# Patient Record
Sex: MALE | Race: WHITE | NOT HISPANIC OR LATINO | Employment: OTHER | URBAN - METROPOLITAN AREA
[De-identification: names, ages, dates, MRNs, and addresses within clinical notes are randomized per-mention and may not be internally consistent; named-entity substitution may affect disease eponyms.]

---

## 2017-01-31 RX ORDER — AMLODIPINE BESYLATE 10 MG/1
10 TABLET ORAL DAILY
COMMUNITY
End: 2017-02-07 | Stop reason: SDUPTHER

## 2017-01-31 RX ORDER — TACROLIMUS 1 MG/1
1 CAPSULE ORAL 2 TIMES DAILY
COMMUNITY
End: 2017-02-07 | Stop reason: SDUPTHER

## 2017-01-31 RX ORDER — PRAVASTATIN SODIUM 40 MG
40 TABLET ORAL DAILY
COMMUNITY
End: 2017-02-07 | Stop reason: SDUPTHER

## 2017-01-31 RX ORDER — INSULIN GLARGINE 100 [IU]/ML
100 INJECTION, SOLUTION SUBCUTANEOUS DAILY
COMMUNITY
End: 2017-02-07 | Stop reason: SDUPTHER

## 2017-01-31 RX ORDER — ISOSORBIDE DINITRATE 30 MG/1
30 TABLET ORAL 4 TIMES DAILY
COMMUNITY
End: 2017-02-07 | Stop reason: SDUPTHER

## 2017-01-31 RX ORDER — FENOFIBRATE 145 MG/1
134 TABLET, COATED ORAL DAILY
COMMUNITY
End: 2017-02-07 | Stop reason: SDUPTHER

## 2017-01-31 RX ORDER — CLOTRIMAZOLE 1 G/ML
SOLUTION TOPICAL 2 TIMES DAILY
COMMUNITY
End: 2017-02-07 | Stop reason: SDUPTHER

## 2017-01-31 RX ORDER — BLOOD SUGAR DIAGNOSTIC
STRIP MISCELLANEOUS
COMMUNITY
End: 2017-02-07 | Stop reason: SDUPTHER

## 2017-01-31 RX ORDER — NEBIVOLOL 20 MG/1
20 TABLET ORAL DAILY
COMMUNITY
End: 2017-02-07 | Stop reason: SDUPTHER

## 2017-01-31 RX ORDER — ASPIRIN 81 MG/1
81 TABLET ORAL DAILY
COMMUNITY
End: 2017-02-07 | Stop reason: SDUPTHER

## 2017-01-31 RX ORDER — VENLAFAXINE HYDROCHLORIDE 150 MG/1
150 CAPSULE, EXTENDED RELEASE ORAL DAILY
COMMUNITY
End: 2017-02-07 | Stop reason: SDUPTHER

## 2017-01-31 RX ORDER — NITROGLYCERIN 0.4 MG/1
0.4 TABLET SUBLINGUAL
COMMUNITY
End: 2017-02-07 | Stop reason: SDUPTHER

## 2017-01-31 RX ORDER — RANOLAZINE 1000 MG/1
1000 TABLET, EXTENDED RELEASE ORAL 2 TIMES DAILY
COMMUNITY
End: 2017-02-07 | Stop reason: SDUPTHER

## 2017-01-31 RX ORDER — MYCOPHENOLIC ACID 360 MG/1
360 TABLET, DELAYED RELEASE ORAL EVERY 12 HOURS SCHEDULED
COMMUNITY
End: 2017-02-07 | Stop reason: SDUPTHER

## 2017-01-31 RX ORDER — LORAZEPAM 0.5 MG/1
0.5 TABLET ORAL EVERY 8 HOURS PRN
COMMUNITY
End: 2017-02-07 | Stop reason: SDUPTHER

## 2017-01-31 RX ORDER — OMEPRAZOLE 40 MG/1
40 CAPSULE, DELAYED RELEASE ORAL DAILY
COMMUNITY
End: 2017-02-07 | Stop reason: SDUPTHER

## 2017-01-31 RX ORDER — PENTOXIFYLLINE 400 MG/1
400 TABLET, EXTENDED RELEASE ORAL
COMMUNITY
End: 2017-02-07 | Stop reason: SDUPTHER

## 2017-01-31 RX ORDER — MAGNESIUM 30 MG
30 TABLET ORAL 2 TIMES DAILY
COMMUNITY
End: 2017-02-07 | Stop reason: SDUPTHER

## 2017-01-31 RX ORDER — CYCLOBENZAPRINE HCL 10 MG
10 TABLET ORAL 3 TIMES DAILY PRN
COMMUNITY
End: 2017-02-07 | Stop reason: SDUPTHER

## 2017-02-01 ENCOUNTER — OFFICE VISIT (OUTPATIENT)
Dept: NEUROSURGERY | Facility: CLINIC | Age: 62
End: 2017-02-01

## 2017-02-01 VITALS
BODY MASS INDEX: 40.5 KG/M2 | TEMPERATURE: 97.2 F | WEIGHT: 299 LBS | DIASTOLIC BLOOD PRESSURE: 78 MMHG | HEIGHT: 72 IN | SYSTOLIC BLOOD PRESSURE: 142 MMHG | HEART RATE: 73 BPM

## 2017-02-01 DIAGNOSIS — M47.812 CERVICAL SPONDYLOSIS WITHOUT MYELOPATHY: ICD-10-CM

## 2017-02-01 DIAGNOSIS — M47.817 LUMBOSACRAL SPONDYLOSIS WITHOUT MYELOPATHY: ICD-10-CM

## 2017-02-01 DIAGNOSIS — G56.01 CARPAL TUNNEL SYNDROME OF RIGHT WRIST: Primary | ICD-10-CM

## 2017-02-01 DIAGNOSIS — M54.50 CHRONIC BILATERAL LOW BACK PAIN WITHOUT SCIATICA: ICD-10-CM

## 2017-02-01 DIAGNOSIS — G89.29 CHRONIC BILATERAL LOW BACK PAIN WITHOUT SCIATICA: ICD-10-CM

## 2017-02-01 DIAGNOSIS — M48.061 SPINAL STENOSIS, LUMBAR REGION, WITHOUT NEUROGENIC CLAUDICATION: ICD-10-CM

## 2017-02-01 DIAGNOSIS — M54.2 NECK PAIN: ICD-10-CM

## 2017-02-01 PROBLEM — M79.602 PAIN IN BOTH UPPER EXTREMITIES: Status: ACTIVE | Noted: 2017-02-01

## 2017-02-01 PROBLEM — M79.601 PAIN IN BOTH UPPER EXTREMITIES: Status: ACTIVE | Noted: 2017-02-01

## 2017-02-01 PROCEDURE — 99204 OFFICE O/P NEW MOD 45 MIN: CPT | Performed by: NEUROLOGICAL SURGERY

## 2017-02-01 NOTE — PROGRESS NOTES
Patient: Mahendra Yates  :  1955  Chart #:  [id number]    Date of Service: 17    Chief Complaint:   Chief Complaint   Patient presents with   • Neck Pain     lower, legs, neck pain       Neck Pain    Chronicity: Patient is new with me today.  He is a pleasant 62 year old male who presents today with low back pain and cervical pain. The current episode started more than 1 year ago (This has been bothersome for the last couple of years now.). The problem occurs intermittently. Progression since onset: Patient states that he has upper extremity pain. The pain is associated with nothing (He has been dropping objects because of the numbness in his upper extremities.). The pain is at a severity of 6/10 (Patient has numbness in his first 3 digits on his hands bilaterally.). The pain is mild (His pain is mild to moderate.). Exacerbated by: He states that when he is ambulatory is when his lumbar region starts hurting. The pain is same all the time (He did have some problem with his gait, however he found to have had a inner ear infection.  He does have trouble when he is ambulatory, but that is due to his small blood vessel disease.). Stiffness is present in the morning. Associated symptoms include chest pain and weakness. He has tried ice, bed rest and acetaminophen (Patient has been to physcial therapy with little relief.) for the symptoms. The treatment provided no relief.     He has numbness in both hands R>L;  The thumb, IF and MF are most affected;  He has intermittent neck and low back pain;  Walking increases axial pain;  His walking is limited by heart disease and angina.      Radiographic Images:   MRI of the cervical spine shows normal alignment of the cervical spine.  He has dessication of all the cervical and upper thoracic disc. He has mild narrowing of the cervical spinal canal.  There is no compression or signal change.  He has a disc herniation and osteophyte at C5-C6 on the right.      Lumbar  "MRI dated 06-15-16 shows he has degenerative disc disease in the upper lumbar region particularly at the L1-L2, L2-L3, L3 and L4.  He has a congential narrow spinal canal.  There is no disc herniation or modic change.    He had an EMG/NCV study of the arms on 1/04/17 and was told there was evidence of CTS.    Past Medical History   Diagnosis Date   • Atherosclerosis    • B12 deficiency    • Chronic headaches    • Coronary artery disease    • Diabetes mellitus    • Gastroesophageal reflux disease    • Hyperlipidemia    • Hypertension    • Sleep apnea      Current Outpatient Prescriptions   Medication Sig Dispense Refill   • amLODIPine (NORVASC) 10 MG tablet Take 10 mg by mouth Daily.     • aspirin 81 MG EC tablet Take 81 mg by mouth Daily.     • clotrimazole (LOTRIMIN) 1 % external solution Apply  topically 2 (Two) Times a Day.     • cyclobenzaprine (FLEXERIL) 10 MG tablet Take 10 mg by mouth 3 (Three) Times a Day As Needed for muscle spasms.     • fenofibrate (TRICOR) 145 MG tablet Take 134 mg by mouth Daily.     • insulin glargine (LANTUS) 100 UNIT/ML injection Inject 100 Units under the skin Daily.     • Insulin Lispro (HUMALOG KWIKPEN) 100 UNIT/ML solution pen-injector Inject  under the skin.     • Insulin Syringe-Needle U-100 (B-D INSULIN SYRINGE) 31G X 5/16\" 0.3 ML misc      • isosorbide dinitrate (ISORDIL) 30 MG tablet Take 30 mg by mouth 4 (Four) Times a Day.     • loratadine-pseudoephedrine (CLARITIN-D 12 HOUR) 5-120 MG per 12 hr tablet Take 1 tablet by mouth 2 (Two) Times a Day.     • LORazepam (ATIVAN) 0.5 MG tablet Take 0.5 mg by mouth Every 8 (Eight) Hours As Needed for anxiety.     • magnesium 30 MG tablet Take 30 mg by mouth 2 (Two) Times a Day.     • Multiple Vitamin (MULTI-VITAMINS PO) Take  by mouth.     • mycophenolate (MYFORTIC) 360 MG tablet delayed-release EC tablet Take 360 mg by mouth Every 12 (Twelve) Hours.     • nebivolol (BYSTOLIC) 20 MG tablet Take 20 mg by mouth Daily.     • " nitroglycerin (NITROSTAT) 0.4 MG SL tablet Place 0.4 mg under the tongue Every 5 (Five) Minutes As Needed for chest pain. Take no more than 3 doses in 15 minutes.     • omeprazole (priLOSEC) 40 MG capsule Take 40 mg by mouth Daily.     • pentoxifylline (TRENtal) 400 MG CR tablet Take 400 mg by mouth 3 (Three) Times a Day With Meals.     • pravastatin (PRAVACHOL) 40 MG tablet Take 40 mg by mouth Daily.     • ranolazine (RANEXA) 1000 MG 12 hr tablet Take 1,000 mg by mouth 2 (Two) Times a Day.     • tacrolimus (PROGRAF) 1 MG capsule Take 1 mg by mouth 2 (Two) Times a Day.     • venlafaxine XR (EFFEXOR-XR) 150 MG 24 hr capsule Take 150 mg by mouth Daily.       No current facility-administered medications for this visit.      Social History     Social History   • Marital status:      Spouse name: N/A   • Number of children: N/A   • Years of education: N/A     Social History Main Topics   • Smoking status: Former Smoker   • Smokeless tobacco: Not on file   • Alcohol use Not on file   • Drug use: Not on file   • Sexual activity: Not on file     Other Topics Concern   • Not on file     Social History Narrative     No family history on file.  Past Surgical History   Procedure Laterality Date   • Liver transplantation     • Coronary artery bypass graft       Review of Systems   Constitutional: Positive for activity change and fatigue.   HENT: Positive for ear discharge, ear pain and tinnitus.    Respiratory: Positive for shortness of breath.    Cardiovascular: Positive for chest pain and leg swelling.   Endocrine: Positive for polydipsia.   Genitourinary: Positive for difficulty urinating.   Musculoskeletal: Positive for arthralgias, back pain, neck pain and neck stiffness.   Allergic/Immunologic: Positive for environmental allergies and immunocompromised state.   Neurological: Positive for weakness and light-headedness.   Psychiatric/Behavioral: Positive for confusion, dysphoric mood and sleep disturbance.   All other  "systems reviewed and are negative.    Vitals:    02/01/17 0918   BP: 142/78   BP Location: Right arm   Patient Position: Sitting   Cuff Size: Adult   Pulse: 73   Temp: 97.2 °F (36.2 °C)   TempSrc: Temporal Artery    Weight: 299 lb (136 kg)   Height: 72\" (182.9 cm)     Physical Exam  Neurologic Exam  Physical Exam   Constitutional: He is oriented to person, place, and time. Vital signs are normal. He appears well-developed and well-nourished. No distress.   Neat obese healthy male    HENT:   Head: Normocephalic.   Right Ear: Hearing normal.   Left Ear: Hearing normal.   Mouth/Throat: Uvula is midline, oropharynx is clear and moist and mucous membranes are normal. He has missing teeth.   Eyes: Conjunctivae, EOM and lids are normal. Pupils are equal, round, and reactive to light.   Fundoscopic exam:  The right eye shows no papilledema.   The left eye shows no papilledema.   Pupils 1 mm; Fundi normal   Neck: Trachea normal and normal range of motion. No thyroid mass present. No Spurling's or L'Hermittes signs.  Cardiovascular: Regular rhythm and normal heart sounds.   No murmur heard.  Pulses:  Carotid pulses are 2+ on the right side, and 2+ on the left side.  Radial pulses are 2+ on the right side, and 2+ on the left side.   Dorsalis pedis pulses are 2+ on the right side, and 2+ on the left side.   Pulmonary/Chest: Effort normal and breath sounds normal.   Musculoskeletal:   Lumbar back: He exhibits pain. He exhibits normal range of motion, no deformity and no spasm.   Mild stiffness; SLR increased low back; Hip ROM normal        Neurologic Exam      Mental Status   Oriented to person, place, and time.   Attention: normal. Concentration: normal.   Speech: speech is normal   Level of consciousness: alert  Knowledge: good and consistent with education.   Normal comprehension.      Cranial Nerves   Cranial nerves II through XII intact.      CN III, IV, VI   Pupils are equal, round, and reactive to light.  Extraocular " motions are normal.      Motor Exam   Muscle bulk: normal  Overall muscle tone: normal  No Pronator Drift    Strength   Strength 5/5 throughout.      Sensory Exam   Light touch normal.   Proprioception normal.      Gait, Coordination, and Reflexes      Gait  Gait: normal     Tremor   Resting tremor: absent  Intention tremor: absent  Action tremor: absent     Reflexes   Right biceps: 1+  Left biceps: +  Right triceps: 2+  Left triceps: 2+  Right patellar: 2+  Left patellar: 2+  Right achilles: 0  Left achilles: 0  No Babinski signs  Right Poe: absent  Left Poe: absent  Right ankle clonus: absent  Left ankle clonus: absent  ELY normal; R handed   Bilateral Tinel's with percussion of the wrists, R > L  R Phalen's sign present     Mahendra was seen today for neck pain.    Diagnoses and all orders for this visit:    Carpal tunnel syndrome of right wrist    Neck pain    Cervical spondylosis without myelopathy    Chronic bilateral low back pain without sciatica    Lumbosacral spondylosis without myelopathy    Spinal stenosis, lumbar region, without neurogenic claudication      Plan: I recommend R carpal tunnel release;  I don't recommend any spine surgery at this time.  I have discussed this with the patient.  I described the operative procedure in detail as well as the indications, alternatives, and expected postoperative course and results. I described the potential risks and complications of surgery in detail. All questions were answered. The patient has indicated understanding of the planned procedure, accepted the risks, and wishes to proceed with surgery. No guarantee of results was given to the patient. The operative permit will be completed prior to surgery.      Luis Hayden MD

## 2017-02-03 ENCOUNTER — PREP FOR SURGERY (OUTPATIENT)
Dept: NEUROSURGERY | Facility: CLINIC | Age: 62
End: 2017-02-03

## 2017-02-03 DIAGNOSIS — G56.01 CARPAL TUNNEL SYNDROME OF RIGHT WRIST: Primary | ICD-10-CM

## 2017-02-07 ENCOUNTER — ANESTHESIA EVENT (OUTPATIENT)
Dept: PERIOP | Facility: HOSPITAL | Age: 62
End: 2017-02-07

## 2017-02-07 ENCOUNTER — APPOINTMENT (OUTPATIENT)
Dept: PREADMISSION TESTING | Facility: HOSPITAL | Age: 62
End: 2017-02-07

## 2017-02-07 VITALS — HEIGHT: 72 IN | BODY MASS INDEX: 40.52 KG/M2 | WEIGHT: 299.16 LBS

## 2017-02-07 LAB
DEPRECATED RDW RBC AUTO: 40.8 FL (ref 37–54)
ERYTHROCYTE [DISTWIDTH] IN BLOOD BY AUTOMATED COUNT: 13.1 % (ref 11.3–14.5)
HCT VFR BLD AUTO: 41.8 % (ref 38.9–50.9)
HGB BLD-MCNC: 14.3 G/DL (ref 13.1–17.5)
MCH RBC QN AUTO: 29.3 PG (ref 27–31)
MCHC RBC AUTO-ENTMCNC: 34.2 G/DL (ref 32–36)
MCV RBC AUTO: 85.7 FL (ref 80–99)
MRSA DNA SPEC QL NAA+PROBE: NEGATIVE
PLATELET # BLD AUTO: 348 10*3/MM3 (ref 150–450)
PMV BLD AUTO: 10.4 FL (ref 6–12)
POTASSIUM BLD-SCNC: 4.2 MMOL/L (ref 3.5–5.5)
RBC # BLD AUTO: 4.88 10*6/MM3 (ref 4.2–5.76)
WBC NRBC COR # BLD: 5.92 10*3/MM3 (ref 3.5–10.8)

## 2017-02-07 PROCEDURE — 85027 COMPLETE CBC AUTOMATED: CPT | Performed by: NEUROLOGICAL SURGERY

## 2017-02-07 PROCEDURE — 93005 ELECTROCARDIOGRAM TRACING: CPT

## 2017-02-07 PROCEDURE — 93010 ELECTROCARDIOGRAM REPORT: CPT | Performed by: INTERNAL MEDICINE

## 2017-02-07 PROCEDURE — 36415 COLL VENOUS BLD VENIPUNCTURE: CPT | Performed by: NEUROLOGICAL SURGERY

## 2017-02-07 PROCEDURE — 84132 ASSAY OF SERUM POTASSIUM: CPT | Performed by: ANESTHESIOLOGY

## 2017-02-07 PROCEDURE — 87641 MR-STAPH DNA AMP PROBE: CPT | Performed by: NEUROLOGICAL SURGERY

## 2017-02-07 RX ORDER — ASPIRIN 81 MG/1
81 TABLET ORAL DAILY
COMMUNITY

## 2017-02-07 RX ORDER — INSULIN GLARGINE 100 [IU]/ML
55 INJECTION, SOLUTION SUBCUTANEOUS 2 TIMES DAILY
COMMUNITY
End: 2021-09-07

## 2017-02-07 RX ORDER — NITROGLYCERIN 0.4 MG/1
0.4 TABLET SUBLINGUAL
COMMUNITY
End: 2022-01-03 | Stop reason: SDUPTHER

## 2017-02-07 RX ORDER — PENTOXIFYLLINE 400 MG/1
400 TABLET, EXTENDED RELEASE ORAL
COMMUNITY
End: 2022-01-07 | Stop reason: SDUPTHER

## 2017-02-07 RX ORDER — MULTIVIT WITH MINERALS/LUTEIN
1000 TABLET ORAL DAILY
COMMUNITY
End: 2020-02-20

## 2017-02-07 RX ORDER — ISOSORBIDE MONONITRATE 30 MG/1
30 TABLET, EXTENDED RELEASE ORAL DAILY
COMMUNITY
End: 2020-02-20

## 2017-02-07 RX ORDER — OMEPRAZOLE 40 MG/1
40 CAPSULE, DELAYED RELEASE ORAL 2 TIMES DAILY
COMMUNITY
End: 2022-01-03 | Stop reason: SDUPTHER

## 2017-02-07 RX ORDER — PRAVASTATIN SODIUM 40 MG
40 TABLET ORAL DAILY
COMMUNITY
End: 2020-02-20

## 2017-02-07 RX ORDER — LORAZEPAM 0.5 MG/1
0.5 TABLET ORAL DAILY
COMMUNITY
End: 2018-12-13

## 2017-02-07 RX ORDER — TACROLIMUS 1 MG/1
1 CAPSULE ORAL 2 TIMES DAILY
COMMUNITY
End: 2022-06-20

## 2017-02-07 RX ORDER — FENOFIBRATE 134 MG/1
134 CAPSULE ORAL EVERY EVENING
COMMUNITY
End: 2022-11-21

## 2017-02-07 RX ORDER — FEXOFENADINE HCL 180 MG/1
180 TABLET ORAL DAILY
COMMUNITY
End: 2018-12-13

## 2017-02-07 RX ORDER — NEBIVOLOL 20 MG/1
20 TABLET ORAL DAILY
COMMUNITY
End: 2022-01-03 | Stop reason: SDUPTHER

## 2017-02-07 RX ORDER — RANOLAZINE 1000 MG/1
1000 TABLET, EXTENDED RELEASE ORAL 2 TIMES DAILY
COMMUNITY
End: 2021-11-17 | Stop reason: ALTCHOICE

## 2017-02-07 RX ORDER — AMLODIPINE BESYLATE 10 MG/1
10 TABLET ORAL DAILY
COMMUNITY
End: 2022-01-07 | Stop reason: SDUPTHER

## 2017-02-07 NOTE — PAT
"Jose Raul hose measurements:  Length: 24  Width: 20  Spoke with Dr. Mendieta re: patient's history/EKG. Patient is \"good\" for surgery  "

## 2017-02-08 ENCOUNTER — RESULTS ENCOUNTER (OUTPATIENT)
Dept: NEUROSURGERY | Facility: CLINIC | Age: 62
End: 2017-02-08

## 2017-02-08 ENCOUNTER — HOSPITAL ENCOUNTER (OUTPATIENT)
Facility: HOSPITAL | Age: 62
Discharge: HOME | End: 2017-02-08
Attending: NEUROLOGICAL SURGERY | Admitting: NEUROLOGICAL SURGERY

## 2017-02-08 ENCOUNTER — ANESTHESIA (OUTPATIENT)
Dept: PERIOP | Facility: HOSPITAL | Age: 62
End: 2017-02-08

## 2017-02-08 VITALS
TEMPERATURE: 97.3 F | HEART RATE: 82 BPM | DIASTOLIC BLOOD PRESSURE: 79 MMHG | WEIGHT: 299 LBS | RESPIRATION RATE: 18 BRPM | BODY MASS INDEX: 40.5 KG/M2 | HEIGHT: 72 IN | OXYGEN SATURATION: 97 % | SYSTOLIC BLOOD PRESSURE: 153 MMHG

## 2017-02-08 DIAGNOSIS — G56.01 CARPAL TUNNEL SYNDROME OF RIGHT WRIST: ICD-10-CM

## 2017-02-08 PROBLEM — G56.00 CARPAL TUNNEL SYNDROME: Status: ACTIVE | Noted: 2017-02-08

## 2017-02-08 LAB — GLUCOSE BLDC GLUCOMTR-MCNC: 226 MG/DL (ref 70–130)

## 2017-02-08 PROCEDURE — 25010000002 DEXAMETHASONE PER 1 MG: Performed by: NURSE ANESTHETIST, CERTIFIED REGISTERED

## 2017-02-08 PROCEDURE — 25010000002 ONDANSETRON PER 1 MG: Performed by: NURSE ANESTHETIST, CERTIFIED REGISTERED

## 2017-02-08 PROCEDURE — 25010000002 PROPOFOL 10 MG/ML EMULSION: Performed by: NURSE ANESTHETIST, CERTIFIED REGISTERED

## 2017-02-08 PROCEDURE — 64721 CARPAL TUNNEL SURGERY: CPT | Performed by: NEUROLOGICAL SURGERY

## 2017-02-08 PROCEDURE — 25010000002 FENTANYL CITRATE (PF) 100 MCG/2ML SOLUTION: Performed by: NURSE ANESTHETIST, CERTIFIED REGISTERED

## 2017-02-08 PROCEDURE — 25010000003 CEFAZOLIN IN DEXTROSE 2-4 GM/100ML-% SOLUTION: Performed by: NEUROLOGICAL SURGERY

## 2017-02-08 PROCEDURE — 82962 GLUCOSE BLOOD TEST: CPT

## 2017-02-08 RX ORDER — DEXAMETHASONE SODIUM PHOSPHATE 10 MG/ML
INJECTION INTRAMUSCULAR; INTRAVENOUS AS NEEDED
Status: DISCONTINUED | OUTPATIENT
Start: 2017-02-08 | End: 2017-02-08 | Stop reason: SURG

## 2017-02-08 RX ORDER — SODIUM CHLORIDE 0.9 % (FLUSH) 0.9 %
1-10 SYRINGE (ML) INJECTION AS NEEDED
Status: DISCONTINUED | OUTPATIENT
Start: 2017-02-08 | End: 2017-02-08 | Stop reason: HOSPADM

## 2017-02-08 RX ORDER — ONDANSETRON 2 MG/ML
INJECTION INTRAMUSCULAR; INTRAVENOUS AS NEEDED
Status: DISCONTINUED | OUTPATIENT
Start: 2017-02-08 | End: 2017-02-08 | Stop reason: SURG

## 2017-02-08 RX ORDER — TRAMADOL HYDROCHLORIDE 50 MG/1
50 TABLET ORAL EVERY 6 HOURS PRN
Qty: 60 TABLET | Refills: 2 | Status: SHIPPED | OUTPATIENT
Start: 2017-02-08 | End: 2018-12-13

## 2017-02-08 RX ORDER — PROPOFOL 10 MG/ML
VIAL (ML) INTRAVENOUS CONTINUOUS PRN
Status: DISCONTINUED | OUTPATIENT
Start: 2017-02-08 | End: 2017-02-08 | Stop reason: SURG

## 2017-02-08 RX ORDER — PROPOFOL 10 MG/ML
VIAL (ML) INTRAVENOUS AS NEEDED
Status: DISCONTINUED | OUTPATIENT
Start: 2017-02-08 | End: 2017-02-08 | Stop reason: SURG

## 2017-02-08 RX ORDER — SODIUM CHLORIDE, SODIUM LACTATE, POTASSIUM CHLORIDE, CALCIUM CHLORIDE 600; 310; 30; 20 MG/100ML; MG/100ML; MG/100ML; MG/100ML
9 INJECTION, SOLUTION INTRAVENOUS CONTINUOUS
Status: DISCONTINUED | OUTPATIENT
Start: 2017-02-08 | End: 2017-02-08 | Stop reason: HOSPADM

## 2017-02-08 RX ORDER — LIDOCAINE HYDROCHLORIDE 10 MG/ML
1 INJECTION, SOLUTION EPIDURAL; INFILTRATION; INTRACAUDAL; PERINEURAL ONCE
Status: DISCONTINUED | OUTPATIENT
Start: 2017-02-08 | End: 2017-02-08 | Stop reason: HOSPADM

## 2017-02-08 RX ORDER — MAGNESIUM HYDROXIDE 1200 MG/15ML
LIQUID ORAL AS NEEDED
Status: DISCONTINUED | OUTPATIENT
Start: 2017-02-08 | End: 2017-02-08 | Stop reason: HOSPADM

## 2017-02-08 RX ORDER — CEFAZOLIN SODIUM 2 G/100ML
2 INJECTION, SOLUTION INTRAVENOUS ONCE
Status: COMPLETED | OUTPATIENT
Start: 2017-02-08 | End: 2017-02-08

## 2017-02-08 RX ORDER — FENTANYL CITRATE 50 UG/ML
INJECTION, SOLUTION INTRAMUSCULAR; INTRAVENOUS AS NEEDED
Status: DISCONTINUED | OUTPATIENT
Start: 2017-02-08 | End: 2017-02-08 | Stop reason: SURG

## 2017-02-08 RX ORDER — HYDROMORPHONE HYDROCHLORIDE 1 MG/ML
0.5 INJECTION, SOLUTION INTRAMUSCULAR; INTRAVENOUS; SUBCUTANEOUS
Status: DISCONTINUED | OUTPATIENT
Start: 2017-02-08 | End: 2017-02-08 | Stop reason: HOSPADM

## 2017-02-08 RX ORDER — FAMOTIDINE 10 MG/ML
20 INJECTION, SOLUTION INTRAVENOUS ONCE
Status: DISCONTINUED | OUTPATIENT
Start: 2017-02-08 | End: 2017-02-08

## 2017-02-08 RX ORDER — FAMOTIDINE 20 MG/1
20 TABLET, FILM COATED ORAL ONCE
Status: DISCONTINUED | OUTPATIENT
Start: 2017-02-08 | End: 2017-02-08 | Stop reason: HOSPADM

## 2017-02-08 RX ORDER — FAMOTIDINE 10 MG/ML
20 INJECTION, SOLUTION INTRAVENOUS ONCE
Status: DISCONTINUED | OUTPATIENT
Start: 2017-02-08 | End: 2017-02-08 | Stop reason: HOSPADM

## 2017-02-08 RX ORDER — LIDOCAINE HYDROCHLORIDE 10 MG/ML
INJECTION, SOLUTION INFILTRATION; PERINEURAL AS NEEDED
Status: DISCONTINUED | OUTPATIENT
Start: 2017-02-08 | End: 2017-02-08 | Stop reason: HOSPADM

## 2017-02-08 RX ORDER — FENTANYL CITRATE 50 UG/ML
50 INJECTION, SOLUTION INTRAMUSCULAR; INTRAVENOUS
Status: DISCONTINUED | OUTPATIENT
Start: 2017-02-08 | End: 2017-02-08 | Stop reason: HOSPADM

## 2017-02-08 RX ADMIN — PROPOFOL 75 MG: 10 INJECTION, EMULSION INTRAVENOUS at 15:47

## 2017-02-08 RX ADMIN — CEFAZOLIN SODIUM 2 G: 2 INJECTION, SOLUTION INTRAVENOUS at 15:42

## 2017-02-08 RX ADMIN — FENTANYL CITRATE 50 MCG: 50 INJECTION, SOLUTION INTRAMUSCULAR; INTRAVENOUS at 16:03

## 2017-02-08 RX ADMIN — DEXAMETHASONE SODIUM PHOSPHATE 4 MG: 10 INJECTION INTRAMUSCULAR; INTRAVENOUS at 16:18

## 2017-02-08 RX ADMIN — PROPOFOL 100 MCG/KG/MIN: 10 INJECTION, EMULSION INTRAVENOUS at 15:47

## 2017-02-08 RX ADMIN — ONDANSETRON 4 MG: 2 INJECTION INTRAMUSCULAR; INTRAVENOUS at 16:18

## 2017-02-08 RX ADMIN — FENTANYL CITRATE 50 MCG: 50 INJECTION, SOLUTION INTRAMUSCULAR; INTRAVENOUS at 16:15

## 2017-02-08 NOTE — PLAN OF CARE
Problem: Perioperative Period (Adult)  Goal: Signs and Symptoms of Listed Potential Problems Will be Absent or Manageable (Perioperative Period)  Outcome: Ongoing (interventions implemented as appropriate)    02/08/17 1356   Perioperative Period   Problems Assessed (Perioperative Period) all   Problems Present (Perioperative Period) none         02/08/17 1356   Perioperative Period   Problems Assessed (Perioperative Period) all   Problems Present (Perioperative Period) none

## 2017-02-08 NOTE — BRIEF OP NOTE
CARPAL TUNNEL RELEASE  Procedure Note    Mahendra Yates  2/8/2017    Pre-op Diagnosis:   R carpal tunnel syndrome    Post-op Diagnosis:     HECTOR    Procedure/CPT® Codes:      Procedure(s):  CARPAL TUNNEL RELEASE RIGHT     Surgeon(s):  Luis Hayden MD    Anesthesia: Regional    Staff:   Circulator: Pattie CLAUDIO RN; Irais Cisneros RN  Scrub Person: Analy Grewal    Estimated Blood Loss: * No values recorded between 2/8/2017  3:41 PM and 2/8/2017  4:35 PM *  0 ml    Specimens:                * No specimens in log *      Drains:           Findings: thick transverse carpal ligament    Complications: none      Luis Hayden MD     Date: 2/8/2017  Time: 4:46 PM

## 2017-02-08 NOTE — PLAN OF CARE
Problem: Patient Care Overview (Adult)  Goal: Plan of Care Review  Outcome: Ongoing (interventions implemented as appropriate)    02/08/17 1305   Coping/Psychosocial Response Interventions   Plan Of Care Reviewed With patient   Patient Care Overview   Progress improving

## 2017-02-08 NOTE — H&P
CHIEF COMPLAINT: Numbness in the hands.     HISTORY OF PRESENT ILLNESS: This 62-year-old gentleman has a history of numbness in both hands. The right is more affected than the left. The numbness is in the thumb, index finger, and middle finger mostly. He has had some intermittent neck and back pain.     The patient has had an EMG/NCV study with evidence of carpal tunnel syndrome.     PAST MEDICAL HISTORY:  1.  This patient has a history of neck and low back pain also.   2.  He has a history of atherosclerosis.   3.  B12 deficiency.   4.  Chronic headaches.   5.  Coronary artery disease.   6.  Diabetes mellitus.   7.  Gastroesophageal reflux disease.   8.  Hyperlipidemia.   9.  Hypertension.    10.  Sleep apnea.     CURRENT MEDICATIONS:  1.  Norvasc 10 mg daily.   2.  Aspirin 81 mg daily.   3.  Lotrimin solution.    4.  Flexeril 10 mg 3 times daily as needed.   5.  TriCor 145 mg daily.   6.  Lantus insulin.   7.  Humalog insulin.   8.  Isosorbide dinitrate 30 mg 4 times daily.   9.  Claritin-D b.i.d.   10.  Ativan 0.5 mg q.8 h. p.r.n.   11.  Magnesium 30 mg b.i.d.   12.  Multivitamin daily.   13.  Myfortic 360 mg q.12 h.   14.  Bystolic 20 mg daily.   15.  Nitroglycerin p.r.n.   16.  Prilosec 40 mg daily.   17.  Trental 400 mg t.i.d.   18.  Pravastatin 40 mg daily.   19.  Ranexa 1000 mg b.i.d.   20.  Prograf 1 mg twice daily.    21.  Effexor- mg daily.     ALLERGIES:  1.  IV CONTRAST DYE.    2.  PENICILLIN.    3.  SULFASALAZINE.     PAST SURGICAL HISTORY:  The patient has had liver transplantation and coronary artery bypass graft surgery.     SOCIAL HISTORY: The patient is . He is a former cigarette smoker.     FAMILY HISTORY: There is no history of hereditary or congenital disease.     REVIEW OF SYSTEMS: The patient denies a history of recent chest pain or heart attack, stroke, seizures, cancer, chronic pulmonary disease, kidney disease, psychiatric disease or a history of bleeding disorder.      PHYSICAL EXAMINATION:  VITAL SIGNS: The patient is 6 feet tall, weighs 299 pounds. Temperature is 97.2°F, blood pressure is 142/78, pulse is 73.   GENERAL: The patient is neat, obese, healthy male. He is in no acute distress.   HEENT: Unremarkable, except for multiple missing teeth.   NECK: Normal range of motion. There were no palpable neck masses. There were no Spurlings or _____ signs.  CHEST: Symmetric with normal expansion on inspiration.   LUNGS: Clear to auscultation in all fields.   HEART: Regular rhythm with normal S1 and S2 sounds. No murmur was detected.   ABDOMEN:  Obese and nontender. Bowel sounds are present.   BACK: Normal range of motion with pain at the extremes. There was no deformity or spasm. He had mild stiffness.   NEUROLOGIC: Mental status, speech, and language were normal. Cranial nerves 2-12 are intact. Motor testing showed normal strength and tone in upper and lower extremities. Sensation to light touch and joint position were normal. The patient's gait was normal. Balance and coordination were intact. Deep tendon reflexes were diminished at the biceps, normal at the triceps, normal at the knees and absent at the ankles. There were no Babinski signs. There were no Ge sign or ankle clonus. Rapid alternating movements were done well with both hands. The patient is right-handed. He had bilateral Tinels sign with percussion of the wrists right more than left. He had a right Phalen sign. Straight leg raising increased low back pain. Hip range of motion was normal.     IMPRESSION: Bilateral carpal tunnel syndrome, right greater than left.     PLAN: The patient is admitted for right carpal tunnel release in an attempt to relieve symptoms of carpal tunnel syndrome in the right hand.         Luis Hayden MD  BAS/tls  DD: 02/08/2017 07:50:05  DT: 02/08/2017 08:55:14  Voice Rec. ID #[id number]  Voice Original ID #[id number]  Doc ID #[id number]  Rev. #1 (mrn)  cc:

## 2017-02-08 NOTE — PLAN OF CARE
Problem: Patient Care Overview (Adult)  Goal: Adult Individualization and Mutuality  Outcome: Ongoing (interventions implemented as appropriate)    02/08/17 1305   Individualization   Patient Specific Preferences none   Patient Specific Goals none   Patient Specific Interventions none   Mutuality/Individual Preferences   What Anxieties, Fears or Concerns Do You Have About Your Health or Care? none   What Questions Do You Have About Your Health or Care? none   What Information Would Help Us Give You More Personalized Care? none

## 2017-02-09 NOTE — OP NOTE
DATE OF PROCEDURE:  02/08/2017    PREOPERATIVE DIAGNOSIS: Right carpal tunnel syndrome.    POSTOPERATIVE DIAGNOSIS: Right carpal tunnel syndrome.     PROCEDURE PERFORMED: Right carpal tunnel release.     SURGEON: Luis Hayden MD     INDICATIONS: This 62-year-old gentleman has symptoms of carpal tunnel syndrome in the right hand. He presents for operative decompression of the median nerve at the wrist in an attempt to relieve symptoms of carpal tunnel syndrome.     DESCRIPTION OF PROCEDURE: The patient was brought into the operating suite and in the supine position a [place] block regional anesthetic was applied to the right arm. Additional sedation was also used. Then, the right arm was prepared with a chlorhexidine skin preparation. The right arm was draped in a sterile fashion. The wrist was gently extended over a rolled towel. Then, a 2.5 cm incision was made on the palm of the hand starting at the wrist crease and extending between the thenar and hypothenar muscles. A self-retaining Weitlaner retractor was placed. Potential bleeding spots were cauterized with the bipolar. Dissection was continued with the #15 blade down to the transverse carpal ligament. The ligament was incised in its midportion and the median nerve identified. Then, a plane was dissected between ligament and nerve both proximally and distally. The Listen Edition dental instrument was used. Then, the skin of the palm was elevated with a Angélica rake and the ligament was split to its distal extent using curved Aburto scissors. The cut ends of the ligament were visualized and the nerve was surrounded by palmar fat. Then, the skin of the wrist was elevated with the Angélica rake. The ligament was split to its proximal extent using the Aburto scissors. A [place] 4 dissector was passed over the nerve under the skin of the wrist and no further constricting bands were identified. The wound was inspected once again and potential bleeding spots cauterized with the  bipolar. The retractor was removed. The incision was closed with a combination of vertical mattress stitches and simple stitches of 3-0 nylon. A Telfa, dressing sponge, Kerlix, and Iliana wrap was applied to the hand. The tourniquet was deflated. The patient was allowed to awaken from sedation. He was transferred in stable condition to the recovery room. He tolerated surgery well. There was negligible blood loss. No blood products were transfused intraoperatively.      MD RONNIE Munoz/marilynn  DD: 02/08/2017 16:56:51  DT: 02/08/2017 20:40:28  Voice Rec. ID #[id number]  Voice Original ID #[id number]  Doc ID #[id number]  Rev. #0  cc:

## 2017-02-22 ENCOUNTER — OFFICE VISIT (OUTPATIENT)
Dept: NEUROSURGERY | Facility: CLINIC | Age: 62
End: 2017-02-22

## 2017-02-22 VITALS
BODY MASS INDEX: 40.77 KG/M2 | DIASTOLIC BLOOD PRESSURE: 64 MMHG | WEIGHT: 301 LBS | HEIGHT: 72 IN | SYSTOLIC BLOOD PRESSURE: 142 MMHG | TEMPERATURE: 97.2 F

## 2017-02-22 DIAGNOSIS — G56.03 BILATERAL CARPAL TUNNEL SYNDROME: Primary | ICD-10-CM

## 2017-02-22 PROCEDURE — 99024 POSTOP FOLLOW-UP VISIT: CPT | Performed by: PHYSICIAN ASSISTANT

## 2017-02-22 NOTE — PROGRESS NOTES
Patient: Mahendra Yates  :  1955  Chart #:  [id number]    Date of Service: 17    Chief Complaint:   Chief Complaint   Patient presents with   • Post-op       HPI    Radiographic Images:   ***    Past Medical History   Diagnosis Date   • Arthritis    • Atherosclerosis    • B12 deficiency    • Cancer    • Cancer of liver    • Chronic headaches    • Coronary artery disease    • Diabetes mellitus    • Gastroesophageal reflux disease    • History of transfusion    • Hyperlipidemia    • Hypertension    • Sleep apnea      Current Outpatient Prescriptions   Medication Sig Dispense Refill   • amLODIPine (NORVASC) 10 MG tablet Take 10 mg by mouth Daily.     • aspirin 81 MG EC tablet Take 81 mg by mouth Daily. Continues per doctors orders     • CYCLOBENZAPRINE HCL PO Take 10 mg by mouth 3 (Three) Times a Day.     • fenofibrate micronized (LOFIBRA) 134 MG capsule Take 134 mg by mouth Every Morning Before Breakfast.     • fexofenadine (ALLEGRA) 180 MG tablet Take 180 mg by mouth Daily.     • insulin aspart (novoLOG) 100 UNIT/ML injection Inject  under the skin 3 (Three) Times a Day Before Meals. SSI     • insulin glargine (LANTUS) 100 UNIT/ML injection Inject 55 Units under the skin 2 (Two) Times a Day.     • isosorbide mononitrate (IMDUR) 30 MG 24 hr tablet Take 30 mg by mouth Daily.     • LORazepam (ATIVAN) 0.5 MG tablet Take 0.5 mg by mouth Daily.     • Magnesium 80 MG tablet Take 80 mg by mouth 2 (Two) Times a Day.     • Multiple Vitamins-Minerals (MULTIVITAMIN ADULT PO) Take 1 tablet by mouth Daily.     • Mycophenolate Sodium (MYFORTIC PO) Take 720 mg by mouth 2 (Two) Times a Day.     • nebivolol (BYSTOLIC) 20 MG tablet Take 20 mg by mouth Daily.     • nitroglycerin (NITROSTAT) 0.4 MG SL tablet Place 0.4 mg under the tongue Every 5 (Five) Minutes As Needed for chest pain. Take no more than 3 doses in 15 minutes.     • omeprazole (priLOSEC) 40 MG capsule Take 40 mg by mouth 2 (Two) Times a Day.     •  pentoxifylline (TRENtal) 400 MG CR tablet Take 400 mg by mouth 3 (Three) Times a Day With Meals.     • pravastatin (PRAVACHOL) 40 MG tablet Take 40 mg by mouth Daily.     • ranolazine (RANEXA) 1000 MG 12 hr tablet Take 1,000 mg by mouth 2 (Two) Times a Day.     • Tacrolimus (PROGRAF PO) Take 2 mg by mouth Every Evening.     • tacrolimus (PROGRAF) 1 MG capsule Take 1 mg by mouth Every Morning.     • traMADol (ULTRAM) 50 MG tablet Take 1 tablet by mouth Every 6 (Six) Hours As Needed for moderate pain (4-6). 60 tablet 2   • Venlafaxine HCl (EFFEXOR XR PO) Take 225 mg by mouth Daily.     • vitamin E 1000 UNIT capsule Take 1,000 Units by mouth Daily.       No current facility-administered medications for this visit.       Allergies   Allergen Reactions   • Contrast Dye Other (See Comments)     Cardiac Arrest   • Penicillins Rash   • Sulfasalazine Rash     Social History     Social History   • Marital status:      Spouse name: N/A   • Number of children: N/A   • Years of education: N/A     Social History Main Topics   • Smoking status: Former Smoker     Packs/day: 4.00     Years: 30.00     Types: Cigarettes     Quit date: 2/7/1991   • Smokeless tobacco: Never Used   • Alcohol use No   • Drug use: No   • Sexual activity: Not on file     Other Topics Concern   • Not on file     Social History Narrative     No family history on file.  Past Surgical History   Procedure Laterality Date   • Liver transplantation     • Coronary artery bypass graft     • Angioplasty     • Coronary stent placement       x2   • Cholecystectomy     • Cardiac surgery     • Tumor removal Right      axilla   • Carpal tunnel release Right 2/8/2017     Procedure: CARPAL TUNNEL RELEASE RIGHT ;  Surgeon: Luis Hayden MD;  Location: [facility];  Service:      Review of Systems   Musculoskeletal: Positive for arthralgias, back pain, myalgias, neck pain and neck stiffness.   Allergic/Immunologic: Positive for environmental allergies.   Neurological:  "Positive for dizziness and weakness.   Psychiatric/Behavioral: Positive for dysphoric mood and sleep disturbance.   All other systems reviewed and are negative.    Vitals:    02/22/17 1316   BP: 142/64   BP Location: Right arm   Patient Position: Sitting   Cuff Size: Adult   Temp: 97.2 °F (36.2 °C)   TempSrc: Temporal Artery    Weight: (!) 301 lb (137 kg)   Height: 72\" (182.9 cm)     Physical Exam  Neurologic Exam    There are no diagnoses linked to this encounter.    Plan: ***    Melissa Dunbar MA  "

## 2017-02-22 NOTE — PROGRESS NOTES
PT NAME: Mahendra Yates   : 1955   Date of Service: 2017       CC: Suture removal       HPI:   Mr. Yates  is a 59-year-old gentleman who is seen today in follow-up status post right carpal tunnel release for right carpal tunnel syndrome on 2017.  He has bilateral carpal tunnel disease.  His right hand is worse than his left.  Prior to surgery, he complained of numbness in both hands.  Numbness was in the thumb, index finger, and middle finger, mostly.  He also has had some intermittent neck and back pain.  EMG/NCV study showed evidence of carpal tunnel syndrome.    Today, Mr. Yates states that he has not noticed much of a difference in his preoperative symptoms.  He has not had any difficulty with his surgical incision.  He denies any new neurologic complaints.      Review of Systems   Constitutional: Positive for fatigue.   HENT: Positive for ear discharge and ear pain.    Musculoskeletal: Positive for arthralgias, back pain, myalgias and neck pain.   Allergic/Immunologic: Positive for environmental allergies.   Neurological: Positive for dizziness and weakness.   Psychiatric/Behavioral: Positive for sleep disturbance.       PHYSICAL EXAM:  Vitals:    17 1316   BP: 142/64   Temp: 97.2 °F (36.2 °C)     Examination of his surgical incision reveals it to be intact and healing.  There is no evidence of erythema, edema, or induration.  His incision was prepped with alcohol and sutures were removed without difficulty.  There was no dehiscence or drainage noted from the incision following suture removal.  He demonstrates fairly good wrist, hand and digit range of motion.  Motor strength is grossly intact.      PLAN: Activity restrictions as well as exercise and range of motion were discussed with Mr. Soto today.  Wound care and signs of infection were also discussed.  He will contact our office when he is ready to undergo left carpal tunnel release.        Harriet Mace,  MARIA INES

## 2017-04-11 ENCOUNTER — TELEPHONE (OUTPATIENT)
Dept: NEUROSURGERY | Facility: CLINIC | Age: 62
End: 2017-04-11

## 2017-04-11 NOTE — TELEPHONE ENCOUNTER
Patient's spouse aware, she stated she would call Dr. Hayden's  to try and get in with Corinna Mace when Dr. Hayden is in office regarding the issues.

## 2017-04-11 NOTE — TELEPHONE ENCOUNTER
Provider:  Catracho  Caller: Patient's wife'  Time of call:   2:04  Phone #:  [phone]  Surgery:  R-CTR  Surgery Date:  02/08/17  Last visit:   02/22/17  Next visit: None    JOSY:         Reason for call:     Extreme pain in the right hand.      When did it start: After surgery.    Where is it located: Right wrist.    How long has this been going on/is this new or the same as before surgery: Yes, it is the same as before surgery.    Characteristics of symptom/severity: His hand is still swelling.    Timing- Is it constant or intermittent: Constant    What makes it worse: Using the hand.    What makes it better: Ultram and Naproxen    What therapies/medications have you tried: Naproxen ( He has had a liver transplant.)

## 2017-04-11 NOTE — TELEPHONE ENCOUNTER
Called number in last message and in database. No answer. Left message.  Will have him come in to be seen if he is having issues.  Has not been examined since the end of Feb 2017

## 2017-05-01 ENCOUNTER — OFFICE VISIT (OUTPATIENT)
Dept: NEUROSURGERY | Facility: CLINIC | Age: 62
End: 2017-05-01

## 2017-05-01 DIAGNOSIS — Z98.890 S/P CARPAL TUNNEL RELEASE: Primary | ICD-10-CM

## 2017-05-01 PROCEDURE — 99024 POSTOP FOLLOW-UP VISIT: CPT | Performed by: NEUROLOGICAL SURGERY

## 2017-05-01 RX ORDER — GABAPENTIN 300 MG/1
300 CAPSULE ORAL 3 TIMES DAILY
Qty: 90 CAPSULE | Refills: 3 | Status: SHIPPED | OUTPATIENT
Start: 2017-05-01 | End: 2018-12-13

## 2017-05-01 RX ORDER — NAPROXEN 500 MG/1
500 TABLET ORAL 2 TIMES DAILY WITH MEALS
Qty: 60 TABLET | Refills: 3 | Status: SHIPPED | OUTPATIENT
Start: 2017-05-01 | End: 2020-02-20

## 2017-05-30 ENCOUNTER — OFFICE VISIT (OUTPATIENT)
Dept: NEUROSURGERY | Facility: CLINIC | Age: 62
End: 2017-05-30

## 2017-05-30 VITALS
BODY MASS INDEX: 40.77 KG/M2 | SYSTOLIC BLOOD PRESSURE: 138 MMHG | HEIGHT: 72 IN | TEMPERATURE: 97.2 F | DIASTOLIC BLOOD PRESSURE: 64 MMHG | WEIGHT: 301 LBS

## 2017-05-30 DIAGNOSIS — G56.01 CARPAL TUNNEL SYNDROME OF RIGHT WRIST: ICD-10-CM

## 2017-05-30 DIAGNOSIS — G56.03 BILATERAL CARPAL TUNNEL SYNDROME: ICD-10-CM

## 2017-05-30 DIAGNOSIS — Z98.890 S/P CARPAL TUNNEL RELEASE: Primary | ICD-10-CM

## 2017-05-30 DIAGNOSIS — M54.50 CHRONIC BILATERAL LOW BACK PAIN WITHOUT SCIATICA: ICD-10-CM

## 2017-05-30 DIAGNOSIS — G89.29 CHRONIC BILATERAL LOW BACK PAIN WITHOUT SCIATICA: ICD-10-CM

## 2017-05-30 DIAGNOSIS — M47.817 LUMBOSACRAL SPONDYLOSIS WITHOUT MYELOPATHY: ICD-10-CM

## 2017-05-30 PROCEDURE — 99213 OFFICE O/P EST LOW 20 MIN: CPT | Performed by: NEUROLOGICAL SURGERY

## 2017-08-30 ENCOUNTER — OFFICE VISIT (OUTPATIENT)
Dept: NEUROSURGERY | Facility: CLINIC | Age: 62
End: 2017-08-30

## 2017-08-30 VITALS
WEIGHT: 299 LBS | DIASTOLIC BLOOD PRESSURE: 64 MMHG | BODY MASS INDEX: 40.5 KG/M2 | OXYGEN SATURATION: 95 % | HEART RATE: 67 BPM | TEMPERATURE: 97.7 F | SYSTOLIC BLOOD PRESSURE: 128 MMHG | HEIGHT: 72 IN

## 2017-08-30 DIAGNOSIS — M54.50 CHRONIC BILATERAL LOW BACK PAIN WITHOUT SCIATICA: ICD-10-CM

## 2017-08-30 DIAGNOSIS — Z98.890 S/P CARPAL TUNNEL RELEASE: Primary | ICD-10-CM

## 2017-08-30 DIAGNOSIS — G89.29 CHRONIC BILATERAL LOW BACK PAIN WITHOUT SCIATICA: ICD-10-CM

## 2017-08-30 DIAGNOSIS — M47.812 CERVICAL SPONDYLOSIS WITHOUT MYELOPATHY: ICD-10-CM

## 2017-08-30 DIAGNOSIS — M48.061 SPINAL STENOSIS, LUMBAR REGION, WITHOUT NEUROGENIC CLAUDICATION: ICD-10-CM

## 2017-08-30 DIAGNOSIS — G56.03 BILATERAL CARPAL TUNNEL SYNDROME: ICD-10-CM

## 2017-08-30 DIAGNOSIS — M47.817 LUMBOSACRAL SPONDYLOSIS WITHOUT MYELOPATHY: ICD-10-CM

## 2017-08-30 DIAGNOSIS — M54.2 NECK PAIN: ICD-10-CM

## 2017-08-30 DIAGNOSIS — G56.01 CARPAL TUNNEL SYNDROME OF RIGHT WRIST: ICD-10-CM

## 2017-08-30 PROCEDURE — 99212 OFFICE O/P EST SF 10 MIN: CPT | Performed by: NEUROLOGICAL SURGERY

## 2017-08-30 NOTE — PROGRESS NOTES
Patient: Mahendra Yates  :  1955  Chart #:  [id number]    Date of Service: 17    Chief Complaint:   Chief Complaint   Patient presents with   • Carpal Tunnel     release        Carpal Tunnel   Chronicity: Patient returns today for a follow-up on his right carpal tunnel release. The current episode started more than 1 month ago. The problem occurs intermittently. The problem has been gradually improving. Pertinent negatives include no abdominal pain, arthralgias, chest pain, chills, congestion, coughing, diaphoresis, fatigue, fever, headaches, joint swelling, myalgias, nausea, neck pain, numbness, rash, sore throat, vomiting or weakness. Nothing (Patient states that the pain is no longer waking him up now.) aggravates the symptoms. He has tried NSAIDs, ice and rest for the symptoms. The treatment provided moderate relief.     He had R CTR 17.  He has no numbness in the hand.  He has some pain proximal to the metacarpal head of the R MF and there is still some tenderness to the carpal tunnel incision.    Radiographic Images:   none    Past Medical History:   Diagnosis Date   • Arthritis    • Atherosclerosis    • B12 deficiency    • Cancer    • Cancer of liver    • Chronic headaches    • Coronary artery disease    • Diabetes mellitus    • Gastroesophageal reflux disease    • History of transfusion    • Hyperlipidemia    • Hypertension    • Sleep apnea      Current Outpatient Prescriptions   Medication Sig Dispense Refill   • amLODIPine (NORVASC) 10 MG tablet Take 10 mg by mouth Daily.     • aspirin 81 MG EC tablet Take 81 mg by mouth Daily. Continues per doctors orders     • CYCLOBENZAPRINE HCL PO Take 10 mg by mouth 3 (Three) Times a Day.     • fenofibrate micronized (LOFIBRA) 134 MG capsule Take 134 mg by mouth Every Morning Before Breakfast.     • fexofenadine (ALLEGRA) 180 MG tablet Take 180 mg by mouth Daily.     • gabapentin (NEURONTIN) 300 MG capsule Take 1 capsule by mouth 3 (Three) Times a  Day. 90 capsule 3   • insulin aspart (novoLOG) 100 UNIT/ML injection Inject  under the skin 3 (Three) Times a Day Before Meals. SSI     • insulin glargine (LANTUS) 100 UNIT/ML injection Inject 55 Units under the skin 2 (Two) Times a Day.     • isosorbide mononitrate (IMDUR) 30 MG 24 hr tablet Take 30 mg by mouth Daily.     • LORazepam (ATIVAN) 0.5 MG tablet Take 0.5 mg by mouth Daily.     • Magnesium 80 MG tablet Take 80 mg by mouth 2 (Two) Times a Day.     • Multiple Vitamins-Minerals (MULTIVITAMIN ADULT PO) Take 1 tablet by mouth Daily.     • Mycophenolate Sodium (MYFORTIC PO) Take 720 mg by mouth 2 (Two) Times a Day.     • naproxen (NAPROSYN) 500 MG tablet Take 1 tablet by mouth 2 (Two) Times a Day With Meals. With food 60 tablet 3   • nebivolol (BYSTOLIC) 20 MG tablet Take 20 mg by mouth Daily.     • nitroglycerin (NITROSTAT) 0.4 MG SL tablet Place 0.4 mg under the tongue Every 5 (Five) Minutes As Needed for chest pain. Take no more than 3 doses in 15 minutes.     • omeprazole (priLOSEC) 40 MG capsule Take 40 mg by mouth 2 (Two) Times a Day.     • pentoxifylline (TRENtal) 400 MG CR tablet Take 400 mg by mouth 3 (Three) Times a Day With Meals.     • pravastatin (PRAVACHOL) 40 MG tablet Take 40 mg by mouth Daily.     • ranolazine (RANEXA) 1000 MG 12 hr tablet Take 1,000 mg by mouth 2 (Two) Times a Day.     • Tacrolimus (PROGRAF PO) Take 2 mg by mouth Every Evening.     • tacrolimus (PROGRAF) 1 MG capsule Take 1 mg by mouth Every Morning.     • traMADol (ULTRAM) 50 MG tablet Take 1 tablet by mouth Every 6 (Six) Hours As Needed for moderate pain (4-6). 60 tablet 2   • Venlafaxine HCl (EFFEXOR XR PO) Take 225 mg by mouth Daily.     • vitamin E 1000 UNIT capsule Take 1,000 Units by mouth Daily.       No current facility-administered medications for this visit.       Allergies   Allergen Reactions   • Contrast Dye Other (See Comments)     Cardiac Arrest   • Penicillins Rash   • Sulfasalazine Rash     Social History      Social History   • Marital status:      Spouse name: N/A   • Number of children: N/A   • Years of education: N/A     Social History Main Topics   • Smoking status: Former Smoker     Packs/day: 4.00     Years: 30.00     Types: Cigarettes     Quit date: 2/7/1991   • Smokeless tobacco: Never Used   • Alcohol use No   • Drug use: No   • Sexual activity: Defer     Other Topics Concern   • None     Social History Narrative     History reviewed. No pertinent family history.  Past Surgical History:   Procedure Laterality Date   • ANGIOPLASTY     • CARDIAC SURGERY     • CARPAL TUNNEL RELEASE Right 2/8/2017    Procedure: CARPAL TUNNEL RELEASE RIGHT ;  Surgeon: Luis Hayden MD;  Location: [facility];  Service:    • CHOLECYSTECTOMY     • CORONARY ARTERY BYPASS GRAFT     • CORONARY STENT PLACEMENT      x2   • LIVER TRANSPLANTATION     • TUMOR REMOVAL Right     axilla     Review of Systems   Constitutional: Negative for activity change, appetite change, chills, diaphoresis, fatigue, fever and unexpected weight change.   HENT: Negative.  Negative for congestion, dental problem, drooling, ear discharge, ear pain, facial swelling, hearing loss, mouth sores, nosebleeds, postnasal drip, rhinorrhea, sinus pressure, sneezing, sore throat, tinnitus, trouble swallowing and voice change.    Eyes: Negative.  Negative for photophobia, pain, discharge, redness, itching and visual disturbance.   Respiratory: Negative.  Negative for apnea, cough, choking, chest tightness, shortness of breath, wheezing and stridor.    Cardiovascular: Negative for chest pain, palpitations and leg swelling.   Gastrointestinal: Negative.  Negative for abdominal distention, abdominal pain, anal bleeding, blood in stool, constipation, diarrhea, nausea, rectal pain and vomiting.   Endocrine: Negative.  Negative for cold intolerance, heat intolerance, polydipsia, polyphagia and polyuria.   Genitourinary: Negative for decreased urine volume, difficulty  "urinating, discharge, dysuria, enuresis, flank pain, frequency, genital sores, hematuria, penile pain, penile swelling, scrotal swelling, testicular pain and urgency.   Musculoskeletal: Negative for arthralgias, back pain, gait problem, joint swelling, myalgias, neck pain and neck stiffness.   Skin: Negative for color change, pallor, rash and wound.   Allergic/Immunologic: Negative.  Negative for environmental allergies, food allergies and immunocompromised state.   Neurological: Negative.  Negative for dizziness, tremors, seizures, syncope, facial asymmetry, speech difficulty, weakness, light-headedness, numbness and headaches.   Hematological: Negative.  Negative for adenopathy. Does not bruise/bleed easily.   Psychiatric/Behavioral: Negative.  Negative for agitation, behavioral problems, confusion, decreased concentration, dysphoric mood, hallucinations, self-injury, sleep disturbance and suicidal ideas. The patient is not nervous/anxious and is not hyperactive.      Vitals:    08/30/17 1012   BP: 128/64   BP Location: Right arm   Patient Position: Sitting   Pulse: 67   Temp: 97.7 °F (36.5 °C)   TempSrc: Temporal Artery    SpO2: 95%   Weight: 299 lb (136 kg)   Height: 72\" (182.9 cm)     Physical Exam  Neurologic Exam    The R palmar incision is healed;  sensation to light touch is normal in the R hand;  There is a nodule on the flexor tendon of the R MF which is tender.  There is no atrophy in the hand.    Mahendra was seen today for carpal tunnel.    Diagnoses and all orders for this visit:    S/P carpal tunnel release  -     Ambulatory Referral to Orthopedic Surgery    Lumbosacral spondylosis without myelopathy  -     Ambulatory Referral to Orthopedic Surgery    Bilateral carpal tunnel syndrome  -     Ambulatory Referral to Orthopedic Surgery    Carpal tunnel syndrome of right wrist  -     Ambulatory Referral to Orthopedic Surgery    Cervical spondylosis without myelopathy  -     Ambulatory Referral to Orthopedic " Surgery    Chronic bilateral low back pain without sciatica  -     Ambulatory Referral to Orthopedic Surgery    Spinal stenosis, lumbar region, without neurogenic claudication  -     Ambulatory Referral to Orthopedic Surgery    Neck pain  -     Ambulatory Referral to Orthopedic Surgery      Plan: I recommend seeing a hand orthopedic specialist.  He is to use the R hand as tolerated;  I will see him after ortho evaluation.    I, Dr. Luis Hayden, personally performed the services described in the documentation as scribed in my presence, and the documentation is both accurate and complete.    Luis Hayden MD   scribe

## 2018-10-11 ENCOUNTER — TRANSCRIBE ORDERS (OUTPATIENT)
Dept: PAIN MEDICINE | Facility: CLINIC | Age: 63
End: 2018-10-11

## 2018-10-11 DIAGNOSIS — M51.36 DDD (DEGENERATIVE DISC DISEASE), LUMBAR: Primary | ICD-10-CM

## 2018-10-11 DIAGNOSIS — M50.90 CERVICAL DISC DISEASE: ICD-10-CM

## 2018-12-07 NOTE — PROGRESS NOTES
"Chief Complaint: \"Pain in my neck.\"      History of Present Illness:   Patient: Mr. Mahendra aYtes, 63 y.o. male   Referring physician: Dr. Calvillo  Reason for referral: Consultation for intractable chronic neck pain.   Pain history: Patient reports a 2-year history of neck pain, which began without incident. Pain has progressed in intensity over the past 2 months.   Pain description: constant neck pain with intermittent exacerbation, described as sharp and stabbing sensation.   Radiation of pain: The pain does not radiate.   Pain intensity today: 7/10  Average pain intensity last week: 8/10  Pain intensity ranges from: 4/10 to 10/10  Aggravating factors: Pain increases with activities  Alleviating factors: Pain decreases with rest    Associated symptoms:   Patient reports intermittent numbness and weakness in the upper extremities (L>R) in his hands.  Patient denies any new bladder or bowel problems.   Patient reports difficulties with his balance and recent falls.      Review of previous therapies and additional medical records:  Mahendra Yates has already failed the following measures, including:   Conservative measures: oral analgesics, opioids, topical analgesics, physical therapy, ice and heat   Interventional measures: None  Surgical measures: No history of cervical spine surgery  Mahendra Yates has not undergone surgical evaluation for his neck pain. Patient underwent with Dr. Luis Hayden in 2017 right carpal tunnel release  Mahendra Yates presents with significant comorbidities including anxiety and depression, hypertension, hyperlipidemia, coronary artery disease (s/p bypass and stent), insulin dependant diabetes, liver CA (s/p transplant in 2010) engaged in treatment.  In terms of current analgesics, Mahendra Yates takes: naproxen, Flexeril, diclofenac. Patient also takes Effexor  I have reviewed [facility] Report #[id number] consistent to medication reconciliation.     Global Pain Scale 12-13  2018        "   Pain 20          Feelings 14          Clinical outcomes 20          Activities 25          GPS Total: 79             Review of Diagnostic Studies:    MRI CERVICAL SPINE WITHOUT CONTRAST-08/23/2016: Multilevel degenerative disc and spondylotic disease. Marrow signal is normal. No cervical fracture or subluxation. No abnormal signal is seen within the cervical cord. Syringomyelia or entrapment myelopathy is not identified.  C1-C2: normal alignment and the foramen magnum is clear.   C4-C5: soft disc protrusion with mild smooth compression of the dural sac and mild effacement of the cord. Mild narrowing of the uncovertebral lateral recesses, slightly worse on the left than right.  C5-C6: dominant soft disc protrusion with arthropathic bony bar producing a dominant central and rightward sloping compression impingement on the cervical cord and dural sac and extending markedly into the right lateral recess to create critical right lateral recess impingement   C6-C7: mild soft disc protrusion producing a mild rightward sloping defect on the dural sac but not effacing the cord or significantly embarrassing the lateral recesses.    Review of Systems   Constitutional: Positive for fatigue.   HENT: Positive for hearing loss, nosebleeds, postnasal drip and tinnitus.    Respiratory: Positive for chest tightness and shortness of breath.    Cardiovascular: Positive for chest pain and leg swelling.   Endocrine: Positive for polydipsia.   Musculoskeletal: Positive for arthralgias, back pain, gait problem, joint swelling, myalgias, neck pain and neck stiffness.   Allergic/Immunologic: Positive for environmental allergies.   Psychiatric/Behavioral: Positive for decreased concentration and sleep disturbance. The patient is nervous/anxious (depression).    All other systems reviewed and are negative.        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)   • Hx of CABG   • History of liver transplant (CMS/HCC)   • Physical deconditioning   • Lumbar stenosis with neurogenic claudication       Past Medical History:   Diagnosis Date   • Arthritis    • Atherosclerosis    • B12 deficiency    • Cancer (CMS/HCC)    • Cancer of liver (CMS/HCC)    • Chronic headaches    • Coronary artery disease    • Diabetes mellitus (CMS/HCC)    • Gastroesophageal reflux disease    • History of transfusion    • Hyperlipidemia    • Hypertension    • Sleep apnea          Past Surgical History:   Procedure Laterality Date   • ANGIOPLASTY     • CARDIAC SURGERY     • CHOLECYSTECTOMY     • CORONARY ARTERY BYPASS GRAFT     • CORONARY STENT PLACEMENT      x2   • LIVER TRANSPLANTATION     • TUMOR REMOVAL Right     axilla         History reviewed. No pertinent family history.      Social History     Socioeconomic History   • Marital status:      Spouse name: Not on file   • Number of children: Not on file   • Years of education: Not on file   • Highest education level: Not on file   Tobacco Use   • Smoking status: Former Smoker     Packs/day: 4.00     Years: 30.00     Pack years: 120.00     Types: Cigarettes     Last attempt to quit: 1991     Years since quittin.8   • Smokeless tobacco: Never Used   Substance and Sexual Activity   • Alcohol use: No   • Drug use: No   • Sexual activity: Defer           Current Outpatient Medications:   •  amLODIPine (NORVASC) 10 MG tablet, Take 10 mg by mouth Daily., Disp: , Rfl:   •  aspirin 81 MG EC tablet, Take 81 mg by mouth Daily. Continues per doctors orders, Disp: , Rfl:   •  coenzyme Q10 100 MG capsule, Take 100 mg by mouth Daily., Disp:  , Rfl:   •  CYCLOBENZAPRINE HCL PO, Take 10 mg by mouth 3 (Three) Times a Day., Disp: , Rfl:   •  DICLOFENAC PO, Take  by mouth., Disp: , Rfl:   •  fenofibrate micronized (LOFIBRA) 134 MG capsule, Take 134 mg by mouth Every Morning Before Breakfast., Disp: , Rfl:   •  glucosamine-chondroitin 500-400 MG capsule capsule, Take  by mouth 3 (Three) Times a Day With Meals., Disp: , Rfl:   •  insulin aspart (novoLOG) 100 UNIT/ML injection, Inject  under the skin 3 (Three) Times a Day Before Meals. SSI, Disp: , Rfl:   •  insulin glargine (LANTUS) 100 UNIT/ML injection, Inject 55 Units under the skin 2 (Two) Times a Day., Disp: , Rfl:   •  isosorbide mononitrate (IMDUR) 30 MG 24 hr tablet, Take 30 mg by mouth Daily., Disp: , Rfl:   •  LORATADINE ALLERGY RELIEF PO, Take  by mouth., Disp: , Rfl:   •  Magnesium 80 MG tablet, Take 80 mg by mouth 2 (Two) Times a Day., Disp: , Rfl:   •  Multiple Vitamins-Minerals (MULTIVITAMIN ADULT PO), Take 1 tablet by mouth Daily., Disp: , Rfl:   •  Mycophenolate Sodium (MYFORTIC PO), Take 720 mg by mouth 2 (Two) Times a Day., Disp: , Rfl:   •  naproxen (NAPROSYN) 500 MG tablet, Take 1 tablet by mouth 2 (Two) Times a Day With Meals. With food, Disp: 60 tablet, Rfl: 3  •  nebivolol (BYSTOLIC) 20 MG tablet, Take 20 mg by mouth Daily., Disp: , Rfl:   •  nitroglycerin (NITROSTAT) 0.4 MG SL tablet, Place 0.4 mg under the tongue Every 5 (Five) Minutes As Needed for chest pain. Take no more than 3 doses in 15 minutes., Disp: , Rfl:   •  omeprazole (priLOSEC) 40 MG capsule, Take 40 mg by mouth 2 (Two) Times a Day., Disp: , Rfl:   •  pentoxifylline (TRENtal) 400 MG CR tablet, Take 400 mg by mouth 3 (Three) Times a Day With Meals., Disp: , Rfl:   •  pravastatin (PRAVACHOL) 40 MG tablet, Take 40 mg by mouth Daily., Disp: , Rfl:   •  ranolazine (RANEXA) 1000 MG 12 hr tablet, Take 1,000 mg by mouth 2 (Two) Times a Day., Disp: , Rfl:   •  Tacrolimus (PROGRAF PO), Take 2 mg by mouth Every Evening., Disp: ,  "Rfl:   •  tacrolimus (PROGRAF) 1 MG capsule, Take 1 mg by mouth Every Morning., Disp: , Rfl:   •  Venlafaxine HCl (EFFEXOR XR PO), Take 225 mg by mouth Daily., Disp: , Rfl:   •  vitamin E 1000 UNIT capsule, Take 1,000 Units by mouth Daily., Disp: , Rfl:       Allergies   Allergen Reactions   • Contrast Dye Other (See Comments)     Cardiac Arrest   • Penicillins Rash   • Sulfasalazine Rash         /72   Pulse 55   Temp 97.8 °F (36.6 °C) (Temporal)   Resp 18   Ht 182.9 cm (72\")   Wt 131 kg (287 lb 12.8 oz)   SpO2 98%   BMI 39.03 kg/m²       Physical Exam:  Constitutional: Patient is oriented to person, place, and time. Patient appears well-developed and well-nourished.   HEENT: Head: Normocephalic and atraumatic. Eyes: Conjunctivae and lids are normal. Pupils: Equal, round, reactive to light.   Neck: Trachea normal. Neck supple. No JVD present.   Lymphatic: No cervical adenopathy  Pulmonary Respiratory effort: No increased work of breathing or signs of respiratory distress. Auscultation of lungs: Clear to auscultation.   Cardiovascular Auscultation of heart: Normal rate and rhythm, normal S1 and S2, no murmurs.   Musculoskeletal   Gait and station: Gait evaluation demonstrated a normal gait. Patient uses a cane  Cervical spine: Passive and active range of motion are limited secondary to pain. Extension, lateral flexion, rotation of the cervical spine increased and reproduced pain. Cervical facet joint loading maneuvers are positive.  Muscles: Presence of active trigger points at the levator scapulae   Shoulders: The range of motion of the glenohumeral joints is full and without pain. Rotator cuff strength is 5/5.   Thoracic spine: Thoracic facet joint loading maneuvers are negative   Neurological: Patient is alert and oriented to person, place, and time. Speech: speech is normal. Cortical function: Normal mental status.   Cranial nerves: Cranial nerves 2-12 intact.   Reflex Scores:  Right brachioradialis: " 2+  Left brachioradialis: 2+  Right biceps: 2+  Left biceps: 2+  Right triceps: 2+  Left triceps: 2+  Right patellar: 2+  Left patellar: 2+  Right Achilles: 2+  Left Achilles: 2+  Motor strength: 5/5  Motor Tone: normal tone.   Involuntary movements: none.   Superficial/Primitive Reflexes: primitive reflexes were absent.   Right Poe: absent  Left Poe: absent  Right ankle clonus: absent  Left ankle clonus: absent   Spurling sign is negative. Neck tornado test is negative. Lhermitte sign is negative. Negative long tract signs. Positive Tinel sign at both wrists  Sensation: No sensory loss. Sensory exam: intact to light touch, intact pain and temperature sensation, intact vibration sensation and normal proprioception.   Coordination: Normal finger to nose and heel to shin. Normal balance and negative. Romberg's sign negative.   Skin and subcutaneous tissue: Skin is warm and intact. No rash noted. No cyanosis.   Psychiatric: Judgment and insight: Normal. Orientation to person, place and time: Normal. Recent and remote memory: Intact. Mood and affect: Normal.     ASSESSMENT:   1. Cervical spondylosis without myelopathy    2. DDD (degenerative disc disease), cervical    3. Cervical stenosis of spine    4. Bilateral carpal tunnel syndrome    5. Lumbar stenosis with neurogenic claudication    6. 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 Morbid obesity due to excess calories (CMS/HCC)    8. Hx of CABG    9. Presence of stent in coronary artery in patient with coronary artery disease    10. History of liver transplant (CMS/HCC)    11. Anxiety and depression    12. Physical deconditioning        PLAN/MEDICAL DECISION MAKING: I had a lengthy conversation with Mr. Mahendra Yates regarding his chronic pain condition and potential therapeutic options including risks, benefits, alternative therapies, to name a few. Patient has failed to obtain pain relief with conservative measures,  as referenced above.  Patient presents with numerous comorbidities. Patient also has a long-standing history of lower back pain and left lower extremity pain and weakness associated with balance disturbance and multiple falls. I have reviewed all available patient's medical records as well as previous therapies as referenced above.  Therefore, I have proposed the following plan:  1. Diagnostic studies:  A. MRI of the lumbar spine without contrast  B. EMG/NCV of the bilateral upper and lower extremities   2. Pharmacological measures: Reviewed. Discussed.   A. Patient takes naproxen, Flexeril, diclofenac. Patient also takes Effexor  B. Trial with prilocaine 2%, lidocaine 10%, imipramine 3%, capsaicin 0.001% and mannitol 20%  cream, apply 1 to 2 grams of cream to the affected areas every 4 to 6 hours as needed  3. Interventional pain management measures:  A. Treatment of chronic neck pain: Patient will be scheduled for diagnostic bilateral cervical medial branch blocks at C5, C6, C7; for bilateral cervical facet joints at C5-C6, C6-C7 to clarify the origin of chronic refractory mechanical/axial cervicalgia. If patient experiences more than 80% pain relief along with significant improvement in the range of motion of the cervical spine, then, patient will be scheduled for a second set of diagnostic bilateral cervical medial branch blocks, to then, proceed with bilateral cervical medial branch rhizotomies. Otherwise, we may proceed with cervical epidural steroid injection by interlaminar approach.   B. Treatment of chronic lower back pain: I have discussed with the patient the possibility of diagnostic and therapeutic transforaminal epidural steroid injections depending on MRI findings vs NSA consultation  4. Long-term rehabilitation efforts:  A. The patient has a history of falls. I did complete a risk assessment for falls. Fall precautions: Patient has been instructed regarding universal fall precautions, such as;   · Using gait aids a cane  or a rolling walker at the appropriate height at all times for ambulation   · Removing all area rugs and coffee tables to create a safe environment at home  · Ensure clean, dry floors  · Wearing supportive footwear and properly fitting clothing  · Ensure bed/chair is appropriate height and patient's feet can touch the floor  · Using a shower transfer bench  · Using walk-in shower and having shower safety bars installed  · Ensure proper lighting, minimize glare  · Have nightlights operational and in use  · Participation in an exercise program for gait training, balance training and strength  · Ensure proper compliance and organization of medications to avoid errors   · Avoid use of over the counter sedatives and alcohol consumption  · Ensure easy access to call bell, glasses, TV control, telephone  · Ensure glasses/hearing aids are in use or close by (on top of night table)  B.   Patient will start a comprehensive physical therapy program for water therapy, therapeutic exercise, core strengthening, gait and balance training, neurodynamics, myofascial release, cupping and dry needling once pain is under control  C.  Start an exercise program such as water therapy and swimming  D.  Contrast therapy: Apply ice-packs for 15-20 minutes, followed by heating pads for 15-20 minutes to affected area   E.  Referral to [facility]  F.   Referral to Dr. Julien for full psychological evaluation and intensive CBT  5.  The patient and his family have been instructed to contact my office with any questions or difficulties. The patient understands the plan and agrees to proceed accordingly.       Patient Care Team:  Bobo Calvillo MD as PCP - General (Family Medicine)  Natanael Estrella MD as Consulting Physician (Neurology)  Oren Pena MD as Consulting Physician (Cardiology)  Everardo Tate MD as Consulting Physician (Pain Medicine)  Scout Grant MD as Consulting Physician  (Otolaryngology)     No orders of the defined types were placed in this encounter.        No future appointments.      Everardo Tate MD     EMR Dragon/Transcription disclaimer:  Much of this encounter note is an electronic transcription of spoken language to printed text. Electronic transcription of spoken language may permit erroneous, or at times, nonsensical words or phrases to be inadvertently transcribed. Although I have reviewed the note for such errors, some may still exist.

## 2018-12-12 PROBLEM — M48.02 CERVICAL STENOSIS OF SPINE: Status: ACTIVE | Noted: 2018-12-12

## 2018-12-12 PROBLEM — M50.30 DDD (DEGENERATIVE DISC DISEASE), CERVICAL: Status: ACTIVE | Noted: 2018-12-12

## 2018-12-12 PROBLEM — M47.812 CERVICAL SPONDYLOSIS WITHOUT MYELOPATHY: Status: ACTIVE | Noted: 2018-12-12

## 2018-12-13 ENCOUNTER — OFFICE VISIT (OUTPATIENT)
Dept: PAIN MEDICINE | Facility: CLINIC | Age: 63
End: 2018-12-13

## 2018-12-13 VITALS
TEMPERATURE: 97.8 F | HEIGHT: 72 IN | BODY MASS INDEX: 38.98 KG/M2 | SYSTOLIC BLOOD PRESSURE: 148 MMHG | WEIGHT: 287.8 LBS | DIASTOLIC BLOOD PRESSURE: 72 MMHG | OXYGEN SATURATION: 98 % | RESPIRATION RATE: 18 BRPM | HEART RATE: 55 BPM

## 2018-12-13 DIAGNOSIS — G56.03 BILATERAL CARPAL TUNNEL SYNDROME: ICD-10-CM

## 2018-12-13 DIAGNOSIS — M47.812 CERVICAL SPONDYLOSIS WITHOUT MYELOPATHY: ICD-10-CM

## 2018-12-13 DIAGNOSIS — I25.10 PRESENCE OF STENT IN CORONARY ARTERY IN PATIENT WITH CORONARY ARTERY DISEASE: ICD-10-CM

## 2018-12-13 DIAGNOSIS — E66.01 MORBID OBESITY DUE TO EXCESS CALORIES (HCC): ICD-10-CM

## 2018-12-13 DIAGNOSIS — Z95.5 PRESENCE OF STENT IN CORONARY ARTERY IN PATIENT WITH CORONARY ARTERY DISEASE: ICD-10-CM

## 2018-12-13 DIAGNOSIS — M48.02 CERVICAL STENOSIS OF SPINE: ICD-10-CM

## 2018-12-13 DIAGNOSIS — M50.30 DDD (DEGENERATIVE DISC DISEASE), CERVICAL: ICD-10-CM

## 2018-12-13 DIAGNOSIS — E10.8 DIABETES MELLITUS TYPE 1 WITH COMPLICATIONS (HCC): ICD-10-CM

## 2018-12-13 DIAGNOSIS — Z95.1 HX OF CABG: ICD-10-CM

## 2018-12-13 DIAGNOSIS — F41.9 ANXIETY AND DEPRESSION: ICD-10-CM

## 2018-12-13 DIAGNOSIS — R53.81 PHYSICAL DECONDITIONING: ICD-10-CM

## 2018-12-13 DIAGNOSIS — M48.062 LUMBAR STENOSIS WITH NEUROGENIC CLAUDICATION: ICD-10-CM

## 2018-12-13 DIAGNOSIS — F32.A ANXIETY AND DEPRESSION: ICD-10-CM

## 2018-12-13 DIAGNOSIS — M47.812 CERVICAL SPONDYLOSIS WITHOUT MYELOPATHY: Primary | ICD-10-CM

## 2018-12-13 DIAGNOSIS — Z94.4 HISTORY OF LIVER TRANSPLANT (HCC): ICD-10-CM

## 2018-12-13 PROCEDURE — 99204 OFFICE O/P NEW MOD 45 MIN: CPT | Performed by: ANESTHESIOLOGY

## 2018-12-13 RX ORDER — UBIDECARENONE 100 MG
100 CAPSULE ORAL DAILY
COMMUNITY
End: 2020-02-20

## 2018-12-13 RX ORDER — SODIUM PHOSPHATE,MONO-DIBASIC 19G-7G/118
ENEMA (ML) RECTAL
COMMUNITY
End: 2020-02-20

## 2018-12-17 ENCOUNTER — TRANSCRIBE ORDERS (OUTPATIENT)
Dept: DIABETES SERVICES | Facility: HOSPITAL | Age: 63
End: 2018-12-17

## 2018-12-17 DIAGNOSIS — E66.01 MORBID OBESITY (HCC): ICD-10-CM

## 2018-12-17 DIAGNOSIS — E11.8 TYPE 2 DIABETES MELLITUS WITH COMPLICATION, UNSPECIFIED WHETHER LONG TERM INSULIN USE: Primary | ICD-10-CM

## 2018-12-26 ENCOUNTER — TRANSCRIBE ORDERS (OUTPATIENT)
Dept: ADMINISTRATIVE | Facility: HOSPITAL | Age: 63
End: 2018-12-26

## 2018-12-26 DIAGNOSIS — G45.8 OTHER TRANSIENT CEREBRAL ISCHEMIC ATTACKS AND RELATED SYNDROMES: Primary | ICD-10-CM

## 2018-12-26 DIAGNOSIS — G45.9 TIA (TRANSIENT ISCHEMIC ATTACK): ICD-10-CM

## 2018-12-28 ENCOUNTER — HOSPITAL ENCOUNTER (OUTPATIENT)
Dept: MRI IMAGING | Facility: HOSPITAL | Age: 63
Discharge: HOME | End: 2018-12-28
Attending: ANESTHESIOLOGY | Admitting: ANESTHESIOLOGY

## 2018-12-28 PROCEDURE — 72148 MRI LUMBAR SPINE W/O DYE: CPT

## 2019-01-14 NOTE — PROGRESS NOTES
"Chief Complaint: \"Neck pain.\"    History of Present Illness:   Patient: Mr. Mahendra Yates, 63 y.o. male, with a 2-year history of neck pain, which began without incident.  Patient was last seen on 12/13/2018 by Dr. Tate, and a procedure was scheduled but unfortunately cancelled due to insurance issues.  Patient presents today to update history and re-reschedule procedure.    Pain description: constant neck pain with intermittent exacerbation, described as sharp and stabbing sensation.   Radiation of pain: The pain does not radiate.   Pain intensity today: 6/10  Average pain intensity last week: 6/10  Pain intensity ranges from: 3/10 to 7/10  Aggravating factors: Pain increases with activities  Alleviating factors: Pain decreases with rest    Associated symptoms:   Patient reports intermittent numbness and weakness in the upper extremities (L>R) in his hands.  Patient denies any new bladder or bowel problems.   Patient reports difficulties with his balance and recent falls.      Review of previous therapies and additional medical records:  Mahendra Yates has already failed the following measures, including:   Conservative measures: oral analgesics, opioids, topical analgesics, physical therapy, ice and heat   Interventional measures: None  Surgical measures: No history of cervical spine surgery  Mahendra Yates has not undergone surgical evaluation for his neck pain. Patient underwent with Dr. Luis Hayden in 2017 right carpal tunnel release  Mahendra Yates presents with significant comorbidities including anxiety and depression, hypertension, hyperlipidemia, coronary artery disease (s/p bypass and stent), insulin dependant diabetes, liver CA (s/p transplant in 2010) engaged in treatment.  In terms of current analgesics, Mahendra Yates takes: naproxen, Flexeril, diclofenac.   I have reviewed Mendez Report #[id number] consistent to medication reconciliation.     Global Pain Scale 12-13  2018 01-15-19       "   Pain 20 14         Feelings 14 18         Clinical outcomes 20 16         Activities 25 21         GPS Total: 79 69            Review of Diagnostic Studies:    MRI CERVICAL SPINE WITHOUT CONTRAST-08/23/2016:    C4-C5: soft disc protrusion with mild smooth compression of the dural sac and mild effacement of the cord. Mild narrowing of the uncovertebral lateral recesses, slightly worse on the left than right.  C5-C6: dominant soft disc protrusion with arthropathic bony bar producing a dominant central and rightward sloping compression impingement on the cervical cord and dural sac and extending markedly into the right lateral recess to create critical right lateral recess impingement   C6-C7: mild soft disc protrusion producing a mild rightward sloping defect on the dural sac but not effacing the cord or significantly embarrassing the lateral recesses.    MRI LUMBAR SPINE WO CONTRAST-12/28/2018: L1-2: Shallow disc bulge and facet arthropathy. Mild spinal canal stenosis beyond baseline. No significant foraminal stenosis.  L2-3: Disc bulge and bilateral facet arthropathy. Mild bilateral foraminal stenosis is unchanged. Severe spinal canal stenosis is seen beyond baseline.  L3-4: Disc bulge and bilateral facet arthropathy. Severe left and moderate right foraminal stenosis. Moderate spinal canal stenosis beyond baseline.  L4-5: Disc bulge and facet arthropathy. Moderate bilateral foraminal stenosis. Mild spinal canal stenosis beyond baseline.  L5-S1: Shallow disc bulge. No significant foraminal stenosis or spinal canal stenosis beyond baseline.    Review of Systems   Constitutional: Positive for fatigue.   HENT: Positive for ear discharge, ear pain and tinnitus.    Endocrine: Positive for polydipsia.   Musculoskeletal: Positive for arthralgias, back pain, gait problem, myalgias, neck pain and neck stiffness.   Allergic/Immunologic: Positive for immunocompromised state.   Neurological: Positive for dizziness, weakness,  light-headedness and headaches.   Psychiatric/Behavioral: The patient is nervous/anxious (depression).    All other systems reviewed and are negative.        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)   • Hx of CABG   • History of liver transplant (CMS/HCC)   • Physical deconditioning   • Lumbar stenosis with neurogenic claudication       Past Medical History:   Diagnosis Date   • Arthritis    • Atherosclerosis    • B12 deficiency    • Cancer (CMS/HCC)    • Cancer of liver (CMS/HCC)    • Chronic headaches    • Coronary artery disease    • Diabetes mellitus (CMS/HCC)    • Gastroesophageal reflux disease    • History of transfusion    • Hyperlipidemia    • Hypertension    • Sleep apnea          Past Surgical History:   Procedure Laterality Date   • ANGIOPLASTY     • CARDIAC SURGERY     • CARPAL TUNNEL RELEASE Right 2/8/2017    Procedure: CARPAL TUNNEL RELEASE RIGHT ;  Surgeon: Luis Hayden MD;  Location: [facility];  Service:    • CHOLECYSTECTOMY     • CORONARY ARTERY BYPASS GRAFT     • CORONARY STENT PLACEMENT      x2   • LIVER TRANSPLANTATION     • TUMOR REMOVAL Right     axilla         History reviewed. No pertinent family history.      Social History     Socioeconomic History   • Marital status:      Spouse name: Not on file   • Number of children: Not on file   • Years of education: Not on file   • Highest education level: Not on file   Tobacco Use   • Smoking status: Former Smoker     Packs/day: 4.00     Years: 30.00     Pack years: 120.00     Types: Cigarettes     Last attempt to quit: 2/7/1991     Years since  quittin.9   • Smokeless tobacco: Never Used   Substance and Sexual Activity   • Alcohol use: No   • Drug use: No   • Sexual activity: Defer           Current Outpatient Medications:   •  amLODIPine (NORVASC) 10 MG tablet, Take 10 mg by mouth Daily., Disp: , Rfl:   •  aspirin 81 MG EC tablet, Take 81 mg by mouth Daily. Continues per doctors orders, Disp: , Rfl:   •  coenzyme Q10 100 MG capsule, Take 100 mg by mouth Daily., Disp: , Rfl:   •  CYCLOBENZAPRINE HCL PO, Take 10 mg by mouth 3 (Three) Times a Day., Disp: , Rfl:   •  DICLOFENAC PO, Take  by mouth., Disp: , Rfl:   •  fenofibrate micronized (LOFIBRA) 134 MG capsule, Take 134 mg by mouth Every Morning Before Breakfast., Disp: , Rfl:   •  Gel Base gel, CAPSAICIN 0.001% IMIPRAMINE 3% LIDOCAINE 10% PRILOCAINE 2% MANNITOL 20%. APPLY 1-2 G TO AFFECTED AREA 3-4 TIMES DAILY, Disp: 240 g, Rfl: PRN  •  glucosamine-chondroitin 500-400 MG capsule capsule, Take  by mouth 3 (Three) Times a Day With Meals., Disp: , Rfl:   •  insulin aspart (novoLOG) 100 UNIT/ML injection, Inject  under the skin 3 (Three) Times a Day Before Meals. SSI, Disp: , Rfl:   •  insulin glargine (LANTUS) 100 UNIT/ML injection, Inject 55 Units under the skin 2 (Two) Times a Day., Disp: , Rfl:   •  isosorbide mononitrate (IMDUR) 30 MG 24 hr tablet, Take 30 mg by mouth Daily., Disp: , Rfl:   •  LORATADINE ALLERGY RELIEF PO, Take  by mouth., Disp: , Rfl:   •  Magnesium 80 MG tablet, Take 80 mg by mouth 2 (Two) Times a Day., Disp: , Rfl:   •  Multiple Vitamins-Minerals (MULTIVITAMIN ADULT PO), Take 1 tablet by mouth Daily., Disp: , Rfl:   •  Mycophenolate Sodium (MYFORTIC PO), Take 720 mg by mouth 2 (Two) Times a Day., Disp: , Rfl:   •  naproxen (NAPROSYN) 500 MG tablet, Take 1 tablet by mouth 2 (Two) Times a Day With Meals. With food, Disp: 60 tablet, Rfl: 3  •  nebivolol (BYSTOLIC) 20 MG tablet, Take 20 mg by mouth Daily., Disp: , Rfl:   •  nitroglycerin (NITROSTAT) 0.4 MG SL tablet, Place 0.4 mg  "under the tongue Every 5 (Five) Minutes As Needed for chest pain. Take no more than 3 doses in 15 minutes., Disp: , Rfl:   •  omeprazole (priLOSEC) 40 MG capsule, Take 40 mg by mouth 2 (Two) Times a Day., Disp: , Rfl:   •  pentoxifylline (TRENtal) 400 MG CR tablet, Take 400 mg by mouth 3 (Three) Times a Day With Meals., Disp: , Rfl:   •  pravastatin (PRAVACHOL) 40 MG tablet, Take 40 mg by mouth Daily., Disp: , Rfl:   •  ranolazine (RANEXA) 1000 MG 12 hr tablet, Take 1,000 mg by mouth 2 (Two) Times a Day., Disp: , Rfl:   •  Tacrolimus (PROGRAF PO), Take 2 mg by mouth Every Evening., Disp: , Rfl:   •  tacrolimus (PROGRAF) 1 MG capsule, Take 1 mg by mouth Every Morning., Disp: , Rfl:   •  Venlafaxine HCl (EFFEXOR XR PO), Take 225 mg by mouth Daily., Disp: , Rfl:   •  vitamin E 1000 UNIT capsule, Take 1,000 Units by mouth Daily., Disp: , Rfl:       Allergies   Allergen Reactions   • Contrast Dye Other (See Comments)     Cardiac Arrest   • Penicillins Rash   • Sulfasalazine Rash         /90   Pulse 63   Temp 96.7 °F (35.9 °C) (Temporal)   Resp 18   Ht 182.9 cm (72\")   Wt 128 kg (283 lb 3.2 oz)   SpO2 99%   BMI 38.41 kg/m²       Physical Exam:  Constitutional: Patient is oriented to person, place, and time.  Appears well-developed and well-nourished.   HEENT: Head: Normocephalic and atraumatic. Eyes: Conjunctivae and lids are normal. Pupils: Equal, round, and reactive to light.   Neck: Trachea normal. Neck supple. No JVD present.   Lymphatic: No cervical adenopathy  Pulmonary: No increased work of breathing or signs of respiratory distress.  Clear to auscultation bilaterally.   Cardiovascular: Normal rate and rhythm, normal S1 and S2, no murmurs.   Musculoskeletal   Gait and station: Normal. Patient uses a cane  Cervical spine: Passive and active range of motion are limited secondary to pain. Extension, lateral flexion, rotation of the cervical spine increased and reproduced pain. Cervical facet joint loading " maneuvers are positive.  Muscles: Presence of active trigger points at the levator scapulae   Shoulders: The range of motion of the glenohumeral joints is full and without pain. Rotator cuff strength is 5/5.   Thoracic spine: Thoracic facet joint loading maneuvers are negative   Neurological: Patient is alert and oriented to person, place, and time. Speech: speech is normal. Cortical function: Normal mental status.   Cranial nerves: Cranial nerves 2-12 intact.   Reflex Scores:  brachioradialis: 2+ bilaterally  biceps: 2+ bilaterally  triceps: 2+ bilaterally  patellar: 2+ bilaterally  Achilles: 2+ bilaterally  Motor strength: 5/5  Motor Tone: normal tone.   Involuntary movements: none.   Right ankle clonus: absent  Left ankle clonus: absent   Spurling sign is negative. Neck tornado test is negative. Lhermitte sign is negative. Negative long tract signs.   Sensation: No sensory loss. Sensory exam: intact to light touch, intact pain and temperature sensation, intact vibration sensation and normal proprioception.   Coordination: Normal finger to nose and heel to shin. Normal balance and negative. Romberg's sign negative.   Skin and subcutaneous tissue: Skin is warm and intact. No rash noted. No cyanosis.   Psychiatric: Judgment and insight: Normal. Orientation to person, place and time: Normal. Recent and remote memory: Intact. Mood and affect: Normal.     ASSESSMENT:   1. Cervical spondylosis without myelopathy    2. DDD (degenerative disc disease), cervical    3. Cervical stenosis of spine    4. Bilateral carpal tunnel syndrome    5. Lumbar stenosis with neurogenic claudication    6. 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 Morbid obesity due to excess calories (CMS/[facility])    8. Hx of CABG    9. Presence of stent in coronary artery in patient with coronary artery disease    10. History of liver transplant (CMS/[facility])    11. Anxiety and depression    12. Physical deconditioning        PLAN/MEDICAL  DECISION MAKING: I have reviewed all available medical records as well as previous therapies as referenced above, and discussed the patient with Dr. Tate.  1. Interventional pain management measures:  A. Treatment of chronic neck pain: Patient will be scheduled for diagnostic bilateral cervical medial branch blocks at C5, C6, C7; for bilateral cervical facet joints at C5-C6, C6-C7 to clarify the origin of chronic refractory mechanical/axial cervicalgia.  If patient experiences more than 80% pain relief along with significant improvement in the range of motion of the cervical spine, then he will be scheduled for a second set of diagnostic bilateral cervical medial branch blocks, in order to proceed with bilateral cervical medial branch rhizotomies.  Otherwise, we may proceed with cervical epidural steroid injection by interlaminar approach.   B. Treatment of chronic lower back pain: Dr. Tate previously discussed with the patient the possibility of diagnostic and therapeutic transforaminal epidural steroid injections depending on MRI findings vs NSA consultation  2. Diagnostic studies:  A. EMG/NCV of the bilateral upper and lower extremities (1/22/19)   2. Pharmacological measures: Reviewed and discussed.   A. Trial with prilocaine 2%, lidocaine 10%, imipramine 3%, capsaicin 0.001% and mannitol 20%  cream, apply 1 to 2 grams of cream to the affected areas every 4 to 6 hours as needed  3. Long-term rehabilitation efforts:  A.   Patient will start a comprehensive physical therapy program for water therapy, therapeutic exercise, core strengthening, gait and balance training, neurodynamics, myofascial release, cupping and dry needling once pain is under control  B.  Start an exercise program such as water therapy and swimming  C.  Contrast therapy: Apply ice-packs for 15-20 minutes, followed by heating pads for 15-20 minutes to affected area   4.  The patient and his family have been instructed to contact my  office with any questions or difficulties. The patient understands the plan and agrees to proceed accordingly.       Patient Care Team:  Bobo Calvillo MD as PCP - General (Family Medicine)  Natanael Estrella MD as Consulting Physician (Neurology)  Oren Pena MD as Consulting Physician (Cardiology)  Everardo Ttae MD as Consulting Physician (Pain Medicine)  Scout Grant MD as Consulting Physician (Otolaryngology)     No orders of the defined types were placed in this encounter.        Future Appointments   Date Time Provider Department Center   1/16/2019  7:30 AM Everardo Tate MD [facility] None   1/22/2019 11:00 AM  [facility]  [facility]         Pravin Castillo PA-C     EMR Dragon/Transcription disclaimer:  Much of this encounter note is an electronic transcription of spoken language to printed text. Electronic transcription of spoken language may permit erroneous, or at times, nonsensical words or phrases to be inadvertently transcribed. Although I have reviewed the note for such errors, some may still exist.

## 2019-01-15 ENCOUNTER — OFFICE VISIT (OUTPATIENT)
Dept: PAIN MEDICINE | Facility: CLINIC | Age: 64
End: 2019-01-15

## 2019-01-15 ENCOUNTER — TRANSCRIBE ORDERS (OUTPATIENT)
Dept: ADMINISTRATIVE | Facility: HOSPITAL | Age: 64
End: 2019-01-15

## 2019-01-15 VITALS
BODY MASS INDEX: 38.36 KG/M2 | HEART RATE: 63 BPM | RESPIRATION RATE: 18 BRPM | DIASTOLIC BLOOD PRESSURE: 90 MMHG | WEIGHT: 283.2 LBS | OXYGEN SATURATION: 99 % | TEMPERATURE: 96.7 F | SYSTOLIC BLOOD PRESSURE: 150 MMHG | HEIGHT: 72 IN

## 2019-01-15 DIAGNOSIS — R06.02 SHORTNESS OF BREATH: Primary | ICD-10-CM

## 2019-01-15 DIAGNOSIS — G56.03 BILATERAL CARPAL TUNNEL SYNDROME: ICD-10-CM

## 2019-01-15 DIAGNOSIS — Z95.5 PRESENCE OF STENT IN CORONARY ARTERY IN PATIENT WITH CORONARY ARTERY DISEASE: ICD-10-CM

## 2019-01-15 DIAGNOSIS — F41.9 ANXIETY AND DEPRESSION: ICD-10-CM

## 2019-01-15 DIAGNOSIS — Z95.1 HX OF CABG: ICD-10-CM

## 2019-01-15 DIAGNOSIS — G45.8 OTHER TRANSIENT CEREBRAL ISCHEMIC ATTACKS AND RELATED SYNDROMES: ICD-10-CM

## 2019-01-15 DIAGNOSIS — M48.02 CERVICAL STENOSIS OF SPINE: ICD-10-CM

## 2019-01-15 DIAGNOSIS — R53.81 PHYSICAL DECONDITIONING: ICD-10-CM

## 2019-01-15 DIAGNOSIS — M47.812 CERVICAL SPONDYLOSIS WITHOUT MYELOPATHY: Primary | ICD-10-CM

## 2019-01-15 DIAGNOSIS — F32.A ANXIETY AND DEPRESSION: ICD-10-CM

## 2019-01-15 DIAGNOSIS — E66.01 MORBID OBESITY DUE TO EXCESS CALORIES (HCC): ICD-10-CM

## 2019-01-15 DIAGNOSIS — E10.8 DIABETES MELLITUS TYPE 1 WITH COMPLICATIONS (HCC): ICD-10-CM

## 2019-01-15 DIAGNOSIS — M50.30 DDD (DEGENERATIVE DISC DISEASE), CERVICAL: ICD-10-CM

## 2019-01-15 DIAGNOSIS — Z94.4 HISTORY OF LIVER TRANSPLANT (HCC): ICD-10-CM

## 2019-01-15 DIAGNOSIS — I25.10 PRESENCE OF STENT IN CORONARY ARTERY IN PATIENT WITH CORONARY ARTERY DISEASE: ICD-10-CM

## 2019-01-15 DIAGNOSIS — M48.062 LUMBAR STENOSIS WITH NEUROGENIC CLAUDICATION: ICD-10-CM

## 2019-01-15 PROCEDURE — 99213 OFFICE O/P EST LOW 20 MIN: CPT | Performed by: PHYSICIAN ASSISTANT

## 2019-01-16 ENCOUNTER — OUTSIDE FACILITY SERVICE (OUTPATIENT)
Dept: PAIN MEDICINE | Facility: CLINIC | Age: 64
End: 2019-01-16

## 2019-01-16 PROCEDURE — 64491 INJ PARAVERT F JNT C/T 2 LEV: CPT | Performed by: ANESTHESIOLOGY

## 2019-01-16 PROCEDURE — 99152 MOD SED SAME PHYS/QHP 5/>YRS: CPT | Performed by: ANESTHESIOLOGY

## 2019-01-16 PROCEDURE — 64490 INJ PARAVERT F JNT C/T 1 LEV: CPT | Performed by: ANESTHESIOLOGY

## 2019-01-17 ENCOUNTER — TELEPHONE (OUTPATIENT)
Dept: PAIN MEDICINE | Facility: CLINIC | Age: 64
End: 2019-01-17

## 2019-01-17 NOTE — TELEPHONE ENCOUNTER
Patient had 1st set dx bilateral cervical medial branch blocks on 01/16/2019. Patient reports that he did well. He slept most of the day. He is scheduled for 2nd set on 02/06/19

## 2019-01-22 ENCOUNTER — HOSPITAL ENCOUNTER (OUTPATIENT)
Dept: NEUROLOGY | Facility: HOSPITAL | Age: 64
Discharge: HOME | End: 2019-01-22
Attending: ANESTHESIOLOGY | Admitting: ANESTHESIOLOGY

## 2019-01-22 PROCEDURE — 95886 MUSC TEST DONE W/N TEST COMP: CPT

## 2019-01-22 PROCEDURE — 95913 NRV CNDJ TEST 13/> STUDIES: CPT

## 2019-02-06 ENCOUNTER — OUTSIDE FACILITY SERVICE (OUTPATIENT)
Dept: PAIN MEDICINE | Facility: CLINIC | Age: 64
End: 2019-02-06

## 2019-02-06 PROCEDURE — 64491 INJ PARAVERT F JNT C/T 2 LEV: CPT | Performed by: ANESTHESIOLOGY

## 2019-02-06 PROCEDURE — 99152 MOD SED SAME PHYS/QHP 5/>YRS: CPT | Performed by: ANESTHESIOLOGY

## 2019-02-06 PROCEDURE — 64490 INJ PARAVERT F JNT C/T 1 LEV: CPT | Performed by: ANESTHESIOLOGY

## 2019-02-06 NOTE — PROGRESS NOTES
"Chief Complaint: \"Neck pain.\"    History of Present Illness:   Patient: Mr. Mahendra Yates, 64 y.o. male, with a 2-year history of neck pain, which began without incident.  He presents today for post-procedure follow-up.  Patient was last seen on 02/06/2019 for a 2nd set of diagnostic cervical medial branch blocks and experienced at least 80% relief immediately after the procedure.  The pain then progressively recurred.   Pain description: constant neck pain with intermittent exacerbation, described as sharp and stabbing sensation.   Radiation of pain: The pain does not radiate.   Pain intensity today: 7/10  Average pain intensity last week: 7-8/10  Pain intensity ranges from: 5/10 to 8/10  Aggravating factors: Pain increases with activities  Alleviating factors: Pain decreases with rest    Associated symptoms:   Patient reports intermittent numbness and weakness in the upper extremities (L>R) in his hands.  Patient denies any new bladder or bowel problems.   Patient reports difficulties with his balance and recent falls.      Review of previous therapies and additional medical records:  Mahendra Yates has already failed the following measures, including:   Conservative measures: oral analgesics, opioids, topical analgesics, physical therapy, ice and heat   Interventional measures: As referenced above.  Cervical MBB's on 01/16/2019.  Surgical measures: No history of cervical spine surgery  Mahendra Yates has not undergone surgical evaluation for his neck pain.  Patient underwent right carpal tunnel release with Dr. Luis Hayden in 2017.  Mahendra Yates presents with significant comorbidities including anxiety and depression, hypertension, hyperlipidemia, coronary artery disease (s/p bypass and stent), insulin dependant diabetes, liver CA (s/p transplant in 2010) engaged in treatment.  In terms of current analgesics, Mahendra Yates takes: naproxen, Flexeril, diclofenac.   I have reviewed [facility] Report #[id number] " consistent to medication reconciliation.     Global Pain Scale 12-13  2018 01-15-19 02-08-  2019        Pain 20 14 17        Feelings 14 18 15        Clinical outcomes 20 16 18        Activities 25 21 15        GPS Total: 79 69 65           Review of Diagnostic Studies:    EMG/NCS- 01/22/2019: Generalized sensori-motor neuropathy, moderate.  EMG of the right leg is normal.  EMG of the left leg suggests chronic L5 radiculopathy.  Median neuropathy at the wrist bilaterally, moderate (carpal tunnel).  EMG of the right arm is normal.  EMG of the left arm suggests chronic C5 radiculopathy.   MRI CERVICAL SPINE WITHOUT CONTRAST-08/23/2016:    C4-C5: soft disc protrusion with mild smooth compression of the dural sac and mild effacement of the cord. Mild narrowing of the uncovertebral lateral recesses, slightly worse on the left than right.  C5-C6: dominant soft disc protrusion with arthropathic bony bar producing a dominant central and rightward sloping compression impingement on the cervical cord and dural sac and extending markedly into the right lateral recess to create critical right lateral recess impingement   C6-C7: mild soft disc protrusion producing a mild rightward sloping defect on the dural sac but not effacing the cord or significantly embarrassing the lateral recesses.  MRI LUMBAR SPINE WO CONTRAST-12/28/2018: L1-2: Shallow disc bulge and facet arthropathy. Mild spinal canal stenosis beyond baseline. No significant foraminal stenosis.  L2-3: Disc bulge and bilateral facet arthropathy. Mild bilateral foraminal stenosis is unchanged. Severe spinal canal stenosis is seen beyond baseline.  L3-4: Disc bulge and bilateral facet arthropathy. Severe left and moderate right foraminal stenosis. Moderate spinal canal stenosis beyond baseline.  L4-5: Disc bulge and facet arthropathy. Moderate bilateral foraminal stenosis. Mild spinal canal stenosis beyond baseline.  L5-S1: Shallow disc bulge. No significant foraminal  stenosis or spinal canal stenosis beyond baseline.    Review of Systems   Constitutional: Positive for fatigue.   HENT: Positive for ear discharge, ear pain and tinnitus.    Endocrine: Positive for polydipsia.   Musculoskeletal: Positive for arthralgias, back pain, gait problem, myalgias, neck pain and neck stiffness.   Allergic/Immunologic: Positive for immunocompromised state.   Neurological: Positive for dizziness, weakness, light-headedness and headaches.   Psychiatric/Behavioral: The patient is nervous/anxious (depression).    All other systems reviewed and are negative.        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)   • Hx of CABG   • History of liver transplant (CMS/HCC)   • Physical deconditioning   • Lumbar stenosis with neurogenic claudication       Past Medical History:   Diagnosis Date   • Arthritis    • Atherosclerosis    • B12 deficiency    • Cancer (CMS/HCC)    • Cancer of liver (CMS/HCC)    • Chronic headaches    • Coronary artery disease    • Diabetes mellitus (CMS/HCC)    • Gastroesophageal reflux disease    • History of transfusion    • Hyperlipidemia    • Hypertension    • Sleep apnea          Past Surgical History:   Procedure Laterality Date   • ANGIOPLASTY     • CARDIAC SURGERY     • CARPAL TUNNEL RELEASE Right 2/8/2017    Procedure: CARPAL TUNNEL RELEASE RIGHT ;  Surgeon: Luis Hayden MD;  Location: [facility];  Service:    • CHOLECYSTECTOMY     • CORONARY ARTERY BYPASS GRAFT     • CORONARY STENT PLACEMENT      x2   • LIVER TRANSPLANTATION     • TUMOR REMOVAL Right     axilla         History  reviewed. No pertinent family history.      Social History     Socioeconomic History   • Marital status:      Spouse name: Not on file   • Number of children: Not on file   • Years of education: Not on file   • Highest education level: Not on file   Tobacco Use   • Smoking status: Former Smoker     Packs/day: 4.00     Years: 30.00     Pack years: 120.00     Types: Cigarettes     Last attempt to quit: 1991     Years since quittin.0   • Smokeless tobacco: Never Used   Substance and Sexual Activity   • Alcohol use: No   • Drug use: No   • Sexual activity: Defer           Current Outpatient Medications:   •  amLODIPine (NORVASC) 10 MG tablet, Take 10 mg by mouth Daily., Disp: , Rfl:   •  aspirin 81 MG EC tablet, Take 81 mg by mouth Daily. Continues per doctors orders, Disp: , Rfl:   •  coenzyme Q10 100 MG capsule, Take 100 mg by mouth Daily., Disp: , Rfl:   •  CYCLOBENZAPRINE HCL PO, Take 10 mg by mouth 3 (Three) Times a Day., Disp: , Rfl:   •  DICLOFENAC PO, Take  by mouth., Disp: , Rfl:   •  fenofibrate micronized (LOFIBRA) 134 MG capsule, Take 134 mg by mouth Every Morning Before Breakfast., Disp: , Rfl:   •  Gel Base gel, CAPSAICIN 0.001% IMIPRAMINE 3% LIDOCAINE 10% PRILOCAINE 2% MANNITOL 20%. APPLY 1-2 G TO AFFECTED AREA 3-4 TIMES DAILY, Disp: 240 g, Rfl: PRN  •  glucosamine-chondroitin 500-400 MG capsule capsule, Take  by mouth 3 (Three) Times a Day With Meals., Disp: , Rfl:   •  insulin aspart (novoLOG) 100 UNIT/ML injection, Inject  under the skin 3 (Three) Times a Day Before Meals. SSI, Disp: , Rfl:   •  insulin glargine (LANTUS) 100 UNIT/ML injection, Inject 55 Units under the skin 2 (Two) Times a Day., Disp: , Rfl:   •  isosorbide mononitrate (IMDUR) 30 MG 24 hr tablet, Take 30 mg by mouth Daily., Disp: , Rfl:   •  LORATADINE ALLERGY RELIEF PO, Take  by mouth., Disp: , Rfl:   •  Magnesium 80 MG tablet, Take 80 mg by mouth 2 (Two) Times a Day., Disp: , Rfl:   •  Multiple Vitamins-Minerals  "(MULTIVITAMIN ADULT PO), Take 1 tablet by mouth Daily., Disp: , Rfl:   •  Mycophenolate Sodium (MYFORTIC PO), Take 720 mg by mouth 2 (Two) Times a Day., Disp: , Rfl:   •  naproxen (NAPROSYN) 500 MG tablet, Take 1 tablet by mouth 2 (Two) Times a Day With Meals. With food, Disp: 60 tablet, Rfl: 3  •  nebivolol (BYSTOLIC) 20 MG tablet, Take 20 mg by mouth Daily., Disp: , Rfl:   •  nitroglycerin (NITROSTAT) 0.4 MG SL tablet, Place 0.4 mg under the tongue Every 5 (Five) Minutes As Needed for chest pain. Take no more than 3 doses in 15 minutes., Disp: , Rfl:   •  omeprazole (priLOSEC) 40 MG capsule, Take 40 mg by mouth 2 (Two) Times a Day., Disp: , Rfl:   •  pentoxifylline (TRENtal) 400 MG CR tablet, Take 400 mg by mouth 3 (Three) Times a Day With Meals., Disp: , Rfl:   •  pravastatin (PRAVACHOL) 40 MG tablet, Take 40 mg by mouth Daily., Disp: , Rfl:   •  ranolazine (RANEXA) 1000 MG 12 hr tablet, Take 1,000 mg by mouth 2 (Two) Times a Day., Disp: , Rfl:   •  Tacrolimus (PROGRAF PO), Take 2 mg by mouth Every Evening., Disp: , Rfl:   •  tacrolimus (PROGRAF) 1 MG capsule, Take 1 mg by mouth Every Morning., Disp: , Rfl:   •  Venlafaxine HCl (EFFEXOR XR PO), Take 225 mg by mouth Daily., Disp: , Rfl:   •  vitamin E 1000 UNIT capsule, Take 1,000 Units by mouth Daily., Disp: , Rfl:       Allergies   Allergen Reactions   • Contrast Dye Other (See Comments)     Cardiac Arrest   • Penicillins Rash   • Sulfasalazine Rash         /82   Pulse 55   Temp 96.3 °F (35.7 °C) (Temporal)   Resp 18   Ht 182.9 cm (72\")   Wt 128 kg (281 lb 3.2 oz)   SpO2 99%   BMI 38.14 kg/m²       Physical Exam:  Constitutional: Patient is oriented to person, place, and time.  Appears well-developed and well-nourished.  Obese.   HEENT: Head: Normocephalic and atraumatic. Eyes: Conjunctivae and lids are normal. Pupils: Equal, round, and reactive to light.   Neck: Trachea normal. Neck supple. No JVD present.   Lymphatic: No cervical " adenopathy  Pulmonary: No increased work of breathing or signs of respiratory distress.  Clear to auscultation bilaterally.   Cardiovascular: Normal rate and rhythm, normal S1 and S2, no murmurs.   Musculoskeletal   Gait and station: Normal. Patient uses a cane  Cervical spine: Passive and active range of motion are limited secondary to pain. Extension, lateral flexion, rotation of the cervical spine increased and reproduced pain. Cervical facet joint loading maneuvers are positive.  Muscles: Presence of active trigger points at the levator scapulae   Shoulders: The range of motion of the glenohumeral joints is full and without pain. Rotator cuff strength is 5/5.   Thoracic spine: Thoracic facet joint loading maneuvers are negative   Neurological: Patient is alert and oriented to person, place, and time. Speech: speech is normal. Cortical function: Normal mental status.   Cranial nerves: Cranial nerves 2-12 intact.   Reflex Scores:  brachioradialis: 2+ bilaterally  biceps: 2+ bilaterally  triceps: 2+ bilaterally  patellar: 2+ bilaterally  Achilles: 2+ bilaterally  Motor strength: 5/5  Motor Tone: normal tone.   Involuntary movements: none.   Right ankle clonus: absent  Left ankle clonus: absent   Spurling sign is negative.  Negative long tract signs.   Sensation: No sensory loss. Sensory exam: intact to light touch, intact pain and temperature sensation, intact vibration sensation and normal proprioception.   Coordination: Normal finger to nose and heel to shin. Normal balance and negative. Romberg's sign negative.   Skin and subcutaneous tissue: Skin is warm and intact. No rash noted. No cyanosis.   Psychiatric: Judgment and insight: Normal. Orientation to person, place and time: Normal. Recent and remote memory: Intact. Mood and affect: Normal.     ASSESSMENT:   1. Cervical spondylosis without myelopathy    2. DDD (degenerative disc disease), cervical    3. Cervical stenosis of spine    4. Bilateral carpal tunnel  syndrome    5. Lumbar stenosis with neurogenic claudication    6. 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 Morbid obesity due to excess calories (CMS/HCC)    8. Hx of CABG    9. Presence of stent in coronary artery in patient with coronary artery disease    10. History of liver transplant (CMS/HCC)    11. Anxiety and depression    12. Physical deconditioning        PLAN/MEDICAL DECISION MAKING: I have reviewed all available medical records as well as previous therapies as referenced above, and discussed the patient with Dr. Tate.  1. Interventional pain management measures: Patient will be scheduled for bilateral cervical medial branch rhizotomies at C5, C6, C7; for bilateral cervical facet joints at C5-C6 and C6-C7.   2. Pharmacological measures: Reviewed and discussed.   A. Continue prilocaine 2%, lidocaine 10%, imipramine 3%, capsaicin 0.001% and mannitol 20%  cream, apply 1 to 2 grams of cream to the affected areas every 4 to 6 hours as needed  3. Long-term rehabilitation efforts:  A.   Patient will start a comprehensive physical therapy program for water therapy, therapeutic exercise, core strengthening, gait and balance training, neurodynamics, myofascial release, cupping and dry needling once pain is under control  B.  Start an exercise program such as water therapy and swimming  C.  Contrast therapy: Apply ice-packs for 15-20 minutes, followed by heating pads for 15-20 minutes to affected area   4.  The patient and his family have been instructed to contact my office with any questions or difficulties. The patient understands the plan and agrees to proceed accordingly.       Patient Care Team:  Bobo Calvillo MD as PCP - General (Family Medicine)  Natanael Estrella MD as Consulting Physician (Neurology)  Oren Pena MD as Consulting Physician (Cardiology)  Everardo Tate MD as Consulting Physician (Pain Medicine)  Scout Grant MD as Consulting Physician  (Otolaryngology)     No orders of the defined types were placed in this encounter.        Future Appointments   Date Time Provider Department Center   2/10/2019  8:30 AM [facility] ECHO/VASC CART RM1  [facility]   2/10/2019  9:30 AM [facility] ECHO/VASC CART RM1  [facility]   2/27/2019  7:00 AM Everardo Tate MD [facility] None         Pravin Castillo PA-C     EMR Dragon/Transcription disclaimer:  Much of this encounter note is an electronic transcription of spoken language to printed text. Electronic transcription of spoken language may permit erroneous, or at times, nonsensical words or phrases to be inadvertently transcribed. Although I have reviewed the note for such errors, some may still exist.

## 2019-02-07 ENCOUNTER — TELEPHONE (OUTPATIENT)
Dept: PAIN MEDICINE | Facility: CLINIC | Age: 64
End: 2019-02-07

## 2019-02-07 NOTE — TELEPHONE ENCOUNTER
Patient had 2nd set dx bilateral cervical medial branch blocks on 02/06/19. Spoke with patient's wife. She reports that he did very well

## 2019-02-08 ENCOUNTER — OFFICE VISIT (OUTPATIENT)
Dept: PAIN MEDICINE | Facility: CLINIC | Age: 64
End: 2019-02-08

## 2019-02-08 VITALS
BODY MASS INDEX: 38.09 KG/M2 | OXYGEN SATURATION: 99 % | WEIGHT: 281.2 LBS | DIASTOLIC BLOOD PRESSURE: 82 MMHG | SYSTOLIC BLOOD PRESSURE: 142 MMHG | RESPIRATION RATE: 18 BRPM | HEART RATE: 55 BPM | TEMPERATURE: 96.3 F | HEIGHT: 72 IN

## 2019-02-08 DIAGNOSIS — M48.062 LUMBAR STENOSIS WITH NEUROGENIC CLAUDICATION: ICD-10-CM

## 2019-02-08 DIAGNOSIS — I25.10 PRESENCE OF STENT IN CORONARY ARTERY IN PATIENT WITH CORONARY ARTERY DISEASE: ICD-10-CM

## 2019-02-08 DIAGNOSIS — Z95.5 PRESENCE OF STENT IN CORONARY ARTERY IN PATIENT WITH CORONARY ARTERY DISEASE: ICD-10-CM

## 2019-02-08 DIAGNOSIS — M48.02 CERVICAL STENOSIS OF SPINE: ICD-10-CM

## 2019-02-08 DIAGNOSIS — M50.30 DDD (DEGENERATIVE DISC DISEASE), CERVICAL: ICD-10-CM

## 2019-02-08 DIAGNOSIS — F41.9 ANXIETY AND DEPRESSION: ICD-10-CM

## 2019-02-08 DIAGNOSIS — Z94.4 HISTORY OF LIVER TRANSPLANT (HCC): ICD-10-CM

## 2019-02-08 DIAGNOSIS — Z95.1 HX OF CABG: ICD-10-CM

## 2019-02-08 DIAGNOSIS — G56.03 BILATERAL CARPAL TUNNEL SYNDROME: ICD-10-CM

## 2019-02-08 DIAGNOSIS — M47.812 CERVICAL SPONDYLOSIS WITHOUT MYELOPATHY: Primary | ICD-10-CM

## 2019-02-08 DIAGNOSIS — E66.01 MORBID OBESITY DUE TO EXCESS CALORIES (HCC): ICD-10-CM

## 2019-02-08 DIAGNOSIS — R53.81 PHYSICAL DECONDITIONING: ICD-10-CM

## 2019-02-08 DIAGNOSIS — E10.8 DIABETES MELLITUS TYPE 1 WITH COMPLICATIONS (HCC): ICD-10-CM

## 2019-02-08 DIAGNOSIS — F32.A ANXIETY AND DEPRESSION: ICD-10-CM

## 2019-02-08 PROCEDURE — 99213 OFFICE O/P EST LOW 20 MIN: CPT | Performed by: PHYSICIAN ASSISTANT

## 2019-02-10 ENCOUNTER — HOSPITAL ENCOUNTER (OUTPATIENT)
Dept: CARDIOLOGY | Facility: HOSPITAL | Age: 64
Discharge: HOME | End: 2019-02-10
Attending: FAMILY MEDICINE | Admitting: FAMILY MEDICINE

## 2019-02-10 ENCOUNTER — HOSPITAL ENCOUNTER (OUTPATIENT)
Dept: CARDIOLOGY | Facility: HOSPITAL | Age: 64
Discharge: HOME | End: 2019-02-10
Attending: FAMILY MEDICINE

## 2019-02-10 VITALS
WEIGHT: 281 LBS | WEIGHT: 281 LBS | HEIGHT: 72 IN | BODY MASS INDEX: 38.06 KG/M2 | HEIGHT: 72 IN | BODY MASS INDEX: 38.06 KG/M2

## 2019-02-10 DIAGNOSIS — R06.02 SHORTNESS OF BREATH: ICD-10-CM

## 2019-02-10 DIAGNOSIS — G45.8 OTHER TRANSIENT CEREBRAL ISCHEMIC ATTACKS AND RELATED SYNDROMES: ICD-10-CM

## 2019-02-10 LAB
BH CV ECHO MEAS - AO MAX PG (FULL): 9.9 MMHG
BH CV ECHO MEAS - AO MAX PG: 16.7 MMHG
BH CV ECHO MEAS - AO MEAN PG (FULL): 4.5 MMHG
BH CV ECHO MEAS - AO MEAN PG: 7.7 MMHG
BH CV ECHO MEAS - AO ROOT AREA (BSA CORRECTED): 1.4
BH CV ECHO MEAS - AO ROOT AREA: 8.7 CM^2
BH CV ECHO MEAS - AO ROOT DIAM: 3.3 CM
BH CV ECHO MEAS - AO V2 MAX: 204.6 CM/SEC
BH CV ECHO MEAS - AO V2 MEAN: 128.3 CM/SEC
BH CV ECHO MEAS - AO V2 VTI: 43.7 CM
BH CV ECHO MEAS - AVA(I,A): 2.5 CM^2
BH CV ECHO MEAS - AVA(I,D): 2.5 CM^2
BH CV ECHO MEAS - AVA(V,A): 2.2 CM^2
BH CV ECHO MEAS - AVA(V,D): 2.2 CM^2
BH CV ECHO MEAS - BSA(HAYCOCK): 2.6 M^2
BH CV ECHO MEAS - BSA(HAYCOCK): 2.6 M^2
BH CV ECHO MEAS - BSA: 2.5 M^2
BH CV ECHO MEAS - BSA: 2.5 M^2
BH CV ECHO MEAS - BZI_BMI: 38.1 KILOGRAMS/M^2
BH CV ECHO MEAS - BZI_BMI: 38.1 KILOGRAMS/M^2
BH CV ECHO MEAS - BZI_METRIC_HEIGHT: 182.9 CM
BH CV ECHO MEAS - BZI_METRIC_HEIGHT: 182.9 CM
BH CV ECHO MEAS - BZI_METRIC_WEIGHT: 127.5 KG
BH CV ECHO MEAS - BZI_METRIC_WEIGHT: 127.5 KG
BH CV ECHO MEAS - EDV(CUBED): 224 ML
BH CV ECHO MEAS - EDV(MOD-SP2): 94 ML
BH CV ECHO MEAS - EDV(MOD-SP4): 101 ML
BH CV ECHO MEAS - EDV(TEICH): 185 ML
BH CV ECHO MEAS - EF(CUBED): 76.2 %
BH CV ECHO MEAS - EF(MOD-BP): 70 %
BH CV ECHO MEAS - EF(MOD-SP2): 70.2 %
BH CV ECHO MEAS - EF(MOD-SP4): 70.3 %
BH CV ECHO MEAS - EF(TEICH): 67.3 %
BH CV ECHO MEAS - ESV(CUBED): 53.2 ML
BH CV ECHO MEAS - ESV(MOD-SP2): 28 ML
BH CV ECHO MEAS - ESV(MOD-SP4): 30 ML
BH CV ECHO MEAS - ESV(TEICH): 60.5 ML
BH CV ECHO MEAS - FS: 38.1 %
BH CV ECHO MEAS - IVS/LVPW: 0.87
BH CV ECHO MEAS - IVSD: 1.1 CM
BH CV ECHO MEAS - LAD MAJOR: 5.6 CM
BH CV ECHO MEAS - LAT PEAK E' VEL: 8.7 CM/SEC
BH CV ECHO MEAS - LATERAL E/E' RATIO: 9.5
BH CV ECHO MEAS - LV DIASTOLIC VOL/BSA (35-75): 41 ML/M^2
BH CV ECHO MEAS - LV MASS(C)D: 298.6 GRAMS
BH CV ECHO MEAS - LV MASS(C)DI: 121.3 GRAMS/M^2
BH CV ECHO MEAS - LV MAX PG: 6.8 MMHG
BH CV ECHO MEAS - LV MEAN PG: 3.1 MMHG
BH CV ECHO MEAS - LV SYSTOLIC VOL/BSA (12-30): 12.2 ML/M^2
BH CV ECHO MEAS - LV V1 MAX: 130.8 CM/SEC
BH CV ECHO MEAS - LV V1 MEAN: 79 CM/SEC
BH CV ECHO MEAS - LV V1 VTI: 30.6 CM
BH CV ECHO MEAS - LVIDD: 6.1 CM
BH CV ECHO MEAS - LVIDS: 3.8 CM
BH CV ECHO MEAS - LVLD AP2: 9.1 CM
BH CV ECHO MEAS - LVLD AP4: 9.1 CM
BH CV ECHO MEAS - LVLS AP2: 7.7 CM
BH CV ECHO MEAS - LVLS AP4: 7.6 CM
BH CV ECHO MEAS - LVOT AREA (M): 3.5 CM^2
BH CV ECHO MEAS - LVOT AREA: 3.5 CM^2
BH CV ECHO MEAS - LVOT DIAM: 2.1 CM
BH CV ECHO MEAS - LVPWD: 1.2 CM
BH CV ECHO MEAS - MED PEAK E' VEL: 8.7 CM/SEC
BH CV ECHO MEAS - MEDIAL E/E' RATIO: 9.5
BH CV ECHO MEAS - MV A MAX VEL: 78 CM/SEC
BH CV ECHO MEAS - MV DEC TIME: 0.23 SEC
BH CV ECHO MEAS - MV E MAX VEL: 83.9 CM/SEC
BH CV ECHO MEAS - MV E/A: 1.1
BH CV ECHO MEAS - PA ACC SLOPE: 452.7 CM/SEC^2
BH CV ECHO MEAS - PA ACC TIME: 0.17 SEC
BH CV ECHO MEAS - PA PR(ACCEL): 2.9 MMHG
BH CV ECHO MEAS - PI END-D VEL: 114 CM/SEC
BH CV ECHO MEAS - RAP SYSTOLE: 3 MMHG
BH CV ECHO MEAS - RVSP: 25 MMHG
BH CV ECHO MEAS - SI(AO): 154.7 ML/M^2
BH CV ECHO MEAS - SI(CUBED): 69.4 ML/M^2
BH CV ECHO MEAS - SI(LVOT): 43.6 ML/M^2
BH CV ECHO MEAS - SI(MOD-SP2): 26.8 ML/M^2
BH CV ECHO MEAS - SI(MOD-SP4): 28.8 ML/M^2
BH CV ECHO MEAS - SI(TEICH): 50.6 ML/M^2
BH CV ECHO MEAS - SV(AO): 380.8 ML
BH CV ECHO MEAS - SV(CUBED): 170.7 ML
BH CV ECHO MEAS - SV(LVOT): 107.2 ML
BH CV ECHO MEAS - SV(MOD-SP2): 66 ML
BH CV ECHO MEAS - SV(MOD-SP4): 71 ML
BH CV ECHO MEAS - SV(TEICH): 124.6 ML
BH CV ECHO MEAS - TAPSE (>1.6): 1.8 CM2
BH CV ECHO MEAS - TR MAX PG: 22 MMHG
BH CV ECHO MEAS - TR MAX VEL: 230.8 CM/SEC
BH CV ECHO MEASUREMENTS AVERAGE E/E' RATIO: 9.64
BH CV VAS BP LEFT ARM: NORMAL MMHG
BH CV VAS BP RIGHT ARM: NORMAL MMHG
BH CV XLRA - RV BASE: 4.4 CM
BH CV XLRA - RV LENGTH: 8.5 CM
BH CV XLRA - RV MID: 4 CM
BH CV XLRA - TDI S': 10.7 CM/SEC
BH CV XLRA MEAS LEFT CCA RATIO VEL: 125 CM/SEC
BH CV XLRA MEAS LEFT DIST CCA EDV: 17.3 CM/SEC
BH CV XLRA MEAS LEFT DIST CCA PSV: 108.4 CM/SEC
BH CV XLRA MEAS LEFT DIST ICA EDV: 21.6 CM/SEC
BH CV XLRA MEAS LEFT DIST ICA PSV: 98.5 CM/SEC
BH CV XLRA MEAS LEFT ICA RATIO VEL: 88 CM/SEC
BH CV XLRA MEAS LEFT ICA/CCA RATIO: 0.7
BH CV XLRA MEAS LEFT MID CCA EDV: 14.7 CM/SEC
BH CV XLRA MEAS LEFT MID CCA PSV: 125.7 CM/SEC
BH CV XLRA MEAS LEFT MID ICA EDV: 17.5 CM/SEC
BH CV XLRA MEAS LEFT MID ICA PSV: 66.3 CM/SEC
BH CV XLRA MEAS LEFT PROX CCA EDV: 15.4 CM/SEC
BH CV XLRA MEAS LEFT PROX CCA PSV: 92.2 CM/SEC
BH CV XLRA MEAS LEFT PROX ECA PSV: 175.8 CM/SEC
BH CV XLRA MEAS LEFT PROX ICA EDV: 20.4 CM/SEC
BH CV XLRA MEAS LEFT PROX ICA PSV: 88.8 CM/SEC
BH CV XLRA MEAS LEFT PROX SCLA PSV: 113.8 CM/SEC
BH CV XLRA MEAS LEFT VERTEBRAL A PSV: 44.7 CM/SEC
BH CV XLRA MEAS RIGHT CCA RATIO VEL: 92.5 CM/SEC
BH CV XLRA MEAS RIGHT DIST CCA EDV: 11.9 CM/SEC
BH CV XLRA MEAS RIGHT DIST CCA PSV: 73.5 CM/SEC
BH CV XLRA MEAS RIGHT DIST ICA EDV: 15.7 CM/SEC
BH CV XLRA MEAS RIGHT DIST ICA PSV: 70.4 CM/SEC
BH CV XLRA MEAS RIGHT ICA RATIO VEL: 83.6 CM/SEC
BH CV XLRA MEAS RIGHT ICA/CCA RATIO: 0.9
BH CV XLRA MEAS RIGHT MID CCA EDV: 13.1 CM/SEC
BH CV XLRA MEAS RIGHT MID CCA PSV: 93.4 CM/SEC
BH CV XLRA MEAS RIGHT MID ICA EDV: 18.2 CM/SEC
BH CV XLRA MEAS RIGHT MID ICA PSV: 81.1 CM/SEC
BH CV XLRA MEAS RIGHT PROX CCA EDV: 18.9 CM/SEC
BH CV XLRA MEAS RIGHT PROX CCA PSV: 133.3 CM/SEC
BH CV XLRA MEAS RIGHT PROX ECA PSV: 133.6 CM/SEC
BH CV XLRA MEAS RIGHT PROX ICA EDV: 20.7 CM/SEC
BH CV XLRA MEAS RIGHT PROX ICA PSV: 84.2 CM/SEC
BH CV XLRA MEAS RIGHT PROX SCLA PSV: 230.1 CM/SEC
BH CV XLRA MEAS RIGHT VERTEBRAL A PSV: 43.2 CM/SEC
LEFT ARM BP: NORMAL MMHG
LEFT ATRIUM VOLUME INDEX: 23.6 ML/M^2
LEFT ATRIUM VOLUME: 58 ML
LV EF 2D ECHO EST: 70 %
RIGHT ARM BP: NORMAL MMHG

## 2019-02-10 PROCEDURE — 93306 TTE W/DOPPLER COMPLETE: CPT

## 2019-02-10 PROCEDURE — 93880 EXTRACRANIAL BILAT STUDY: CPT

## 2019-02-10 PROCEDURE — 93306 TTE W/DOPPLER COMPLETE: CPT | Performed by: INTERNAL MEDICINE

## 2019-02-10 PROCEDURE — 93880 EXTRACRANIAL BILAT STUDY: CPT | Performed by: INTERNAL MEDICINE

## 2019-02-27 ENCOUNTER — OUTSIDE FACILITY SERVICE (OUTPATIENT)
Dept: PAIN MEDICINE | Facility: CLINIC | Age: 64
End: 2019-02-27

## 2019-02-27 DIAGNOSIS — E10.8 DIABETES MELLITUS TYPE 1 WITH COMPLICATIONS (HCC): Primary | ICD-10-CM

## 2019-02-27 DIAGNOSIS — E66.01 MORBID OBESITY DUE TO EXCESS CALORIES (HCC): ICD-10-CM

## 2019-02-27 PROCEDURE — 64633 DESTROY CERV/THOR FACET JNT: CPT | Performed by: ANESTHESIOLOGY

## 2019-02-27 PROCEDURE — 99152 MOD SED SAME PHYS/QHP 5/>YRS: CPT | Performed by: ANESTHESIOLOGY

## 2019-02-27 PROCEDURE — 64634 DESTROY C/TH FACET JNT ADDL: CPT | Performed by: ANESTHESIOLOGY

## 2019-02-28 ENCOUNTER — TELEPHONE (OUTPATIENT)
Dept: PAIN MEDICINE | Facility: CLINIC | Age: 64
End: 2019-02-28

## 2019-02-28 NOTE — TELEPHONE ENCOUNTER
Patient had bilateral cervical RFTC on 02/27/19. Called patient to f/u post procedure. Reached voicemail. Left message for return call

## 2019-06-19 ENCOUNTER — TRANSCRIBE ORDERS (OUTPATIENT)
Dept: ADMINISTRATIVE | Facility: HOSPITAL | Age: 64
End: 2019-06-19

## 2019-06-19 ENCOUNTER — HOSPITAL ENCOUNTER (OUTPATIENT)
Dept: GENERAL RADIOLOGY | Facility: HOSPITAL | Age: 64
Discharge: HOME | End: 2019-06-19
Admitting: FAMILY MEDICINE

## 2019-06-19 DIAGNOSIS — M23.92 DERANGEMENT, KNEE INTERNAL, LEFT: Primary | ICD-10-CM

## 2019-06-19 PROCEDURE — 73562 X-RAY EXAM OF KNEE 3: CPT

## 2020-01-30 ENCOUNTER — OFFICE VISIT (OUTPATIENT)
Dept: NEUROSURGERY | Facility: CLINIC | Age: 65
End: 2020-01-30

## 2020-01-30 VITALS
WEIGHT: 285 LBS | BODY MASS INDEX: 38.6 KG/M2 | TEMPERATURE: 97.6 F | SYSTOLIC BLOOD PRESSURE: 126 MMHG | DIASTOLIC BLOOD PRESSURE: 80 MMHG | HEIGHT: 72 IN

## 2020-01-30 DIAGNOSIS — M54.10 RADICULAR SYNDROME OF RIGHT LEG: ICD-10-CM

## 2020-01-30 DIAGNOSIS — M51.26 HERNIATED LUMBAR INTERVERTEBRAL DISC: Primary | ICD-10-CM

## 2020-01-30 DIAGNOSIS — M48.061 DEGENERATIVE LUMBAR SPINAL STENOSIS: ICD-10-CM

## 2020-01-30 PROCEDURE — 99203 OFFICE O/P NEW LOW 30 MIN: CPT | Performed by: NEUROLOGICAL SURGERY

## 2020-01-30 RX ORDER — HYDROCODONE BITARTRATE AND ACETAMINOPHEN 7.5; 325 MG/1; MG/1
1 TABLET ORAL EVERY 6 HOURS PRN
Qty: 45 TABLET | Refills: 0 | Status: SHIPPED | OUTPATIENT
Start: 2020-01-30 | End: 2020-02-10 | Stop reason: SDUPTHER

## 2020-01-30 NOTE — PROGRESS NOTES
"Mahendra Yates  1955  [id number]      Chief Complaint   Patient presents with   • Back Pain   • Leg Pain     Bilateral   • Fall       HISTORY OF PRESENT ILLNESS: This is a 64-year-old male who presents with a several month history of severe pain in his back rating to his right lower extremity.  He has had several falls on occasion because of his leg \"giving out.  He has had lumbar MRI and reports for neurosurgical consultation.    His past medical history, and a word, is \"awful\".  He had a liver transplant approximately 11 years ago, is diabetic, has unreconstructable coronary vascular disease with recent evaluation.  He was told that he was not a surgical candidate and there was little that can be done for him from a cardio vascular perspective.  He is hypertensive.  Has sleep apnea.  Has had TIAs in the past.    Past Medical History:   Diagnosis Date   • Arthritis    • Atherosclerosis    • B12 deficiency    • Cancer (CMS/HCC)    • Cancer of liver (CMS/HCC)    • Chronic headaches    • Coronary artery disease    • Diabetes mellitus (CMS/HCC)    • Gastroesophageal reflux disease    • History of transfusion    • Hyperlipidemia    • Hypertension    • Myocardial infarction (CMS/HCC)    • Sleep apnea    • Stroke (CMS/HCC)        Past Surgical History:   Procedure Laterality Date   • ANGIOPLASTY     • CARDIAC SURGERY     • CARPAL TUNNEL RELEASE Right 2/8/2017    Procedure: CARPAL TUNNEL RELEASE RIGHT ;  Surgeon: Luis Hayden MD;  Location: [facility];  Service:    • CHOLECYSTECTOMY     • CORONARY ARTERY BYPASS GRAFT     • CORONARY STENT PLACEMENT      x2   • LIVER TRANSPLANTATION     • TUMOR REMOVAL Right     axilla       Family History   Problem Relation Age of Onset   • Heart disease Mother    • Diabetes Father    • Cancer Brother        Social History     Socioeconomic History   • Marital status:      Spouse name: Not on file   • Number of children: Not on file   • Years of education: Not on file   • " Highest education level: Not on file   Tobacco Use   • Smoking status: Former Smoker     Packs/day: 4.00     Years: 30.00     Pack years: 120.00     Types: Cigarettes     Last attempt to quit: 1991     Years since quittin.9   • Smokeless tobacco: Never Used   Substance and Sexual Activity   • Alcohol use: No   • Drug use: No   • Sexual activity: Defer       Allergies   Allergen Reactions   • Contrast Dye Other (See Comments)     Cardiac Arrest   • Penicillins Rash   • Sulfasalazine Rash         Current Outpatient Medications:   •  amLODIPine (NORVASC) 10 MG tablet, Take 10 mg by mouth Daily., Disp: , Rfl:   •  aspirin 81 MG EC tablet, Take 81 mg by mouth Daily. Continues per doctors orders, Disp: , Rfl:   •  coenzyme Q10 100 MG capsule, Take 100 mg by mouth Daily., Disp: , Rfl:   •  CYCLOBENZAPRINE HCL PO, Take 10 mg by mouth 3 (Three) Times a Day., Disp: , Rfl:   •  DICLOFENAC PO, Take  by mouth., Disp: , Rfl:   •  fenofibrate micronized (LOFIBRA) 134 MG capsule, Take 134 mg by mouth Every Morning Before Breakfast., Disp: , Rfl:   •  Gel Base gel, CAPSAICIN 0.001% IMIPRAMINE 3% LIDOCAINE 10% PRILOCAINE 2% MANNITOL 20%. APPLY 1-2 G TO AFFECTED AREA 3-4 TIMES DAILY, Disp: 240 g, Rfl: PRN  •  glucosamine-chondroitin 500-400 MG capsule capsule, Take  by mouth 3 (Three) Times a Day With Meals., Disp: , Rfl:   •  insulin aspart (novoLOG) 100 UNIT/ML injection, Inject  under the skin 3 (Three) Times a Day Before Meals. SSI, Disp: , Rfl:   •  insulin glargine (LANTUS) 100 UNIT/ML injection, Inject 55 Units under the skin 2 (Two) Times a Day., Disp: , Rfl:   •  isosorbide mononitrate (IMDUR) 30 MG 24 hr tablet, Take 30 mg by mouth Daily., Disp: , Rfl:   •  LORATADINE ALLERGY RELIEF PO, Take  by mouth., Disp: , Rfl:   •  Magnesium 80 MG tablet, Take 80 mg by mouth 2 (Two) Times a Day., Disp: , Rfl:   •  Multiple Vitamins-Minerals (MULTIVITAMIN ADULT PO), Take 1 tablet by mouth Daily., Disp: , Rfl:   •  Mycophenolate  Sodium (MYFORTIC PO), Take 720 mg by mouth 2 (Two) Times a Day., Disp: , Rfl:   •  naproxen (NAPROSYN) 500 MG tablet, Take 1 tablet by mouth 2 (Two) Times a Day With Meals. With food, Disp: 60 tablet, Rfl: 3  •  nebivolol (BYSTOLIC) 20 MG tablet, Take 20 mg by mouth Daily., Disp: , Rfl:   •  nitroglycerin (NITROSTAT) 0.4 MG SL tablet, Place 0.4 mg under the tongue Every 5 (Five) Minutes As Needed for chest pain. Take no more than 3 doses in 15 minutes., Disp: , Rfl:   •  omeprazole (priLOSEC) 40 MG capsule, Take 40 mg by mouth 2 (Two) Times a Day., Disp: , Rfl:   •  pentoxifylline (TRENtal) 400 MG CR tablet, Take 400 mg by mouth 3 (Three) Times a Day With Meals., Disp: , Rfl:   •  pravastatin (PRAVACHOL) 40 MG tablet, Take 40 mg by mouth Daily., Disp: , Rfl:   •  ranolazine (RANEXA) 1000 MG 12 hr tablet, Take 1,000 mg by mouth 2 (Two) Times a Day., Disp: , Rfl:   •  Tacrolimus (PROGRAF PO), Take 2 mg by mouth Every Evening., Disp: , Rfl:   •  tacrolimus (PROGRAF) 1 MG capsule, Take 1 mg by mouth Every Morning., Disp: , Rfl:   •  Venlafaxine HCl (EFFEXOR XR PO), Take 225 mg by mouth Daily., Disp: , Rfl:   •  vitamin E 1000 UNIT capsule, Take 1,000 Units by mouth Daily., Disp: , Rfl:     Review of Systems   Constitutional: Negative for activity change, appetite change, chills, diaphoresis, fatigue, fever and unexpected weight change.   HENT: Positive for tinnitus. Negative for congestion, dental problem, drooling, ear discharge, ear pain, facial swelling, hearing loss, mouth sores, nosebleeds, postnasal drip, rhinorrhea, sinus pressure, sinus pain, sneezing, sore throat, trouble swallowing and voice change.    Eyes: Negative for photophobia, pain, discharge, redness, itching and visual disturbance.   Respiratory: Negative for apnea, cough, choking, chest tightness, shortness of breath, wheezing and stridor.    Cardiovascular: Positive for chest pain. Negative for palpitations and leg swelling.   Gastrointestinal:  "Negative for abdominal distention, abdominal pain, anal bleeding, blood in stool, constipation, diarrhea, nausea, rectal pain and vomiting.   Endocrine: Negative for cold intolerance, heat intolerance, polydipsia, polyphagia and polyuria.   Genitourinary: Negative for decreased urine volume, difficulty urinating, discharge, dysuria, enuresis, flank pain, frequency, genital sores, hematuria, penile pain, penile swelling, scrotal swelling, testicular pain and urgency.   Musculoskeletal: Positive for arthralgias, back pain and myalgias. Negative for gait problem, joint swelling, neck pain and neck stiffness.   Skin: Negative for color change, pallor, rash and wound.   Allergic/Immunologic: Negative for environmental allergies, food allergies and immunocompromised state.   Neurological: Negative for dizziness, tremors, seizures, syncope, facial asymmetry, speech difficulty, weakness, light-headedness, numbness and headaches.   Hematological: Negative for adenopathy. Does not bruise/bleed easily.   Psychiatric/Behavioral: Negative for agitation, behavioral problems, confusion, decreased concentration, dysphoric mood, hallucinations, self-injury, sleep disturbance and suicidal ideas. The patient is not nervous/anxious and is not hyperactive.        Vitals:    01/30/20 1102   BP: 126/80   BP Location: Left arm   Patient Position: Sitting   Cuff Size: Large Adult   Temp: 97.6 °F (36.4 °C)   Weight: 129 kg (285 lb)   Height: 182.9 cm (72\")       Neurological Examination:    Mental status/speech: The patient is alert and oriented.  Speech is clear without aphysia or dysarthria.  No overt cognitive deficits.    Cranial nerve examination:    Olfaction: Smell is intact.  Vision: Vision is intact without visual field abnormalities.  Funduscopic examination is normal.  No pupillary irregularity.  Ocular motor examination: The extraocular muscles are intact.  There is no diplopia.  The pupil is round and reactive to both light and " accommodation.  There is no nystagmus.  Facial movement/sensation: There is no facial weakness.  Sensation is intact in the first, second, and third divisions of the trigeminal nerve.  The corneal reflex is intact.  Auditory: Hearing is intact to finger rub bilaterally.  Cranial nerves IX, X, XI, XII: Phonation is normal.  No dysphagia.  Tongue is protruded in the midline without atrophy.  The gag reflex is intact.  Shoulder shrug is normal.    Musculoligamentous ligamentous examination: MI 38.7.  Marked limitation of range of motion lumbar spine.  He has slight weakness in the quadricep muscle on the right as compared to the left.  The deep tendon reflexes are hypoactive.  Straight leg raising, Lasègue and flip test negative.  No Babinski, Ge or clonus.    Medical Decision Making:     Diagnostic Data Set: The lumbar MRI shows the presence of disc herniation L3-L4 with superior migration of what appears to be a fragment with compression of the right side of the dural cylinder as well as the L for nerve root.  He has a mild spinal stenosis L2-L3 and L3-L4      Assessment: Symptomatic disc herniation L3-L4, right          Recommendations: Surgical intervention is a therapeutic option however the risks are exceedingly high, in my opinion.  I have asked him to see Dr. rob for a epidural steroid injection.  I have given him prescription of Lortab 7.5/325 1 4 times daily for pain and he will call me subsequently.  In interim I will talk to his cardiologist to determine if indeed he is a surgical candidate.  Unfortunately he has a structural issue which calls for surgery and extraction of the disc fragment.  I will keep you informed and thank you for allowing me to see him    I greatly appreciate the opportunity to see and evaluate this individual.  If you have questions or concerns regarding issues that I may have overlooked please call me at any time: [phone].  South Machuca M.D.  [facility]  [address]  [facility]  [address]    Scribed for Serafin Machuca MD by Jona Rodriguez CMA. 1/30/2020 11:37 AM     I have read and concur with the information provided by the scribe.  Serafin Machuca MD

## 2020-01-30 NOTE — PATIENT INSTRUCTIONS
Call Dr. Machuca on a Monday or Tuesday with an update.      Ask for Christie () and leave a message for  Dr. Machuca.     He will call you back at the end of the day as soon as he can.     [phone]

## 2020-02-10 DIAGNOSIS — M48.061 DEGENERATIVE LUMBAR SPINAL STENOSIS: ICD-10-CM

## 2020-02-10 DIAGNOSIS — M54.10 RADICULAR SYNDROME OF RIGHT LEG: ICD-10-CM

## 2020-02-10 DIAGNOSIS — M51.26 HERNIATED LUMBAR INTERVERTEBRAL DISC: ICD-10-CM

## 2020-02-10 RX ORDER — HYDROCODONE BITARTRATE AND ACETAMINOPHEN 7.5; 325 MG/1; MG/1
1 TABLET ORAL EVERY 6 HOURS PRN
Qty: 45 TABLET | Refills: 0 | Status: SHIPPED | OUTPATIENT
Start: 2020-02-10 | End: 2020-02-20

## 2020-02-10 NOTE — TELEPHONE ENCOUNTER
Spoke with Kisha and let her know that the script is signed and ready for . She will come get it tomorrow. Script up front for .

## 2020-02-10 NOTE — TELEPHONE ENCOUNTER
Provider:  Sven  Caller: Kisha  Time of call:  11:23A   Phone #: [phone]   Surgery:  n/a  Surgery Date: n/a   Last visit: 01/30/2020    Next visit: TBD      Reason for call:     Kisha LM regarding a refill on patients [facility].  She said she knows the patient has some left but she will be going out of town soon and wants to ensure that the patient will get his refill.    JOSY:    01/09/2020 Hydrocodone/Acetaminophen 325MG/5MG  1955 56 7 SHARMAINE PICKETT [facility] 10- [phone]  [facility] 40 1    01/16/2020 Hydrocodone/Acetaminophen  325MG/5MG  1955 [address] 15 SHARMAINE PICKETT [facility] 10- [phone]  [facility] 40 1    02/04/2020 Hydrocodone/Acetaminophen 325MG/7.5MG  1955 45 12 JACKY MATHUR [facility] 10- [phone]  [facility] 28 1

## 2020-02-11 ENCOUNTER — TELEPHONE (OUTPATIENT)
Dept: NEUROSURGERY | Facility: CLINIC | Age: 65
End: 2020-02-11

## 2020-02-11 NOTE — TELEPHONE ENCOUNTER
Patient picked up Rx from our office.  Pharmacy will not fill early patients Norco Rx unless someone from our office calls the pharmacy to give a verbal okay.    Patient's wife is going out of town and she will be away when the rx is due for refill, that is the reason for the early request.    Pharmacy:  [facility]  [phone]    Rx: NORCO 7.5 - 325 MG         1 tab every 6 hrs as needed

## 2020-02-12 ENCOUNTER — TELEPHONE (OUTPATIENT)
Dept: URGENT CARE | Facility: CLINIC | Age: 65
End: 2020-02-12

## 2020-02-13 ENCOUNTER — TELEPHONE (OUTPATIENT)
Dept: URGENT CARE | Facility: CLINIC | Age: 65
End: 2020-02-13

## 2020-02-13 NOTE — TELEPHONE ENCOUNTER
LMTCB  [facility] LM for him to schedule Ortho appt..  Radiologist read his wrist xray as a nondisplaced fx. May need more stable splint or f/u with ortho ASAP

## 2020-02-14 ENCOUNTER — OFFICE VISIT (OUTPATIENT)
Dept: ORTHOPEDIC SURGERY | Facility: CLINIC | Age: 65
End: 2020-02-14

## 2020-02-14 ENCOUNTER — TELEPHONE (OUTPATIENT)
Dept: URGENT CARE | Facility: CLINIC | Age: 65
End: 2020-02-14

## 2020-02-14 VITALS — BODY MASS INDEX: 38.52 KG/M2 | HEART RATE: 71 BPM | WEIGHT: 284.39 LBS | HEIGHT: 72 IN | OXYGEN SATURATION: 98 %

## 2020-02-14 DIAGNOSIS — M25.532 LEFT WRIST PAIN: Primary | ICD-10-CM

## 2020-02-14 DIAGNOSIS — S52.572A OTHER CLOSED INTRA-ARTICULAR FRACTURE OF DISTAL END OF LEFT RADIUS, INITIAL ENCOUNTER: ICD-10-CM

## 2020-02-14 PROCEDURE — 25600 CLTX DST RDL FX/EPHYS SEP WO: CPT | Performed by: PHYSICIAN ASSISTANT

## 2020-02-14 PROCEDURE — 99214 OFFICE O/P EST MOD 30 MIN: CPT | Performed by: PHYSICIAN ASSISTANT

## 2020-02-14 NOTE — PROGRESS NOTES
[facility]    Subjective     Chief Complaint   Patient presents with   • Left Wrist - Pain     DOI: 02/12/20        HPI  Mahendra Yates is a 65 y.o. male.  Right-hand-dominant.  Patient was referred for evaluation of his left wrist.  He reports that he has had multiple falls due to his legs giving out.  That is currently being evaluated by Nicholas Machuca and Lea.    Patient reports that he fell on 2/12/2020 and then again last night onto his left wrist.  Patient was initially seen on 12 February at the urgent care.  It was at that time he was diagnosed with a distal radius fracture.    Patient currently endorses a pain scale of 8/10.  Severity the pain moderate.  Quality pain throbbing, stabbing, shooting.  Associated symptoms swelling.  Patient currently using hydrocodone 7.5 for pain control.  He is currently in a wheelchair but at home does use a walker to assist with ambulation.    No currently reported fever, chills, night sweats or other constitutional symptoms.    Past Medical History:   Diagnosis Date   • Arthritis    • Atherosclerosis    • B12 deficiency    • Cancer (CMS/HCC)    • Cancer of liver (CMS/HCC)    • Chronic headaches    • Coronary artery disease    • Diabetes mellitus (CMS/HCC)    • Gastroesophageal reflux disease    • History of transfusion    • Hyperlipidemia    • Hypertension    • Myocardial infarction (CMS/HCC)    • Sleep apnea    • Stroke (CMS/HCC)       Past Surgical History:   Procedure Laterality Date   • ANGIOPLASTY     • CARDIAC SURGERY     • CARPAL TUNNEL RELEASE Right 2/8/2017    Procedure: CARPAL TUNNEL RELEASE RIGHT ;  Surgeon: Luis Hayden MD;  Location: [facility];  Service:    • CHOLECYSTECTOMY     • CORONARY ARTERY BYPASS GRAFT     • CORONARY STENT PLACEMENT      x2   • LIVER TRANSPLANTATION     • TUMOR REMOVAL Right     axilla      Family History   Problem Relation Age of Onset   • Heart disease Mother    • Diabetes Father    • Cancer Brother       Social History     Socioeconomic History   • Marital status:      Spouse name: Not on file   • Number of children: Not on file   • Years of education: Not on file   • Highest education level: Not on file   Tobacco Use   • Smoking status: Former Smoker     Packs/day: 4.00     Years: 30.00     Pack years: 120.00     Types: Cigarettes     Last attempt to quit: 1991     Years since quittin.0   • Smokeless tobacco: Never Used   Substance and Sexual Activity   • Alcohol use: No   • Drug use: No   • Sexual activity: Defer      Current Outpatient Medications on File Prior to Visit   Medication Sig Dispense Refill   • amLODIPine (NORVASC) 10 MG tablet Take 10 mg by mouth Daily.     • aspirin 81 MG EC tablet Take 81 mg by mouth Daily. Continues per doctors orders     • coenzyme Q10 100 MG capsule Take 100 mg by mouth Daily.     • CYCLOBENZAPRINE HCL PO Take 10 mg by mouth 3 (Three) Times a Day.     • DICLOFENAC PO Take  by mouth.     • fenofibrate micronized (LOFIBRA) 134 MG capsule Take 134 mg by mouth Every Morning Before Breakfast.     • Gel Base gel CAPSAICIN 0.001% IMIPRAMINE 3% LIDOCAINE 10% PRILOCAINE 2% MANNITOL 20%. APPLY 1-2 G TO AFFECTED AREA 3-4 TIMES DAILY 240 g PRN   • glucosamine-chondroitin 500-400 MG capsule capsule Take  by mouth 3 (Three) Times a Day With Meals.     • HYDROcodone-acetaminophen (NORCO) 7.5-325 MG per tablet Take 1 tablet by mouth Every 6 (Six) Hours As Needed for Moderate Pain . 45 tablet 0   • insulin aspart (novoLOG) 100 UNIT/ML injection Inject  under the skin 3 (Three) Times a Day Before Meals. SSI     • insulin glargine (LANTUS) 100 UNIT/ML injection Inject 55 Units under the skin 2 (Two) Times a Day.     • isosorbide mononitrate (IMDUR) 30 MG 24 hr tablet Take 30 mg by mouth Daily.     • LORATADINE ALLERGY RELIEF PO Take  by mouth.     • Magnesium 80 MG tablet Take 80 mg by mouth 2 (Two) Times a Day.     • Multiple Vitamins-Minerals (MULTIVITAMIN ADULT PO) Take 1  tablet by mouth Daily.     • mupirocin (BACTROBAN) 2 % nasal ointment into the nostril(s) as directed by provider 2 (Two) Times a Day for 10 days. Apply to affected areas of skin twice a day 1 g 1   • Mycophenolate Sodium (MYFORTIC PO) Take 720 mg by mouth 2 (Two) Times a Day.     • naproxen (NAPROSYN) 500 MG tablet Take 1 tablet by mouth 2 (Two) Times a Day With Meals. With food 60 tablet 3   • nebivolol (BYSTOLIC) 20 MG tablet Take 20 mg by mouth Daily.     • nitroglycerin (NITROSTAT) 0.4 MG SL tablet Place 0.4 mg under the tongue Every 5 (Five) Minutes As Needed for chest pain. Take no more than 3 doses in 15 minutes.     • omeprazole (priLOSEC) 40 MG capsule Take 40 mg by mouth 2 (Two) Times a Day.     • pentoxifylline (TRENtal) 400 MG CR tablet Take 400 mg by mouth 3 (Three) Times a Day With Meals.     • pravastatin (PRAVACHOL) 40 MG tablet Take 40 mg by mouth Daily.     • ranolazine (RANEXA) 1000 MG 12 hr tablet Take 1,000 mg by mouth 2 (Two) Times a Day.     • Tacrolimus (PROGRAF PO) Take 2 mg by mouth Every Evening.     • tacrolimus (PROGRAF) 1 MG capsule Take 1 mg by mouth Every Morning.     • Venlafaxine HCl (EFFEXOR XR PO) Take 225 mg by mouth Daily.     • vitamin E 1000 UNIT capsule Take 1,000 Units by mouth Daily.       No current facility-administered medications on file prior to visit.       Allergies   Allergen Reactions   • Contrast Dye Other (See Comments)     Cardiac Arrest   • Penicillins Rash   • Sulfasalazine Rash        The following portions of the patient's history were reviewed and updated as appropriate: allergies, current medications, past family history, past medical history, past social history, past surgical history and problem list.    Review of Systems   Constitutional: Negative.    HENT: Negative.    Eyes: Negative.    Respiratory: Negative.    Cardiovascular: Negative.    Gastrointestinal: Negative.    Endocrine: Negative.    Genitourinary: Negative.    Musculoskeletal: Positive  "for arthralgias and joint swelling.   Skin: Negative.    Allergic/Immunologic: Negative.    Neurological: Negative.    Hematological: Negative.    Psychiatric/Behavioral: Negative.         Objective      Physical Exam  Pulse 71   Ht 182.9 cm (72.01\")   Wt 129 kg (284 lb 6.3 oz)   SpO2 98%   BMI 38.56 kg/m²     Body mass index is 38.56 kg/m².    GENERAL APPEARANCE: awake, alert & oriented x 3, in no acute distress and well developed, well nourished  PSYCH: normal mood and affect  LUNGS:  breathing nonlabored, no wheezing  EYES: sclera anicteric, pupils equal  CARDIOVASCULAR: palpable radial pulses bilaterally. Capillary refill less than 2 seconds  INTEGUMENTARY: skin intact, no clubbing, cyanosis  NEUROLOGIC:  Normal Sensation         Ortho Exam  Peripheral Vascular   Left Upper Extremity    No cyanotic nail beds    Pink nail beds and rapid capillary refill   Palpation    Radial Pulse - Bilaterally normal    Musculoskeletal   Left Upper Extremity   Radius:    Inspection and Palpation:    Tenderness - Moderately tender about the wrist    Swelling -very mild    Effusion - none    Muscle tone - no atrophy    Pulses - +2   Deformities/Malalignments/Discrepancies    Normal bony contour    There is a documented closed fracture : location - left - distal end    Motor: Grossly intact radial, ulnar, median, AIN, PIN nerve distributions.  Sensory: Grossly intact to light touch to the radial, ulnar, median nerve distributions.      Imaging/Studies  Ordered left wrist plain films.  Imaging read by Dr. Aleman.    Imaging Results (Last 7 Days)     Procedure Component Value Units Date/Time    XR Wrist 3+ View Left [[id number]] Resulted:  02/17/20 0807     Updated:  02/17/20 0808    Narrative:       Left Wrist X-Ray  Indication: Pain  AP, Lateral, and Oblique views    Findings:  Healing distal radius intra-articular nondisplaced fracture  No bony lesion  Normal soft tissues  Normal joint spaces     prior studies were available for " comparison.            Assessment/Plan        ICD-10-CM ICD-9-CM   1. Left wrist pain M25.532 719.43   2. Other closed intra-articular fracture of distal end of left radius, initial encounter S52.572A 813.42       Orders Placed This Encounter   Procedures   • XR Wrist 3+ View Left        -Left wrist pain due to closed intra-articular distal radius fracture that appears to be nondisplaced.  -Patient was placed into a short arm fiberglass cast today.  -To remain nonweightbearing to the left wrist.  We talked about use of a platform for his walker.  His wife believes he has something that he can use in that capacity.  -Patient already taking hydrocodone.  He may continue use at this time to control his wrist pain.  Any refills would have to be through his PCP.  -Follow-up next week to make sure the cast is still fitting appropriately and he is having no episodes of skin breakdown.  We will also reassess at that time if we need to repeat imaging or not.  -Questions and concerns answered.    History, exam and imaging discussed with Dr. Aleman who agrees with the above assessment and plan.    Medical Decision Making  Management Options : prescription/IM medicine and close treatment of fracture or dislocation  Data/Risk: radiology tests       Christin Handley PA-C  02/17/20  2:50 PM         EMR Dragon/Transcription disclaimer:  Much of this encounter note is an electronic transcription of spoken language to printed text. Electronic transcription of spoken language may permit erroneous, or at times, nonsensical words or phrases to be inadvertently transcribed. Although I have reviewed the note for such errors, some may still exist.

## 2020-02-19 NOTE — PROGRESS NOTES
"Chief Complaint: \"Pain in my lower back and legs.\"      History of Present Illness:   Patient: Mr. Mahendra Yates, 65 y.o. male originally referred by Dr. Calvillo in consultation for intractable chronic neck pain, and most recently referred by Dr. Serafin Machuca in consultation for intractable chronic low back pain and right lower extremity pain.  He was last seen on February 27, 2019, when he underwent bilateral cervical medial branch rhizotomies at C5, C6, and C7, from which he reports experiencing significant pain relief, and reports he has not any troubles dropping objects or with his  since his procedure.  Although, he does report a sensation of \"soreness\" in his neck today.  He was scheduled for a follow-up visit with Pravin Castillo PA-C, to further be evaluated for his lower back and lower extremity pain, unfortunately he canceled his appointment and has not been seen by this practice since.  He returns today at the request of Dr. Serafin Machuca to be evaluated for a new complaint of lower back pain.  He has suffered quite a few falls in the most recent months because of his leg \"giving out,\" in fact, he most recently suffered a closed intra-articular distal radius fracture, and is being seen by orthopedics.  He has not participated in any recent physical therapy.    Problem #1 Lower back and lower extremity pain  Pain History: Patient reports a 4 year history of pain, which began without incident. Pain has progressed in intensity over the past 1 year.   Pain Description: constant pain with intermittent exacerbation, described as aching, sharp, shooting and throbbing sensation.   Radiation of Pain: The pain radiates into the posterior aspect of the gluteal region, medial aspect of the thighs, to the anterior aspect of the shins extending to the plantar aspect of the toes. RT>LT  Pain intensity today: 4/10 (sitting) 8/10 (standing)  Average pain intensity last week: 8/10  Pain intensity ranges from: 6/10 to " 10/10  Aggravating factors: Pain increases immediately upon standing, walking for any amount of time and lying flat.  Alleviating factors: Pain decreases with sitting, heat and analgesics.   Associated Symptoms:   Patient reports pain and weakness in the lower extremities. He denies numbness.   Patient denies  any new bladder or bowel problems.   Patient reports difficulties with his balance, and recent falls. He ambulates with a rolling walker.    Problem #2 Chronic Neck Pain  Pain history: Patient reports a now 3-year history of neck pain, which began without incident.    Pain description: constant neck pain with intermittent exacerbation, described as sharp and stabbing sensation.   Radiation of pain: The pain does not radiate.   Pain intensity today: 5/10  Average pain intensity last week: 5/10  Pain intensity ranges from: 3/10 to 7/10  Aggravating factors: Pain increases with activities  Alleviating factors: Pain decreases with rest  Associated symptoms:   Patient reports intermittent numbness and weakness in the upper extremities (L>R) in his hands.       Review of previous therapies and additional medical records:  Mahendra Yates has already failed the following measures, including:   Conservative measures: oral analgesics, opioids, topical analgesics, physical therapy, ice and heat   Interventional measures: 02/27/2019: Cervical RFTC  Surgical measures: No history of cervical spine surgery. No history of lumbar spine surgery.  Mahendra Yates has not undergone surgical evaluation for his neck pain. Patient underwent with Dr. Luis Hayden in 2017 right carpal tunnel release  Mahendra Yates underwent neurosurgical consultation with Dr. Serafin Machuca on 01/30/2020, and was found to be a potential surgical candidate, pending cardiac clearance.  Mahendra Yates presents with significant comorbidities including anxiety and depression, hypertension, hyperlipidemia, coronary artery disease (s/p bypass and stent on  Plavix), insulin dependant diabetes, liver CA (s/p transplant in 2010) engaged in treatment.  In terms of current analgesics, Mahendra Yates takes: naproxen, Flexeril, diclofenac, and Norco. Patient also takes Effexor  I have reviewed Mendez Report #[id number] consistent to medication reconciliation.     Global Pain Scale 12-13  2018 02-20 2020         Pain 20 20         Feelings 14 25         Clinical outcomes 20 25         Activities 25 25         GPS Total: 79 95            Review of New Diagnostic Studies:  MRI of the Lumbar Spine wo contrast 01/18/2020: Diffuse spondylosis is present with anterior spurs at all levels above L4-L5 and lateral disc bulges at all levels above L5-S1.  Disc desiccation and disc bulges are present at all lumbar levels.  Disc space narrowing is present at the upper 3 level lumbar levels.  There are Schmorl's nodes at multiple levels and these are associated with contiguous marrow edema reaction at L2-L3 and L3-L4.  T11-T12 and T12-L1: Negative.  L1-L2: A mild disc bulge and mild facet hypertrophy result in mild narrowing of the neural foramina and thecal sac.  L2-L3: A slight retrolisthesis, vacuum phenomenon in the disc, a disc bulge, and facet hypertrophy are present.  There is a small central disc extrusion extending superior to the disc space.  A disc extrusion on the right at L3-L4 extends superiorly into the right subarticular zone.  This causes severe narrowing of the right subarticular zone and compression of the right L3 nerve root.  There is mild to moderate narrowing of the neural foramina.  L3-L4: A disc bulge, vacuum phenomenon in the disc, and facet hypertrophy are present.  A posterior rightward disc extrusion extends superiorly to 0.6 cm above the disc space.  This causes severe narrowing of the right L2-L3 subarticular zone.  There is moderate left neural foraminal narrowing with effacement of the left L3 nerve root.  There is mild to moderate right neural foraminal  narrowing.  There is moderate to severe central spinal stenosis.  L4-L5: A disc bulge and facet hypertrophy result in mild to moderate bilateral neural foraminal narrowing.  This is worse on the right where it causes mild effacement of the right L4 nerve root.  There is mild narrowing of the thecal sac and superior right subarticular zone.  L5-S1: A mild disc bulge and slight facet hypertrophy result in mild bilateral neural foraminal narrowing without central spinal stenosis.  EMG/NCV of the bilateral upper and lower extremities 01/22/2019: right leg is normal, left leg suggests chronic L5 radiculopathy. Median neuropathy at the wrist bilaterally, moderate (carpal tunnel). EMG of the right arm is normal,  left arm suggests chronic C5 radiculopathy.      Review of Diagnostic Studies:    MRI CERVICAL SPINE WITHOUT CONTRAST-08/23/2016: Multilevel degenerative disc and spondylotic disease. Marrow signal is normal. No cervical fracture or subluxation. No abnormal signal is seen within the cervical cord. Syringomyelia or entrapment myelopathy is not identified.  C1-C2: normal alignment and the foramen magnum is clear.   C4-C5: soft disc protrusion with mild smooth compression of the dural sac and mild effacement of the cord. Mild narrowing of the uncovertebral lateral recesses, slightly worse on the left than right.  C5-C6: dominant soft disc protrusion with arthropathic bony bar producing a dominant central and rightward sloping compression impingement on the cervical cord and dural sac and extending markedly into the right lateral recess to create critical right lateral recess impingement   C6-C7: mild soft disc protrusion producing a mild rightward sloping defect on the dural sac but not effacing the cord or significantly embarrassing the lateral recesses.    Review of Systems   Constitutional: Positive for fatigue.   HENT: Positive for hearing loss and tinnitus.    Respiratory: Positive for cough, choking and  shortness of breath.    Cardiovascular: Positive for chest pain.   Endocrine: Positive for polydipsia.   Musculoskeletal: Positive for arthralgias, back pain, myalgias, neck pain and neck stiffness.   Allergic/Immunologic: Positive for immunocompromised state.   Neurological: Positive for weakness.   Hematological: Bruises/bleeds easily.   Psychiatric/Behavioral: The patient is nervous/anxious (depression).    All other systems reviewed and are negative.        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)   • Hx of CABG   • History of liver transplant (CMS/HCC)   • Physical deconditioning   • Lumbar stenosis with neurogenic claudication   • Herniated lumbar intervertebral disc   • Radicular syndrome of right leg   • Degenerative lumbar spinal stenosis       Past Medical History:   Diagnosis Date   • Arthritis    • Atherosclerosis    • B12 deficiency    • Cancer (CMS/HCC)    • Cancer of liver (CMS/HCC)    • Chronic headaches    • Coronary artery disease    • Diabetes mellitus (CMS/HCC)    • Gastroesophageal reflux disease    • History of transfusion    • Hyperlipidemia    • Hypertension    • Myocardial infarction (CMS/HCC)    • Sleep apnea    • Stroke (CMS/HCC)          Past Surgical History:   Procedure Laterality Date   • ANGIOPLASTY     • CARDIAC SURGERY     • CARPAL TUNNEL RELEASE Right 2/8/2017    Procedure: CARPAL TUNNEL RELEASE RIGHT ;  Surgeon: Luis Hayden MD;  Location: [facility];  Service:    • CHOLECYSTECTOMY     • CORONARY ARTERY BYPASS GRAFT     • CORONARY STENT PLACEMENT      x2   • LIVER TRANSPLANTATION     •  TUMOR REMOVAL Right     axilla         Family History   Problem Relation Age of Onset   • Heart disease Mother    • Diabetes Father    • Cancer Brother          Social History     Socioeconomic History   • Marital status:      Spouse name: Not on file   • Number of children: Not on file   • Years of education: Not on file   • Highest education level: Not on file   Tobacco Use   • Smoking status: Former Smoker     Packs/day: 4.00     Years: 30.00     Pack years: 120.00     Types: Cigarettes     Last attempt to quit: 1991     Years since quittin.0   • Smokeless tobacco: Never Used   Substance and Sexual Activity   • Alcohol use: No   • Drug use: No   • Sexual activity: Defer           Current Outpatient Medications:   •  amLODIPine (NORVASC) 10 MG tablet, Take 10 mg by mouth Daily., Disp: , Rfl:   •  aspirin 81 MG EC tablet, Take 81 mg by mouth Daily. Continues per doctors orders, Disp: , Rfl:   •  DHEA 50 MG tablet, Take  by mouth., Disp: , Rfl:   •  fenofibrate micronized (LOFIBRA) 134 MG capsule, Take 134 mg by mouth Every Morning Before Breakfast., Disp: , Rfl:   •  Gel Base gel, CAPSAICIN 0.001% IMIPRAMINE 3% LIDOCAINE 10% PRILOCAINE 2% MANNITOL 20%. APPLY 1-2 G TO AFFECTED AREA 3-4 TIMES DAILY, Disp: 240 g, Rfl: PRN  •  insulin aspart (novoLOG) 100 UNIT/ML injection, Inject  under the skin 3 (Three) Times a Day Before Meals. SSI, Disp: , Rfl:   •  insulin glargine (LANTUS) 100 UNIT/ML injection, Inject 55 Units under the skin 2 (Two) Times a Day., Disp: , Rfl:   •  nebivolol (BYSTOLIC) 20 MG tablet, Take 20 mg by mouth Daily., Disp: , Rfl:   •  nitroglycerin (NITROSTAT) 0.4 MG SL tablet, Place 0.4 mg under the tongue Every 5 (Five) Minutes As Needed for chest pain. Take no more than 3 doses in 15 minutes., Disp: , Rfl:   •  Omega-3 1000 MG capsule, Take  by mouth., Disp: , Rfl:   •  omeprazole (priLOSEC) 40 MG capsule, Take 40 mg by mouth 2 (Two) Times a Day., Disp: , Rfl:   •  pentoxifylline  "(TRENtal) 400 MG CR tablet, Take 400 mg by mouth 3 (Three) Times a Day With Meals., Disp: , Rfl:   •  ranolazine (RANEXA) 1000 MG 12 hr tablet, Take 1,000 mg by mouth 2 (Two) Times a Day., Disp: , Rfl:   •  tacrolimus (PROGRAF) 1 MG capsule, Take 1 mg by mouth Every Morning., Disp: , Rfl:       Allergies   Allergen Reactions   • Contrast Dye Other (See Comments)     Cardiac Arrest   • Penicillins Rash   • Sulfasalazine Rash         Ht 182.9 cm (72.01\")   Wt 129 kg (284 lb)   BMI 38.51 kg/m²       Physical Exam:  Constitutional: Patient is oriented to person, place, and time. Patient appears well-developed and well-nourished.   HEENT: Head: Normocephalic and atraumatic. Eyes: Conjunctivae and lids are normal. Pupils: Equal, round, reactive to light.   Neck: Trachea normal. Neck supple. No JVD present.   Lymphatic: No cervical adenopathy  Pulmonary Respiratory effort: No increased work of breathing or signs of respiratory distress. Auscultation of lungs: Clear to auscultation.   Cardiovascular Auscultation of heart: Normal rate and rhythm, normal S1 and S2, no murmurs. Femoral: right 2+, left 2+. Posterior tibialis: right 2+ and left 2+. Dorsalis pedis: right 2+ and left 2+. No edema.  Musculoskeletal   Gait and station: Gait evaluation demonstrated an antalgic gait with limping on the right. Patient uses a cane  Cervical spine: Passive and active range of motion are almost full and without pain. Extension, lateral flexion, rotation of the cervical spine increased and reproduced minimal pain. Cervical facet joint loading maneuvers are equivocal.  Muscles: Presence of active trigger points at the levator scapulae   Shoulders: The range of motion of the glenohumeral joints is full and without pain. Rotator cuff strength is 5/5.   Thoracic spine: Thoracic facet joint loading maneuvers are negative   Lumbar spine: Passive and active range of motion are limited secondary to pain. Extension, flexion, lateral flexion, " rotation of the lumbar spine increased and reproduced pain. Lumbar facet joint loading maneuvers are positive.  Terry test and Gaenslen's test are negative.   Piriformis maneuvers are negative  Palpation of the bilateral ischial tuberosities, unrevealing   Palpation of the bilateral greater trochanter, unrevealing.    Examination of the Iliotibial band:  reveals tenderness on the bilaterally.    Hip joints: The range of motion of the hip joints is limited but without pain.   Neurological: Patient is alert and oriented to person, place, and time. Speech: speech is normal. Cortical function: Normal mental status.   Cranial nerves: Cranial nerves 2-12 intact.   Reflex Scores:  Right brachioradialis: 2+  Left brachioradialis: 2+  Right biceps: 2+  Left biceps: 2+  Right triceps: 2+  Left triceps: 2+  Right patellar: 1+  Left patellar: 1+  Right Achilles: 1+  Left Achilles: 1+  Motor strength: 4/5 in the lower extremities.  Atrophy of the right gastrocnemius.  Motor Tone: normal tone.   Involuntary movements: none.   Superficial/Primitive Reflexes: primitive reflexes were absent.   Right Poe: absent  Left Poe: absent  Right ankle clonus: absent  Left ankle clonus: absent   Spurling sign is negative. Neck tornado test is negative. Lhermitte sign is negative. Negative long tract signs. Positive Tinel sign at both wrists.  Straight leg raising test on the right elicits his lumbar and gluteal pain.  Sensation: No sensory loss. Sensory exam: intact to light touch, intact pain and temperature sensation, intact vibration sensation and normal proprioception.   Coordination: Normal finger to nose and heel to shin. Normal balance and negative. Romberg's sign negative.   Skin and subcutaneous tissue: Skin is warm and intact. No rash noted. No cyanosis.   Psychiatric: Judgment and insight: Normal. Orientation to person, place and time: Normal. Recent and remote memory: Intact. Mood and affect: Normal.     ASSESSMENT:   1.  Lumbar stenosis with neurogenic claudication    2. Herniated lumbar intervertebral disc    3. DDD (degenerative disc disease), lumbar    4. DDD (degenerative disc disease), cervical    5. Cervical spondylosis without myelopathy    6. Presence of stent in coronary artery in patient with coronary artery disease    7. Morbid obesity due to excess calories (CMS/[facility])    8. Diabetes mellitus type 1 with complications (CMS/[facility])    9. Physical deconditioning        PLAN/MEDICAL DECISION MAKING: I had a lengthy conversation with Mr. Mahendra Yates regarding his chronic pain condition and potential therapeutic options including risks, benefits, alternative therapies, to name a few. Patient has failed to obtain pain relief with conservative measures,  as referenced above. Patient presents with numerous comorbidities. Patient also has a long-standing history of lower back pain and left lower extremity pain and weakness associated with balance disturbance and multiple falls. MRI of the lumbar spine revealed diffuse spondylosis is present with anterior spurs at all levels above L4-L5 and lateral disc bulges at all levels above L5-S1. L3-L4: A disc bulge, vacuum phenomenon in the disc, and facet hypertrophy are present.  A posterior rightward disc extrusion extends superiorly to 0.6 cm above the disc space.  This causes severe narrowing of the right L2-L3 subarticular zone.  There is moderate left neural foraminal narrowing with effacement of the left L3 nerve root.  There is mild to moderate right neural foraminal narrowing.  There is moderate to severe central spinal stenosis. L4-L5: A disc bulge and facet hypertrophy result in mild to moderate bilateral neural foraminal narrowing.  This is worse on the right where it causes mild effacement of the right L4 nerve root.  There is mild narrowing of the thecal sac and superior right subarticular zone. Disc desiccation and disc bulges are present at all lumbar levels.  I have  reviewed all available patient's medical records as well as previous therapies as referenced above, and discussed the patient with Dr. Tate.  Therefore, I have proposed the following plan:  1. Pharmacological measures: Reviewed. Discussed.   A. Patient takes naproxen, Flexeril, diclofenac, and Norco. Patient also takes Effexor  B. Continue with prilocaine 2%, lidocaine 10%, imipramine 3%, capsaicin 0.001% and mannitol 20%  cream, apply 1 to 2 grams of cream to the affected areas every 4 to 6 hours as needed  2. Interventional pain management measures:  A. Treatment of chronic neck pain: None indicated at this time.  If pain recurs patient will be scheduled for one set of diagnostic bilateral cervical medial branch blocks at C5, C6, C7; for bilateral cervical facet joints at C5-C6, C6-C7 to clarify the origin of chronic refractory mechanical/axial cervicalgia. If patient experiences more than 80% pain relief along with significant improvement in the range of motion of the cervical spine, to then, proceed with bilateral cervical medial branch rhizotomies. Otherwise, we may proceed with cervical epidural steroid injection by interlaminar approach.   B. Treatment of chronic lower back pain: Patient will need to stop Plavix 7 days between last dose and procedure (we will request clearance from Dr. Oren Pena) order to proceed with diagnostic and therapeutic bilateral L4-L5 transforaminal epidural steroid injections.  We may repeat epidural depending on patient's outcome.  Patient will follow-up with Dr. Machuca thereafter.    3. Long-term rehabilitation efforts:  A. The patient has a history of falls. I did complete a risk assessment for falls. Fall precautions: Patient has been instructed regarding universal fall precautions, such as;   · Using gait aids a cane or a rolling walker at the appropriate height at all times for ambulation   · Removing all area rugs and coffee tables to create a safe environment at  home  · Ensure clean, dry floors  · Wearing supportive footwear and properly fitting clothing  · Ensure bed/chair is appropriate height and patient's feet can touch the floor  · Using a shower transfer bench  · Using walk-in shower and having shower safety bars installed  · Ensure proper lighting, minimize glare  · Have nightlights operational and in use  · Participation in an exercise program for gait training, balance training and strength  · Ensure proper compliance and organization of medications to avoid errors   · Avoid use of over the counter sedatives and alcohol consumption  · Ensure easy access to call bell, glasses, TV control, telephone  · Ensure glasses/hearing aids are in use or close by (on top of night table)  B.   Patient will start a comprehensive physical therapy program for water therapy, therapeutic exercise, core strengthening, gait and balance training, neurodynamics, myofascial release, cupping and dry needling once pain is under control  C.  Start an exercise program such as water therapy and swimming  D.  Contrast therapy: Apply ice-packs for 15-20 minutes, followed by heating pads for 15-20 minutes to affected area   4.  The patient and his family have been instructed to contact my office with any questions or difficulties. The patient understands the plan and agrees to proceed accordingly.       Patient Care Team:  Bobo Calvillo MD as PCP - General (Family Medicine)  Natanael Estrella MD as Consulting Physician (Neurology)  Oren Pena MD as Consulting Physician (Cardiology)  Everardo Tate MD as Consulting Physician (Pain Medicine)  Scout Grant MD as Consulting Physician (Otolaryngology)  Christin Handley PA-C as Physician Assistant (Physician Assistant)  Radha Briggs APRN as Nurse Practitioner (Nurse Practitioner)     No orders of the defined types were placed in this encounter.        Future Appointments   Date Time Provider Department Center   2/21/2020  10:00 AM Christin Handley PA-C [facility] None   3/4/2020 10:15 AM Everardo Tate MD [facility] None         SERJIO Kline/Transcription disclaimer:  Much of this encounter note is an electronic transcription of spoken language to printed text. Electronic transcription of spoken language may permit erroneous, or at times, nonsensical words or phrases to be inadvertently transcribed. Although I have reviewed the note for such errors, some may still exist.

## 2020-02-20 ENCOUNTER — OFFICE VISIT (OUTPATIENT)
Dept: PAIN MEDICINE | Facility: CLINIC | Age: 65
End: 2020-02-20

## 2020-02-20 ENCOUNTER — DOCUMENTATION (OUTPATIENT)
Dept: PAIN MEDICINE | Facility: CLINIC | Age: 65
End: 2020-02-20

## 2020-02-20 VITALS — WEIGHT: 284 LBS | BODY MASS INDEX: 38.47 KG/M2 | HEIGHT: 72 IN

## 2020-02-20 DIAGNOSIS — Z95.5 PRESENCE OF STENT IN CORONARY ARTERY IN PATIENT WITH CORONARY ARTERY DISEASE: ICD-10-CM

## 2020-02-20 DIAGNOSIS — M48.062 LUMBAR STENOSIS WITH NEUROGENIC CLAUDICATION: Primary | ICD-10-CM

## 2020-02-20 DIAGNOSIS — M48.062 LUMBAR STENOSIS WITH NEUROGENIC CLAUDICATION: ICD-10-CM

## 2020-02-20 DIAGNOSIS — M47.812 CERVICAL SPONDYLOSIS WITHOUT MYELOPATHY: ICD-10-CM

## 2020-02-20 DIAGNOSIS — E66.01 MORBID OBESITY DUE TO EXCESS CALORIES (HCC): ICD-10-CM

## 2020-02-20 DIAGNOSIS — M51.36 DDD (DEGENERATIVE DISC DISEASE), LUMBAR: ICD-10-CM

## 2020-02-20 DIAGNOSIS — I25.10 PRESENCE OF STENT IN CORONARY ARTERY IN PATIENT WITH CORONARY ARTERY DISEASE: ICD-10-CM

## 2020-02-20 DIAGNOSIS — R53.81 PHYSICAL DECONDITIONING: ICD-10-CM

## 2020-02-20 DIAGNOSIS — M51.26 HERNIATED LUMBAR INTERVERTEBRAL DISC: ICD-10-CM

## 2020-02-20 DIAGNOSIS — E10.8 DIABETES MELLITUS TYPE 1 WITH COMPLICATIONS (HCC): ICD-10-CM

## 2020-02-20 DIAGNOSIS — M50.30 DDD (DEGENERATIVE DISC DISEASE), CERVICAL: ICD-10-CM

## 2020-02-20 PROCEDURE — 99024 POSTOP FOLLOW-UP VISIT: CPT | Performed by: NURSE PRACTITIONER

## 2020-02-20 PROCEDURE — 99214 OFFICE O/P EST MOD 30 MIN: CPT | Performed by: NURSE PRACTITIONER

## 2020-02-20 RX ORDER — PRASTERONE (DHEA) 50 MG
1 TABLET ORAL DAILY
COMMUNITY
End: 2022-11-21

## 2020-02-20 RX ORDER — CHLORAL HYDRATE 500 MG
1 CAPSULE ORAL DAILY
COMMUNITY
End: 2022-01-04

## 2020-02-20 NOTE — PROGRESS NOTES
Appointment card, map and procedure information sent to patient.   Blood thinner clearance sent to Dr. Pena, success received.

## 2020-02-21 ENCOUNTER — OFFICE VISIT (OUTPATIENT)
Dept: ORTHOPEDIC SURGERY | Facility: CLINIC | Age: 65
End: 2020-02-21

## 2020-02-21 VITALS — HEIGHT: 72 IN | HEART RATE: 83 BPM | BODY MASS INDEX: 38.52 KG/M2 | OXYGEN SATURATION: 99 % | WEIGHT: 284.39 LBS

## 2020-02-21 DIAGNOSIS — S52.572D OTHER CLOSED INTRA-ARTICULAR FRACTURE OF DISTAL END OF LEFT RADIUS WITH ROUTINE HEALING, SUBSEQUENT ENCOUNTER: ICD-10-CM

## 2020-02-21 DIAGNOSIS — Z09 FRACTURE FOLLOW-UP: Primary | ICD-10-CM

## 2020-02-21 PROCEDURE — 99024 POSTOP FOLLOW-UP VISIT: CPT | Performed by: PHYSICIAN ASSISTANT

## 2020-02-21 NOTE — PROGRESS NOTES
"    [facility] [place] [facility] Note        Subjective     CC: Follow-up of the Left Wrist (1 week)      NEERU Yates is a 65 y.o. male.  Patient returns today for one-week follow-up.  He fall again and caused increased pain to the wrist as well swelling to his fingers.  Currently he endorses a pain scale of 3/10.  No numbness or tingling.    ROS:    Constiutional:Pt denies fever, chills, nausea, or vomiting.  MSK:as above        Objective      Past Medical History  Past Medical History:   Diagnosis Date   • Arthritis    • Atherosclerosis    • B12 deficiency    • Cancer (CMS/HCC)    • Cancer of liver (CMS/HCC)    • Chronic headaches    • Coronary artery disease    • Diabetes mellitus (CMS/HCC)    • Gastroesophageal reflux disease    • History of transfusion    • Hyperlipidemia    • Hypertension    • Myocardial infarction (CMS/HCC)    • Sleep apnea    • Stroke (CMS/HCC)          Physical Exam  Pulse 83   Ht 182.9 cm (72.01\")   Wt 129 kg (284 lb 6.3 oz)   SpO2 99%   BMI 38.56 kg/m²     Body mass index is 38.56 kg/m².    Patient is well nourished and well developed.        Ortho Exam  Left wrist  Cast intact.  No evidence of skin compromise or irritation secondary to the cast.  Patient is able to wiggle all digits.  Sensory is grossly intact to each digit.  2+ capillary refill to each digit.      Imaging/Labs/EMG Reviewed:  Ordered left wrist plain films.  Imaging read by Dr. Aleman.    Left Wrist X-Ray  Indication: Pain  AP, Lateral, and Oblique views     Findings: Reduced and healing left distal radius fracture in cast     No bony lesion  Normal soft tissues  Normal joint spaces     prior studies were available for comparison.      Assessment:  1. Fracture follow-up    2. Other closed intra-articular fracture of distal end of left radius with routine healing, subsequent encounter        Plan:  1. Follow-up left distal radius fracture, stable.  2. To remain nonweightbearing to the left " wrist.  3. Follow-up 3 weeks for repeat evaluation to include pre-clinic imaging of the left wrist out of the cast.  4. Questions and concerns answered.      Christin Handley PA-C  02/27/20  1:52 PM

## 2020-02-24 ENCOUNTER — TELEPHONE (OUTPATIENT)
Dept: PAIN MEDICINE | Facility: CLINIC | Age: 65
End: 2020-02-24

## 2020-02-27 DIAGNOSIS — R53.81 PHYSICAL DECONDITIONING: ICD-10-CM

## 2020-02-27 DIAGNOSIS — M50.30 DDD (DEGENERATIVE DISC DISEASE), CERVICAL: ICD-10-CM

## 2020-02-27 DIAGNOSIS — M47.812 CERVICAL SPONDYLOSIS WITHOUT MYELOPATHY: ICD-10-CM

## 2020-02-27 DIAGNOSIS — M51.26 HERNIATED LUMBAR INTERVERTEBRAL DISC: ICD-10-CM

## 2020-02-27 DIAGNOSIS — M51.36 DDD (DEGENERATIVE DISC DISEASE), LUMBAR: ICD-10-CM

## 2020-02-27 DIAGNOSIS — M48.062 LUMBAR STENOSIS WITH NEUROGENIC CLAUDICATION: Primary | ICD-10-CM

## 2020-02-27 DIAGNOSIS — E66.01 MORBID OBESITY DUE TO EXCESS CALORIES (HCC): ICD-10-CM

## 2020-03-02 ENCOUNTER — OFFICE VISIT (OUTPATIENT)
Dept: ORTHOPEDIC SURGERY | Facility: CLINIC | Age: 65
End: 2020-03-02

## 2020-03-02 VITALS — OXYGEN SATURATION: 97 % | HEART RATE: 80 BPM | BODY MASS INDEX: 38.52 KG/M2 | HEIGHT: 72 IN | WEIGHT: 284.39 LBS

## 2020-03-02 DIAGNOSIS — S80.211A ABRASION OF RIGHT KNEE, INITIAL ENCOUNTER: ICD-10-CM

## 2020-03-02 DIAGNOSIS — M25.561 ACUTE PAIN OF RIGHT KNEE: Primary | ICD-10-CM

## 2020-03-02 PROCEDURE — 99213 OFFICE O/P EST LOW 20 MIN: CPT | Performed by: PHYSICIAN ASSISTANT

## 2020-03-02 NOTE — PROGRESS NOTES
"    [facility] [place] [facility] Note        Subjective     CC: Pain of the Right Knee (Fall 03/01/2020 getting in car)      NEERU Yates is a 65 y.o. male.  Patient presents today status post multiple falls since last visit.  He fell again yesterday resulting in increased pain to the right knee as well as abrasions.  He currently endorses a pain scale of 9/10 to the right knee.  Severity the pain moderate to severe.  Quality the pain aching, burning, throbbing, stabbing.  Associated symptoms swelling.  Falls are related to his legs giving away which is currently being worked up and evaluated by Dr. Tate.    He is currently 2 weeks into treatment for left distal radius fracture.  Cast is in place.    ROS:    Constiutional:Pt denies fever, chills, nausea, or vomiting.  MSK:as above        Objective      Past Medical History  Past Medical History:   Diagnosis Date   • Arthritis    • Atherosclerosis    • B12 deficiency    • Cancer (CMS/HCC)    • Cancer of liver (CMS/HCC)    • Chronic headaches    • Coronary artery disease    • Diabetes mellitus (CMS/HCC)    • Gastroesophageal reflux disease    • History of transfusion    • Hyperlipidemia    • Hypertension    • Myocardial infarction (CMS/HCC)    • Sleep apnea    • Stroke (CMS/HCC)          Physical Exam  Pulse 80   Ht 182.9 cm (72.01\")   Wt 129 kg (284 lb 6.3 oz)   SpO2 97%   BMI 38.56 kg/m²     Body mass index is 38.56 kg/m².    Patient is well nourished and well developed.        Ortho Exam  Left knee  Skin: Abrasions noted lateral aspect proximal tibia.  No erythema or warmth.  No evidence of infection.  Skin surrounding the knee joint itself with mild soft tissue swelling.  Tenderness: Patient with diffuse tenderness throughout knee but increased pain noted along medial soft tissue structures.  No focal joint line tenderness.  Straight leg raise intact with palpable extensor mechanism as well as patellar tendon.  Motion: 0-90.  Instability: " Lachman negative.  Varus and valgus stress negative.  Ankle dorsiflexion/plantarflexion grossly intact.    Left wrist  Cast in place.  No evidence of skin irritation or breakdown.  Cast does fit well without pistoning.  Patient able to demonstrate movement of all digits.      Imaging/Labs/EMG Reviewed:  Ordered left knee plain films.  Imaging read by Dr. Aleman.    Knee X-Ray  Indication: Pain     Upright AP . Lateral,  and Sunrise views of right knee     Findings:  No fracture  No bony lesion  Normal soft tissues  Normal joint spaces     No prior studies were available for comparison.      Assessment:  1. Acute pain of right knee    2. Abrasion of right knee, initial encounter        Plan:  1. Right knee pain following falls with noted abrasions to the lateral proximal tibia.  No evidence of fracture or infection.  2. Patient was placed in a hinged knee brace for stability and support.  3. Continue use of walker to assist with ambulation.  4. He has a follow-up appointment March 13 for his left wrist, will reevaluate knee at that time as well.  He will require imaging of his wrist out of his cast as well as repeat imaging of the left knee if there is no improvement of symptoms.  5. Questions and concerns answered.    History, exam and imaging discussed with Dr. Aleman who agrees the above assessment and plan.      Christin Handley PA-C  03/03/20  8:24 AM

## 2020-03-04 ENCOUNTER — OUTSIDE FACILITY SERVICE (OUTPATIENT)
Dept: PAIN MEDICINE | Facility: CLINIC | Age: 65
End: 2020-03-04

## 2020-03-04 PROCEDURE — 64483 NJX AA&/STRD TFRM EPI L/S 1: CPT | Performed by: ANESTHESIOLOGY

## 2020-03-04 PROCEDURE — 99152 MOD SED SAME PHYS/QHP 5/>YRS: CPT | Performed by: ANESTHESIOLOGY

## 2020-03-05 ENCOUNTER — TELEPHONE (OUTPATIENT)
Dept: PAIN MEDICINE | Facility: CLINIC | Age: 65
End: 2020-03-05

## 2020-03-05 NOTE — TELEPHONE ENCOUNTER
DX/THERA MARYLOU L4-L5 TRANS AIDE 03/04/2020.    Attempted to contact patient. No answer.   Voicemail greeting was patient stating his name. I requested a call back and also left information regarding his f/u appointment in office.

## 2020-03-13 ENCOUNTER — OFFICE VISIT (OUTPATIENT)
Dept: ORTHOPEDIC SURGERY | Facility: CLINIC | Age: 65
End: 2020-03-13

## 2020-03-13 VITALS — HEIGHT: 72 IN | HEART RATE: 78 BPM | BODY MASS INDEX: 38.52 KG/M2 | WEIGHT: 284.39 LBS | OXYGEN SATURATION: 99 %

## 2020-03-13 DIAGNOSIS — S80.211D ABRASION OF RIGHT KNEE, SUBSEQUENT ENCOUNTER: ICD-10-CM

## 2020-03-13 DIAGNOSIS — S52.572D OTHER CLOSED INTRA-ARTICULAR FRACTURE OF DISTAL END OF LEFT RADIUS WITH ROUTINE HEALING, SUBSEQUENT ENCOUNTER: ICD-10-CM

## 2020-03-13 DIAGNOSIS — Z09 FRACTURE FOLLOW-UP: ICD-10-CM

## 2020-03-13 DIAGNOSIS — M25.561 ACUTE PAIN OF RIGHT KNEE: Primary | ICD-10-CM

## 2020-03-13 PROCEDURE — 99213 OFFICE O/P EST LOW 20 MIN: CPT | Performed by: PHYSICIAN ASSISTANT

## 2020-03-13 PROCEDURE — 99024 POSTOP FOLLOW-UP VISIT: CPT | Performed by: PHYSICIAN ASSISTANT

## 2020-03-13 PROCEDURE — 20610 DRAIN/INJ JOINT/BURSA W/O US: CPT | Performed by: PHYSICIAN ASSISTANT

## 2020-03-13 RX ORDER — LIDOCAINE HYDROCHLORIDE 10 MG/ML
4 INJECTION, SOLUTION EPIDURAL; INFILTRATION; INTRACAUDAL; PERINEURAL
Status: COMPLETED | OUTPATIENT
Start: 2020-03-13 | End: 2020-03-13

## 2020-03-13 RX ORDER — TRIAMCINOLONE ACETONIDE 40 MG/ML
40 INJECTION, SUSPENSION INTRA-ARTICULAR; INTRAMUSCULAR
Status: COMPLETED | OUTPATIENT
Start: 2020-03-13 | End: 2020-03-13

## 2020-03-13 RX ADMIN — TRIAMCINOLONE ACETONIDE 40 MG: 40 INJECTION, SUSPENSION INTRA-ARTICULAR; INTRAMUSCULAR at 10:39

## 2020-03-13 RX ADMIN — LIDOCAINE HYDROCHLORIDE 4 ML: 10 INJECTION, SOLUTION EPIDURAL; INFILTRATION; INTRACAUDAL; PERINEURAL at 10:39

## 2020-03-13 NOTE — PROGRESS NOTES
Procedure   Large Joint Arthrocentesis: R knee  Date/Time: 3/13/2020 10:39 AM  Consent given by: patient  Site marked: site marked  Timeout: Immediately prior to procedure a time out was called to verify the correct patient, procedure, equipment, support staff and site/side marked as required   Supporting Documentation  Indications: pain   Procedure Details  Location: knee - R knee  Preparation: Patient was prepped and draped in the usual sterile fashion  Needle size: 22 G  Approach: anterolateral  Medications administered: 4 mL lidocaine PF 1% 1 %; 40 mg triamcinolone acetonide 40 MG/ML  Patient tolerance: patient tolerated the procedure well with no immediate complications

## 2020-03-13 NOTE — PROGRESS NOTES
"    [facility] [place] [facility] [place]        Subjective     Follow-up (3 week follow up - Other closed intra-articular fracture of distal end of left radius with routine healing and Acute pain of right knee)       HPI    Mahendra Yates is a 65 y.o. male.  Patient returns today for follow-up evaluation of right knee and left wrist.  He is currently being treated for left wrist distal radius fracture.  He is been in a cast for the last 4 weeks.  States his wrist is feeling much better.  His right knee is continued to hurt.  He was provided a hinged knee brace at his last appointment.    At this time he endorses a pain scale of 8/10 to the right knee.  Severity the pain moderate to severe.  Pain is worse with walking, stair climbing it does affect his sleep.  He also notes giving away.  He does use a walker to assist with ambulation.    Only notes mild discomfort noted to the left wrist.        Objective      Physical Exam  Pulse 78   Ht 182.9 cm (72.01\")   Wt 129 kg (284 lb 6.3 oz)   SpO2 99%   BMI 38.56 kg/m²     Body mass index is 38.56 kg/m².        Ortho Exam  Left knee  Skin: Abrasions noted lateral aspect proximal tibia healing well without redness, warmth or evidence of infection.  Tenderness: Pain now predominantly localized medial knee/joint line.  No lateral pain except over abrasion to proximal lateral tibia.  Motion: 0-100.  Instability: Lachman negative.  Varus and valgus stress negative.  Ankle dorsiflexion/plantarflexion grossly intact.       Left wrist  Cast removed.  Skin: Grossly intact without any redness or warmth.  No rashes or lesions or skin breakdown secondary to the casting.    Tenderness: Minimal palpable discomfort noted distal radius.  Motion: Limited range of motion of the wrist secondary to stiffness from being immobilized.  Motor/sensory: Grossly intact C5-T1.    Imaging/Studies  Ordered left wrist plain films.  Imaging read by Dr. Aleman.  Imaging Results (Last 24 Hours)     " Procedure Component Value Units Date/Time    XR Wrist 3+ View Left [[id number]] Resulted:  03/13/20 1032     Updated:  03/13/20 1034    Narrative:       Left Wrist X-Ray  Indication: Pain  AP, Lateral, and Oblique views    Findings:  Healing left distal radius fracture  No bony lesion  Normal soft tissues  Normal joint spaces    prior studies were available for comparison.            Assessment:  1. Acute pain of right knee    2. Fracture follow-up    3. Other closed intra-articular fracture of distal end of left radius with routine healing, subsequent encounter    4. Abrasion of right knee, subsequent encounter        Plan:  1. Continued right knee pain despite hinged knee brace.  Offered and accepted corticosteroid injection to the right knee.  Injection was given today.  2. Right knee abrasions healing without evidence of infection.  3. Left wrist distal radius fracture, remains  evidence of healing noted.  4. Patient was placed in an Exos brace today.  5. Follow-up 4 weeks for repeat evaluation, sooner if issues arise or symptoms worsen/change.  6. Questions and concerns answered    After discussing the risks, benefits, indications of injection, the patient gave consent to proceed.  His right knee was confirmed as the correct joint to be injected with a timeout.  It was then prepped using Hibiclens and injected with a mixture of 4 cc of 1% plain lidocaine and 1 cc of Kenalog (40 mg per mL), without any resistance through the anterior lateral approach, patient in seated position.  Area was cleaned, hemostasis was achieved and a Band-Aid was applied over the injection site.  The patient tolerated procedure well.  I instructed the patient on signs and symptoms of infection.  They should report to the emergency department or return to clinic if any of these develop, for further evaluation and treatment.  Recommended modifying activity for the next 48 hours to include rest, ice, elevation and oral pain  medication as needed.        Christin Handley PA-C  03/13/20  11:48

## 2020-04-01 ENCOUNTER — TELEMEDICINE (OUTPATIENT)
Dept: PAIN MEDICINE | Facility: CLINIC | Age: 65
End: 2020-04-01

## 2020-04-01 DIAGNOSIS — F41.9 ANXIETY AND DEPRESSION: ICD-10-CM

## 2020-04-01 DIAGNOSIS — M47.812 CERVICAL SPONDYLOSIS WITHOUT MYELOPATHY: ICD-10-CM

## 2020-04-01 DIAGNOSIS — M48.062 LUMBAR STENOSIS WITH NEUROGENIC CLAUDICATION: ICD-10-CM

## 2020-04-01 DIAGNOSIS — E10.8 DIABETES MELLITUS TYPE 1 WITH COMPLICATIONS (HCC): ICD-10-CM

## 2020-04-01 DIAGNOSIS — E66.01 MORBID OBESITY DUE TO EXCESS CALORIES (HCC): ICD-10-CM

## 2020-04-01 DIAGNOSIS — I25.10 PRESENCE OF STENT IN CORONARY ARTERY IN PATIENT WITH CORONARY ARTERY DISEASE: ICD-10-CM

## 2020-04-01 DIAGNOSIS — M51.26 HERNIATED LUMBAR INTERVERTEBRAL DISC: ICD-10-CM

## 2020-04-01 DIAGNOSIS — F32.A ANXIETY AND DEPRESSION: ICD-10-CM

## 2020-04-01 DIAGNOSIS — Z91.81 AT HIGH RISK FOR FALLS: ICD-10-CM

## 2020-04-01 DIAGNOSIS — M48.02 CERVICAL STENOSIS OF SPINE: ICD-10-CM

## 2020-04-01 DIAGNOSIS — Z95.5 PRESENCE OF STENT IN CORONARY ARTERY IN PATIENT WITH CORONARY ARTERY DISEASE: ICD-10-CM

## 2020-04-01 DIAGNOSIS — M50.30 DDD (DEGENERATIVE DISC DISEASE), CERVICAL: ICD-10-CM

## 2020-04-01 PROCEDURE — 99213 OFFICE O/P EST LOW 20 MIN: CPT | Performed by: NURSE PRACTITIONER

## 2020-04-01 RX ORDER — GABAPENTIN 100 MG/1
100 CAPSULE ORAL 3 TIMES DAILY
Qty: 90 CAPSULE | Refills: 1 | OUTPATIENT
Start: 2020-04-01 | End: 2020-05-04

## 2020-05-04 ENCOUNTER — TELEMEDICINE (OUTPATIENT)
Dept: PAIN MEDICINE | Facility: CLINIC | Age: 65
End: 2020-05-04

## 2020-05-04 DIAGNOSIS — M48.062 LUMBAR STENOSIS WITH NEUROGENIC CLAUDICATION: ICD-10-CM

## 2020-05-04 DIAGNOSIS — R29.6 MULTIPLE FALLS: ICD-10-CM

## 2020-05-04 DIAGNOSIS — M51.26 HERNIATED LUMBAR INTERVERTEBRAL DISC: ICD-10-CM

## 2020-05-04 DIAGNOSIS — M50.30 DDD (DEGENERATIVE DISC DISEASE), CERVICAL: ICD-10-CM

## 2020-05-04 DIAGNOSIS — M47.812 CERVICAL SPONDYLOSIS WITHOUT MYELOPATHY: ICD-10-CM

## 2020-05-04 DIAGNOSIS — E66.01 MORBID OBESITY DUE TO EXCESS CALORIES (HCC): ICD-10-CM

## 2020-05-04 DIAGNOSIS — M48.062 LUMBAR STENOSIS WITH NEUROGENIC CLAUDICATION: Primary | ICD-10-CM

## 2020-05-04 DIAGNOSIS — F41.9 ANXIETY AND DEPRESSION: ICD-10-CM

## 2020-05-04 DIAGNOSIS — F32.A ANXIETY AND DEPRESSION: ICD-10-CM

## 2020-05-04 DIAGNOSIS — E10.8 DIABETES MELLITUS TYPE 1 WITH COMPLICATIONS (HCC): ICD-10-CM

## 2020-05-04 PROCEDURE — 99212 OFFICE O/P EST SF 10 MIN: CPT | Performed by: NURSE PRACTITIONER

## 2020-05-04 RX ORDER — GABAPENTIN 300 MG/1
300 CAPSULE ORAL 3 TIMES DAILY
Qty: 90 CAPSULE | Refills: 1 | Status: SHIPPED | OUTPATIENT
Start: 2020-05-04 | End: 2021-11-17

## 2021-01-15 ENCOUNTER — IMMUNIZATION (OUTPATIENT)
Dept: VACCINE CLINIC | Facility: HOSPITAL | Age: 66
End: 2021-01-15

## 2021-01-15 PROCEDURE — 91300 HC SARSCOV02 VAC 30MCG/0.3ML IM: CPT | Performed by: INTERNAL MEDICINE

## 2021-01-15 PROCEDURE — 0002A: CPT | Performed by: INTERNAL MEDICINE

## 2021-01-15 PROCEDURE — 0001A: CPT | Performed by: INTERNAL MEDICINE

## 2021-02-05 ENCOUNTER — IMMUNIZATION (OUTPATIENT)
Dept: VACCINE CLINIC | Facility: HOSPITAL | Age: 66
End: 2021-02-05

## 2021-02-05 PROCEDURE — 0002A: CPT | Performed by: INTERNAL MEDICINE

## 2021-02-05 PROCEDURE — 91300 HC SARSCOV02 VAC 30MCG/0.3ML IM: CPT | Performed by: INTERNAL MEDICINE

## 2021-05-26 ENCOUNTER — TRANSCRIBE ORDERS (OUTPATIENT)
Dept: ADMINISTRATIVE | Facility: HOSPITAL | Age: 66
End: 2021-05-26

## 2021-05-26 DIAGNOSIS — R15.9 FREQUENT FECAL INCONTINENCE: Primary | ICD-10-CM

## 2021-05-26 DIAGNOSIS — M54.50 LOW BACK PAIN, UNSPECIFIED BACK PAIN LATERALITY, UNSPECIFIED CHRONICITY, UNSPECIFIED WHETHER SCIATICA PRESENT: ICD-10-CM

## 2021-06-08 ENCOUNTER — TELEPHONE (OUTPATIENT)
Dept: NEUROLOGY | Facility: OTHER | Age: 66
End: 2021-06-08

## 2021-06-08 NOTE — TELEPHONE ENCOUNTER
[facility] [id number]    RECEIVED FAX VIA [employer] - ONLY RECEIVED IMAGING. NOTHING ELSE.

## 2021-09-07 ENCOUNTER — APPOINTMENT (OUTPATIENT)
Dept: CT IMAGING | Facility: HOSPITAL | Age: 66
End: 2021-09-07

## 2021-09-07 ENCOUNTER — APPOINTMENT (OUTPATIENT)
Dept: GENERAL RADIOLOGY | Facility: HOSPITAL | Age: 66
End: 2021-09-07

## 2021-09-07 ENCOUNTER — HOSPITAL ENCOUNTER (OUTPATIENT)
Facility: HOSPITAL | Age: 66
Setting detail: OBSERVATION
Discharge: LEFT BEFORE DISCHARGE | End: 2021-09-08
Attending: EMERGENCY MEDICINE | Admitting: INTERNAL MEDICINE

## 2021-09-07 ENCOUNTER — APPOINTMENT (OUTPATIENT)
Dept: MRI IMAGING | Facility: HOSPITAL | Age: 66
End: 2021-09-07

## 2021-09-07 ENCOUNTER — APPOINTMENT (OUTPATIENT)
Dept: CARDIOLOGY | Facility: HOSPITAL | Age: 66
End: 2021-09-07
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
## 2021-09-07 DIAGNOSIS — R32 URINARY AND BOWEL INCONTINENCE: ICD-10-CM

## 2021-09-07 DIAGNOSIS — I10 ELEVATED BLOOD PRESSURE READING WITH DIAGNOSIS OF HYPERTENSION: ICD-10-CM

## 2021-09-07 DIAGNOSIS — R15.9 URINARY AND BOWEL INCONTINENCE: ICD-10-CM

## 2021-09-07 DIAGNOSIS — Z94.4 HISTORY OF LIVER TRANSPLANT (HCC): ICD-10-CM

## 2021-09-07 DIAGNOSIS — M48.062 LUMBAR STENOSIS WITH NEUROGENIC CLAUDICATION: ICD-10-CM
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
## 2021-09-07 DIAGNOSIS — S22.42XA CLOSED FRACTURE OF MULTIPLE RIBS OF LEFT SIDE, INITIAL ENCOUNTER: Primary | ICD-10-CM

## 2021-09-07 DIAGNOSIS — I20.8 CHRONIC STABLE ANGINA (HCC): ICD-10-CM

## 2021-09-07 LAB
ADV 40+41 DNA STL QL NAA+NON-PROBE: NOT DETECTED
ALBUMIN SERPL-MCNC: 4.2 G/DL (ref 3.5–5.2)
ALBUMIN/GLOB SERPL: 1.6 G/DL
ALP SERPL-CCNC: 47 U/L (ref 39–117)
ALT SERPL W P-5'-P-CCNC: 14 U/L (ref 1–41)
ANION GAP SERPL CALCULATED.3IONS-SCNC: 12 MMOL/L (ref 5–15)
APTT PPP: 30.6 SECONDS (ref 22–39)
AST SERPL-CCNC: 20 U/L (ref 1–40)
ASTRO TYP 1-8 RNA STL QL NAA+NON-PROBE: NOT DETECTED
BACTERIA UR QL AUTO: NORMAL /HPF
BASOPHILS # BLD AUTO: 0.05 10*3/MM3 (ref 0–0.2)
BASOPHILS NFR BLD AUTO: 0.8 % (ref 0–1.5)
BH CV LOWER VASCULAR LEFT COMMON FEMORAL AUGMENT: NORMAL
BH CV LOWER VASCULAR LEFT COMMON FEMORAL COMPRESS: NORMAL
BH CV LOWER VASCULAR LEFT COMMON FEMORAL PHASIC: NORMAL
BH CV LOWER VASCULAR LEFT COMMON FEMORAL SPONT: NORMAL
BH CV LOWER VASCULAR LEFT DISTAL FEMORAL COMPRESS: NORMAL
BH CV LOWER VASCULAR LEFT GASTRONEMIUS COMPRESS: NORMAL
BH CV LOWER VASCULAR LEFT GREATER SAPH AK COMPRESS: NORMAL
BH CV LOWER VASCULAR LEFT LESSER SAPH COMPRESS: NORMAL
BH CV LOWER VASCULAR LEFT MID FEMORAL AUGMENT: NORMAL
BH CV LOWER VASCULAR LEFT MID FEMORAL COMPRESS: NORMAL
BH CV LOWER VASCULAR LEFT MID FEMORAL PHASIC: NORMAL
BH CV LOWER VASCULAR LEFT MID FEMORAL SPONT: NORMAL
BH CV LOWER VASCULAR LEFT PERONEAL COMPRESS: NORMAL
BH CV LOWER VASCULAR LEFT POPLITEAL AUGMENT: NORMAL
BH CV LOWER VASCULAR LEFT POPLITEAL COMPRESS: NORMAL
BH CV LOWER VASCULAR LEFT POPLITEAL PHASIC: NORMAL
BH CV LOWER VASCULAR LEFT POPLITEAL SPONT: NORMAL
BH CV LOWER VASCULAR LEFT POSTERIOR TIBIAL COMPRESS: NORMAL
BH CV LOWER VASCULAR LEFT PROFUNDA FEMORAL COMPRESS: NORMAL
BH CV LOWER VASCULAR LEFT PROXIMAL FEMORAL COMPRESS: NORMAL
BH CV LOWER VASCULAR LEFT SAPHENOFEMORAL JUNCTION COMPRESS: NORMAL
BH CV LOWER VASCULAR RIGHT COMMON FEMORAL AUGMENT: NORMAL
BH CV LOWER VASCULAR RIGHT COMMON FEMORAL COMPRESS: NORMAL
BH CV LOWER VASCULAR RIGHT COMMON FEMORAL PHASIC: NORMAL
BH CV LOWER VASCULAR RIGHT COMMON FEMORAL SPONT: NORMAL
BH CV LOWER VASCULAR RIGHT DISTAL FEMORAL COMPRESS: NORMAL
BH CV LOWER VASCULAR RIGHT GASTRONEMIUS COMPRESS: NORMAL
BH CV LOWER VASCULAR RIGHT GREATER SAPH AK COMPRESS: NORMAL
BH CV LOWER VASCULAR RIGHT LESSER SAPH COMPRESS: NORMAL
BH CV LOWER VASCULAR RIGHT MID FEMORAL AUGMENT: NORMAL
BH CV LOWER VASCULAR RIGHT MID FEMORAL COMPRESS: NORMAL
BH CV LOWER VASCULAR RIGHT MID FEMORAL PHASIC: NORMAL
BH CV LOWER VASCULAR RIGHT MID FEMORAL SPONT: NORMAL
BH CV LOWER VASCULAR RIGHT PERONEAL COMPRESS: NORMAL
BH CV LOWER VASCULAR RIGHT POPLITEAL AUGMENT: NORMAL
BH CV LOWER VASCULAR RIGHT POPLITEAL COMPRESS: NORMAL
BH CV LOWER VASCULAR RIGHT POPLITEAL PHASIC: NORMAL
BH CV LOWER VASCULAR RIGHT POPLITEAL SPONT: NORMAL
BH CV LOWER VASCULAR RIGHT POSTERIOR TIBIAL COMPRESS: NORMAL
BH CV LOWER VASCULAR RIGHT PROFUNDA FEMORAL COMPRESS: NORMAL
BH CV LOWER VASCULAR RIGHT PROXIMAL FEMORAL COMPRESS: NORMAL
BH CV LOWER VASCULAR RIGHT SAPHENOFEMORAL JUNCTION COMPRESS: NORMAL
BILIRUB SERPL-MCNC: 0.4 MG/DL (ref 0–1.2)
BILIRUB UR QL STRIP: ABNORMAL
BUN SERPL-MCNC: 17 MG/DL (ref 8–23)
BUN/CREAT SERPL: 16 (ref 7–25)
C CAYETANENSIS DNA STL QL NAA+NON-PROBE: NOT DETECTED
C COLI+JEJ+UPSA DNA STL QL NAA+NON-PROBE: NOT DETECTED
CALCIUM SPEC-SCNC: 10.1 MG/DL (ref 8.6–10.5)
CHLORIDE SERPL-SCNC: 103 MMOL/L (ref 98–107)
CLARITY UR: CLEAR
CO2 SERPL-SCNC: 23 MMOL/L (ref 22–29)
COLOR UR: ABNORMAL
CREAT SERPL-MCNC: 1.06 MG/DL (ref 0.76–1.27)
CRYPTOSP DNA STL QL NAA+NON-PROBE: NOT DETECTED
DEPRECATED RDW RBC AUTO: 44.8 FL (ref 37–54)
E HISTOLYT DNA STL QL NAA+NON-PROBE: NOT DETECTED
EAEC PAA PLAS AGGR+AATA ST NAA+NON-PRB: NOT DETECTED
EC STX1+STX2 GENES STL QL NAA+NON-PROBE: NOT DETECTED
EOSINOPHIL # BLD AUTO: 0.23 10*3/MM3 (ref 0–0.4)
EOSINOPHIL NFR BLD AUTO: 3.5 % (ref 0.3–6.2)
EPEC EAE GENE STL QL NAA+NON-PROBE: NOT DETECTED
ERYTHROCYTE [DISTWIDTH] IN BLOOD BY AUTOMATED COUNT: 14.4 % (ref 12.3–15.4)
ETEC LTA+ST1A+ST1B TOX ST NAA+NON-PROBE: NOT DETECTED
FLUAV SUBTYP SPEC NAA+PROBE: NOT DETECTED
FLUBV RNA ISLT QL NAA+PROBE: NOT DETECTED
G LAMBLIA DNA STL QL NAA+NON-PROBE: NOT DETECTED
GFR SERPL CREATININE-BSD FRML MDRD: 70 ML/MIN/1.73
GLOBULIN UR ELPH-MCNC: 2.7 GM/DL
GLUCOSE BLDC GLUCOMTR-MCNC: 90 MG/DL (ref 70–130)
GLUCOSE SERPL-MCNC: 132 MG/DL (ref 65–99)
GLUCOSE UR STRIP-MCNC: NEGATIVE MG/DL
HCT VFR BLD AUTO: 38.7 % (ref 37.5–51)
HGB BLD-MCNC: 12.7 G/DL (ref 13–17.7)
HGB UR QL STRIP.AUTO: NEGATIVE
HOLD SPECIMEN: NORMAL
HYALINE CASTS UR QL AUTO: NORMAL /LPF
IMM GRANULOCYTES # BLD AUTO: 0.03 10*3/MM3 (ref 0–0.05)
IMM GRANULOCYTES NFR BLD AUTO: 0.5 % (ref 0–0.5)
INR PPP: 1.1 (ref 0.85–1.16)
KETONES UR QL STRIP: NEGATIVE
LEUKOCYTE ESTERASE UR QL STRIP.AUTO: ABNORMAL
LIPASE SERPL-CCNC: 36 U/L (ref 13–60)
LYMPHOCYTES # BLD AUTO: 2.14 10*3/MM3 (ref 0.7–3.1)
LYMPHOCYTES NFR BLD AUTO: 32.9 % (ref 19.6–45.3)
MAXIMAL PREDICTED HEART RATE: 153 BPM
MCH RBC QN AUTO: 28.1 PG (ref 26.6–33)
MCHC RBC AUTO-ENTMCNC: 32.8 G/DL (ref 31.5–35.7)
MCV RBC AUTO: 85.6 FL (ref 79–97)
MONOCYTES # BLD AUTO: 0.77 10*3/MM3 (ref 0.1–0.9)
MONOCYTES NFR BLD AUTO: 11.8 % (ref 5–12)
NEUTROPHILS NFR BLD AUTO: 3.29 10*3/MM3 (ref 1.7–7)
NEUTROPHILS NFR BLD AUTO: 50.5 % (ref 42.7–76)
NITRITE UR QL STRIP: POSITIVE
NOROVIRUS GI+II RNA STL QL NAA+NON-PROBE: NOT DETECTED
NRBC BLD AUTO-RTO: 0 /100 WBC (ref 0–0.2)
NT-PROBNP SERPL-MCNC: 676.2 PG/ML (ref 0–900)
P SHIGELLOIDES DNA STL QL NAA+NON-PROBE: NOT DETECTED
PH UR STRIP.AUTO: 7 [PH] (ref 5–8)
PLATELET # BLD AUTO: 401 10*3/MM3 (ref 140–450)
PMV BLD AUTO: 10.9 FL (ref 6–12)
POTASSIUM SERPL-SCNC: 3.7 MMOL/L (ref 3.5–5.2)
PROT SERPL-MCNC: 6.9 G/DL (ref 6–8.5)
PROT UR QL STRIP: NEGATIVE
PROTHROMBIN TIME: 13.9 SECONDS (ref 11.4–14.4)
QT INTERVAL: 412 MS
QT INTERVAL: 434 MS
QTC INTERVAL: 444 MS
QTC INTERVAL: 465 MS
RBC # BLD AUTO: 4.52 10*6/MM3 (ref 4.14–5.8)
RBC # UR: NORMAL /HPF
REF LAB TEST METHOD: NORMAL
RVA RNA STL QL NAA+NON-PROBE: NOT DETECTED
S ENT+BONG DNA STL QL NAA+NON-PROBE: NOT DETECTED
SAPO I+II+IV+V RNA STL QL NAA+NON-PROBE: NOT DETECTED
SARS-COV-2 RNA PNL SPEC NAA+PROBE: NOT DETECTED
SHIGELLA SP+EIEC IPAH ST NAA+NON-PROBE: NOT DETECTED
SODIUM SERPL-SCNC: 138 MMOL/L (ref 136–145)
SP GR UR STRIP: 1.01 (ref 1–1.03)
SQUAMOUS #/AREA URNS HPF: NORMAL /HPF
STRESS TARGET HR: 130 BPM
TROPONIN T SERPL-MCNC: 0.05 NG/ML (ref 0–0.03)
TROPONIN T SERPL-MCNC: 0.05 NG/ML (ref 0–0.03)
UROBILINOGEN UR QL STRIP: ABNORMAL
V CHOL+PARA+VUL DNA STL QL NAA+NON-PROBE: NOT DETECTED
V CHOLERAE DNA STL QL NAA+NON-PROBE: NOT DETECTED
WBC # BLD AUTO: 6.51 10*3/MM3 (ref 3.4–10.8)
WBC UR QL AUTO: NORMAL /HPF
WHOLE BLOOD HOLD SPECIMEN: NORMAL
WHOLE BLOOD HOLD SPECIMEN: NORMAL
Y ENTEROCOL DNA STL QL NAA+NON-PROBE: NOT DETECTED

## 2021-09-07 PROCEDURE — 82962 GLUCOSE BLOOD TEST: CPT

## 2021-09-07 PROCEDURE — 80053 COMPREHEN METABOLIC PANEL: CPT | Performed by: EMERGENCY MEDICINE

## 2021-09-07 PROCEDURE — 63710000001 MYCOPHENOLATE PER 180 MG: Performed by: INTERNAL MEDICINE

## 2021-09-07 PROCEDURE — 96366 THER/PROPH/DIAG IV INF ADDON: CPT

## 2021-09-07 PROCEDURE — 93970 EXTREMITY STUDY: CPT | Performed by: INTERNAL MEDICINE

## 2021-09-07 PROCEDURE — 99285 EMERGENCY DEPT VISIT HI MDM: CPT

## 2021-09-07 PROCEDURE — 83880 ASSAY OF NATRIURETIC PEPTIDE: CPT | Performed by: EMERGENCY MEDICINE

## 2021-09-07 PROCEDURE — 83993 ASSAY FOR CALPROTECTIN FECAL: CPT | Performed by: INTERNAL MEDICINE

## 2021-09-07 PROCEDURE — G0378 HOSPITAL OBSERVATION PER HR: HCPCS

## 2021-09-07 PROCEDURE — 99204 OFFICE O/P NEW MOD 45 MIN: CPT | Performed by: INTERNAL MEDICINE

## 2021-09-07 PROCEDURE — 96365 THER/PROPH/DIAG IV INF INIT: CPT

## 2021-09-07 PROCEDURE — 85610 PROTHROMBIN TIME: CPT | Performed by: EMERGENCY MEDICINE

## 2021-09-07 PROCEDURE — 0097U HC BIOFIRE FILMARRAY GI PANEL: CPT | Performed by: INTERNAL MEDICINE

## 2021-09-07 PROCEDURE — 63710000001 TACROLIMUS PER 1 MG: Performed by: INTERNAL MEDICINE

## 2021-09-07 PROCEDURE — 85025 COMPLETE CBC W/AUTO DIFF WBC: CPT | Performed by: EMERGENCY MEDICINE

## 2021-09-07 PROCEDURE — 85730 THROMBOPLASTIN TIME PARTIAL: CPT | Performed by: EMERGENCY MEDICINE

## 2021-09-07 PROCEDURE — C9803 HOPD COVID-19 SPEC COLLECT: HCPCS

## 2021-09-07 PROCEDURE — 87636 SARSCOV2 & INF A&B AMP PRB: CPT | Performed by: EMERGENCY MEDICINE

## 2021-09-07 PROCEDURE — 84484 ASSAY OF TROPONIN QUANT: CPT | Performed by: EMERGENCY MEDICINE

## 2021-09-07 PROCEDURE — 83690 ASSAY OF LIPASE: CPT | Performed by: EMERGENCY MEDICINE

## 2021-09-07 PROCEDURE — 71045 X-RAY EXAM CHEST 1 VIEW: CPT

## 2021-09-07 PROCEDURE — 81001 URINALYSIS AUTO W/SCOPE: CPT | Performed by: EMERGENCY MEDICINE

## 2021-09-07 PROCEDURE — 73502 X-RAY EXAM HIP UNI 2-3 VIEWS: CPT

## 2021-09-07 PROCEDURE — 93005 ELECTROCARDIOGRAM TRACING: CPT | Performed by: EMERGENCY MEDICINE

## 2021-09-07 PROCEDURE — 99220 PR INITIAL OBSERVATION CARE/DAY 70 MINUTES: CPT | Performed by: INTERNAL MEDICINE

## 2021-09-07 PROCEDURE — 93970 EXTREMITY STUDY: CPT

## 2021-09-07 PROCEDURE — 71250 CT THORAX DX C-: CPT

## 2021-09-07 PROCEDURE — 72148 MRI LUMBAR SPINE W/O DYE: CPT

## 2021-09-07 PROCEDURE — 74176 CT ABD & PELVIS W/O CONTRAST: CPT

## 2021-09-07 RX ORDER — SPIRONOLACTONE 50 MG/1
100 TABLET, FILM COATED ORAL DAILY
Status: DISCONTINUED | OUTPATIENT
Start: 2021-09-08 | End: 2021-09-08 | Stop reason: HOSPADM

## 2021-09-07 RX ORDER — CLOPIDOGREL BISULFATE 75 MG/1
50 TABLET ORAL DAILY
Status: ON HOLD | COMMUNITY
End: 2022-02-10

## 2021-09-07 RX ORDER — CETIRIZINE HYDROCHLORIDE 10 MG/1
10 TABLET ORAL DAILY
Status: DISCONTINUED | OUTPATIENT
Start: 2021-09-08 | End: 2021-09-08 | Stop reason: HOSPADM

## 2021-09-07 RX ORDER — CAPSAICIN 0.75 MG/G
CREAM TOPICAL EVERY 6 HOURS PRN
Status: DISCONTINUED | OUTPATIENT
Start: 2021-09-07 | End: 2021-09-08 | Stop reason: HOSPADM

## 2021-09-07 RX ORDER — DEXTROSE MONOHYDRATE 25 G/50ML
25 INJECTION, SOLUTION INTRAVENOUS
Status: DISCONTINUED | OUTPATIENT
Start: 2021-09-07 | End: 2021-09-08 | Stop reason: HOSPADM

## 2021-09-07 RX ORDER — TRAMADOL HYDROCHLORIDE 50 MG/1
50 TABLET ORAL EVERY 6 HOURS PRN
COMMUNITY
End: 2022-01-04

## 2021-09-07 RX ORDER — NITROGLYCERIN 20 MG/100ML
10-50 INJECTION INTRAVENOUS
Status: DISCONTINUED | OUTPATIENT
Start: 2021-09-07 | End: 2021-09-08

## 2021-09-07 RX ORDER — SPIRONOLACTONE 50 MG/1
50 TABLET, FILM COATED ORAL DAILY
COMMUNITY
End: 2022-01-03 | Stop reason: SDUPTHER

## 2021-09-07 RX ORDER — ASPIRIN 81 MG/1
81 TABLET ORAL DAILY
Status: DISCONTINUED | OUTPATIENT
Start: 2021-09-08 | End: 2021-09-08 | Stop reason: HOSPADM

## 2021-09-07 RX ORDER — DULOXETIN HYDROCHLORIDE 60 MG/1
60 CAPSULE, DELAYED RELEASE ORAL 2 TIMES DAILY
COMMUNITY
End: 2022-01-03

## 2021-09-07 RX ORDER — PENTOXIFYLLINE 400 MG/1
400 TABLET, EXTENDED RELEASE ORAL NIGHTLY
Status: DISCONTINUED | OUTPATIENT
Start: 2021-09-07 | End: 2021-09-08 | Stop reason: HOSPADM

## 2021-09-07 RX ORDER — TRAMADOL HYDROCHLORIDE 50 MG/1
50 TABLET ORAL EVERY 6 HOURS PRN
Status: DISCONTINUED | OUTPATIENT
Start: 2021-09-07 | End: 2021-09-08 | Stop reason: HOSPADM

## 2021-09-07 RX ORDER — RANOLAZINE 500 MG/1
1000 TABLET, EXTENDED RELEASE ORAL EVERY 12 HOURS SCHEDULED
Status: DISCONTINUED | OUTPATIENT
Start: 2021-09-07 | End: 2021-09-08 | Stop reason: HOSPADM

## 2021-09-07 RX ORDER — NEBIVOLOL 10 MG/1
20 TABLET ORAL DAILY
Status: DISCONTINUED | OUTPATIENT
Start: 2021-09-08 | End: 2021-09-08 | Stop reason: HOSPADM

## 2021-09-07 RX ORDER — CLOPIDOGREL BISULFATE 75 MG/1
75 TABLET ORAL DAILY
Status: DISCONTINUED | OUTPATIENT
Start: 2021-09-08 | End: 2021-09-08 | Stop reason: HOSPADM

## 2021-09-07 RX ORDER — SODIUM CHLORIDE 0.9 % (FLUSH) 0.9 %
10 SYRINGE (ML) INJECTION EVERY 12 HOURS SCHEDULED
Status: DISCONTINUED | OUTPATIENT
Start: 2021-09-07 | End: 2021-09-08 | Stop reason: HOSPADM

## 2021-09-07 RX ORDER — SODIUM CHLORIDE 0.9 % (FLUSH) 0.9 %
10 SYRINGE (ML) INJECTION AS NEEDED
Status: DISCONTINUED | OUTPATIENT
Start: 2021-09-07 | End: 2021-09-08 | Stop reason: HOSPADM

## 2021-09-07 RX ORDER — MYCOPHENOLIC ACID 180 MG/1
360 TABLET, DELAYED RELEASE ORAL EVERY 12 HOURS SCHEDULED
Status: DISCONTINUED | OUTPATIENT
Start: 2021-09-07 | End: 2021-09-08 | Stop reason: HOSPADM

## 2021-09-07 RX ORDER — AMLODIPINE BESYLATE 10 MG/1
10 TABLET ORAL DAILY
Status: DISCONTINUED | OUTPATIENT
Start: 2021-09-08 | End: 2021-09-08 | Stop reason: HOSPADM

## 2021-09-07 RX ORDER — PANTOPRAZOLE SODIUM 40 MG/1
40 TABLET, DELAYED RELEASE ORAL
Status: DISCONTINUED | OUTPATIENT
Start: 2021-09-07 | End: 2021-09-08 | Stop reason: HOSPADM

## 2021-09-07 RX ORDER — MYCOPHENOLATE MOFETIL 500 MG/1
1000 TABLET ORAL 2 TIMES DAILY
COMMUNITY
End: 2022-11-21

## 2021-09-07 RX ORDER — NICOTINE POLACRILEX 4 MG
15 LOZENGE BUCCAL
Status: DISCONTINUED | OUTPATIENT
Start: 2021-09-07 | End: 2021-09-08 | Stop reason: HOSPADM

## 2021-09-07 RX ORDER — ISOSORBIDE MONONITRATE 60 MG/1
60 TABLET, EXTENDED RELEASE ORAL EVERY 12 HOURS
Status: DISCONTINUED | OUTPATIENT
Start: 2021-09-07 | End: 2021-09-08 | Stop reason: HOSPADM

## 2021-09-07 RX ORDER — TACROLIMUS 0.5 MG/1
0.5 CAPSULE ORAL EVERY 12 HOURS
Status: DISCONTINUED | OUTPATIENT
Start: 2021-09-07 | End: 2021-09-08 | Stop reason: HOSPADM

## 2021-09-07 RX ORDER — DULOXETIN HYDROCHLORIDE 60 MG/1
60 CAPSULE, DELAYED RELEASE ORAL DAILY
Status: DISCONTINUED | OUTPATIENT
Start: 2021-09-08 | End: 2021-09-08 | Stop reason: HOSPADM

## 2021-09-07 RX ADMIN — PANTOPRAZOLE SODIUM 40 MG: 40 TABLET, DELAYED RELEASE ORAL at 22:24

## 2021-09-07 RX ADMIN — TACROLIMUS 0.5 MG: 0.5 CAPSULE, GELATIN COATED ORAL at 22:23

## 2021-09-07 RX ADMIN — RANOLAZINE 1000 MG: 500 TABLET, EXTENDED RELEASE ORAL at 22:23

## 2021-09-07 RX ADMIN — LOSARTAN POTASSIUM: 50 TABLET, FILM COATED ORAL at 13:19

## 2021-09-07 RX ADMIN — MYCOPHENOLIC ACID 360 MG: 180 TABLET, DELAYED RELEASE ORAL at 22:22

## 2021-09-07 RX ADMIN — ISOSORBIDE MONONITRATE 60 MG: 60 TABLET, EXTENDED RELEASE ORAL at 22:23

## 2021-09-07 RX ADMIN — PENTOXIFYLLINE 400 MG: 400 TABLET, EXTENDED RELEASE ORAL at 22:23

## 2021-09-07 RX ADMIN — NITROGLYCERIN 10 MCG/MIN: 20 INJECTION INTRAVENOUS at 08:40

## 2021-09-07 NOTE — ED PROVIDER NOTES
Subjective   The patient is a 66-year-old male who presents to ED complaining of chest and abdominal pain that began a few days ago. The patient complains of a burning pain in his epigastric area which radiates to the right upper quadrant.  Patient notes he has also been having burning with urination over the last couple of days as well as dribbling of urine but denies any history of prostate problems.  He also complains of fecal incontinence for the last couple of weeks which is something he has never had before.  Patient states that he has contacted his doctor about this and they started him on a medication which did help but he is unsure of what medication is called.  He also notes that he has noticed increased edema to his bilateral lower extremities over the past few days. In addition the patient states that he did fell a couple of days ago into the bathtub and complains of some pain and bruising to his right lower anterior ribs.  He denies any other injury from the fall. Patient reports an extensive history of coronary artery disease with previous CABG and multiple stents. The patient has a history of liver transplant and he notes that the medication he was given for his liver affected the arteries in his heart and that he has only 1 coronary artery working at full capacity. He has not had a cough, fever, or shortness of breath. The patient further notes he has been told by his doctors that he has 6 months - 1 year to live due to all of his medical problems.      History provided by:  Patient      Review of Systems   Constitutional: Negative for chills and fever.   Respiratory: Negative for cough and shortness of breath.    Cardiovascular: Positive for chest pain and leg swelling. Negative for palpitations.   Gastrointestinal: Positive for abdominal pain. Negative for nausea and vomiting.        Positive for incontinence   Genitourinary: Positive for difficulty urinating and dysuria.   Musculoskeletal:  Negative for back pain.   Neurological: Negative for syncope.   Psychiatric/Behavioral: Negative for confusion.   All other systems reviewed and are negative.      Past Medical History:   Diagnosis Date   • Arthritis    • Atherosclerosis    • B12 deficiency    • Cancer (CMS/HCC)    • Cancer of liver (CMS/HCC)    • Chronic headaches    • Coronary artery disease    • Diabetes mellitus (CMS/HCC)    • Gastroesophageal reflux disease    • History of transfusion    • Hyperlipidemia    • Hypertension    • Myocardial infarction (CMS/HCC)    • Sleep apnea    • Stroke (CMS/HCC)        Allergies   Allergen Reactions   • Contrast Dye Other (See Comments)     Cardiac Arrest   • Penicillins Rash   • Sulfasalazine Rash       Past Surgical History:   Procedure Laterality Date   • ANGIOPLASTY     • CARDIAC SURGERY     • CARPAL TUNNEL RELEASE Right 2017    Procedure: CARPAL TUNNEL RELEASE RIGHT ;  Surgeon: Luis Hayden MD;  Location: [facility];  Service:    • CHOLECYSTECTOMY     • CORONARY ARTERY BYPASS GRAFT     • CORONARY STENT PLACEMENT      x2   • LIVER TRANSPLANTATION     • TUMOR REMOVAL Right     axilla       Family History   Problem Relation Age of Onset   • Heart disease Mother    • Diabetes Father    • Cancer Brother        Social History     Socioeconomic History   • Marital status:      Spouse name: Not on file   • Number of children: Not on file   • Years of education: Not on file   • Highest education level: Not on file   Tobacco Use   • Smoking status: Former Smoker     Packs/day: 4.00     Years: 30.00     Pack years: 120.00     Types: Cigarettes     Quit date: 1991     Years since quittin.6   • Smokeless tobacco: Never Used   Substance and Sexual Activity   • Alcohol use: No   • Drug use: No   • Sexual activity: Defer           Objective   Physical Exam  Vitals and nursing note reviewed.   Constitutional:       Appearance: He is well-developed.   HENT:      Head: Normocephalic and atraumatic.    Eyes:      Extraocular Movements: Extraocular movements intact.      Pupils: Pupils are equal, round, and reactive to light.   Cardiovascular:      Rate and Rhythm: Normal rate and regular rhythm.      Pulses:           Radial pulses are 2+ on the left side.      Heart sounds: Normal heart sounds.   Pulmonary:      Effort: Pulmonary effort is normal.      Breath sounds: Normal breath sounds.   Abdominal:      Palpations: Abdomen is soft.      Tenderness: There is abdominal tenderness.   Musculoskeletal:         General: Normal range of motion.      Cervical back: Normal range of motion and neck supple.      Right lower leg: Edema present.      Left lower leg: Edema present.   Skin:     General: Skin is warm and dry.   Neurological:      Mental Status: He is alert and oriented to person, place, and time.   Psychiatric:         Mood and Affect: Mood normal.         Behavior: Behavior normal.         Procedures           ED Course  ED Course as of Sep 07 1434   Tue Sep 07, 2021   0846 I personally reviewed the single view of the chest showing no focal infiltrate or emergent findings.  See report radiology for details.   XR Chest 1 View [RS]   0900 Troponin T(!!): 0.053 [RS]   0902 Cardiology consulted for evaluation.    [RS]   1203 Patient evaluated here in the emergency department by cardiology.  They feel the patient is a medical management patient this point.  No intervention indicated at this time.  This is consistent with prior documentation seen within the records.  See their note for further details.    [RS]   1338 Cardiology recommends admission and they will follow for further evaluation and management.  Hospitalist paged for admission.    [RS]   1341 Case discussed with Dr. Chávez who will admit.    [RS]      ED Course User Index  [RS] Rodri Baptiste MD                                           MDM  Number of Diagnoses or Management Options  Chronic stable angina (CMS/HCC)  Closed fracture of  multiple ribs of left side, initial encounter  Elevated blood pressure reading with diagnosis of hypertension  Fall in home, initial encounter  History of liver transplant (CMS/HCC)  Urinary and bowel incontinence  Diagnosis management comments: Recent Results (from the past 24 hour(s))  -ECG 12 Lead  Collection Time: 09/07/21  8:09 AM       Result                      Value             Ref Range           QT Interval                 434               ms                  QTC Interval                465               ms             -Troponin  Collection Time: 09/07/21  8:15 AM  Specimen: Blood       Result                      Value             Ref Range           Troponin T                  0.053 (C)         0.000 - 0.03*  -Comprehensive Metabolic Panel  Collection Time: 09/07/21  8:15 AM  Specimen: Blood       Result                      Value             Ref Range           Glucose                     132 (H)           65 - 99 mg/dL       BUN                         17                8 - 23 mg/dL        Creatinine                  1.06              0.76 - 1.27 *       Sodium                      138               136 - 145 mm*       Potassium                   3.7               3.5 - 5.2 mm*       Chloride                    103               98 - 107 mmo*       CO2                         23.0              22.0 - 29.0 *       Calcium                     10.1              8.6 - 10.5 m*       Total Protein               6.9               6.0 - 8.5 g/*       Albumin                     4.20              3.50 - 5.20 *       ALT (SGPT)                  14                1 - 41 U/L          AST (SGOT)                  20                1 - 40 U/L          Alkaline Phosphatase        47                39 - 117 U/L        Total Bilirubin             0.4               0.0 - 1.2 mg*       eGFR Non African Amer       70                >60 mL/min/1*       Globulin                    2.7               gm/dL               A/G  Ratio                   1.6               g/dL                BUN/Creatinine Ratio        16.0              7.0 - 25.0          Anion Gap                   12.0              5.0 - 15.0 m*  -Lipase  Collection Time: 09/07/21  8:15 AM  Specimen: Blood       Result                      Value             Ref Range           Lipase                      36                13 - 60 U/L    -BNP  Collection Time: 09/07/21  8:15 AM  Specimen: Blood       Result                      Value             Ref Range           proBNP                      676.2             0.0 - 900.0 *  -Green Top (Gel)  Collection Time: 09/07/21  8:15 AM       Result                      Value             Ref Range           Extra Tube                                                    Hold for add-ons.  -Lavender Top  Collection Time: 09/07/21  8:15 AM       Result                      Value             Ref Range           Extra Tube                                                    hold for add-on  -Gold Top - SST  Collection Time: 09/07/21  8:15 AM       Result                      Value             Ref Range           Extra Tube                                                    Hold for add-ons.  -Gray Top  Collection Time: 09/07/21  8:15 AM       Result                      Value             Ref Range           Extra Tube                                                    Hold for add-ons.  -Light Blue Top  Collection Time: 09/07/21  8:15 AM       Result                      Value             Ref Range           Extra Tube                                                    hold for add-on  -CBC Auto Differential  Collection Time: 09/07/21  8:15 AM  Specimen: Blood       Result                      Value             Ref Range           WBC                         6.51              3.40 - 10.80*       RBC                         4.52              4.14 - 5.80 *       Hemoglobin                  12.7 (L)          13.0 - 17.7 *       Hematocrit                   38.7              37.5 - 51.0 %       MCV                         85.6              79.0 - 97.0 *       MCH                         28.1              26.6 - 33.0 *       MCHC                        32.8              31.5 - 35.7 *       RDW                         14.4              12.3 - 15.4 %       RDW-SD                      44.8              37.0 - 54.0 *       MPV                         10.9              6.0 - 12.0 fL       Platelets                   401               140 - 450 10*       Neutrophil %                50.5              42.7 - 76.0 %       Lymphocyte %                32.9              19.6 - 45.3 %       Monocyte %                  11.8              5.0 - 12.0 %        Eosinophil %                3.5               0.3 - 6.2 %         Basophil %                  0.8               0.0 - 1.5 %         Immature Grans %            0.5               0.0 - 0.5 %         Neutrophils, Absolute       3.29              1.70 - 7.00 *       Lymphocytes, Absolute       2.14              0.70 - 3.10 *       Monocytes, Absolute         0.77              0.10 - 0.90 *       Eosinophils, Absolute       0.23              0.00 - 0.40 *       Basophils, Absolute         0.05              0.00 - 0.20 *       Immature Grans, Absolu*     0.03              0.00 - 0.05 *       nRBC                        0.0               0.0 - 0.2 /1*  -Protime-INR  Collection Time: 09/07/21  8:15 AM  Specimen: Blood       Result                      Value             Ref Range           Protime                     13.9              11.4 - 14.4 *       INR                         1.10              0.85 - 1.16    -aPTT  Collection Time: 09/07/21  8:15 AM  Specimen: Blood       Result                      Value             Ref Range           PTT                         30.6              22.0 - 39.0 *  -COVID-19 and FLU A/B PCR - Swab, Nasopharynx  Collection Time: 09/07/21  8:46 AM  Specimen: Nasopharynx; Swab       Result                       Value             Ref Range           COVID19                     Not Detected      Not Detected*       Influenza A PCR             Not Detected      Not Detected        Influenza B PCR             Not Detected      Not Detected   -Urinalysis With Microscopic If Indicated (No Culture) - Urine, Clean Catch  Collection Time: 09/07/21  8:50 AM  Specimen: Urine, Clean Catch       Result                      Value             Ref Range           Color, UA                   Orange (A)        Yellow, Straw       Appearance, UA              Clear             Clear               pH, UA                      7.0               5.0 - 8.0           Specific [place], UA        1.014             1.001 - 1.030       Glucose, UA                 Negative          Negative            Ketones, UA                 Negative          Negative            Bilirubin, UA                                 Negative        Small (1+) (A)       Blood, UA                   Negative          Negative            Protein, UA                 Negative          Negative            Leuk Esterase, UA           Trace (A)         Negative            Nitrite, UA                 Positive (A)      Negative            Urobilinogen, UA            1.0 E.U./dL       0.2 - 1.0 E.*  -Urinalysis, Microscopic Only - Urine, Clean Catch  Collection Time: 09/07/21  8:50 AM  Specimen: Urine, Clean Catch       Result                      Value             Ref Range           RBC, UA                     None Seen         None Seen, 0*       WBC, UA                     None Seen         None Seen, 0*       Bacteria, UA                None Seen         None Seen, T*       Squamous Epithelial Ce*     None Seen         None Seen, 0*       Hyaline Casts, UA           None Seen         0 - 6 /LPF          Methodology                                                   Automated Microscopy  -ECG 12 Lead  Collection Time: 09/07/21 10:45 AM       Result                       Value             Ref Range           QT Interval                 412               ms                  QTC Interval                444               ms             -Troponin  Collection Time: 09/07/21 10:47 AM  Specimen: Blood       Result                      Value             Ref Range           Troponin T                  0.054 (C)         0.000 - 0.03*  -Duplex Venous Lower Extremity - Bilateral CAR  Collection Time: 09/07/21  1:07 PM       Result                      Value             Ref Range           Target HR (85%)             130               bpm                 Max. Pred. HR (100%)        153               bpm                 Right Common Femoral S*     Y                                     Right Common Femoral P*     Y                                     Right Common Femoral A*     Y                                     Right Common Femoral C*     C                                     Right Saphenofemoral J*     C                                     Right Profunda Femoral*     C                                     Right Proximal Femoral*     C                                     Right Mid Femoral Spont     Y                                     Right Mid Femoral Phas*     Y                                     Right Mid Femoral Augm*     Y                                     Right Mid Femoral Comp*     C                                     Right Distal Femoral C*     C                                     Right Popliteal Spont       Y                                     Right Popliteal Phasic      Y                                     Right Popliteal Augment     Y                                     Right Popliteal Compre*     C                                     Right Posterior Tibial*     C                                     Right Peroneal Compress     C                                     Right GastronemiusSole*     C                                     Right Greater Saph AK *     C                                      Left Common Femoral Sp*     Y                                     Left Common Femoral Ph*     Y                                     Left Common Femoral Au*     Y                                     Left Common Femoral Co*     C                                     Left Saphenofemoral Ju*     C                                     Left Profunda Femoral *     C                                     Left Proximal Femoral *     C                                     Left Mid Femoral Spont      Y                                     Left Mid Femoral Phasic     Y                                     Left Mid Femoral Augme*     Y                                     Left Mid Femoral Compr*     C                                     Left Distal Femoral Co*     C                                     Left Popliteal Spont        Y                                     Left Popliteal Phasic       Y                                     Left Popliteal Augment      Y                                     Left Popliteal Compress     C                                     Left Posterior Tibial *     C                                     Left Peroneal Compress      C                                     Left GastronemiusSolea*     C                                     Left Greater Saph AK C*     C                                     Left Lesser Saph Compr*     C                                     Right Lesser Saph Comp*     C                                Note: In addition to lab results from this visit, the labs listed above may include labs taken at another facility or during a different encounter within the last 24 hours. Please correlate lab times with ED admission and discharge times for further clarification of the services performed during this visit.    CT Abdomen Pelvis Without Contrast   Final Result    Subtle nondisplaced fractures of the left posterior 10th and    anterior sixth and seventh ribs. No additional acute  findings in the    chest.         Operative changes from prior liver transplant with no acute findings    noted in the abdomen and pelvis.              This report was finalized on 9/7/2021 10:16 AM by Serafin Strange.          CT Chest Without Contrast Diagnostic   Final Result    Subtle nondisplaced fractures of the left posterior 10th and    anterior sixth and seventh ribs. No additional acute findings in the    chest.         Operative changes from prior liver transplant with no acute findings    noted in the abdomen and pelvis.              This report was finalized on 9/7/2021 10:16 AM by Serafin Strange.          XR Chest 1 View   Final Result    No acute cardiopulmonary abnormality.         This report was finalized on 9/7/2021 8:24 AM by Serafin Strange.          -----------------------------------------------------            09/07/21 09/07/21 09/07/21 09/07/21               1130      1145      1200      1319     -----------------------------------------------------   BP:       136/80              144/80    139/93     BP Location:                              Right arm    Patient Position:                                Lying      Pulse:      68        71        70        86       Resp:                                     18       Temp:                                              TempSrc:                                           SpO2:      98%       99%       99%       97%       Weight:                                            Height:                                           -----------------------------------------------------  Medications  sodium chloride 0.9 % flush 10 mL (has no administration in time range)  nitroglycerin (TRIDIL) 200 mcg/ml infusion (10 mcg/min Intravenous Currently Infusing 9/7/21 1353)  losartan (COZAAR) 50 mg, hydroCHLOROthiazide (HYDRODIURIL) 12.5 mg for HYZAAR 50-12.5 ( Oral Given 9/7/21  1319)  ECG/EMG Results (last 24 hours)     Procedure Component Value Units Date/Time    ECG 12 Lead ([id number]) Collected: 09/07/21 0809     Updated: 09/07/21 1429     QT Interval 434 ms      QTC Interval 465 ms     Narrative:      Test Reason : chest pain  Blood Pressure :   */*   mmHG  Vent. Rate :  69 BPM     Atrial Rate :  69 BPM     P-R Int : 220 ms          QRS Dur :  98 ms      QT Int : 434 ms       P-R-T Axes :  74  70  55 degrees     QTc Int : 465 ms    ** Poor data quality, interpretation may be adversely affected  Sinus rhythm with sinus arrhythmia with 1st degree AV block  ST & T wave abnormality, consider anterior ischemia  Abnormal ECG  When compared with ECG of 07-FEB-2017 11:17,  PA interval has increased  T wave inversion now evident in Anterior leads  QT has lengthened  Confirmed by MICHELE RIBEIRO MD ([id number]) on 9/7/2021 2:28:53 PM    Referred By: EDMD           Confirmed By: MICHELE RIBEIRO MD    ECG 12 Lead ([id number]) Collected: 09/07/21 1045     Updated: 09/07/21 1430     QT Interval 412 ms      QTC Interval 444 ms     Narrative:      Test Reason : chest pain  Blood Pressure :   */*   mmHG  Vent. Rate :  70 BPM     Atrial Rate :  70 BPM     P-R Int : 208 ms          QRS Dur :  98 ms      QT Int : 412 ms       P-R-T Axes :  88  71 -33 degrees     QTc Int : 444 ms    ** Poor data quality, interpretation may be adversely affected  Normal sinus rhythm with sinus arrhythmia  ST & T wave abnormality, consider anterior ischemia  Abnormal ECG  When compared with ECG of 07-SEP-2021 08:09, (Unconfirmed)  No significant change was found  Confirmed by MICHELE RIBEIRO MD ([id number]) on 9/7/2021 2:29:39 PM    Referred By: EDMD           Confirmed By: MICHELE RIBEIRO MD      ECG 12 Lead   Final Result    Test Reason : chest pain    Blood Pressure :   */*   mmHG    Vent. Rate :  70 BPM     Atrial Rate :  70 BPM       P-R Int : 208 ms          QRS Dur :  98 ms        QT Int : 412 ms       P-R-T Axes :  88  71 -33 degrees        QTc Int : 444 ms        ** Poor data quality, interpretation may be adversely affected    Normal sinus rhythm with sinus arrhythmia    ST & T wave abnormality, consider anterior ischemia    Abnormal ECG    When compared with ECG of 07-SEP-2021 08:09, (Unconfirmed)    No significant change was found    Confirmed by MICHELE RIBEIRO MD ([id number]) on 9/7/2021 2:29:39 PM        Referred By: TRA           Confirmed By: MICHELE RIBEIRO MD     ECG 12 Lead   Final Result    Test Reason : chest pain    Blood Pressure :   */*   mmHG    Vent. Rate :  69 BPM     Atrial Rate :  69 BPM       P-R Int : 220 ms          QRS Dur :  98 ms        QT Int : 434 ms       P-R-T Axes :  74  70  55 degrees       QTc Int : 465 ms        ** Poor data quality, interpretation may be adversely affected    Sinus rhythm with sinus arrhythmia with 1st degree AV block    ST & T wave abnormality, consider anterior ischemia    Abnormal ECG    When compared with ECG of 07-FEB-2017 11:17,    WI interval has increased    T wave inversion now evident in Anterior leads    QT has lengthened    Confirmed by MICHELE RIBEIRO MD ([id number]) on 9/7/2021 2:28:53 PM        Referred By: EDMD           Confirmed By: MICHELE RIBEIRO MD            Amount and/or Complexity of Data Reviewed  Clinical lab tests: reviewed  Tests in the radiology section of CPT®: reviewed  Obtain history from someone other than the patient: yes  Review and summarize past medical records: yes  Discuss the patient with other providers: yes  Independent visualization of images, tracings, or specimens: yes        Final diagnoses:   Closed fracture of multiple ribs of left side, initial encounter   Fall in home, initial encounter   Chronic stable angina (CMS/HCC)   Urinary and bowel incontinence   History of liver transplant (CMS/HCC)   Elevated blood pressure reading with diagnosis of hypertension       ED Disposition  ED Disposition     ED Disposition Condition Comment    Decision to Admit  Level of Care:  Telemetry [5]   Diagnosis: Closed fracture of multiple ribs of left side, initial encounter [[id number]]   Admitting Physician: SHANNA HUBER [[id number]]   Attending Physician: SHANNA HUBER [[id number]]            No follow-up provider specified.       Medication List      No changes were made to your prescriptions during this visit.          Rodri Baptiste MD  09/07/21 4887

## 2021-09-07 NOTE — CONSULTS
[facility] at [facility]   Consult Note    Referring Provider: Dr. Rodri Baptiste    Primary Cardiologist: Dr. Norris    Reason for Consultation: chest pain    Patient Care Team:  Bobo Calvillo MD as PCP - General (Family Medicine)  Natanael Estrella MD as Consulting Physician (Neurology)  Oren Pena MD as Consulting Physician (Cardiology)  Everardo Tate MD as Consulting Physician (Pain Medicine)  Scout Grant MD as Consulting Physician (Otolaryngology)  Christin Handley PA-C as Physician Assistant (Physician Assistant)  Radha Briggs APRN as Nurse Practitioner (Nurse Practitioner)     Problem List:  1. Coronary artery disease  1. CABG 7/2009  2. History of stents, ICDB  3. 2019 [facility] Dr. Pena NSTEMI.  Patent LIMA to LAD with diffuse distal LAD disease.  VG to OM occluded, unable to be intervened upon.  VG to RCA patent with diffuse target vessel disease.  2. Hypertension  3. Dyslipidemia  4. Diabetes  5. History of CVA 12/2018  6. History of liver cancer  1. Liver transplant 2009  2. [place], [place]  7. Arthritis  8. GERD  9. Psoriasis  10. Spinal stenosis   11. Surgeries:  1. CABG  2. Right axilla tumor excision  3. Liver transplant  4. Cholecystectomy  5. Right carpal tunnel release        Allergies   Allergen Reactions   • Contrast Dye Other (See Comments)     Cardiac Arrest   • Penicillins Rash   • Sulfasalazine Rash           Current Facility-Administered Medications:   •  nitroglycerin (TRIDIL) 200 mcg/ml infusion, 10-50 mcg/min, Intravenous, Titrated, Rodri Baptiste MD, Last Rate: 3 mL/hr at 09/07/21 0921, 10 mcg/min at 09/07/21 0921  •  sodium chloride 0.9 % flush 10 mL, 10 mL, Intravenous, PRN, Rodri Baptiste MD    Current Outpatient Medications:   •  amLODIPine (NORVASC) 10 MG tablet, Take 10 mg by mouth Daily., Disp: , Rfl:   •  aspirin 81 MG EC tablet, Take 81 mg by mouth Daily. Continues per doctors orders, Disp: , Rfl:   •   DHEA 50 MG tablet, Take  by mouth., Disp: , Rfl:   •  fenofibrate micronized (LOFIBRA) 134 MG capsule, Take 134 mg by mouth Every Morning Before Breakfast., Disp: , Rfl:   •  gabapentin (NEURONTIN) 300 MG capsule, Take 1 capsule by mouth 3 (Three) Times a Day., Disp: 90 capsule, Rfl: 1  •  Gel Base gel, CAPSAICIN 0.001% IMIPRAMINE 3% LIDOCAINE 10% PRILOCAINE 2% MANNITOL 20%. APPLY 1-2 G TO AFFECTED AREA 3-4 TIMES DAILY, Disp: 240 g, Rfl: PRN  •  insulin aspart (novoLOG) 100 UNIT/ML injection, Inject  under the skin 3 (Three) Times a Day Before Meals. SSI, Disp: , Rfl:   •  insulin glargine (LANTUS) 100 UNIT/ML injection, Inject 55 Units under the skin 2 (Two) Times a Day., Disp: , Rfl:   •  nebivolol (BYSTOLIC) 20 MG tablet, Take 20 mg by mouth Daily., Disp: , Rfl:   •  nitroglycerin (NITROSTAT) 0.4 MG SL tablet, Place 0.4 mg under the tongue Every 5 (Five) Minutes As Needed for chest pain. Take no more than 3 doses in 15 minutes., Disp: , Rfl:   •  Omega-3 1000 MG capsule, Take  by mouth., Disp: , Rfl:   •  omeprazole (priLOSEC) 40 MG capsule, Take 40 mg by mouth 2 (Two) Times a Day., Disp: , Rfl:   •  pentoxifylline (TRENtal) 400 MG CR tablet, Take 400 mg by mouth 3 (Three) Times a Day With Meals., Disp: , Rfl:   •  ranolazine (RANEXA) 1000 MG 12 hr tablet, Take 1,000 mg by mouth 2 (Two) Times a Day., Disp: , Rfl:   •  tacrolimus (PROGRAF) 1 MG capsule, Take 1 mg by mouth Every Morning., Disp: , Rfl:     nitroglycerin, 10-50 mcg/min, Last Rate: 10 mcg/min (09/07/21 0921)        (Not in a hospital admission)        Subjective .   History of present illness:    Patient is a 66-year-old male who we are asked to see today for further evaluation of chest pain.  He has previous history of coronary disease with previous coronary artery bypass grafting in 2009 as well as previous stenting.  His last cardiac catheterization was in 2019.  At that time he had an occluded VG to OM which was unable to be intervened upon.  Had  noted patent LIMA as well as patent VG to RCA.  Patient was previously followed at [facility].  His primary cardiologist recently retired and he has not establish care with his new cardiologist at [facility] yet.  Over the course of the last 9 to 10 months he has had chronic recurrent anginal symptoms for which he takes basically daily sublingual nitroglycerin.  Notes that at times he will take up to 11 tablets in a day.  He has been overall in his usual state of health up until roughly about 6 to 7 days ago.  He suffered a mechanical fall in his home at which time he fell headfirst into his bathtub falling on top of a shower chair.  Patient notes that at this time he did injure his lower extremities.  He is also been having issues with elevated tacrolimus levels.  Within the last couple of weeks he has been experiencing bowel incontinence which is new as well as urinary retention.  Patient notes no bowel movement in roughly 4 days.  Last evening he got up to try to go to the bathroom and was unable to pass hardly any urine.  He was straining and while he was straining had significant chest discomfort and complained of a swelling sensation just below his sternum.  Due to persistent significant pain he presented to the hospital for further evaluation.  We have been asked to see him regarding his chest pain.  Patient also notes right upper quadrant discomfort and tenderness.  Noncontrast CT showed nondisplaced fractures of the left posterior 10th and anterior sixth and seventh ribs.      Social History     Socioeconomic History   • Marital status:      Spouse name: Not on file   • Number of children: Not on file   • Years of education: Not on file   • Highest education level: Not on file   Tobacco Use   • Smoking status: Former Smoker     Packs/day: 4.00     Years: 30.00     Pack years: 120.00     Types: Cigarettes     Quit date: 1991     Years since quittin.6   • Smokeless tobacco: Never  "Used   Substance and Sexual Activity   • Alcohol use: No   • Drug use: No   • Sexual activity: Defer     Family History   Problem Relation Age of Onset   • Heart disease Mother    • Diabetes Father    • Cancer Brother          Review of Systems:  Review of Systems   Constitutional: Positive for malaise/fatigue. Negative for fever.   HENT: Negative for nosebleeds.    Eyes: Negative for redness and visual disturbance.   Cardiovascular: Positive for chest pain and irregular heartbeat. Negative for orthopnea, palpitations and paroxysmal nocturnal dyspnea.   Respiratory: Positive for shortness of breath. Negative for cough, snoring, sputum production and wheezing.    Hematologic/Lymphatic: Negative for bleeding problem.   Skin: Negative for flushing, itching and rash.   Musculoskeletal: Positive for arthritis and joint pain. Negative for falls and muscle cramps.   Gastrointestinal: Positive for bloating, abdominal pain, bowel incontinence and diarrhea. Negative for heartburn, nausea and vomiting.   Genitourinary: Negative for hematuria.   Neurological: Negative for excessive daytime sleepiness, dizziness, headaches, tremors and weakness.   Psychiatric/Behavioral: Positive for depression. Negative for substance abuse. The patient is not nervous/anxious.               Objective   Vitals:  /82   Pulse 68   Temp 98.2 °F (36.8 °C) (Oral)   Resp 18   Ht 182.9 cm (72\")   Wt 99.8 kg (220 lb)   SpO2 97%   BMI 29.84 kg/m²        Vitals reviewed.   Constitutional:       Appearance: Well-developed.   Neck:      Vascular: No JVD.      Trachea: No tracheal deviation.   Pulmonary:      Effort: Pulmonary effort is normal.      Breath sounds: Normal breath sounds.   Cardiovascular:      Normal rate. Regular rhythm.   Pulses:     Intact distal pulses.   Edema:     Peripheral edema present.     Ankle: 1+ edema of the left ankle and trace edema of the right ankle.  Abdominal:      General: Bowel sounds are normal.      " Tenderness: There is no abdominal tenderness.   Musculoskeletal:         General: No deformity. Neurological:      Mental Status: Alert and oriented to person, place, and time.              Results Review:  I reviewed the patient's new clinical results.  Results from last 7 days   Lab Units 09/07/21  0815   WBC 10*3/mm3 6.51   HEMOGLOBIN g/dL 12.7*   HEMATOCRIT % 38.7   PLATELETS 10*3/mm3 401     Results from last 7 days   Lab Units 09/07/21  0815   SODIUM mmol/L 138   POTASSIUM mmol/L 3.7   CHLORIDE mmol/L 103   CO2 mmol/L 23.0   BUN mg/dL 17   CREATININE mg/dL 1.06   CALCIUM mg/dL 10.1   BILIRUBIN mg/dL 0.4   ALK PHOS U/L 47   ALT (SGPT) U/L 14   AST (SGOT) U/L 20   GLUCOSE mg/dL 132*     Results from last 7 days   Lab Units 09/07/21  0815   SODIUM mmol/L 138   POTASSIUM mmol/L 3.7   CHLORIDE mmol/L 103   CO2 mmol/L 23.0   BUN mg/dL 17   CREATININE mg/dL 1.06   GLUCOSE mg/dL 132*   CALCIUM mg/dL 10.1     Results from last 7 days   Lab Units 09/07/21  0815   INR  1.10     Lab Results   Lab Value Date/Time    TROPONINT 0.053 (C) 09/07/2021 0815             Results from last 7 days   Lab Units 09/07/21  0815   PROBNP pg/mL 676.2           Tele: Sinus rhythm with frequent PVCs and occasional PACs.    EKG: Sinus rhythm with nonspecific ST and T wave changes.      Assessment/Plan     1. Chest pain, likely combination of angina and musculoskeletal discomfort.  Suspect that his severe discomfort is related to musculoskeletal discomfort secondary to recent rib fractures.  Troponin overall flat.  2. CAD, on maximal medical therapy.  By cardiac catheterization in 2019 has occluded VG to OM.  3. Rib fractures, secondary to recent mechanical fall.  4. History of liver transplant with recently noted elevated tacrolimus levels.  5. Hypertension, systolic blood pressure 140s at home.  6. Urinary retention  7. Bowel incontinence      Plan:    1. At this time do not see any testing or symptoms suspicious for acute coronary  syndrome.  Will defer any further invasive management.  2. Patient needs more adequate hypertensive control.  We will add Hyzaar 50/12.5 mg daily.  3. Likely needs further evaluation of recent bowel and bladder issues.  Possibly related to tacrolimus.  4. Case was discussed with ER physician regarding this.  5. Will check venous duplex of right lower extremity secondary to recent injury and persistent edema, as well as some discomfort of foot flexion.  6. We will continue to follow along.      SERJIO Edge obtained past medical, family history, social history, review of systems and functioned as a scribe for the remainder of the dictation for Dr. Dillon.      Dictated utilizing Dragon dictation    I April Dillon MD personally performed the services described in this documentation as scribed by the above individual in my presence, and it is both accurate and complete.    April Dillon MD, FACC

## 2021-09-07 NOTE — H&P
[facility] Medicine Services  HISTORY AND PHYSICAL    Patient Name: Mahendra Yates  : 1955  MRN: [id number]  Primary Care Physician: Bobo Calvillo MD  Date of admission: 2021      Subjective   Subjective     Chief Complaint:  Multiple complaints    HPI:  Mahendra Yates is a 66 y.o. male with CAD s/p recent stenting 2-3 months ago, liver transplant 12 years ago in [place], multilevel degenerative spinal disease who presents for evaluation of multiple complaints.  Predominantly he has had bilateral leg pain substantially worse on the right, running from his groin/hip down to his ankle with associated swelling in the right lower extremity, this has been associated with new bowel incontinence (will not feel an urge to defecate, then will have soiled his clothing), and urinary retention/hesitancy.  Have been ongoing for several weeks.  He has also had intermittent upper extremity pains, worse on the left.  Occasional chest pain, RUQ/epigastric abdominal pain, etc.  He was recently seen by his PCP who recommended he be seen by his liver transplant team in [place] however he was not willing to make that drive with his pain and bowel incontinence so he presented to our facility for further evaluation.      Of note he reports having an MI and LHC with stenting slightly over 2 months ago, he states that he was told he only had one blood vessel left due to toxicity from his rejection meds, and that 2 stents were placed which were anticipated to go bad and that he could die at any day.      He also reports MRI of the lumbar spine, EMG/NCV several months ago and was told he has severe lumbar disease.      Several days ago he reports a fall and striking his chest wall on the edge of his bathtub.    COVID Details:    Symptoms:    [x] NONE [] Fever []  Cough [] Shortness of breath [] Change in taste/smell      The patient has started, but not completed, their COVID-19  vaccination series.      Review of Systems   Gen- No fevers, chills  CV-yes chest pain, no palpitations  Resp- No cough, dyspnea  GI- No N/V/D, abd pain    All other systems reviewed and are negative.     Personal History     Past Medical History:   Diagnosis Date   • Arthritis    • Atherosclerosis    • B12 deficiency    • Cancer (CMS/HCC)    • Cancer of liver (CMS/HCC)    • Chronic headaches    • Coronary artery disease    • Diabetes mellitus (CMS/HCC)    • Gastroesophageal reflux disease    • History of transfusion    • Hyperlipidemia    • Hypertension    • Myocardial infarction (CMS/HCC)    • Sleep apnea    • Stroke (CMS/HCC)        Past Surgical History:   Procedure Laterality Date   • ANGIOPLASTY     • CARDIAC SURGERY     • CARPAL TUNNEL RELEASE Right 2/8/2017    Procedure: CARPAL TUNNEL RELEASE RIGHT ;  Surgeon: Luis Hayden MD;  Location: [facility];  Service:    • CHOLECYSTECTOMY     • CORONARY ARTERY BYPASS GRAFT     • CORONARY STENT PLACEMENT      x2   • LIVER TRANSPLANTATION     • TUMOR REMOVAL Right     axilla       Family History:  family history includes Cancer in his brother; Diabetes in his father; Heart disease in his mother. Otherwise pertinent FHx was reviewed and unremarkable.     Social History:  reports that he quit smoking about 30 years ago. His smoking use included cigarettes. He has a 120.00 pack-year smoking history. He has never used smokeless tobacco. He reports that he does not drink alcohol and does not use drugs.  Social History     Social History Narrative   • Not on file       Medications:  Available home medication information reviewed.  (Not in a hospital admission)      Allergies   Allergen Reactions   • Contrast Dye Other (See Comments)     Cardiac Arrest   • Penicillins Rash   • Sulfasalazine Rash       Objective   Objective     Vital Signs:   Temp:  [98.2 °F (36.8 °C)] 98.2 °F (36.8 °C)  Heart Rate:  [61-86] 66  Resp:  [18-24] 18  BP: (136-156)/(79-99) 140/99       Physical  Exam   Constitutional: Awake, alert, no acute distress, laying in ED stretcher  Eyes: PERRL, sclerae anicteric, no conjunctival injection  HENT: NCAT, mucous membranes moist  Neck: Supple, trachea midline  Respiratory: Clear to auscultation bilaterally, nonlabored respirations   Cardiovascular: RRR, palpable radial pulses bilaterally  Gastrointestinal: Positive bowel sounds, soft, nontender, nondistended  Musculoskeletal: BL LE edema RT >> LT, multiple old healed scars/wounds on his legs, no cyanosis, or clubbing  Psychiatric: Appropriate affect, cooperative  Neurologic: Speech clear, RT LE extremity strength notably weaker than LT, mid foot pain elicited with dorsiflexion (slightly increased tonicity), minimal plantar flexion    Result Review:  I have personally reviewed the results from the time of this admission to 9/7/2021 15:04 EDT and agree with these findings:  [x]  Laboratory  []  Microbiology  []  Radiology  []  EKG/Telemetry   []  Cardiology/Vascular   []  Pathology  []  Old records  []  Other:  Most notable findings include: Labs largely unremarkable      LAB RESULTS:      Lab 09/07/21  0815   WBC 6.51   HEMOGLOBIN 12.7*   HEMATOCRIT 38.7   PLATELETS 401   NEUTROS ABS 3.29   IMMATURE GRANS (ABS) 0.03   LYMPHS ABS 2.14   MONOS ABS 0.77   EOS ABS 0.23   MCV 85.6   PROTIME 13.9   INR 1.10   APTT 30.6         Lab 09/07/21  0815   SODIUM 138   POTASSIUM 3.7   CHLORIDE 103   CO2 23.0   ANION GAP 12.0   BUN 17   CREATININE 1.06   GLUCOSE 132*   CALCIUM 10.1         Lab 09/07/21  0815   TOTAL PROTEIN 6.9   ALBUMIN 4.20   GLOBULIN 2.7   ALT (SGPT) 14   AST (SGOT) 20   BILIRUBIN 0.4   ALK PHOS 47   LIPASE 36         Lab 09/07/21  1047 09/07/21  0815   PROBNP  --  676.2   TROPONIN T 0.054* 0.053*                 UA    Urinalysis 8/8/21 9/7/21 9/7/21     0850 0850   Squamous Epithelial Cells, UA   None Seen   Specific [place], UA 1.008 1.014    Ketones, UA Negative Negative    Blood, UA Negative Negative     Leukocytes, UA Negative Trace (A)    Nitrite, UA Negative Positive (A)    RBC, UA   None Seen   WBC, UA   None Seen   Bacteria, UA   None Seen   (A) Abnormal value              Microbiology Results (last 10 days)     Procedure Component Value - Date/Time    COVID PRE-OP / PRE-PROCEDURE SCREENING ORDER (NO ISOLATION) - Swab, Nasopharynx [[id number]]  (Normal) Collected: 09/07/21 0846    Lab Status: Final result Specimen: Swab from Nasopharynx Updated: 09/07/21 0918    Narrative:      The following orders were created for panel order COVID PRE-OP / PRE-PROCEDURE SCREENING ORDER (NO ISOLATION) - Swab, Nasopharynx.  Procedure                               Abnormality         Status                     ---------                               -----------         ------                     COVID-19 and FLU A/B PCR...[[id number]]  Normal              Final result                 Please view results for these tests on the individual orders.    COVID-19 and FLU A/B PCR - Swab, Nasopharynx [[id number]]  (Normal) Collected: 09/07/21 0846    Lab Status: Final result Specimen: Swab from Nasopharynx Updated: 09/07/21 0918     COVID19 Not Detected     Influenza A PCR Not Detected     Influenza B PCR Not Detected    Narrative:      Fact sheet for providers: https://www.fda.gov/media/262153/download    Fact sheet for patients: https://www.fda.gov/media/832533/download    Test performed by PCR.          CT Abdomen Pelvis Without Contrast    Result Date: 9/7/2021  EXAMINATION: CT CHEST WO CONTRAST DIAGNOSTIC-, CT ABDOMEN PELVIS WO CONTRAST-  INDICATION: fall w/ upper abdominal pain and hx of liver txp  TECHNIQUE: Axial noncontrast CT of the chest, abdomen and pelvis with multiplanar reconstruction  The radiation dose reduction device was turned on for each scan per the ALARA (As Low as Reasonably Achievable) protocol.  COMPARISON: CT chest 6/29/2012  FINDINGS: No pathologic axillary adenopathy or other worrisome body wall soft tissue  finding in the chest. Evaluation of the osseous structures demonstrates subtle cortical irregularity involving the left posterior 10th and anterior sixth and seventh ribs compatible subtle areas of likely nondisplaced fracture. There is no pleural or pericardial effusion. No pathologic mediastinal or hilar lymphadenopathy. Nonaneurysmal mildly atherosclerotic thoracic aorta. Evaluation of the lung fields demonstrates no evidence of acute infectious or inflammatory process. There are no suspicious pulmonary nodules.  In the abdomen and pelvis, the body wall soft tissues are unremarkable. There is no evidence of acute fracture or aggressive osseous lesion. Operative changes are noted from prior liver transplant. There is no suspicious focal hepatic lesion or intrahepatic biliary ductal dilatation. The spleen, pancreas and bilateral adrenal glands are unremarkable. The kidneys demonstrate no evidence of stones or hydronephrosis. Small and large bowel loops are nondilated. There is no suspicious focal bowel wall thickening. Nonaneurysmal mildly atherosclerotic abdominal aorta. No bulky retroperitoneal adenopathy. Evaluation of the pelvic viscera is unremarkable.      Impression: Subtle nondisplaced fractures of the left posterior 10th and anterior sixth and seventh ribs. No additional acute findings in the chest.  Operative changes from prior liver transplant with no acute findings noted in the abdomen and pelvis.   This report was finalized on 9/7/2021 10:16 AM by Serafin Strange.      CT Chest Without Contrast Diagnostic    Result Date: 9/7/2021  EXAMINATION: CT CHEST WO CONTRAST DIAGNOSTIC-, CT ABDOMEN PELVIS WO CONTRAST-  INDICATION: fall w/ upper abdominal pain and hx of liver txp  TECHNIQUE: Axial noncontrast CT of the chest, abdomen and pelvis with multiplanar reconstruction  The radiation dose reduction device was turned on for each scan per the ALARA (As Low as Reasonably Achievable) protocol.  COMPARISON: CT  chest 6/29/2012  FINDINGS: No pathologic axillary adenopathy or other worrisome body wall soft tissue finding in the chest. Evaluation of the osseous structures demonstrates subtle cortical irregularity involving the left posterior 10th and anterior sixth and seventh ribs compatible subtle areas of likely nondisplaced fracture. There is no pleural or pericardial effusion. No pathologic mediastinal or hilar lymphadenopathy. Nonaneurysmal mildly atherosclerotic thoracic aorta. Evaluation of the lung fields demonstrates no evidence of acute infectious or inflammatory process. There are no suspicious pulmonary nodules.  In the abdomen and pelvis, the body wall soft tissues are unremarkable. There is no evidence of acute fracture or aggressive osseous lesion. Operative changes are noted from prior liver transplant. There is no suspicious focal hepatic lesion or intrahepatic biliary ductal dilatation. The spleen, pancreas and bilateral adrenal glands are unremarkable. The kidneys demonstrate no evidence of stones or hydronephrosis. Small and large bowel loops are nondilated. There is no suspicious focal bowel wall thickening. Nonaneurysmal mildly atherosclerotic abdominal aorta. No bulky retroperitoneal adenopathy. Evaluation of the pelvic viscera is unremarkable.      Impression: Subtle nondisplaced fractures of the left posterior 10th and anterior sixth and seventh ribs. No additional acute findings in the chest.  Operative changes from prior liver transplant with no acute findings noted in the abdomen and pelvis.   This report was finalized on 9/7/2021 10:16 AM by Serafin Strange.      XR Chest 1 View    Result Date: 9/7/2021  EXAMINATION: XR CHEST 1 VW-  INDICATION: Chest Pain triage protocol  COMPARISON: 6/20/2012  FINDINGS: Lung fields remain clear. There is no new focal opacity. No effusion or pneumothorax. Unchanged heart and mediastinal contours with median sternotomy wires intact.      Impression: No acute  cardiopulmonary abnormality.  This report was finalized on 9/7/2021 8:24 AM by Serafin Strange.        Results for orders placed during the hospital encounter of 02/10/19    Adult Transthoracic Echo Complete W/ Cont if Necessary Per Protocol    Interpretation Summary  · Estimated EF = 70%.  · Left ventricular wall thickness is consistent with mild concentric hypertrophy.  · Left ventricular diastolic dysfunction (grade I) consistent with impaired relaxation.  · There is thickening of the left coronary cusp of the aortic valve  · Aortic valve maximum pressure gradient is 16.7 mmHg.      Assessment/Plan   Assessment & Plan     Active Hospital Problems    Diagnosis  POA   • **Bowel and bladder incontinence [R32, R15.9]  Yes   • Closed fracture of multiple ribs of left side [S22.42XA]  Yes   • Herniated lumbar intervertebral disc [M51.26]  Yes     L3/4, right     • Radicular syndrome of right leg [M54.10]  Yes   • Presence of stent in coronary artery in patient with coronary artery disease [I25.10, Z95.5]  Not Applicable   • Anxiety and depression [F41.9, F32.9]  Yes   • Hx of CABG [Z95.1]  Not Applicable   • History of liver transplant (CMS/HCC) [Z94.4]  Not Applicable   • Lumbar stenosis with neurogenic claudication [M48.062]  Yes     Summary: This is a 66-year-old male with multilevel degenerative disease of the spine, CAD with recent MI and stenting reportedly over 2 months ago, liver transplant 12 years ago at [place] who presents with multiple complaints predominantly radicular lower extremity pain with new bowel incontinence and urinary hesitancy/retention    *Patient's MAR substantially different from well organized printed list provided by patient and wife, I have attempted to match his medicines as closely as possible, placed to pharmacy consult to assist with accuracy of medications    Assessment/plan    RT >> LT leg pain with weakness  Bowel/bladder incontinence  Asymmetric lower extremity  edema  -Multiple recent falls, limited ability to bear weight with a walker, check RT hip x-ray  -Cardiology checking venous duplex for asymmetrical edema, results pending  -Reports MRI/EMG/NCV several months ago with severe lumbar disease predating symptoms; obtain MRI lumbar spine, depending on results may need neurosurgical evaluation (complicated by need for DAPT with recent stenting)  -Check GI PCR panel, fecal calprotectin  -PT/OT    CAD with prior CABG, recent stenting  Troponin elevation  -Patient reports MI with C slightly over 2 months ago; says he was told he has one vessel left with x2 stents placed, reports being told they could go bad at any time and he could die  -Continue home antianginals, aspirin, Plavix  -Troponins flat, seen by cardiology in the ED who do not believe this is ACS, defer further ischemic evaluation    Liver transplant patient  -S/p liver transplant 12 years ago in [place]  -Patient takes mycophenolate, tacrolimus chronically; need to continue these, unable to order mycophenolate the way he reports taking it, requested pharmacy to assist  -CMP markedly normal  -Follows with transplant in [place]    DM type 2, A1c 6.4%, with long-term use of insulin  -Accu-Cheks with SSI    GERD  Hyperlipidemia  Hypertension  Hx stroke  -Continuing home meds    DVT prophylaxis: Mechanical      CODE STATUS: Patient wishes to be DNR  Code Status and Medical Interventions:   Ordered at: 09/07/21 1502     Limited Support to NOT Include:    Intubation     Level Of Support Discussed With:    Patient     Code Status:    No CPR     Medical Interventions (Level of Support Prior to Arrest):    Limited       Admission Status:  I believe this patient meets OBSERVATION status, however if further evaluation or treatment plans warrant, status may change.  Based upon current information, I predict patient's care encounter to be less than or equal to 2 midnights.      Blaze Perez, DO  09/07/21

## 2021-09-08 VITALS
BODY MASS INDEX: 29.8 KG/M2 | RESPIRATION RATE: 16 BRPM | HEIGHT: 72 IN | SYSTOLIC BLOOD PRESSURE: 147 MMHG | HEART RATE: 80 BPM | WEIGHT: 220 LBS | OXYGEN SATURATION: 100 % | DIASTOLIC BLOOD PRESSURE: 78 MMHG | TEMPERATURE: 97.9 F

## 2021-09-08 LAB
ALBUMIN SERPL-MCNC: 3.9 G/DL (ref 3.5–5.2)
ALBUMIN/GLOB SERPL: 1.6 G/DL
ALP SERPL-CCNC: 42 U/L (ref 39–117)
ALT SERPL W P-5'-P-CCNC: 13 U/L (ref 1–41)
ANION GAP SERPL CALCULATED.3IONS-SCNC: 11 MMOL/L (ref 5–15)
AST SERPL-CCNC: 20 U/L (ref 1–40)
BASOPHILS # BLD AUTO: 0.05 10*3/MM3 (ref 0–0.2)
BASOPHILS NFR BLD AUTO: 0.8 % (ref 0–1.5)
BILIRUB SERPL-MCNC: 0.5 MG/DL (ref 0–1.2)
BUN SERPL-MCNC: 15 MG/DL (ref 8–23)
BUN/CREAT SERPL: 13.4 (ref 7–25)
CALCIUM SPEC-SCNC: 10.1 MG/DL (ref 8.6–10.5)
CHLORIDE SERPL-SCNC: 106 MMOL/L (ref 98–107)
CO2 SERPL-SCNC: 23 MMOL/L (ref 22–29)
CREAT SERPL-MCNC: 1.12 MG/DL (ref 0.76–1.27)
DEPRECATED RDW RBC AUTO: 44 FL (ref 37–54)
EOSINOPHIL # BLD AUTO: 0.2 10*3/MM3 (ref 0–0.4)
EOSINOPHIL NFR BLD AUTO: 3.2 % (ref 0.3–6.2)
ERYTHROCYTE [DISTWIDTH] IN BLOOD BY AUTOMATED COUNT: 14.3 % (ref 12.3–15.4)
GFR SERPL CREATININE-BSD FRML MDRD: 65 ML/MIN/1.73
GLOBULIN UR ELPH-MCNC: 2.4 GM/DL
GLUCOSE BLDC GLUCOMTR-MCNC: 109 MG/DL (ref 70–130)
GLUCOSE SERPL-MCNC: 114 MG/DL (ref 65–99)
HCT VFR BLD AUTO: 37.6 % (ref 37.5–51)
HGB BLD-MCNC: 12.6 G/DL (ref 13–17.7)
IMM GRANULOCYTES # BLD AUTO: 0.02 10*3/MM3 (ref 0–0.05)
IMM GRANULOCYTES NFR BLD AUTO: 0.3 % (ref 0–0.5)
LYMPHOCYTES # BLD AUTO: 2.08 10*3/MM3 (ref 0.7–3.1)
LYMPHOCYTES NFR BLD AUTO: 33.3 % (ref 19.6–45.3)
MCH RBC QN AUTO: 28.4 PG (ref 26.6–33)
MCHC RBC AUTO-ENTMCNC: 33.5 G/DL (ref 31.5–35.7)
MCV RBC AUTO: 84.7 FL (ref 79–97)
MONOCYTES # BLD AUTO: 0.8 10*3/MM3 (ref 0.1–0.9)
MONOCYTES NFR BLD AUTO: 12.8 % (ref 5–12)
NEUTROPHILS NFR BLD AUTO: 3.1 10*3/MM3 (ref 1.7–7)
NEUTROPHILS NFR BLD AUTO: 49.6 % (ref 42.7–76)
NRBC BLD AUTO-RTO: 0 /100 WBC (ref 0–0.2)
PLATELET # BLD AUTO: 367 10*3/MM3 (ref 140–450)
PMV BLD AUTO: 11 FL (ref 6–12)
POTASSIUM SERPL-SCNC: 4 MMOL/L (ref 3.5–5.2)
PROT SERPL-MCNC: 6.3 G/DL (ref 6–8.5)
RBC # BLD AUTO: 4.44 10*6/MM3 (ref 4.14–5.8)
SODIUM SERPL-SCNC: 140 MMOL/L (ref 136–145)
WBC # BLD AUTO: 6.25 10*3/MM3 (ref 3.4–10.8)

## 2021-09-08 PROCEDURE — 82962 GLUCOSE BLOOD TEST: CPT

## 2021-09-08 PROCEDURE — 63710000001 TACROLIMUS PER 1 MG: Performed by: INTERNAL MEDICINE

## 2021-09-08 PROCEDURE — 99217 PR OBSERVATION CARE DISCHARGE MANAGEMENT: CPT | Performed by: INTERNAL MEDICINE

## 2021-09-08 PROCEDURE — G0378 HOSPITAL OBSERVATION PER HR: HCPCS

## 2021-09-08 PROCEDURE — 85025 COMPLETE CBC W/AUTO DIFF WBC: CPT | Performed by: INTERNAL MEDICINE

## 2021-09-08 PROCEDURE — 99213 OFFICE O/P EST LOW 20 MIN: CPT | Performed by: INTERNAL MEDICINE

## 2021-09-08 PROCEDURE — 80053 COMPREHEN METABOLIC PANEL: CPT | Performed by: INTERNAL MEDICINE

## 2021-09-08 PROCEDURE — 63710000001 MYCOPHENOLATE PER 180 MG: Performed by: INTERNAL MEDICINE

## 2021-09-08 RX ORDER — ISOSORBIDE MONONITRATE 60 MG/1
60 TABLET, EXTENDED RELEASE ORAL EVERY 12 HOURS
Start: 2021-09-08 | End: 2021-11-17 | Stop reason: SDUPTHER

## 2021-09-08 RX ORDER — LOSARTAN POTASSIUM AND HYDROCHLOROTHIAZIDE 12.5; 5 MG/1; MG/1
1 TABLET ORAL DAILY
Qty: 30 TABLET | Refills: 0 | Status: SHIPPED | OUTPATIENT
Start: 2021-09-08 | End: 2022-02-11 | Stop reason: HOSPADM

## 2021-09-08 RX ADMIN — ISOSORBIDE MONONITRATE 60 MG: 60 TABLET, EXTENDED RELEASE ORAL at 09:13

## 2021-09-08 RX ADMIN — CLOPIDOGREL BISULFATE 75 MG: 75 TABLET ORAL at 09:13

## 2021-09-08 RX ADMIN — CETIRIZINE HYDROCHLORIDE 10 MG: 10 TABLET, FILM COATED ORAL at 09:13

## 2021-09-08 RX ADMIN — AMLODIPINE BESYLATE 10 MG: 10 TABLET ORAL at 09:13

## 2021-09-08 RX ADMIN — TACROLIMUS 0.5 MG: 0.5 CAPSULE, GELATIN COATED ORAL at 09:13

## 2021-09-08 RX ADMIN — MYCOPHENOLIC ACID 360 MG: 180 TABLET, DELAYED RELEASE ORAL at 09:14

## 2021-09-08 RX ADMIN — DULOXETINE HYDROCHLORIDE 60 MG: 60 CAPSULE, DELAYED RELEASE ORAL at 09:13

## 2021-09-08 RX ADMIN — LOSARTAN POTASSIUM: 50 TABLET, FILM COATED ORAL at 09:13

## 2021-09-08 RX ADMIN — RANOLAZINE 1000 MG: 500 TABLET, EXTENDED RELEASE ORAL at 09:13

## 2021-09-08 RX ADMIN — NEBIVOLOL HYDROCHLORIDE 20 MG: 10 TABLET ORAL at 09:13

## 2021-09-08 RX ADMIN — ASPIRIN 81 MG: 81 TABLET, COATED ORAL at 09:13

## 2021-09-08 RX ADMIN — PANTOPRAZOLE SODIUM 40 MG: 40 TABLET, DELAYED RELEASE ORAL at 09:22

## 2021-09-08 NOTE — CASE MANAGEMENT/SOCIAL WORK
Continued Stay Note  [facility]     Patient Name: Mahendra Yates  MRN: [id number]  Today's Date: 9/8/2021    Admit Date: 9/7/2021    Discharge Plan     Row Name 09/08/21 1041       Plan    Plan Comments  Patient was discharged and left floor before  could visit    Final Discharge Disposition Code  01 - home or self-care        Discharge Codes    No documentation.             Sonja C Kellerman, RN

## 2021-09-08 NOTE — PLAN OF CARE
"New admit, recent fall multiple left rib fractures noted, c/o increased BLE swelling, new onset urinary urgency/straining and bowel urgency, incontinence at times, RLE unable to plantar flex (new), BLE numbness present no worse than baseline. Reports increased BLE buckling and falling. Ambulates x1 gait belt and walker. Patient is very impulsive.    C/o chest pain in ED, denies pain at this time, Radha BASSETT contacted stated to \"hold his nitro drip and see how he tolerates it\" drip held at 2300.     NPO since 0000. MRI without contrast of back completed see results.     See patient's significant health history./provider's note.     Hospitalist and cardiology following following.     Goal Outcome Evaluation:                 "

## 2021-09-08 NOTE — SIGNIFICANT NOTE
"OT attempted to see pt for Initial Evaluation. Pt is refusing all therapies. He is questioning therapist vaccination status. Reports that he is \"very upset with the hospital\" and that he \"does not need therapy.\" OT will d/c order at patient request. RN notified.  "

## 2021-09-08 NOTE — PROGRESS NOTES
[facility] Cardiology Daily Note       LOS: 0 days   Patient Care Team:  Bobo Calvillo MD as PCP - General (Family Medicine)  Natanael Estrella MD as Consulting Physician (Neurology)  Oren Pean MD as Consulting Physician (Cardiology)  Everardo Tate MD as Consulting Physician (Pain Medicine)  Scout Grant MD as Consulting Physician (Otolaryngology)  Christin Handley PA-C as Physician Assistant (Physician Assistant)  Radha Briggs APRN as Nurse Practitioner (Nurse Practitioner)    Chief Complaint: Chest pain/coronary artery disease    Subjective     Subjective: Had a bad experience last night suffers from bowel and bladder incontinence and was instructed to stay in bed as he was felt to be a fall risk.  Difficulty in getting staff to help him to the bathroom quickly enough.  Wants to go home this morning.  States that he felt like he was a prisoner last night.  No chest pain last night.  100% saturated on room air. Blood pressures remain in the 130 to 150 range.    Review of Systems:   As above.    Medications:  amLODIPine, 10 mg, Oral, Daily  aspirin, 81 mg, Oral, Daily  cetirizine, 10 mg, Oral, Daily  clopidogrel, 75 mg, Oral, Daily  DULoxetine, 60 mg, Oral, Daily  insulin lispro, 0-7 Units, Subcutaneous, 4x Daily With Meals & Nightly  isosorbide mononitrate, 60 mg, Oral, Q12H  losartan-HCTZ (HYZAAR) 50-12.5 combo dose, , Oral, Daily  mycophenolate, 360 mg, Oral, Q12H  nebivolol, 20 mg, Oral, Daily  pantoprazole, 40 mg, Oral, BID AC  pentoxifylline, 400 mg, Oral, Nightly  ranolazine, 1,000 mg, Oral, Q12H  sodium chloride, 10 mL, Intravenous, Q12H  spironolactone, 100 mg, Oral, Daily  tacrolimus, 0.5 mg, Oral, Q12H        Objective     Vital Sign Min/Max for last 24 hours  Temp  Min: 97.8 °F (36.6 °C)  Max: 98.7 °F (37.1 °C)   BP  Min: 102/66  Max: 156/87   Pulse  Min: 61  Max: 92   Resp  Min: 16  Max: 18   SpO2  Min: 94 %  Max: 100 %   No data recorded   Weight   "Min: 99.8 kg (220 lb)  Max: 99.8 kg (220 lb)      Intake/Output Summary (Last 24 hours) at 9/8/2021 0800  Last data filed at 9/8/2021 0609  Gross per 24 hour   Intake --   Output 1300 ml   Net -1300 ml        Flowsheet Rows      First Filed Value   Admission Height  182.9 cm (72\") Documented at 09/07/2021 0756   Admission Weight  99.8 kg (220 lb) Documented at 09/07/2021 0756          Physical Exam:    General: Alert and oriented.   Cardiovascular: Heart has a nondisplaced focal PMI. Regular rate and rhythm with 1/6 SEmurmur, no gallop or rub.  Lungs: Clear without rales or wheezes. Equal expansion is noted.   Abdomen: Soft, nontender.  Extremities: Show trace edema.   Skin: warm and dry.     Results Review:    I reviewed the patient's new clinical results.  EKG:  Tele: Sinus rhythm with occasional PVCs    Labs:    Results from last 7 days   Lab Units 09/07/21  0815   SODIUM mmol/L 138   POTASSIUM mmol/L 3.7   CHLORIDE mmol/L 103   CO2 mmol/L 23.0   BUN mg/dL 17   CREATININE mg/dL 1.06   CALCIUM mg/dL 10.1   BILIRUBIN mg/dL 0.4   ALK PHOS U/L 47   ALT (SGPT) U/L 14   AST (SGOT) U/L 20   GLUCOSE mg/dL 132*     Results from last 7 days   Lab Units 09/08/21  0713 09/07/21  0815   WBC 10*3/mm3 6.25 6.51   HEMOGLOBIN g/dL 12.6* 12.7*   HEMATOCRIT % 37.6 38.7   PLATELETS 10*3/mm3 367 401     Lab Results   Component Value Date    TROPONINT 0.054 (C) 09/07/2021    TROPONINT 0.053 (C) 09/07/2021     Lab Results   Component Value Date    CHOL 198 08/08/2021     Lab Results   Component Value Date    TRIG 230 (H) 08/08/2021     Lab Results   Component Value Date    HDL 56 08/08/2021     No components found for: LDLCALC  Lab Results   Component Value Date    INR 1.10 09/07/2021    PROTIME 13.9 09/07/2021         Ejection Fraction: 70% by echo 2/10/2019    Assessment   Assessment:    1. Coronary artery disease  1. CABG 7/2009  2. History of stents, ICDB  3. 2019 [facility] Dr. Pena NSTEMI.  Patent LIMA to LAD with diffuse distal LAD " disease.  VG to OM occluded, unable to be intervened upon.  VG to RCA patent with diffuse target vessel disease.  2. Hypertension  3. Dyslipidemia  4. Diabetes  5. History of CVA 12/2018  6. History of liver cancer  1. Liver transplant 2009  2. [place], [place]  7. Arthritis  8. GERD      Plan:    Discharge home today is okay with me.  The patient would like the remainder of his work-up as an outpatient due to the experience he had last evening.      He would like to follow-up with me.  I can see him in approximately 2 months.    Continue losartan HCT 50/12.5 once daily.    Continue Plavix and aspirin for DAPT  Continue Ranexa and Imdur for chronic angina  Continue Bystolic amlodipine and losartan for hypertension  Continue amlodipine and Bystolic for chronic angina    April Dillon MD  09/08/21  08:00 EDT

## 2021-09-08 NOTE — SIGNIFICANT NOTE
Spoke with Mr. Yates in length about his frustrations with a nightshift nurse he interacted with last night.  His wife showed up during our conversation.  The bed and chair alarm pad had been in place due to his recent fall history.  Every time he sat up on the side of the bed, the pad alarm activated so the staff responded to the alarm.  He was very upset with how one of those nurses talked to him in response to the alarm.  He was ready to leave AMA and sign the paperwork.  Dr. Perez walked into the room during this discussion and started the discharge process instead.  I told the patient I would address his concerns and tried to convince him to stay, but he was adamant about leaving immediately.  IV removed, cardiac monitor removed, discharge paperwork printed, and I took the patient in a wheelchair to his wife's car.  I advised the patient to follow up with neurosurgery on his own since they were consulted today, but did not have a chance to see him prior to leaving.

## 2021-09-08 NOTE — DISCHARGE SUMMARY
[facility] Medicine Services  DISCHARGE SUMMARY    Patient Name: Mahendra Yates  : 1955  MRN: [id number]    Date of Admission: 2021  7:58 AM  Date of Discharge: 2021  Primary Care Physician: Bobo Calvillo MD    Consults     Date and Time Order Name Status Description    2021  8:46 AM Inpatient Neurosurgery Consult      2021  9:02 AM Inpatient Cardiology Consult Completed           Hospital Course     Presenting Problem:   Closed fracture of multiple ribs of left side, initial encounter [S22.42XA]  Bowel and bladder incontinence [R32, R15.9]    Active Hospital Problems    Diagnosis  POA   • **Bowel and bladder incontinence [R32, R15.9]  Yes   • Closed fracture of multiple ribs of left side [S22.42XA]  Yes   • Herniated lumbar intervertebral disc [M51.26]  Yes   • Radicular syndrome of right leg [M54.10]  Yes   • Presence of stent in coronary artery in patient with coronary artery disease [I25.10, Z95.5]  Not Applicable   • Anxiety and depression [F41.9, F32.9]  Yes   • Hx of CABG [Z95.1]  Not Applicable   • History of liver transplant (CMS/HCC) [Z94.4]  Not Applicable   • Lumbar stenosis with neurogenic claudication [M48.062]  Yes      Resolved Hospital Problems   No resolved problems to display.          Hospital Course:  Mahendra Yates is a 66 y.o. male with remote liver transplant on chronic immunosuppressive's, CAD with recent MI and stenting in the last 2-3 months, and known multilevel degenerative disease throughout his spine especially within the lumbar spine.  He presented to the hospital with complaints of several weeks of difficulty maintaining bowel and bladder continence, radicular pain in his right lower extremity with difficulty ambulating in the same.  He had suffered a recent fall striking his chest wall on a bathtub at home.  Due to complaints of chest pain and recent MI he was evaluated in the ED by cardiology who felt no ischemic  work-up was eminently necessary, however they did start Hyzaar for improved blood pressure control.  Therapy services were consulted for his difficulty with ambulation and recent fall which she refused.  MRI of the lumbar spine showed advanced and progressed degenerative disease with neuroforaminal stenosis and at least some degree of nerve impingement on the contralateral side from his symptoms.  Neurosurgery was initially consulted for evaluation, however the patient was extremely agitated related to interactions with nursing staff overnight and was initially requesting to leave AMA.  He was agreeable to remain long enough for us to set up an outpatient neurosurgical eval (although he is an exceedingly poor candidate for surgery due to recent MI, extensive coronary artery disease, and obligatory DAPT due to recent stenting).  He should follow-up with his PCP and a referral for neurosurgery have been placed.  He is also interested in following up with Dr. Dillon of cardiology locally.    RT >> LT leg pain with weakness  Bowel/bladder incontinence  Asymmetric lower extremity edema  -Multiple recent falls, limited ability to bear weight with a walker  -RT hip x-ray without acute fracture  -Venous duplex without DVT  -MRI lumbar spine with advanced degenerative disease, neuroforaminal narrowing, and at least some nerve impingement on the left side  -Initially plan for neurosurgery eval but patient climbing further care on an inpatient setting, agreeable for outpatient referral     CAD with prior CABG, recent stenting  Troponin elevation  -Patient reports MI with [facility] slightly over 2 months ago; says he was told he has one vessel left with x2 stents placed, reports being told they could go bad at any time and he would die  -Continue all home meds, cardiology has added Hyzaar  -Follow-up with Dr. Dillon in 2 months     Liver transplant patient  -S/p liver transplant 12 years ago in [place]  -Patient  takes mycophenolate, tacrolimus chronically;  continue discharge  -CMP markedly normal on arrival  -Follows with transplant in [place]     DM type 2, A1c 6.4%, with long-term use of insulin     GERD  Hyperlipidemia  Hypertension  Hx stroke      Discharge Follow Up Recommendations for outpatient labs/diagnostics:  PCP 1-2 weeks  Neurosurgical referral placed  Dr. Dillon 2 months    Day of Discharge     HPI:   Very upset over interactions with staff last night, heavily related to bed alarm.  Reporting that he was spoke to disrespectfully.  Continues to have pain and difficulty ambulating, refusing therapy services.  Insisting he go home today    Review of Systems  Gen- No fevers, chills  CV- No chest pain, palpitations  Resp- No cough, dyspnea  GI- No N/V/D, abd pain    Vital Signs:   Temp:  [97.8 °F (36.6 °C)-98.7 °F (37.1 °C)] 97.9 °F (36.6 °C)  Heart Rate:  [61-92] 80  Resp:  [16-18] 16  BP: (102-153)/(59-99) 147/78     Physical Exam:  Constitutional: Awake, alert, sitting up on edge of bed  HENT: NCAT, mucous membranes moist  Respiratory: Clear to auscultation bilaterally, respiratory effort normal   Cardiovascular: RRR, palpable radial pulses  Gastrointestinal: Positive bowel sounds, soft, nontender, nondistended  Musculoskeletal: No bilateral ankle edema  Psychiatric: Agitated affect, cooperative    Pertinent  and/or Most Recent Results     LAB RESULTS:      Lab 09/08/21  0713 09/07/21  0815   WBC 6.25 6.51   HEMOGLOBIN 12.6* 12.7*   HEMATOCRIT 37.6 38.7   PLATELETS 367 401   NEUTROS ABS 3.10 3.29   IMMATURE GRANS (ABS) 0.02 0.03   LYMPHS ABS 2.08 2.14   MONOS ABS 0.80 0.77   EOS ABS 0.20 0.23   MCV 84.7 85.6   PROTIME  --  13.9   APTT  --  30.6         Lab 09/08/21  0713 09/07/21  0815   SODIUM 140 138   POTASSIUM 4.0 3.7   CHLORIDE 106 103   CO2 23.0 23.0   ANION GAP 11.0 12.0   BUN 15 17   CREATININE 1.12 1.06   GLUCOSE 114* 132*   CALCIUM 10.1 10.1         Lab 09/08/21  0713 09/07/21  0815   TOTAL  PROTEIN 6.3 6.9   ALBUMIN 3.90 4.20   GLOBULIN 2.4 2.7   ALT (SGPT) 13 14   AST (SGOT) 20 20   BILIRUBIN 0.5 0.4   ALK PHOS 42 47   LIPASE  --  36         Lab 09/07/21  1047 09/07/21  0815   PROBNP  --  676.2   TROPONIN T 0.054* 0.053*   PROTIME  --  13.9   INR  --  1.10                 Brief Urine Lab Results  (Last result in the past 365 days)      Color   Clarity   Blood   Leuk Est   Nitrite   Protein   CREAT   Urine HCG        09/07/21 0850 [place] Clear Negative Trace Positive Negative             Microbiology Results (last 10 days)     Procedure Component Value - Date/Time    Gastrointestinal Panel, PCR - Stool, Per Rectum [[id number]]  (Normal) Collected: 09/07/21 1648    Lab Status: Final result Specimen: Stool from Per Rectum Updated: 09/07/21 1827     Campylobacter Not Detected     Plesiomonas shigelloides Not Detected     Salmonella Not Detected     Vibrio Not Detected     Vibrio cholerae Not Detected     Yersinia enterocolitica Not Detected     Enteroaggregative E. coli (EAEC) Not Detected     Enteropathogenic E. coli (EPEC) Not Detected     Enterotoxigenic E. coli (ETEC) lt/st Not Detected     Shiga-like toxin-producing E. coli (STEC) stx1/stx2 Not Detected     Shigella/Enteroinvasive E. coli (EIEC) Not Detected     Cryptosporidium Not Detected     Cyclospora cayetanensis Not Detected     Entamoeba histolytica Not Detected     Giardia lamblia Not Detected     Adenovirus F40/41 Not Detected     Astrovirus Not Detected     Norovirus GI/GII Not Detected     Rotavirus A Not Detected     Sapovirus (I, II, IV or V) Not Detected    COVID PRE-OP / PRE-PROCEDURE SCREENING ORDER (NO ISOLATION) - Swab, Nasopharynx [[id number]]  (Normal) Collected: 09/07/21 0846    Lab Status: Final result Specimen: Swab from Nasopharynx Updated: 09/07/21 0918    Narrative:      The following orders were created for panel order COVID PRE-OP / PRE-PROCEDURE SCREENING ORDER (NO ISOLATION) - Swab, Nasopharynx.  Procedure                                Abnormality         Status                     ---------                               -----------         ------                     COVID-19 and FLU A/B PCR...[[id number]]  Normal              Final result                 Please view results for these tests on the individual orders.    COVID-19 and FLU A/B PCR - Swab, Nasopharynx [[id number]]  (Normal) Collected: 09/07/21 0846    Lab Status: Final result Specimen: Swab from Nasopharynx Updated: 09/07/21 0918     COVID19 Not Detected     Influenza A PCR Not Detected     Influenza B PCR Not Detected    Narrative:      Fact sheet for providers: https://www.fda.gov/media/345119/download    Fact sheet for patients: https://www.fda.gov/media/989342/download    Test performed by PCR.          CT Abdomen Pelvis Without Contrast    Result Date: 9/7/2021  EXAMINATION: CT CHEST WO CONTRAST DIAGNOSTIC-, CT ABDOMEN PELVIS WO CONTRAST-  INDICATION: fall w/ upper abdominal pain and hx of liver txp  TECHNIQUE: Axial noncontrast CT of the chest, abdomen and pelvis with multiplanar reconstruction  The radiation dose reduction device was turned on for each scan per the ALARA (As Low as Reasonably Achievable) protocol.  COMPARISON: CT chest 6/29/2012  FINDINGS: No pathologic axillary adenopathy or other worrisome body wall soft tissue finding in the chest. Evaluation of the osseous structures demonstrates subtle cortical irregularity involving the left posterior 10th and anterior sixth and seventh ribs compatible subtle areas of likely nondisplaced fracture. There is no pleural or pericardial effusion. No pathologic mediastinal or hilar lymphadenopathy. Nonaneurysmal mildly atherosclerotic thoracic aorta. Evaluation of the lung fields demonstrates no evidence of acute infectious or inflammatory process. There are no suspicious pulmonary nodules.  In the abdomen and pelvis, the body wall soft tissues are unremarkable. There is no evidence of acute fracture or aggressive  osseous lesion. Operative changes are noted from prior liver transplant. There is no suspicious focal hepatic lesion or intrahepatic biliary ductal dilatation. The spleen, pancreas and bilateral adrenal glands are unremarkable. The kidneys demonstrate no evidence of stones or hydronephrosis. Small and large bowel loops are nondilated. There is no suspicious focal bowel wall thickening. Nonaneurysmal mildly atherosclerotic abdominal aorta. No bulky retroperitoneal adenopathy. Evaluation of the pelvic viscera is unremarkable.      Subtle nondisplaced fractures of the left posterior 10th and anterior sixth and seventh ribs. No additional acute findings in the chest.  Operative changes from prior liver transplant with no acute findings noted in the abdomen and pelvis.   This report was finalized on 9/7/2021 10:16 AM by Serafin Strange.      CT Chest Without Contrast Diagnostic    Result Date: 9/7/2021  EXAMINATION: CT CHEST WO CONTRAST DIAGNOSTIC-, CT ABDOMEN PELVIS WO CONTRAST-  INDICATION: fall w/ upper abdominal pain and hx of liver txp  TECHNIQUE: Axial noncontrast CT of the chest, abdomen and pelvis with multiplanar reconstruction  The radiation dose reduction device was turned on for each scan per the ALARA (As Low as Reasonably Achievable) protocol.  COMPARISON: CT chest 6/29/2012  FINDINGS: No pathologic axillary adenopathy or other worrisome body wall soft tissue finding in the chest. Evaluation of the osseous structures demonstrates subtle cortical irregularity involving the left posterior 10th and anterior sixth and seventh ribs compatible subtle areas of likely nondisplaced fracture. There is no pleural or pericardial effusion. No pathologic mediastinal or hilar lymphadenopathy. Nonaneurysmal mildly atherosclerotic thoracic aorta. Evaluation of the lung fields demonstrates no evidence of acute infectious or inflammatory process. There are no suspicious pulmonary nodules.  In the abdomen and pelvis, the body  wall soft tissues are unremarkable. There is no evidence of acute fracture or aggressive osseous lesion. Operative changes are noted from prior liver transplant. There is no suspicious focal hepatic lesion or intrahepatic biliary ductal dilatation. The spleen, pancreas and bilateral adrenal glands are unremarkable. The kidneys demonstrate no evidence of stones or hydronephrosis. Small and large bowel loops are nondilated. There is no suspicious focal bowel wall thickening. Nonaneurysmal mildly atherosclerotic abdominal aorta. No bulky retroperitoneal adenopathy. Evaluation of the pelvic viscera is unremarkable.      Subtle nondisplaced fractures of the left posterior 10th and anterior sixth and seventh ribs. No additional acute findings in the chest.  Operative changes from prior liver transplant with no acute findings noted in the abdomen and pelvis.   This report was finalized on 9/7/2021 10:16 AM by Serafin Strange.      MRI Lumbar Spine Without Contrast    Result Date: 9/8/2021  EXAMINATION: MRI LUMBAR SPINE WO CONTRAST-  INDICATION: Spinal stenosis now with RT leg radiculopathy and bowel/bladder incontinence; S22.42XA-Multiple fractures of ribs, left side, initial encounter for closed fracture; W19.XXXA-Unspecified fall, initial encounter; Y92.009-Unspecified place in unspecified non-institutional (private) residence as the place of occurrence of the external cause; I20.8-Other forms of angina pectoris; R32-Unspecified urina  TECHNIQUE: Multiplanar multisequence MRI lumbar spine performed without IV contrast  COMPARISON: 12/20/2018  FINDINGS: There is multilevel degenerative loss of vertebral body endplate height, similar to comparison, without evidence of acute fracture. Edematous Modic endplate changes are present at L3-4. There is otherwise no evidence of suspicious marrow replacing lesion. The paraspinal soft tissues are unremarkable. The conus medullaris is satisfactory in appearance. There is some  redundancy of the cauda equina nerve roots due to areas of thecal sac compression. Alignment is anatomic without evidence of significant listhesis or subluxation. Multilevel spondylosis change is present, moderately worsened from 2018 comparison with areas of involvement noted including  L1-2, circumferential disc bulge and bilateral facet arthropathy with mild spinal canal and bilateral neuroforaminal narrowing.  L2-3, prominent disc bulge and bilateral facet arthropathy with moderate to severe spinal canal narrowing and moderate bilateral neuroforaminal stenosis, somewhat greater on the right. These findings have worsened from 2018.  L3-4, large circumferential disc bulge, with additional component of left lateral disc protrusion. There is associated severe spinal canal stenosis. There is severe narrowing of the left subarticular recess with compression of the adjacent traversing left L4 nerve root as well as likely compression of the exiting left L3 nerve root in the neural foramen. There is also moderate to severe right neuroforaminal stenosis.  L4-5, circumferential disc bulge and bilateral facet arthropathy with moderate spinal canal narrowing. There is moderate to severe right and moderate left neuroforaminal stenosis.  L5-S1, circumferential disc bulge and bilateral facet arthropathy with mild-to-moderate spinal canal and bilateral neuroforaminal narrowing      Multilevel lumbar spondylosis, worsened from 2018 comparison and most advanced at the L2-3, L3-4 and L4-5 levels. At L3-4, there is severe spinal canal stenosis, in addition to severe narrowing of the left subarticular recess and left neural foramen with associated compression of the traversing and exiting L3 and L4 nerve roots. Additional areas of moderate to severe spinal canal and neuroforaminal impingement are present at the adjacent levels.   This report was finalized on 9/8/2021 8:34 AM by Serafin Strange.      XR Chest 1 View    Result Date:  9/7/2021  EXAMINATION: XR CHEST 1 VW-  INDICATION: Chest Pain triage protocol  COMPARISON: 6/20/2012  FINDINGS: Lung fields remain clear. There is no new focal opacity. No effusion or pneumothorax. Unchanged heart and mediastinal contours with median sternotomy wires intact.      No acute cardiopulmonary abnormality.  This report was finalized on 9/7/2021 8:24 AM by Serafin Strange.      Duplex Venous Lower Extremity - Bilateral CAR    Result Date: 9/7/2021  · No evidence of deep or superficial venous thrombosis in the right or left lower extremities.      XR Hip With or Without Pelvis 2 - 3 View Right    Result Date: 9/7/2021  EXAMINATION: XR HIP W OR WO PELVIS 2-3 VIEW RIGHT-  INDICATION: Falls, rib fractures; RT hip/groin pain with difficulty bearing weight, rule out fracture; S22.42XA-Multiple fractures of ribs, left side, initial encounter for closed fracture; W19.XXXA-Unspecified fall, initial encounter; Y92.009-Unspecified place in unspecified non-institutional (private) residence as the place of occurrence of the external cause; I20.8-Other forms of angina pectoris; M48-Mnsi  COMPARISON: NONE  FINDINGS: Bones of the pelvis appear to be intact. Sacral foraminal lines appear intact. Hip joints and SI joints appear well-maintained. No acetabular or pubic ramus fracture is seen. Dedicated images of the right hip show no evidence of femoral neck fracture or intertrochanteric fracture, other fracture or bony avulsion.       No evidence of acute right hip trauma.          Results for orders placed during the hospital encounter of 09/07/21    Duplex Venous Lower Extremity - Bilateral CAR    Interpretation Summary  · No evidence of deep or superficial venous thrombosis in the right or left lower extremities.      Results for orders placed during the hospital encounter of 09/07/21    Duplex Venous Lower Extremity - Bilateral CAR    Interpretation Summary  · No evidence of deep or superficial venous thrombosis in the  right or left lower extremities.      Results for orders placed during the hospital encounter of 02/10/19    Adult Transthoracic Echo Complete W/ Cont if Necessary Per Protocol    Interpretation Summary  · Estimated EF = 70%.  · Left ventricular wall thickness is consistent with mild concentric hypertrophy.  · Left ventricular diastolic dysfunction (grade I) consistent with impaired relaxation.  · There is thickening of the left coronary cusp of the aortic valve  · Aortic valve maximum pressure gradient is 16.7 mmHg.        Pending Labs     Order Current Status    Calprotectin, Fecal - Stool, Per Rectum In process        Discharge Details        Discharge Medications      New Medications      Instructions Start Date   isosorbide mononitrate 60 MG 24 hr tablet  Commonly known as: IMDUR   60 mg, Oral, Every 12 Hours      losartan-hydrochlorothiazide 50-12.5 MG per tablet  Commonly known as: Hyzaar   1 tablet, Oral, Daily         Continue These Medications      Instructions Start Date   amLODIPine 10 MG tablet  Commonly known as: NORVASC   10 mg, Oral, Daily      aspirin 81 MG EC tablet   81 mg, Oral, Daily, Continues per doctors orders       clopidogrel 75 MG tablet  Commonly known as: PLAVIX   50 mg, Oral, Daily      DHEA 50 MG tablet   1 tablet, Oral, Daily      DULoxetine 60 MG capsule  Commonly known as: CYMBALTA   40 mg, Oral, 2 Times Daily      fenofibrate micronized 134 MG capsule  Commonly known as: LOFIBRA   134 mg, Oral, Every Morning Before Breakfast      gabapentin 300 MG capsule  Commonly known as: NEURONTIN   300 mg, Oral, 3 Times Daily      Gel Base gel   CAPSAICIN 0.001% IMIPRAMINE 3% LIDOCAINE 10% PRILOCAINE 2% MANNITOL 20%. APPLY 1-2 G TO AFFECTED AREA 3-4 TIMES DAILY      mycophenolate 500 MG tablet  Commonly known as: CELLCEPT   360 mg, Oral, 2 Times Daily      nebivolol 20 MG tablet  Commonly known as: BYSTOLIC   20 mg, Oral, Daily      nitroglycerin 0.4 MG SL tablet  Commonly known as:  NITROSTAT   0.4 mg, Sublingual, Every 5 Minutes PRN, Take no more than 3 doses in 15 minutes.       Omega-3 1000 MG capsule   Oral      omeprazole 40 MG capsule  Commonly known as: priLOSEC   40 mg, Oral, 2 Times Daily      pentoxifylline 400 MG CR tablet  Commonly known as: TRENtal   400 mg, Oral, 3 Times Daily With Meals      Ranexa 1000 MG 12 hr tablet  Generic drug: ranolazine   1,000 mg, Oral, 2 Times Daily      spironolactone 50 MG tablet  Commonly known as: ALDACTONE   50 mg, Oral, Daily      tacrolimus 1 MG capsule  Commonly known as: PROGRAF   1 mg, Oral, Every Morning      traMADol 50 MG tablet  Commonly known as: ULTRAM   50 mg, Oral, Every 6 Hours PRN             Allergies   Allergen Reactions   • Contrast Dye Other (See Comments)     Cardiac Arrest   • Penicillins Rash   • Sulfasalazine Rash         Discharge Disposition:      Diet:  Hospital:  Diet Order   Procedures   • Diet Regular; Consistent Carbohydrate          CODE STATUS:    Code Status and Medical Interventions:   Ordered at: 09/07/21 1502     Limited Support to NOT Include:    Intubation     Level Of Support Discussed With:    Patient     Code Status:    No CPR     Medical Interventions (Level of Support Prior to Arrest):    Limited       No future appointments.    Additional Instructions for the Follow-ups that You Need to Schedule     Discharge Follow-up with PCP   As directed       Currently Documented PCP:    Bobo Calvillo MD    PCP Phone Number:    [phone]     Follow Up Details: 1-2 weeks         Discharge Follow-up with Specified Provider: Dr. Dillon 2 months   As directed      To: Dr. Dillon 2 months                     Blaze Preez DO  09/08/21      Time Spent on Discharge:  I spent  28  minutes on this discharge activity which included: face-to-face encounter with the patient, reviewing the data in the system, coordination of the care with the nursing staff as well as consultants, documentation, and  entering orders.

## 2021-09-15 LAB — CALPROTECTIN STL-MCNT: 63 UG/G (ref 0–120)

## 2021-09-24 ENCOUNTER — TRANSCRIBE ORDERS (OUTPATIENT)
Dept: HOME HEALTH SERVICES | Facility: HOME HEALTHCARE | Age: 66
End: 2021-09-24

## 2021-09-24 ENCOUNTER — HOME HEALTH ADMISSION (OUTPATIENT)
Dept: HOME HEALTH SERVICES | Facility: HOME HEALTHCARE | Age: 66
End: 2021-09-24

## 2021-09-24 DIAGNOSIS — I10 HYPERTENSION, ESSENTIAL: Primary | ICD-10-CM

## 2021-10-11 ENCOUNTER — TELEPHONE (OUTPATIENT)
Dept: NEUROSURGERY | Facility: CLINIC | Age: 66
End: 2021-10-11

## 2021-10-11 NOTE — TELEPHONE ENCOUNTER
Caller: Kisha Yates    Relationship to patient: Emergency Contact    Best call back number: [phone]    Chief complaint: [facility] APPT     Type of visit: ED FU/MRI (PREV. PT OF [facility])10/1/21 PER PT DO NOT RS -    Requested date: NOT     If rescheduling, when is the original appointment: 9/27/201    Additional notes: PT WIFE CALLED TO [facility] APPT STATED THAT HER  IS HAVING ISSUES WITH WALKING AND STANDING. PAIN IS ABOUT 8/10     PLEASE CALL PT WIFE WITH DATE AND TIME OF APPT.     THANK  YOU

## 2021-10-25 ENCOUNTER — TELEPHONE (OUTPATIENT)
Dept: PAIN MEDICINE | Facility: CLINIC | Age: 66
End: 2021-10-25

## 2021-10-25 ENCOUNTER — OFFICE VISIT (OUTPATIENT)
Dept: NEUROSURGERY | Facility: CLINIC | Age: 66
End: 2021-10-25

## 2021-10-25 VITALS
DIASTOLIC BLOOD PRESSURE: 54 MMHG | SYSTOLIC BLOOD PRESSURE: 104 MMHG | WEIGHT: 207 LBS | TEMPERATURE: 96.9 F | BODY MASS INDEX: 28.04 KG/M2 | HEIGHT: 72 IN

## 2021-10-25 DIAGNOSIS — M48.061 DEGENERATIVE LUMBAR SPINAL STENOSIS: Primary | ICD-10-CM

## 2021-10-25 PROCEDURE — 99214 OFFICE O/P EST MOD 30 MIN: CPT | Performed by: STUDENT IN AN ORGANIZED HEALTH CARE EDUCATION/TRAINING PROGRAM

## 2021-10-25 RX ORDER — OXYCODONE AND ACETAMINOPHEN 10; 325 MG/1; MG/1
1 TABLET ORAL EVERY 6 HOURS PRN
COMMUNITY
Start: 2021-10-15 | End: 2022-01-06 | Stop reason: SDUPTHER

## 2021-10-25 RX ORDER — TIZANIDINE 2 MG/1
TABLET ORAL
COMMUNITY
End: 2022-01-04

## 2021-10-25 NOTE — PROGRESS NOTES
Patient: Mahendra Yates  : 1955    Primary Care Provider: Bobo Calvillo MD    Requesting Provider: As above      Chief Complaint: Back Pain, Leg Pain (RLE>LLE), Extremity Weakness (BLE), Numbness (BLE), and Arm Pain (LUE> RUE)      History of Present Illness: This is a 66 y.o. male who presents with several years of progressively worsening back and leg pain.  The patient describes a burning type pain that radiates down the lateral aspect of his right lower extremity.  It occasionally goes to the foot but often stops at the shin.  This pain seems to be worse when he is lying flat at night.  He has difficulty ambulating long distances because of the pain as well.  He has tried some physical therapy but does not seem to think it helps.  He is not had any type of lumbar injections.  The patient has a complex medical history with a recent large myocardial infarction as well as stroke.  He is on aspirin and Plavix has been told that he is too high risk to undergo any type of general anesthesia.  As such, the patient is unwilling to do any type of surgical procedure given the high risk.    PMHX  Allergies:  Allergies   Allergen Reactions   • Contrast Dye Other (See Comments)     Cardiac Arrest   • Penicillins Rash   • Sulfasalazine Rash     Medications    Current Outpatient Medications:   •  amLODIPine (NORVASC) 10 MG tablet, Take 10 mg by mouth Daily., Disp: , Rfl:   •  aspirin 81 MG EC tablet, Take 81 mg by mouth Daily. Continues per doctors orders, Disp: , Rfl:   •  clopidogrel (PLAVIX) 75 MG tablet, Take 50 mg by mouth Daily., Disp: , Rfl:   •  DHEA 50 MG tablet, Take 1 tablet by mouth Daily., Disp: , Rfl:   •  DULoxetine (CYMBALTA) 60 MG capsule, Take 40 mg by mouth 2 (Two) Times a Day., Disp: , Rfl:   •  fenofibrate micronized (LOFIBRA) 134 MG capsule, Take 134 mg by mouth Every Morning Before Breakfast., Disp: , Rfl:   •  gabapentin (NEURONTIN) 300 MG capsule, Take 1 capsule by mouth 3 (Three) Times  a Day., Disp: 90 capsule, Rfl: 1  •  Gel Base gel, CAPSAICIN 0.001% IMIPRAMINE 3% LIDOCAINE 10% PRILOCAINE 2% MANNITOL 20%. APPLY 1-2 G TO AFFECTED AREA 3-4 TIMES DAILY, Disp: 240 g, Rfl: PRN  •  isosorbide mononitrate (IMDUR) 60 MG 24 hr tablet, Take 1 tablet by mouth Every 12 (Twelve) Hours., Disp: , Rfl:   •  losartan-hydrochlorothiazide (Hyzaar) 50-12.5 MG per tablet, Take 1 tablet by mouth Daily., Disp: 30 tablet, Rfl: 0  •  mycophenolate (CELLCEPT) 500 MG tablet, Take 360 mg by mouth 2 (Two) Times a Day., Disp: , Rfl:   •  nebivolol (BYSTOLIC) 20 MG tablet, Take 20 mg by mouth Daily., Disp: , Rfl:   •  nitroglycerin (NITROSTAT) 0.4 MG SL tablet, Place 0.4 mg under the tongue Every 5 (Five) Minutes As Needed for chest pain. Take no more than 3 doses in 15 minutes., Disp: , Rfl:   •  Omega-3 1000 MG capsule, Take  by mouth., Disp: , Rfl:   •  omeprazole (priLOSEC) 40 MG capsule, Take 40 mg by mouth 2 (Two) Times a Day., Disp: , Rfl:   •  oxyCODONE-acetaminophen (PERCOCET)  MG per tablet, Take 1 tablet by mouth Every 6 (Six) Hours As Needed., Disp: , Rfl:   •  pentoxifylline (TRENtal) 400 MG CR tablet, Take 400 mg by mouth 3 (Three) Times a Day With Meals., Disp: , Rfl:   •  ranolazine (RANEXA) 1000 MG 12 hr tablet, Take 1,000 mg by mouth 2 (Two) Times a Day., Disp: , Rfl:   •  spironolactone (ALDACTONE) 50 MG tablet, Take 50 mg by mouth Daily., Disp: , Rfl:   •  tacrolimus (PROGRAF) 1 MG capsule, Take 1 mg by mouth Every Morning., Disp: , Rfl:   •  tiZANidine (ZANAFLEX) 2 MG tablet, tizanidine 2 mg tablet  TAKE 1 TO 2 TABLETS BY MOUTH EVERY 6 HOURS AS NEEDED FOR SPASM, Disp: , Rfl:   •  traMADol (ULTRAM) 50 MG tablet, Take 50 mg by mouth Every 6 (Six) Hours As Needed for Moderate Pain ., Disp: , Rfl:   Past Medical History:  Past Medical History:   Diagnosis Date   • Arthritis    • Atherosclerosis    • B12 deficiency    • Cancer (CMS/HCC)    • Cancer of liver (CMS/HCC)    • Chronic headaches    • Coronary  artery disease    • Diabetes mellitus (CMS/HCC)    • Gastroesophageal reflux disease    • History of transfusion    • Hyperlipidemia    • Hypertension    • Myocardial infarction (CMS/HCC)    • Sleep apnea    • Stroke (CMS/HCC)      Past Surgical History:  Past Surgical History:   Procedure Laterality Date   • ANGIOPLASTY     • CARDIAC SURGERY     • CARPAL TUNNEL RELEASE Right 2017    Procedure: CARPAL TUNNEL RELEASE RIGHT ;  Surgeon: Luis Hayden MD;  Location: [facility];  Service:    • CHOLECYSTECTOMY     • CORONARY ARTERY BYPASS GRAFT     • CORONARY STENT PLACEMENT      x2   • LIVER TRANSPLANTATION     • TUMOR REMOVAL Right     axilla     Social Hx:  Social History     Tobacco Use   • Smoking status: Former Smoker     Packs/day: 4.00     Years: 30.00     Pack years: 120.00     Types: Cigarettes     Quit date: 1991     Years since quittin.7   • Smokeless tobacco: Never Used   Substance Use Topics   • Alcohol use: No   • Drug use: No     Family Hx:  Family History   Problem Relation Age of Onset   • Heart disease Mother    • Diabetes Father    • Cancer Brother      Review of Systems:        Review of Systems   Constitutional: Positive for chills, fatigue and unexpected weight change. Negative for activity change, appetite change, diaphoresis and fever.   HENT: Positive for ear pain, hearing loss and tinnitus. Negative for congestion, dental problem, drooling, ear discharge, facial swelling, mouth sores, nosebleeds, postnasal drip, rhinorrhea, sinus pressure, sneezing, sore throat, trouble swallowing and voice change.    Eyes: Negative for photophobia, pain, discharge, redness, itching and visual disturbance.   Respiratory: Positive for cough and choking. Negative for apnea, chest tightness, shortness of breath, wheezing and stridor.    Cardiovascular: Positive for chest pain and leg swelling. Negative for palpitations.   Gastrointestinal: Positive for abdominal pain and diarrhea. Negative for  "abdominal distention, anal bleeding, blood in stool, constipation, nausea, rectal pain and vomiting.   Endocrine: Positive for cold intolerance. Negative for heat intolerance, polydipsia, polyphagia and polyuria.   Genitourinary: Positive for difficulty urinating and urgency. Negative for decreased urine volume, dysuria, enuresis, flank pain, frequency, genital sores and hematuria.   Musculoskeletal: Positive for back pain, gait problem and joint swelling. Negative for arthralgias, myalgias, neck pain and neck stiffness.   Skin: Positive for color change. Negative for pallor, rash and wound.   Allergic/Immunologic: Positive for immunocompromised state. Negative for environmental allergies and food allergies.   Neurological: Positive for light-headedness. Negative for dizziness, tremors, seizures, syncope, facial asymmetry, speech difficulty, weakness, numbness and headaches.   Hematological: Negative for adenopathy. Does not bruise/bleed easily.   Psychiatric/Behavioral: Positive for agitation, confusion and hallucinations. Negative for behavioral problems, decreased concentration, dysphoric mood, self-injury, sleep disturbance and suicidal ideas. The patient is nervous/anxious. The patient is not hyperactive.         Physical Exam:   /54 (BP Location: Right arm, Patient Position: Sitting, Cuff Size: Adult)   Temp 96.9 °F (36.1 °C) (Infrared)   Ht 182.9 cm (72\")   Wt 93.9 kg (207 lb)   BMI 28.07 kg/m²   Awake, alert and oriented x 3  Speech f/c  Opens eyes spont  Pupils 3 mm rx bilaterally  EOMI  Normal sensation to light touch in all 3 distributions of CN V bilaterally  FS  TML  5/5 in his upper extremities bilaterally.  In the left lower extremity he has 5 out of 5.  In the right lower extremity he has 4+ out of 5 in hip flexion and knee extension.  She has 1 out of 5 in dorsiflexion and EHL.  And he is 4 out of 5 in plantarflexion.  Normal sensation to light touch in all 4 ext  2+DTR's  No lester's or " clonus bilaterally  No PD or dysmetria  Gait not assessed    Diagnostic Studies:  All neurological imaging studies were independently reviewed unless stated otherwise    Assessment/Plan:  This is a 66 y.o. male presenting with progressively worsening lower back and right lower extremity pain.  In reviewing the patient's lumbar MRI, he has severe stenosis at L2-3 and L3-4.  I think this stenosis is contributing to the patient's lower extremity pain and weakness.  Unfortunately, the patient's cardiovascular history puts him at very high risk for any type of general anesthesia.  Because of this, the patient is unable to undergo any type of surgery due to the risk, which is understandable.  Because of his decision to not want any surgery, we will refer him to our pain clinic for potential injections to help with his pain.  At this time, because he will not undergo surgery, we will not schedule a follow-up with me.

## 2021-11-17 ENCOUNTER — OFFICE VISIT (OUTPATIENT)
Dept: CARDIOLOGY | Facility: CLINIC | Age: 66
End: 2021-11-17

## 2021-11-17 VITALS
WEIGHT: 210 LBS | OXYGEN SATURATION: 98 % | HEART RATE: 73 BPM | SYSTOLIC BLOOD PRESSURE: 110 MMHG | HEIGHT: 72 IN | BODY MASS INDEX: 28.44 KG/M2 | DIASTOLIC BLOOD PRESSURE: 56 MMHG

## 2021-11-17 DIAGNOSIS — I25.700 CORONARY ARTERY DISEASE INVOLVING CORONARY BYPASS GRAFT OF NATIVE HEART WITH UNSTABLE ANGINA PECTORIS (HCC): Primary | ICD-10-CM

## 2021-11-17 DIAGNOSIS — E78.5 DYSLIPIDEMIA: ICD-10-CM

## 2021-11-17 DIAGNOSIS — I10 ESSENTIAL HYPERTENSION: ICD-10-CM

## 2021-11-17 PROCEDURE — 99214 OFFICE O/P EST MOD 30 MIN: CPT | Performed by: INTERNAL MEDICINE

## 2021-11-17 RX ORDER — PRAVASTATIN SODIUM 80 MG/1
80 TABLET ORAL DAILY
COMMUNITY
End: 2022-01-03 | Stop reason: SDUPTHER

## 2021-11-17 RX ORDER — DULAGLUTIDE 0.75 MG/.5ML
0.75 INJECTION, SOLUTION SUBCUTANEOUS WEEKLY
COMMUNITY
End: 2022-05-04 | Stop reason: SDUPTHER

## 2021-11-17 RX ORDER — ISOSORBIDE MONONITRATE 60 MG/1
60 TABLET, EXTENDED RELEASE ORAL 2 TIMES DAILY
Start: 2021-11-17 | End: 2022-01-04

## 2021-11-17 RX ORDER — LORATADINE 10 MG/1
1 CAPSULE, LIQUID FILLED ORAL DAILY
COMMUNITY

## 2021-11-17 RX ORDER — LORAZEPAM 0.5 MG/1
0.5 TABLET ORAL 2 TIMES DAILY PRN
COMMUNITY
End: 2022-06-20

## 2021-11-17 RX ORDER — MULTIPLE VITAMINS W/ MINERALS TAB 9MG-400MCG
1 TAB ORAL DAILY
COMMUNITY

## 2021-11-17 NOTE — PROGRESS NOTES
[facility]    Patient ID: Mahendra Yates is a 66 y.o. male.  : 1955   Contact: [phone]    Encounter date: 2021    PCP: Bobo Calvillo MD      Chief complaint:   Chief Complaint   Patient presents with   • Coronary Artery Disease   • Shortness of Breath   • Chest Pain   • Dizziness       Problem List:  1. Coronary artery disease  a. CABG 2009  b. History of stents, ICDB  c. NSTEMI/LHC, 2019 (Dr. Pena): Patent LIMA to LAD with diffuse distal LAD disease. VG to OM occluded, unable to be intervened upon. VG to RCA patent with diffuse target vessel disease.  2. Hypertension  3. Dyslipidemia  4. Lower extremity edema   a. Venous duplex, 2021: No DVT or superficial venous thrombosis bilaterally.  5. Diabetes  6. History of CVA 2018  7. History of liver cancer  a. Liver transplant   b. [place], [place]  8. Arthritis  9. GERD    Allergies   Allergen Reactions   • Contrast Dye Other (See Comments)     Cardiac Arrest   • Penicillins Rash   • Sulfasalazine Rash       Current Medications:    Current Outpatient Medications:   •  amLODIPine (NORVASC) 10 MG tablet, Take 10 mg by mouth Daily., Disp: , Rfl:   •  aspirin 81 MG EC tablet, Take 81 mg by mouth Daily. Continues per doctors orders, Disp: , Rfl:   •  clopidogrel (PLAVIX) 75 MG tablet, Take 50 mg by mouth Daily., Disp: , Rfl:   •  DHEA 50 MG tablet, Take 1 tablet by mouth Daily., Disp: , Rfl:   •  Dulaglutide (Trulicity) 0.75 MG/0.5ML solution pen-injector, Inject  under the skin into the appropriate area as directed 1 (One) Time Per Week., Disp: , Rfl:   •  DULoxetine (CYMBALTA) 60 MG capsule, Take 60 mg by mouth 2 (Two) Times a Day., Disp: , Rfl:   •  fenofibrate micronized (LOFIBRA) 134 MG capsule, Take 134 mg by mouth Every Morning Before Breakfast., Disp: , Rfl:   •  Gel Base gel, CAPSAICIN 0.001% IMIPRAMINE 3% LIDOCAINE 10% PRILOCAINE 2% MANNITOL 20%. APPLY 1-2 G TO  AFFECTED AREA 3-4 TIMES DAILY, Disp: 240 g, Rfl: PRN  •  insulin NPH-insulin regular (humuLIN 70/30,novoLIN 70/30) (70-30) 100 UNIT/ML injection, Inject  under the skin into the appropriate area as directed As Needed., Disp: , Rfl:   •  isosorbide mononitrate (IMDUR) 60 MG 24 hr tablet, Take 1 tablet by mouth 2 (Two) Times a Day., Disp: , Rfl:   •  Loratadine 10 MG capsule, Take 1 capsule by mouth Daily As Needed., Disp: , Rfl:   •  LORazepam (ATIVAN) 0.5 MG tablet, Take 0.5 mg by mouth 2 (Two) Times a Day As Needed for Anxiety (2 tabs BID PRN)., Disp: , Rfl:   •  losartan-hydrochlorothiazide (Hyzaar) 50-12.5 MG per tablet, Take 1 tablet by mouth Daily., Disp: 30 tablet, Rfl: 0  •  multivitamin with minerals (MULTIVITAMIN ADULTS 50+ PO), Take 1 tablet by mouth Daily., Disp: , Rfl:   •  mycophenolate (CELLCEPT) 500 MG tablet, Take 360 mg by mouth 2 (Two) Times a Day., Disp: , Rfl:   •  nebivolol (BYSTOLIC) 20 MG tablet, Take 20 mg by mouth Daily., Disp: , Rfl:   •  nitroglycerin (NITROSTAT) 0.4 MG SL tablet, Place 0.4 mg under the tongue Every 5 (Five) Minutes As Needed for chest pain. Take no more than 3 doses in 15 minutes., Disp: , Rfl:   •  Omega-3 1000 MG capsule, Take  by mouth., Disp: , Rfl:   •  omeprazole (priLOSEC) 40 MG capsule, Take 40 mg by mouth 2 (Two) Times a Day., Disp: , Rfl:   •  oxyCODONE-acetaminophen (PERCOCET)  MG per tablet, Take 1 tablet by mouth Every 6 (Six) Hours As Needed., Disp: , Rfl:   •  pentoxifylline (TRENtal) 400 MG CR tablet, Take 400 mg by mouth 3 (Three) Times a Day With Meals., Disp: , Rfl:   •  pravastatin (PRAVACHOL) 80 MG tablet, Take 80 mg by mouth Daily., Disp: , Rfl:   •  tacrolimus (PROGRAF) 1 MG capsule, Take 1 mg by mouth Every Morning., Disp: , Rfl:   •  tiZANidine (ZANAFLEX) 2 MG tablet, tizanidine 2 mg tablet  TAKE 1 TO 2 TABLETS BY MOUTH EVERY 6 HOURS AS NEEDED FOR SPASM, Disp: , Rfl:   •  traMADol (ULTRAM) 50 MG tablet, Take 50 mg by mouth Every 6 (Six)  "Hours As Needed for Moderate Pain ., Disp: , Rfl:   •  spironolactone (ALDACTONE) 50 MG tablet, Take 50 mg by mouth Daily., Disp: , Rfl:     HPI    Mahendra Yates is a 66 y.o. male who presents today for a 2 month hospital follow up of coronary artery disease and cardiac risk factors. Since last visit, he's experienced chest pain similar to what he had prior to stent placement in 2019. This is both exertional and non-exertional chest pain but in the past it had only been brought on with activity.     He no longer takes Ranexa due to its price but he didn't notice a difference when he was taking it. He is prescribed to take Imdur every twelve hours. His heart rate stays around 50-60 bpm. He is afraid to undergo any surgeries since he was told he won't wake up if put on general anesthesia.  After being discharged from the hospital the patient reports being severely depressed.     He still falls frequently due to dizziness. This may occur upon standing but more often when he turns his head too fast. Patient denies shortness of breath, palpitations, edema, and syncope.      The following portions of the patient's history were reviewed and updated as appropriate: allergies, current medications and problem list.    Pertinent positives as listed in the HPI.  All other systems reviewed are negative.         Vitals:    11/17/21 1438   BP: 110/56   BP Location: Right arm   Patient Position: Sitting   Pulse: 73   SpO2: 98%   Weight: 95.3 kg (210 lb)   Height: 182.9 cm (72\")       Physical Exam:  General: Alert and oriented.  Neck: Jugular venous pressure is within normal limits. Carotids have normal upstrokes without bruits.   Cardiovascular: Heart has a nondisplaced focal PMI. Regular rate and rhythm. 1/6SE murmur, no gallop or rub.  Lungs: Clear, no rales or wheezes. Equal expansion is noted.   Extremities: Show no edema.  Skin: Warm and dry.  Neurologic: Nonfocal.     Diagnostic Data (reviewed with patient):  Lab " Results   Component Value Date    GLUCOSE 114 (H) 09/08/2021    BUN 15 09/08/2021    CREATININE 1.12 09/08/2021    EGFRIFNONA 65 09/08/2021    BCR 13.4 09/08/2021     09/08/2021    K 4.0 09/08/2021     09/08/2021    CO2 23.0 09/08/2021    CALCIUM 10.1 09/08/2021    ALBUMIN 3.90 09/08/2021    ALKPHOS 42 09/08/2021    AST 20 09/08/2021    ALT 13 09/08/2021     Lab Results   Component Value Date    CHOL 198 08/08/2021    TRIG 230 (H) 08/08/2021    HDL 56 08/08/2021     (H) 08/08/2021      Lab Results   Component Value Date    WBC 6.25 09/08/2021    RBC 4.44 09/08/2021    HGB 12.6 (L) 09/08/2021    HCT 37.6 09/08/2021    MCV 84.7 09/08/2021     09/08/2021        Procedures      Assessment:    ICD-10-CM ICD-9-CM   1. Coronary artery disease involving coronary bypass graft of native heart with unstable angina pectoris (HCC)  I25.700 414.05     411.1   2. Essential hypertension  I10 401.9   3. Dyslipidemia  E78.5 272.4         Plan:  1. Instead of taking Imdur 60 mg every 12 hours begin taking 6 hours apart to gain full effect during the day.  2. If chest pain doesn't improve we can increase dosage of Imdur and have discussed LHC as last resort if medical management fails.   3. Could consider restarting Ranexa but the patient does not feel like he has had any worsening of his chest pain since he discontinued Ranexa due to cost.  4. Continue on aspirin 81 mg and Plavix 75 mg for DAPTand nitroglycerin 0.4 mg as needed.   5. Continue on amlodipine 10 mg, Hyzaar 50-12.5 mg, nebivolol 20 mg, and spironolactone 50 mg daily for hypertension.   6. Continue on Lofibra 134 mg and pravastatin 80 mg daily for dyslipidemia.   7. Continue all other current medications.  8. F/up in 2 months, sooner if needed.    Scribed for April Dillon MD by Niharika Serrano. 11/17/2021 15:00 EST      I April Dillon MD personally performed the services described in this documentation as scribed by the above  individual in my presence, and it is both accurate and complete.    April Dillon MD, FACC

## 2021-12-06 ENCOUNTER — TELEPHONE (OUTPATIENT)
Dept: PAIN MEDICINE | Facility: CLINIC | Age: 66
End: 2021-12-06

## 2021-12-06 NOTE — TELEPHONE ENCOUNTER
HUB UNSUCCESSFULLY ATTEMPTED TO WARM TRANSFER PATIENT.    Caller: JOSEPH YAN    Relationship: WIFE    Best call back number: [phone]    What was the call regarding: SOMEONE CALLED LAST WEEK AND SPOKE TO THE PATIENT AND CHANGED HIS  12/7/2021 APPT TO 12/14/2021. PATIENT RECEIVED A CONFIRMATION CALL TODAY REGARDING AN APPT TOMORROW. THAT IS THE APPOINTMENT IN THE SYSTEM. CAN FIND NO NOTATION IN CHART REGARDING THIS. PLEASE CALL ASAP AND LET THE PATIENT/WIFE KNOW. THEY ARE OPEN TO COMING TOMORROW, BUT THEY NEED TO MAKE SURE. HE IS IN A LOT OF PAIN.     Do you require a callback: YES

## 2021-12-06 NOTE — TELEPHONE ENCOUNTER
Spoke with pts wife and advised that their appointment is with Radha tomorrow at 230. Kisha understood

## 2021-12-06 NOTE — PROGRESS NOTES
"Chief Complaint: \"Lower back and lower extremity pain.\"      History of Present Illness:   Patient: Mr. Mahendra Yates, 66 y.o. male originally referred by Dr. Calvillo in consultation for intractable chronic neck pain, and most recently referred by Dr. Aiblio Sanz in consultation for intractable chronic low back pain and lower extremity pain. I last saw him through a telemedicine visit for follow-up evaluation on 5/4/2020.   He underwent diagnostic and therapeutic bilateral L4-L5 transforaminal epidural steroid injections performed on 3/4/2020 from which he reported experiencing 60% pain relief and functional improvement that lasted for two weeks, followed by progressive recurrence to previous levels.  He did feel stronger after epidural regarding his bilateral leg weakness. At his last encounter with me he started a trial of gabapentin 300 mg nightly, from which he reported improvement with his sleep and extremity pain at night. He was lost to follow-up thereafter, as due to his several medical comorbidities and being in the midst of the pandemic, he did not wish to repeat epidural or have an office visit at that time. He was recently referred by Dr. Abilio Sanz due to his continued lower lower back and lower extremity symptoms after a fall he sustained in September 2021, from which he was admitted to the hospital. He did suffer some rib fractures. Additionally this summer, he suffered an extensive myocardial infarction, with stenting at that time, and additionally another cardiac event in September without intervention. He remains on aspirin. He underwent new MRI of the lumbar spine without contrast on September 7, 2021 which revealed progression of his multilevel degenerative disc disease, he also has worsening spinal canal stenosis at L2-L3 L3-L4 and L4-L5, and L3-L4 there is severe spinal canal stenosis with severe narrowing of the left subarticular recess and left neural foramen. " Unfortunately, due to his recent MRI, and significant comorbidities such as previous liver transplant, on immunosuppressive's, CAD, and diabetes, and recent MIs, he is a poor surgical candidate and cannot have any general anesthesia at this time. Therefore, he has been referred back to our practice to consider injections to help control his pain. He is also under the care of neurology for his polyneuropathy. He is here today to discuss his recurrent lower back and lower extremity symptoms.    Problem #1 Lower back and lower extremity pain  Pain History: Patient reports a now 6-year history of pain, which began without incident.    Pain Description: constant pain with intermittent exacerbation, described as aching, sharp, burning, shooting and throbbing sensation.   Radiation of Pain: The pain radiates into the right hip, lateral aspect of the right thigh, right shin, and into the right foot, on the left he experiences more localized foot pain and burning.   Pain intensity today: 8/10   Average pain intensity last week: 8/10  Pain intensity ranges from: 5/10 to 10/10  Aggravating factors: Pain increases immediately upon standing, walking for any amount of time and lying flat. He describes severe neurogenic claudication. He uses a rolling walker or a motorized scooter.  Alleviating factors: Pain decreases with sitting, heat and analgesics.   Associated Symptoms:   Patient reports pain and weakness in the lower extremities. He denies numbness but reports burning in his thighs and feet.   Patient denies any new bladder or bowel problems.   Patient reports difficulties with his balance, and mulitple recent falls. He ambulates with a rolling walker.    Problem #2 Chronic Neck Pain  Pain history: Patient reports a now 3-year history of neck pain, which began without incident. On February 27, 2019, he underwent bilateral cervical medial branch rhizotomies at C5, C6, and C7, from which he reports experiencing significant  "pain relief, and reports he has not any troubles dropping objects or with his  since his procedure. He does report a sensation of \"soreness\" in his neck today   Pain description: constant neck pain with intermittent exacerbation, described as sharp and stabbing sensation.   Radiation of pain: The pain does not radiate.   Pain intensity today: 5/10  Average pain intensity last week: 5/10  Pain intensity ranges from: 3/10 to 7/10  Aggravating factors: Pain increases with activities  Alleviating factors: Pain decreases with rest  Associated symptoms:   Patient reports intermittent numbness and weakness in the upper extremities (L>R) in his hands.       Review of previous therapies and additional medical records:  Mahendra Yates has already failed the following measures, including:   Conservative measures: oral analgesics, opioids, topical analgesics, physical therapy, ice and heat   Interventional measures: 02/27/2019: Cervical RFTC  03/04/2020: DxTx bilateral L4-L5 TESI  Surgical measures: No history of cervical spine surgery. No history of lumbar spine surgery.  Mahendra Yates has not undergone surgical evaluation for his neck pain. Patient underwent with Dr. Luis Hayden in 2017 right carpal tunnel release.  Mahendra Yates underwent neurosurgical consultation recently with Dr. Abilio Sanz on 10/25/2021, and do to patient's significant medical comorbidities, he was found not to be a surgical candidate.   Mahendra Yates presents with significant comorbidities including anxiety and depression, hypertension, hyperlipidemia, coronary artery disease (s/p bypass and stent on Plavix), insulin dependant diabetes, liver CA (s/p transplant in 2010) engaged in treatment.  In terms of current analgesics, Mahendra Yates takes:  Tramadol, tizanidine, Cymbalta. Patient also takes lorazepam.   I have reviewed Mendez Report which is consistent to medication reconciliation.  SOAPP: Low Risk       Global Pain Scale " 12-13 2018 02-20 2020 04-01 2020 12-07 2021       Pain 20 20 18 20       Feelings 14 25 15 25       Clinical outcomes 20 25 20 12       Activities 25 25 20 12       GPS Total: 79 95 73 69          Review of New Diagnostic Studies:  MRI of the lumbar spine without contrast 9/8/2021: Imaging and radiology report were reviewed. There is multilevel degenerative disc disease and loss of vertebral body heights. There is edematous Modic endplate changes at L3-L4. The conus medullaris appears unremarkable. No evidence of significant listhesis or subluxation. Overall moderately worsened from 2018 study.  L1-L2: Circumferential disc bulge with bilateral facet arthritis with mild spinal canal and bilateral neural foraminal narrowing.  L2-L3: Disc bulge with bilateral facet arthritis there is moderate to severe spinal canal stenosis and moderate bilateral neuroforaminal stenosis.  L3-L4: Large circumferential disc bulge with a left lateral disc protrusion contributing to severe spinal canal stenosis with severe narrowing of the left subarticular recess. There is compression of the adjacent transversing left L4 nerve root as well as the exiting left L3 nerve root in the neural foramen. Moderate to severe right neural foraminal stenosis.   L4-L5: Circumferential disc bulge with bilateral facet arthritis. There is moderate spinal canal stenosis with moderate to severe right and moderate left neural foraminal stenosis.  L5-S1: Circumferential disc bulge and bilateral facet arthritis with mild to moderate spinal canal stenosis and bilateral neural foraminal stenosis.      Review of Previous Diagnostic Studies:  MRI of the Lumbar Spine wo contrast 01/18/2020: Diffuse spondylosis is present with anterior spurs at all levels above L4-L5 and lateral disc bulges at all levels above L5-S1.  Disc desiccation and disc bulges are present at all lumbar levels.  Disc space narrowing is present at the upper 3 level lumbar levels.  There are  Schmorl's nodes at multiple levels and these are associated with contiguous marrow edema reaction at L2-L3 and L3-L4.  T11-T12 and T12-L1: Negative.  L1-L2: A mild disc bulge and mild facet hypertrophy result in mild narrowing of the neural foramina and thecal sac.  L2-L3: A slight retrolisthesis, vacuum phenomenon in the disc, a disc bulge, and facet hypertrophy are present.  There is a small central disc extrusion extending superior to the disc space.  A disc extrusion on the right at L3-L4 extends superiorly into the right subarticular zone.  This causes severe narrowing of the right subarticular zone and compression of the right L3 nerve root.  There is mild to moderate narrowing of the neural foramina.  L3-L4: A disc bulge, vacuum phenomenon in the disc, and facet hypertrophy are present.  A posterior rightward disc extrusion extends superiorly to 0.6 cm above the disc space.  This causes severe narrowing of the right L2-L3 subarticular zone.  There is moderate left neural foraminal narrowing with effacement of the left L3 nerve root.  There is mild to moderate right neural foraminal narrowing.  There is moderate to severe central spinal stenosis.  L4-L5: A disc bulge and facet hypertrophy result in mild to moderate bilateral neural foraminal narrowing.  This is worse on the right where it causes mild effacement of the right L4 nerve root.  There is mild narrowing of the thecal sac and superior right subarticular zone.  L5-S1: A mild disc bulge and slight facet hypertrophy result in mild bilateral neural foraminal narrowing without central spinal stenosis.  EMG/NCV of the bilateral upper and lower extremities 01/22/2019: right leg is normal, left leg suggests chronic L5 radiculopathy. Median neuropathy at the wrist bilaterally, moderate (carpal tunnel). EMG of the right arm is normal,  left arm suggests chronic C5 radiculopathy.   MRI CERVICAL SPINE WITHOUT CONTRAST-08/23/2016: Multilevel degenerative disc and  spondylotic disease. Marrow signal is normal. No cervical fracture or subluxation. No abnormal signal is seen within the cervical cord. Syringomyelia or entrapment myelopathy is not identified.  C1-C2: normal alignment and the foramen magnum is clear.   C4-C5: soft disc protrusion with mild smooth compression of the dural sac and mild effacement of the cord. Mild narrowing of the uncovertebral lateral recesses, slightly worse on the left than right.  C5-C6: dominant soft disc protrusion with arthropathic bony bar producing a dominant central and rightward sloping compression impingement on the cervical cord and dural sac and extending markedly into the right lateral recess to create critical right lateral recess impingement   C6-C7: mild soft disc protrusion producing a mild rightward sloping defect on the dural sac but not effacing the cord or significantly embarrassing the lateral recesses.    Review of Systems   Constitutional: Positive for fatigue.   HENT: Positive for ear discharge, ear pain and tinnitus.    Eyes: Positive for discharge and redness.   Respiratory: Positive for chest tightness and shortness of breath.    Cardiovascular: Positive for chest pain and leg swelling.   Gastrointestinal: Positive for constipation and rectal pain.   Genitourinary: Positive for urgency.   Musculoskeletal: Positive for arthralgias, back pain, gait problem, joint swelling, myalgias, neck pain and neck stiffness.   Allergic/Immunologic: Positive for immunocompromised state.   Neurological: Positive for dizziness, weakness, light-headedness and headaches.   Hematological: Bruises/bleeds easily.   Psychiatric/Behavioral: Positive for agitation, confusion, decreased concentration and sleep disturbance. The patient is nervous/anxious (depression).    All other systems reviewed and are negative.        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)   • Hx of CABG   • History of liver transplant (HCC)   • Physical deconditioning   • Lumbar stenosis with neurogenic claudication   • Herniated lumbar intervertebral disc   • Radicular syndrome of right leg   • Degenerative lumbar spinal stenosis   • Bowel and bladder incontinence   • Closed fracture of multiple ribs of left side       Past Medical History:   Diagnosis Date   • Arthritis    • Atherosclerosis    • B12 deficiency    • Cancer (HCC)    • Cancer of liver (HCC)    • Chronic headaches    • Coronary artery disease    • Diabetes mellitus (HCC)    • Gastroesophageal reflux disease    • History of transfusion    • Hyperlipidemia    • Hypertension    • Myocardial infarction (HCC)    • Sleep apnea    • Stroke (HCC)          Past Surgical History:   Procedure Laterality Date   • ANGIOPLASTY     • CARDIAC SURGERY     • CARPAL TUNNEL RELEASE Right 2017    Procedure: CARPAL TUNNEL RELEASE RIGHT ;  Surgeon: Luis Hayden MD;  Location: [facility];  Service:    • CHOLECYSTECTOMY     • CORONARY ARTERY BYPASS GRAFT     • CORONARY STENT PLACEMENT      x2   • LIVER TRANSPLANTATION     • TUMOR REMOVAL Right     axilla         Family History   Problem Relation Age of Onset   • Heart disease Mother    • Stroke Mother    • Diabetes Father    • Liver cancer Brother    • No Known Problems Sister    • Macular degeneration Brother    • No Known Problems Sister          Social History     Socioeconomic History   • Marital status:    Tobacco Use   • Smoking status: Former Smoker     Packs/day: 4.00     Years: 30.00     Pack years: 120.00     Types: Cigarettes     Quit date: 1991     Years since quittin.8   •  Smokeless tobacco: Never Used   Vaping Use   • Vaping Use: Never used   Substance and Sexual Activity   • Alcohol use: No   • Drug use: No   • Sexual activity: Defer           Current Outpatient Medications:   •  amLODIPine (NORVASC) 10 MG tablet, Take 10 mg by mouth Daily., Disp: , Rfl:   •  aspirin 81 MG EC tablet, Take 81 mg by mouth Daily. Continues per doctors orders, Disp: , Rfl:   •  clopidogrel (PLAVIX) 75 MG tablet, Take 50 mg by mouth Daily., Disp: , Rfl:   •  DHEA 50 MG tablet, Take 1 tablet by mouth Daily., Disp: , Rfl:   •  Dulaglutide (Trulicity) 0.75 MG/0.5ML solution pen-injector, Inject  under the skin into the appropriate area as directed 1 (One) Time Per Week., Disp: , Rfl:   •  DULoxetine (CYMBALTA) 60 MG capsule, Take 60 mg by mouth 2 (Two) Times a Day., Disp: , Rfl:   •  fenofibrate micronized (LOFIBRA) 134 MG capsule, Take 134 mg by mouth Every Morning Before Breakfast., Disp: , Rfl:   •  gabapentin (NEURONTIN) 300 MG capsule, Take 1 capsule by mouth 3 (Three) Times a Day., Disp: 90 capsule, Rfl: 1  •  Gel Base gel, CAPSAICIN 0.001% IMIPRAMINE 3% LIDOCAINE 10% PRILOCAINE 2% MANNITOL 20%. APPLY 1-2 G TO AFFECTED AREA 3-4 TIMES DAILY, Disp: 240 g, Rfl: PRN  •  insulin NPH-insulin regular (humuLIN 70/30,novoLIN 70/30) (70-30) 100 UNIT/ML injection, Inject  under the skin into the appropriate area as directed As Needed., Disp: , Rfl:   •  isosorbide mononitrate (IMDUR) 60 MG 24 hr tablet, Take 1 tablet by mouth 2 (Two) Times a Day., Disp: , Rfl:   •  Loratadine 10 MG capsule, Take 1 capsule by mouth Daily As Needed., Disp: , Rfl:   •  LORazepam (ATIVAN) 0.5 MG tablet, Take 0.5 mg by mouth 2 (Two) Times a Day As Needed for Anxiety (2 tabs BID PRN)., Disp: , Rfl:   •  losartan-hydrochlorothiazide (Hyzaar) 50-12.5 MG per tablet, Take 1 tablet by mouth Daily., Disp: 30 tablet, Rfl: 0  •  multivitamin with minerals (MULTIVITAMIN ADULTS 50+ PO), Take 1 tablet by mouth Daily., Disp: , Rfl:   •   "mycophenolate (CELLCEPT) 500 MG tablet, Take 360 mg by mouth 2 (Two) Times a Day., Disp: , Rfl:   •  nebivolol (BYSTOLIC) 20 MG tablet, Take 20 mg by mouth Daily., Disp: , Rfl:   •  nitroglycerin (NITROSTAT) 0.4 MG SL tablet, Place 0.4 mg under the tongue Every 5 (Five) Minutes As Needed for chest pain. Take no more than 3 doses in 15 minutes., Disp: , Rfl:   •  Omega-3 1000 MG capsule, Take  by mouth., Disp: , Rfl:   •  omeprazole (priLOSEC) 40 MG capsule, Take 40 mg by mouth 2 (Two) Times a Day., Disp: , Rfl:   •  oxyCODONE-acetaminophen (PERCOCET)  MG per tablet, Take 1 tablet by mouth Every 6 (Six) Hours As Needed., Disp: , Rfl:   •  pentoxifylline (TRENtal) 400 MG CR tablet, Take 400 mg by mouth 3 (Three) Times a Day With Meals., Disp: , Rfl:   •  pravastatin (PRAVACHOL) 80 MG tablet, Take 80 mg by mouth Daily., Disp: , Rfl:   •  spironolactone (ALDACTONE) 50 MG tablet, Take 50 mg by mouth Daily., Disp: , Rfl:   •  tacrolimus (PROGRAF) 1 MG capsule, Take 1 mg by mouth Every Morning., Disp: , Rfl:   •  tiZANidine (ZANAFLEX) 2 MG tablet, tizanidine 2 mg tablet  TAKE 1 TO 2 TABLETS BY MOUTH EVERY 6 HOURS AS NEEDED FOR SPASM, Disp: , Rfl:   •  traMADol (ULTRAM) 50 MG tablet, Take 50 mg by mouth Every 6 (Six) Hours As Needed for Moderate Pain ., Disp: , Rfl:       Allergies   Allergen Reactions   • Contrast Dye Other (See Comments)     Cardiac Arrest   • Penicillins Rash   • Sulfasalazine Rash         /89   Pulse 80   Temp 97.8 °F (36.6 °C)   Resp 18   Ht 182 cm (71.65\")   Wt 99.2 kg (218 lb 9.6 oz)   SpO2 99%   BMI 29.93 kg/m²           Physical Exam:   HEENT: Head: Normocephalic and atraumatic. Eyes: Conjunctivae and lids are normal. Pupils: Equal, round, reactive to light.   Neck: Trachea normal. Neck supple. No JVD present.   Lymphatic: No cervical adenopathy  Pulmonary Respiratory effort: No increased work of breathing or signs of respiratory distress.   Cardiovascular Auscultation of heart: " Normal rate and rhythm, normal S1 and S2, no murmurs. Femoral: right 2+, left 2+. Posterior tibialis: right 2+ and left 2+. Dorsalis pedis: right 2+ and left 2+. 1+ edema.  Musculoskeletal   Gait and station: Gait evaluation demonstrated shuffling, and not properly assessed due to inability to walk without assistance.  Patient uses a rolling walker or wheelchair  Lumbar spine: Passive and active range of motion are limited secondary to pain. Extension, flexion, lateral flexion, rotation of the lumbar spine increased and reproduced pain. Lumbar facet joint loading maneuvers are positive.  Terry test and Gaenslen's test are negative.   Piriformis maneuvers are negative  Palpation of the bilateral ischial tuberosities, unrevealing   Palpation of the bilateral greater trochanter, unrevealing.    Examination of the Iliotibial band: reveals tenderness on the bilaterally.    Hip joints: The range of motion of the hip joints is limited but without pain.   Neurological: Patient is alert and oriented to person, place, and time. Speech: speech is normal. Cortical function: Normal mental status.   Cranial nerves: Cranial nerves 2-12 intact.   Reflex Scores:  Right brachioradialis: 2+  Left brachioradialis: 2+  Right biceps: 2+  Left biceps: 2+  Right triceps: 2+  Left triceps: 2+  Right patellar: 0+  Left patellar: 1+  Right Achilles: 1+  Left Achilles: 1+  Motor strength: 4/5 in the lower extremities, 1/5 right dorsiflexion.  Atrophy of the right gastrocnemius.  Motor Tone: normal tone.   Involuntary movements: none.   Superficial/Primitive Reflexes: primitive reflexes were absent.   Right Poe: absent  Left Poe: absent  Right ankle clonus: absent  Left ankle clonus: absent   Negative long tract signs. Straight leg raising test on the right elicits his lumbar and gluteal pain.  Sensation: No sensory loss. Sensory exam: intact to light touch, intact pain and temperature sensation, intact vibration sensation and normal  proprioception.   Coordination: Normal finger to nose and heel to shin. Normal balance and negative. Romberg's sign negative.   Skin and subcutaneous tissue: Skin is warm and intact. No rash noted. No cyanosis.   Psychiatric: Judgment and insight: Normal. Orientation to person, place and time: Normal. Recent and remote memory: Intact. Mood and affect: Normal.     ASSESSMENT:   1. Lumbar stenosis with neurogenic claudication    2. DDD (degenerative disc disease), lumbar    3. 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 Anxiety and depression    5. Multiple falls    6. At high risk for falls    7. Presence of stent in coronary artery in patient with coronary artery disease    8. Physical deconditioning        PLAN/MEDICAL DECISION MAKING: I had a lengthy conversation with Mr. Mahendra Yates regarding his chronic pain condition and potential therapeutic options including risks, benefits, alternative therapies, to name a few. Patient has failed to obtain pain relief with conservative measures,  as referenced above. Patient presents with numerous comorbidities such as status post liver transplant on immunosuppressive therapy, CAD with bypass, recent large myocardial infarction with stenting, on aspirin and Plavix. He presents with a longstanding history of lower back and lower extremity pain and weakness associated with balance disturbance and multiple falls. He was recently referred by Dr. Abilio Sanz due to his continued lower lower back and lower extremity symptoms after a fall he sustained in September 2021, from which he was admitted to the hospital. He did suffer for some rib fractures. Additionally this summer, he suffered extensive myocardial infarction, with stenting at that time. He remains on aspirin and tells me they have recently discontinued Plavix due to cost. He underwent new MRI of the lumbar spine without contrast on September 7, 2021 which revealed progression of his multilevel  degenerative disc disease, he also has worsening spinal canal stenosis at L2-L3 L3-L4 and L4-L5, and at L3-L4 there is severe spinal canal stenosis with severe narrowing of the left subarticular recess and left neural foramen. Unfortunately, due to his recent MRI, and significant comorbidities such as previous liver transplant, on immunosuppressive's, CAD, and diabetes, he is a poor surgical candidate and cannot have any general anesthesia at this time. He additionally is followed by neurology, for his polyneuropathy was recently started on Lyrica, from which he tells me he does not provide him with analgesic benefit. I have reviewed all available patient's medical records as well as previous therapies as referenced above.  Therefore, I have proposed the following plan:  1. Pharmacological measures: Reviewed. Discussed.   A. Patient takes Tramadol, tizanidine, Cymbalta. Patient also takes lorazepam  B. Discontinue Lyrica. Resume gabapentin 300 mg 3 times daily, #90, 1 refill.  2. Interventional pain management measures:  A. Treatment of chronic neck pain: None indicated at this time.  If pain recurs patient will be scheduled for one set of diagnostic bilateral cervical medial branch blocks at C5, C6, C7; for bilateral cervical facet joints at C5-C6, C6-C7 to clarify the origin of chronic refractory mechanical/axial cervicalgia. If patient experiences more than 80% pain relief along with significant improvement in the range of motion of the cervical spine, to then, proceed with bilateral cervical medial branch rhizotomies. Otherwise, we may proceed with cervical epidural steroid injection by interlaminar approach.   B. Treatment of chronic lower back pain:  Patient will scheduled diagnostic and therapeutic right L2-L3 and right L3-L4 transforaminal epidural steroid injections. We may repeat epidural depending on patient's outcome.    4. Long-term rehabilitation efforts:  A. The patient has a history of falls. I did  complete a risk assessment for falls. Fall precautions: Patient has been instructed regarding universal fall precautions, such as;   · Using gait aids a cane or a rolling walker at the appropriate height at all times for ambulation   · Removing all area rugs and coffee tables to create a safe environment at home  · Ensure clean, dry floors  · Wearing supportive footwear and properly fitting clothing  · Ensure bed/chair is appropriate height and patient's feet can touch the floor  · Using a shower transfer bench  · Using walk-in shower and having shower safety bars installed  · Ensure proper lighting, minimize glare  · Have nightlights operational and in use  · Participation in an exercise program for gait training, balance training and strength  · Ensure proper compliance and organization of medications to avoid errors   · Avoid use of over the counter sedatives and alcohol consumption  · Ensure easy access to call bell, glasses, TV control, telephone  · Ensure glasses/hearing aids are in use or close by (on top of night table)  B.  Patient will start a comprehensive physical therapy program for water therapy, therapeutic exercise, core strengthening, gait and balance training, neurodynamics, myofascial release, cupping and dry needling once pain is under control  C.  Start an exercise program such as water therapy and swimming  D.  Contrast therapy: Apply ice-packs for 15-20 minutes, followed by heating pads for 15-20 minutes to affected area   E.  The patient has weakness of the right foot secondary to foot drop for his medical need for for foot and ankle stabilization.  Patient is at high risk of trip and fall. Patient should benefit from an AFO to help clear the toes/foot and prevent falls.   5.  The patient and his family have been instructed to contact my office with any questions or difficulties. The patient understands the plan and agrees to proceed accordingly.         Patient Care Team:  Bobo Calvillo,  MD as PCP - General (Family Medicine)  Natanael Estrella MD as Consulting Physician (Neurology)  Oren Pena MD as Consulting Physician (Cardiology)  Everardo Tate MD as Consulting Physician (Pain Medicine)  Scout Grant MD as Consulting Physician (Otolaryngology)  Christin Handley PA-C as Physician Assistant (Physician Assistant)  Radha Briggs APRN as Nurse Practitioner (Nurse Practitioner)     New Medications Ordered This Visit   Medications   • gabapentin (NEURONTIN) 300 MG capsule     Sig: Take 1 capsule by mouth 3 (Three) Times a Day.     Dispense:  90 capsule     Refill:  1         Future Appointments   Date Time Provider Department Center   12/20/2021  8:15 AM Everardo Tate MD [facility]   1/19/2022  1:00 PM April Dillon MD [facility]         SERJIO Kline

## 2021-12-07 ENCOUNTER — OFFICE VISIT (OUTPATIENT)
Dept: PAIN MEDICINE | Facility: CLINIC | Age: 66
End: 2021-12-07

## 2021-12-07 VITALS
SYSTOLIC BLOOD PRESSURE: 138 MMHG | TEMPERATURE: 97.8 F | HEART RATE: 80 BPM | RESPIRATION RATE: 18 BRPM | WEIGHT: 218.6 LBS | HEIGHT: 72 IN | BODY MASS INDEX: 29.61 KG/M2 | DIASTOLIC BLOOD PRESSURE: 89 MMHG | OXYGEN SATURATION: 99 %

## 2021-12-07 DIAGNOSIS — E10.8 DIABETES MELLITUS TYPE 1 WITH COMPLICATIONS (HCC): ICD-10-CM

## 2021-12-07 DIAGNOSIS — F32.A ANXIETY AND DEPRESSION: ICD-10-CM

## 2021-12-07 DIAGNOSIS — Z95.5 PRESENCE OF STENT IN CORONARY ARTERY IN PATIENT WITH CORONARY ARTERY DISEASE: ICD-10-CM

## 2021-12-07 DIAGNOSIS — F41.9 ANXIETY AND DEPRESSION: ICD-10-CM

## 2021-12-07 DIAGNOSIS — R29.6 MULTIPLE FALLS: ICD-10-CM

## 2021-12-07 DIAGNOSIS — Z91.81 AT HIGH RISK FOR FALLS: ICD-10-CM

## 2021-12-07 DIAGNOSIS — I25.10 PRESENCE OF STENT IN CORONARY ARTERY IN PATIENT WITH CORONARY ARTERY DISEASE: ICD-10-CM

## 2021-12-07 DIAGNOSIS — M51.36 DDD (DEGENERATIVE DISC DISEASE), LUMBAR: ICD-10-CM

## 2021-12-07 DIAGNOSIS — R53.81 PHYSICAL DECONDITIONING: ICD-10-CM

## 2021-12-07 DIAGNOSIS — M48.062 LUMBAR STENOSIS WITH NEUROGENIC CLAUDICATION: ICD-10-CM

## 2021-12-07 DIAGNOSIS — M48.062 LUMBAR STENOSIS WITH NEUROGENIC CLAUDICATION: Primary | ICD-10-CM

## 2021-12-07 PROCEDURE — 99214 OFFICE O/P EST MOD 30 MIN: CPT | Performed by: NURSE PRACTITIONER

## 2021-12-07 RX ORDER — GABAPENTIN 300 MG/1
300 CAPSULE ORAL 3 TIMES DAILY
Qty: 90 CAPSULE | Refills: 1 | Status: SHIPPED | OUTPATIENT
Start: 2021-12-07 | End: 2022-01-04 | Stop reason: SDUPTHER

## 2021-12-13 DIAGNOSIS — E10.8 DIABETES MELLITUS TYPE 1 WITH COMPLICATIONS (HCC): Primary | ICD-10-CM

## 2021-12-13 DIAGNOSIS — Z76.89 ENCOUNTER TO ESTABLISH CARE WITH NEW DOCTOR: ICD-10-CM

## 2021-12-13 DIAGNOSIS — F41.9 ANXIETY AND DEPRESSION: ICD-10-CM

## 2021-12-13 DIAGNOSIS — F32.A ANXIETY AND DEPRESSION: ICD-10-CM

## 2021-12-15 DIAGNOSIS — M48.062 LUMBAR STENOSIS WITH NEUROGENIC CLAUDICATION: Primary | ICD-10-CM

## 2021-12-20 ENCOUNTER — OUTSIDE FACILITY SERVICE (OUTPATIENT)
Dept: PAIN MEDICINE | Facility: CLINIC | Age: 66
End: 2021-12-20

## 2021-12-20 PROCEDURE — 99152 MOD SED SAME PHYS/QHP 5/>YRS: CPT | Performed by: ANESTHESIOLOGY

## 2021-12-20 PROCEDURE — 64483 NJX AA&/STRD TFRM EPI L/S 1: CPT | Performed by: ANESTHESIOLOGY

## 2021-12-20 PROCEDURE — 64484 NJX AA&/STRD TFRM EPI L/S EA: CPT | Performed by: ANESTHESIOLOGY

## 2021-12-21 ENCOUNTER — TELEPHONE (OUTPATIENT)
Dept: PAIN MEDICINE | Facility: CLINIC | Age: 66
End: 2021-12-21

## 2021-12-21 NOTE — TELEPHONE ENCOUNTER
Spoke with pt regarding yesterday’s procedure with Dr. Tate. Pt stated that he only had 2 hours of sleep and that the pain in his legs and back is terrible.Advised pt to give the injection a few more days to take effect and it doesn't improve symptoms to give the office a call back. Advised that isn’t a follow up appointment scheduled, to call office as needed. Pt understood, no further needs expressed.

## 2022-01-03 ENCOUNTER — OFFICE VISIT (OUTPATIENT)
Dept: FAMILY MEDICINE CLINIC | Facility: CLINIC | Age: 67
End: 2022-01-03

## 2022-01-03 VITALS
HEIGHT: 72 IN | HEART RATE: 80 BPM | DIASTOLIC BLOOD PRESSURE: 70 MMHG | BODY MASS INDEX: 31.02 KG/M2 | OXYGEN SATURATION: 98 % | SYSTOLIC BLOOD PRESSURE: 142 MMHG | WEIGHT: 229 LBS

## 2022-01-03 DIAGNOSIS — F32.A ANXIETY AND DEPRESSION: ICD-10-CM

## 2022-01-03 DIAGNOSIS — M54.42 CHRONIC LOW BACK PAIN WITH BILATERAL SCIATICA, UNSPECIFIED BACK PAIN LATERALITY: Primary | ICD-10-CM

## 2022-01-03 DIAGNOSIS — E11.65 UNCONTROLLED TYPE 2 DIABETES MELLITUS WITH HYPERGLYCEMIA: ICD-10-CM

## 2022-01-03 DIAGNOSIS — Z94.4 HISTORY OF LIVER TRANSPLANT: ICD-10-CM

## 2022-01-03 DIAGNOSIS — G89.29 CHRONIC LOW BACK PAIN WITH BILATERAL SCIATICA, UNSPECIFIED BACK PAIN LATERALITY: Primary | ICD-10-CM

## 2022-01-03 DIAGNOSIS — M54.41 CHRONIC LOW BACK PAIN WITH BILATERAL SCIATICA, UNSPECIFIED BACK PAIN LATERALITY: Primary | ICD-10-CM

## 2022-01-03 DIAGNOSIS — Z95.5 PRESENCE OF STENT IN CORONARY ARTERY IN PATIENT WITH CORONARY ARTERY DISEASE: ICD-10-CM

## 2022-01-03 DIAGNOSIS — R15.9 INCONTINENCE OF FECES, UNSPECIFIED FECAL INCONTINENCE TYPE: ICD-10-CM

## 2022-01-03 DIAGNOSIS — K21.9 GASTROESOPHAGEAL REFLUX DISEASE, UNSPECIFIED WHETHER ESOPHAGITIS PRESENT: ICD-10-CM

## 2022-01-03 DIAGNOSIS — F41.9 ANXIETY AND DEPRESSION: ICD-10-CM

## 2022-01-03 DIAGNOSIS — I10 PRIMARY HYPERTENSION: ICD-10-CM

## 2022-01-03 DIAGNOSIS — L40.9 PSORIASIS: ICD-10-CM

## 2022-01-03 DIAGNOSIS — M48.062 LUMBAR STENOSIS WITH NEUROGENIC CLAUDICATION: ICD-10-CM

## 2022-01-03 DIAGNOSIS — E10.8 DIABETES MELLITUS TYPE 1 WITH COMPLICATIONS: ICD-10-CM

## 2022-01-03 DIAGNOSIS — I25.10 PRESENCE OF STENT IN CORONARY ARTERY IN PATIENT WITH CORONARY ARTERY DISEASE: ICD-10-CM

## 2022-01-03 DIAGNOSIS — F41.0 PANIC ATTACKS: ICD-10-CM

## 2022-01-03 DIAGNOSIS — R19.8 ALTERNATING CONSTIPATION AND DIARRHEA: ICD-10-CM

## 2022-01-03 PROCEDURE — 99215 OFFICE O/P EST HI 40 MIN: CPT | Performed by: INTERNAL MEDICINE

## 2022-01-03 PROCEDURE — 90662 IIV NO PRSV INCREASED AG IM: CPT | Performed by: INTERNAL MEDICINE

## 2022-01-03 PROCEDURE — G0008 ADMIN INFLUENZA VIRUS VAC: HCPCS | Performed by: INTERNAL MEDICINE

## 2022-01-03 RX ORDER — NEBIVOLOL 20 MG/1
20 TABLET ORAL DAILY
Qty: 90 TABLET | Refills: 3 | Status: SHIPPED | OUTPATIENT
Start: 2022-01-03 | End: 2022-01-07 | Stop reason: SDUPTHER

## 2022-01-03 RX ORDER — MAGNESIUM OXIDE 400 MG/1
400 TABLET ORAL 2 TIMES DAILY
COMMUNITY
End: 2022-11-21

## 2022-01-03 RX ORDER — DULOXETIN HYDROCHLORIDE 60 MG/1
60 CAPSULE, DELAYED RELEASE ORAL DAILY
Qty: 90 CAPSULE | Refills: 3 | Status: SHIPPED | OUTPATIENT
Start: 2022-01-03 | End: 2022-11-21

## 2022-01-03 RX ORDER — NITROGLYCERIN 0.4 MG/1
0.4 TABLET SUBLINGUAL
Qty: 30 TABLET | Refills: 5 | Status: SHIPPED | OUTPATIENT
Start: 2022-01-03 | End: 2022-10-04

## 2022-01-03 RX ORDER — SPIRONOLACTONE 50 MG/1
50 TABLET, FILM COATED ORAL DAILY
Qty: 90 TABLET | Refills: 3 | Status: SHIPPED | OUTPATIENT
Start: 2022-01-03 | End: 2022-02-11 | Stop reason: HOSPADM

## 2022-01-03 RX ORDER — PRAVASTATIN SODIUM 80 MG/1
80 TABLET ORAL DAILY
Qty: 90 TABLET | Refills: 3 | Status: SHIPPED | OUTPATIENT
Start: 2022-01-03 | End: 2022-11-21

## 2022-01-03 RX ORDER — OMEPRAZOLE 40 MG/1
40 CAPSULE, DELAYED RELEASE ORAL 2 TIMES DAILY
Qty: 180 CAPSULE | Refills: 3 | Status: SHIPPED | OUTPATIENT
Start: 2022-01-03 | End: 2022-02-22 | Stop reason: SDUPTHER

## 2022-01-04 ENCOUNTER — OFFICE VISIT (OUTPATIENT)
Dept: CARDIOLOGY | Facility: CLINIC | Age: 67
End: 2022-01-04

## 2022-01-04 VITALS
HEART RATE: 78 BPM | WEIGHT: 225 LBS | HEIGHT: 71 IN | BODY MASS INDEX: 31.5 KG/M2 | OXYGEN SATURATION: 98 % | SYSTOLIC BLOOD PRESSURE: 128 MMHG | DIASTOLIC BLOOD PRESSURE: 64 MMHG

## 2022-01-04 DIAGNOSIS — Z95.5 PRESENCE OF STENT IN CORONARY ARTERY IN PATIENT WITH CORONARY ARTERY DISEASE: Primary | ICD-10-CM

## 2022-01-04 DIAGNOSIS — R53.81 PHYSICAL DECONDITIONING: ICD-10-CM

## 2022-01-04 DIAGNOSIS — I25.10 PRESENCE OF STENT IN CORONARY ARTERY IN PATIENT WITH CORONARY ARTERY DISEASE: Primary | ICD-10-CM

## 2022-01-04 DIAGNOSIS — E66.01 MORBID OBESITY DUE TO EXCESS CALORIES: ICD-10-CM

## 2022-01-04 PROCEDURE — 99213 OFFICE O/P EST LOW 20 MIN: CPT | Performed by: NURSE PRACTITIONER

## 2022-01-04 RX ORDER — RANOLAZINE 1000 MG/1
1000 TABLET, EXTENDED RELEASE ORAL 2 TIMES DAILY
COMMUNITY
End: 2022-01-04 | Stop reason: SDUPTHER

## 2022-01-04 RX ORDER — RANOLAZINE 1000 MG/1
1000 TABLET, EXTENDED RELEASE ORAL 2 TIMES DAILY
Qty: 60 TABLET | Refills: 11 | Status: SHIPPED | OUTPATIENT
Start: 2022-01-04 | End: 2022-01-07 | Stop reason: SDUPTHER

## 2022-01-04 RX ORDER — ISOSORBIDE MONONITRATE 120 MG/1
120 TABLET, EXTENDED RELEASE ORAL DAILY
Qty: 60 TABLET | Refills: 11 | Status: SHIPPED | OUTPATIENT
Start: 2022-01-04 | End: 2022-02-11 | Stop reason: HOSPADM

## 2022-01-04 RX ORDER — GABAPENTIN 300 MG/1
300 CAPSULE ORAL 3 TIMES DAILY
Qty: 90 CAPSULE | Refills: 2 | Status: SHIPPED | OUTPATIENT
Start: 2022-01-04 | End: 2022-05-28

## 2022-01-04 NOTE — PROGRESS NOTES
[facility] Cardiology    Patient ID: Mahendra Yates is a 66 y.o. male.  : 1955   Contact: [phone]    Encounter date: 2022    PCP: Rodríguez Lopez DO      Chief complaint:   Chief Complaint   Patient presents with   • Coronary Artery Disease   • Chest Pain   • Shortness of Breath   • Dizziness     PROBLEM LIST:  1. Coronary artery disease  a. CABG 2009  b. History of stents, ICDB  c. NSTEMI/LHC, 2019 (Dr. Pena): Patent LIMA to LAD with diffuse distal LAD disease. VG to OM occluded, unable to be intervened upon. VG to RCA patent with diffuse target vessel disease.  2. Hypertension  3. Dyslipidemia  4. Lower extremity edema   a. Venous duplex, 2021: No DVT or superficial venous thrombosis bilaterally.  5. Diabetes  6. History of CVA 2018  7. History of liver cancer  a. Liver transplant   b. [place], [place]  8. Arthritis  9. GERD       Allergies and Medications:  Allergies   Allergen Reactions   • Contrast Dye Other (See Comments)     Cardiac Arrest   • Penicillins Rash   • Sulfasalazine Rash       Current Medications:    Current Outpatient Medications:   •  amLODIPine (NORVASC) 10 MG tablet, Take 10 mg by mouth Daily., Disp: , Rfl:   •  aspirin 81 MG EC tablet, Take 81 mg by mouth Daily. Continues per doctors orders, Disp: , Rfl:   •  clopidogrel (PLAVIX) 75 MG tablet, Take 50 mg by mouth Daily., Disp: , Rfl:   •  DHEA 50 MG tablet, Take 1 tablet by mouth Daily., Disp: , Rfl:   •  Dulaglutide (Trulicity) 0.75 MG/0.5ML solution pen-injector, Inject  under the skin into the appropriate area as directed 1 (One) Time Per Week., Disp: , Rfl:   •  DULoxetine (Cymbalta) 60 MG capsule, Take 1 capsule by mouth Daily., Disp: 90 capsule, Rfl: 3  •  fenofibrate micronized (LOFIBRA) 134 MG capsule, Take 134 mg by mouth Every Morning Before Breakfast., Disp: , Rfl:   •  Gel Base gel, CAPSAICIN 0.001% IMIPRAMINE 3% LIDOCAINE 10% PRILOCAINE 2%  MANNITOL 20%. APPLY 1-2 G TO AFFECTED AREA 3-4 TIMES DAILY, Disp: 240 g, Rfl: PRN  •  insulin NPH-insulin regular (humuLIN 70/30,novoLIN 70/30) (70-30) 100 UNIT/ML injection, Inject  under the skin into the appropriate area as directed As Needed., Disp: , Rfl:   •  Loratadine 10 MG capsule, Take 1 capsule by mouth Daily As Needed., Disp: , Rfl:   •  LORazepam (ATIVAN) 0.5 MG tablet, Take 0.5 mg by mouth 2 (Two) Times a Day As Needed for Anxiety (2 tabs BID PRN)., Disp: , Rfl:   •  losartan-hydrochlorothiazide (Hyzaar) 50-12.5 MG per tablet, Take 1 tablet by mouth Daily., Disp: 30 tablet, Rfl: 0  •  magnesium oxide (MAG-OX) 400 MG tablet, Take 400 mg by mouth Daily., Disp: , Rfl:   •  multivitamin with minerals (MULTIVITAMIN ADULTS 50+ PO), Take 1 tablet by mouth Daily., Disp: , Rfl:   •  mycophenolate (CELLCEPT) 500 MG tablet, Take 360 mg by mouth 2 (Two) Times a Day., Disp: , Rfl:   •  nebivolol (BYSTOLIC) 20 MG tablet, Take 1 tablet by mouth Daily., Disp: 90 tablet, Rfl: 3  •  nitroglycerin (NITROSTAT) 0.4 MG SL tablet, Place 1 tablet under the tongue Every 5 (Five) Minutes As Needed for Chest Pain. Take no more than 3 doses in 15 minutes., Disp: 30 tablet, Rfl: 5  •  omeprazole (priLOSEC) 40 MG capsule, Take 1 capsule by mouth 2 (Two) Times a Day., Disp: 180 capsule, Rfl: 3  •  oxyCODONE-acetaminophen (PERCOCET)  MG per tablet, Take 1 tablet by mouth Every 6 (Six) Hours As Needed., Disp: , Rfl:   •  pentoxifylline (TRENtal) 400 MG CR tablet, Take 400 mg by mouth 3 (Three) Times a Day With Meals., Disp: , Rfl:   •  pravastatin (PRAVACHOL) 80 MG tablet, Take 1 tablet by mouth Daily., Disp: 90 tablet, Rfl: 3  •  ranolazine (RANEXA) 1000 MG 12 hr tablet, Take 1 tablet by mouth 2 (Two) Times a Day., Disp: 60 tablet, Rfl: 11  •  spironolactone (ALDACTONE) 50 MG tablet, Take 1 tablet by mouth Daily., Disp: 90 tablet, Rfl: 3  •  tacrolimus (PROGRAF) 1 MG capsule, Take 1 mg by mouth Every Morning., Disp: , Rfl:   •   "gabapentin (NEURONTIN) 300 MG capsule, Take 1 capsule by mouth 3 (Three) Times a Day., Disp: 90 capsule, Rfl: 2  •  isosorbide mononitrate (IMDUR) 120 MG 24 hr tablet, Take 1 tablet by mouth Daily. AM and 6 hours after., Disp: 60 tablet, Rfl: 11    HPI    Mahendra Yates is a 66 y.o. male who presents today for follow up on CAD, HTN, HLD. Since last visit, patient has continued to have angina. He believes this is more frequent now. Occurs at rest and with activity. Always resolves with 1-2 SL NTG. He misunderstood and has been taking his Imdur 60 mg TID to QID rather than taking it in the AM and again 6 hours later. He was also able to resume his Renexa. He advocates to have a heart cath but is willing/agreeable to try taking 120 mg of imdur in AM and 6 hours later first. He is mostly sedentary due to foot drop and issues with his right knee. Otherwise, he has done well. His BP are controlled. No PND, orthopnea, edema.        The following portions of the patient's history were reviewed and updated as appropriate: allergies, current medications and problem list.    Pertinent positives as listed in the HPI.  All other systems reviewed are negative.         Vitals:    01/04/22 1529   BP: 128/64   BP Location: Left arm   Patient Position: Sitting   Pulse: 78   SpO2: 98%   Weight: 102 kg (225 lb)   Height: 180.3 cm (71\")       Physical Exam:  General: Alert and oriented.  Neck: Jugular venous pressure is within normal limits. Carotids have normal upstrokes without bruits.   Cardiovascular: Heart has a nondisplaced focal PMI. Regular rate and rhythm without murmur, gallop or rub.  Lungs: Clear without rales or wheezes. Equal expansion is noted.   Extremities: Show no edema.  Skin: Warm and dry.  Neurologic: Nonfocal.     Diagnostic Data:  Lab Results   Component Value Date    GLUCOSE 114 (H) 09/08/2021    BUN 15 09/08/2021    CREATININE 1.12 09/08/2021    EGFRIFNONA 65 09/08/2021    BCR 13.4 09/08/2021    K 4.0 " 09/08/2021    CO2 23.0 09/08/2021    CALCIUM 10.1 09/08/2021    ALBUMIN 3.90 09/08/2021    AST 20 09/08/2021    ALT 13 09/08/2021     Lab Results   Component Value Date    GLUCOSE 114 (H) 09/08/2021    CALCIUM 10.1 09/08/2021     09/08/2021    K 4.0 09/08/2021    CO2 23.0 09/08/2021     09/08/2021    BUN 15 09/08/2021    CREATININE 1.12 09/08/2021    EGFRIFNONA 65 09/08/2021    BCR 13.4 09/08/2021    ANIONGAP 11.0 09/08/2021     Lab Results   Component Value Date    CHOL 198 08/08/2021    TRIG 230 (H) 08/08/2021    HDL 56 08/08/2021     (H) 08/08/2021     Lab Results   Component Value Date    WBC 6.25 09/08/2021    HGB 12.6 (L) 09/08/2021    HCT 37.6 09/08/2021    MCV 84.7 09/08/2021     09/08/2021     No results found for: TSH    Procedures      ASSESSMENT:    ICD-10-CM ICD-9-CM   1. Presence of stent in coronary artery in patient with coronary artery disease  I25.10 414.01    Z95.5 V45.82   2. Physical deconditioning  R53.81 799.3   3. Morbid obesity due to excess calories (HCC)  E66.01 278.01     Lab results found above were reviewed with the patient.      PLAN:  1. Continue ASA, Plavix, statin for CAD.   2. Take Imdur 120 mg in AM and 6 hours later. Try this for one week, if no improvement, will need LHC per Dr. Dillon last note. Will need to be premedicated for contrast dye allergy.   3. Continue Renexa, Norvasc for angina.   4. Continue Norvasc, aldactone, Bystolic, losartan-HCTZ for HTN  5. Continue all other current medications.  6. F/up in 3 months, sooner if needed.      Electronically signed by SERJIO Castrejon, 01/05/22, 8:57 AM EST.

## 2022-01-04 NOTE — PROGRESS NOTES
Chief Complaint   Patient presents with   • Establish Care   • Leg Pain     Had injections in legs from pain clinic. Discuss pain medication managements.        HPI:  Mahendra Yates is a 66 y.o. male who presents today for establish care.  He has a complicated past medical history.  He is established with cardiology for coronary artery disease.  His main concern today is chronic back pain.  Taking oxycodone 10 mg 4 times daily along with gabapentin.  Established with Dr. Tate.  Opiate pain medication was previously managed by his primary care physician.  He is also taking lorazepam 1 mg twice daily for anxiety.  He reports his anxiety is much improved at this time.    ROS:  Constitutional: no fevers, night sweats or unexplained weight loss  Eyes: no vision changes  ENT: no runny nose, ear pain, sore throat  Cardio: no chest pain, palpitations  Pulm: no shortness of breath, wheezing, or cough  GI: no abdominal pain or changes in bowel movements  : no difficulty urinating  MSK:  difficulty ambulating, + joint pain  Neuro: no weakness, dizziness or headache  Psych: no trouble sleeping  Endo: no change in appetite      Past Medical History:   Diagnosis Date   • Arthritis    • Atherosclerosis    • B12 deficiency    • Cancer (HCC)    • Cancer of liver (HCC)    • Chronic headaches    • Coronary artery disease    • Diabetes mellitus (HCC)    • Gastroesophageal reflux disease    • History of transfusion    • Hyperlipidemia    • Hypertension    • Myocardial infarction (HCC)    • Sleep apnea    • Stroke (HCC)       Family History   Problem Relation Age of Onset   • Heart disease Mother    • Stroke Mother    • Diabetes Father    • Liver cancer Brother    • No Known Problems Sister    • Macular degeneration Brother    • No Known Problems Sister    • Dementia Brother       Social History     Socioeconomic History   • Marital status:    Tobacco Use   • Smoking status: Former Smoker     Packs/day: 4.00     Years:  30.00     Pack years: 120.00     Types: Cigarettes     Quit date: 5/3/1993     Years since quittin.6   • Smokeless tobacco: Never Used   Vaping Use   • Vaping Use: Never used   Substance and Sexual Activity   • Alcohol use: No   • Drug use: No   • Sexual activity: Defer      Allergies   Allergen Reactions   • Contrast Dye Other (See Comments)     Cardiac Arrest   • Penicillins Rash   • Sulfasalazine Rash      Immunization History   Administered Date(s) Administered   • COVID-19 (PFIZER) 01/15/2021, 2021, 2021   • Flu Vaccine Quad PF >36MO 10/12/2017, 2018   • Fluzone High Dose =>65 Years ([facility] ONLY) 2019   • Fluzone High-Dose 65+yrs 2020, 2022   • Influenza Quad Vaccine (Inpatient) 2017        PE:  Vitals:    22 1424   BP: 142/70   Pulse: 80   SpO2: 98%      Body mass index is 31.36 kg/m².    Gen Appearance: NAD  HEENT: Normocephalic, PERRLA, no thyromegaly, trache midline  Heart: RRR, normal S1 and S2, no murmur  Lungs: CTA b/l, no wheezing, no crackles  Abdomen: Soft, non-tender, non-distended, no guarding and BSx4  MSK: Moves all extremities well, normal gait, no peripheral edema  Pulses: Palpable and equal b/l  Lymph nodes: No palpable lymphadenopathy   Neuro: No focal deficits      Current Outpatient Medications   Medication Sig Dispense Refill   • amLODIPine (NORVASC) 10 MG tablet Take 10 mg by mouth Daily.     • aspirin 81 MG EC tablet Take 81 mg by mouth Daily. Continues per doctors orders     • Dulaglutide (Trulicity) 0.75 MG/0.5ML solution pen-injector Inject  under the skin into the appropriate area as directed 1 (One) Time Per Week.     • DULoxetine (Cymbalta) 60 MG capsule Take 1 capsule by mouth Daily. 90 capsule 3   • fenofibrate micronized (LOFIBRA) 134 MG capsule Take 134 mg by mouth Every Morning Before Breakfast.     • gabapentin (NEURONTIN) 300 MG capsule Take 1 capsule by mouth 3 (Three) Times a Day. 90 capsule 2   • Loratadine 10 MG  capsule Take 1 capsule by mouth Daily As Needed.     • LORazepam (ATIVAN) 0.5 MG tablet Take 0.5 mg by mouth 2 (Two) Times a Day As Needed for Anxiety (2 tabs BID PRN).     • losartan-hydrochlorothiazide (Hyzaar) 50-12.5 MG per tablet Take 1 tablet by mouth Daily. 30 tablet 0   • magnesium oxide (MAG-OX) 400 MG tablet Take 400 mg by mouth Daily.     • multivitamin with minerals (MULTIVITAMIN ADULTS 50+ PO) Take 1 tablet by mouth Daily.     • mycophenolate (CELLCEPT) 500 MG tablet Take 360 mg by mouth 2 (Two) Times a Day.     • nebivolol (BYSTOLIC) 20 MG tablet Take 1 tablet by mouth Daily. 90 tablet 3   • nitroglycerin (NITROSTAT) 0.4 MG SL tablet Place 1 tablet under the tongue Every 5 (Five) Minutes As Needed for Chest Pain. Take no more than 3 doses in 15 minutes. 30 tablet 5   • omeprazole (priLOSEC) 40 MG capsule Take 1 capsule by mouth 2 (Two) Times a Day. 180 capsule 3   • oxyCODONE-acetaminophen (PERCOCET)  MG per tablet Take 1 tablet by mouth Every 6 (Six) Hours As Needed.     • pentoxifylline (TRENtal) 400 MG CR tablet Take 400 mg by mouth 3 (Three) Times a Day With Meals.     • pravastatin (PRAVACHOL) 80 MG tablet Take 1 tablet by mouth Daily. 90 tablet 3   • spironolactone (ALDACTONE) 50 MG tablet Take 1 tablet by mouth Daily. 90 tablet 3   • tacrolimus (PROGRAF) 1 MG capsule Take 1 mg by mouth Every Morning.     • clopidogrel (PLAVIX) 75 MG tablet Take 50 mg by mouth Daily.     • DHEA 50 MG tablet Take 1 tablet by mouth Daily.     • Gel Base gel CAPSAICIN 0.001% IMIPRAMINE 3% LIDOCAINE 10% PRILOCAINE 2% MANNITOL 20%. APPLY 1-2 G TO AFFECTED AREA 3-4 TIMES DAILY 240 g PRN   • insulin NPH-insulin regular (humuLIN 70/30,novoLIN 70/30) (70-30) 100 UNIT/ML injection Inject  under the skin into the appropriate area as directed As Needed.     • isosorbide mononitrate (IMDUR) 120 MG 24 hr tablet Take 1 tablet by mouth Daily. AM and 6 hours after. 60 tablet 11   • ranolazine (RANEXA) 1000 MG 12 hr tablet  Take 1 tablet by mouth 2 (Two) Times a Day. 60 tablet 11     No current facility-administered medications for this visit.      Recommend weaning down on lorazepam if possible.  Reduce dose to 0.5 mg twice daily for now.  Recent A1c was controlled.  Blood pressure well.  Referring to pain management to establish care.    Counseling was given to patient and family for the following topics: diagnostic results, instructions for management, impressions, risks and benefits of treatment options and importance of treatment compliance . Total time of the encounter was 60 minutes and 30 minutes was spent face to face counseling.    Diagnoses and all orders for this visit:    1. Chronic low back pain with bilateral sciatica, unspecified back pain laterality (Primary)    2. Psoriasis    3. Panic attacks    4. Anxiety and depression    5. Presence of stent in coronary artery in patient with coronary artery disease    6. History of liver transplant (HCC)    7. Diabetes mellitus type 2 with complications (HCC)    8. Primary hypertension    9. Uncontrolled type 2 diabetes mellitus with hyperglycemia (HCC)    10. Gastroesophageal reflux disease, unspecified whether esophagitis present  -     omeprazole (priLOSEC) 40 MG capsule; Take 1 capsule by mouth 2 (Two) Times a Day.  Dispense: 180 capsule; Refill: 3    11. Alternating constipation and diarrhea    12. Incontinence of feces, unspecified fecal incontinence type    13. Lumbar stenosis with neurogenic claudication  -     gabapentin (NEURONTIN) 300 MG capsule; Take 1 capsule by mouth 3 (Three) Times a Day.  Dispense: 90 capsule; Refill: 2    Other orders  -     DULoxetine (Cymbalta) 60 MG capsule; Take 1 capsule by mouth Daily.  Dispense: 90 capsule; Refill: 3  -     nebivolol (BYSTOLIC) 20 MG tablet; Take 1 tablet by mouth Daily.  Dispense: 90 tablet; Refill: 3  -     pravastatin (PRAVACHOL) 80 MG tablet; Take 1 tablet by mouth Daily.  Dispense: 90 tablet; Refill: 3  -      spironolactone (ALDACTONE) 50 MG tablet; Take 1 tablet by mouth Daily.  Dispense: 90 tablet; Refill: 3  -     nitroglycerin (NITROSTAT) 0.4 MG SL tablet; Place 1 tablet under the tongue Every 5 (Five) Minutes As Needed for Chest Pain. Take no more than 3 doses in 15 minutes.  Dispense: 30 tablet; Refill: 5  -     Fluzone High-Dose 65+yrs (3104-2713)         Return in about 3 months (around 4/3/2022) for Medicare Wellness.     Dictated Utilizing Dragon Dictation    Please note that portions of this note were completed with a voice recognition program.    Part of this note may be an electronic transcription/translation of spoken language to printed text using the Dragon Dictation System.

## 2022-01-06 DIAGNOSIS — M54.42 CHRONIC LOW BACK PAIN WITH BILATERAL SCIATICA, UNSPECIFIED BACK PAIN LATERALITY: Primary | ICD-10-CM

## 2022-01-06 DIAGNOSIS — M54.41 CHRONIC LOW BACK PAIN WITH BILATERAL SCIATICA, UNSPECIFIED BACK PAIN LATERALITY: Primary | ICD-10-CM

## 2022-01-06 DIAGNOSIS — G89.29 CHRONIC LOW BACK PAIN WITH BILATERAL SCIATICA, UNSPECIFIED BACK PAIN LATERALITY: Primary | ICD-10-CM

## 2022-01-06 RX ORDER — TACROLIMUS 1 MG/G
1 OINTMENT TOPICAL 2 TIMES DAILY
Qty: 100 G | Refills: 0 | Status: SHIPPED | OUTPATIENT
Start: 2022-01-06 | End: 2022-11-21 | Stop reason: SDUPTHER

## 2022-01-06 RX ORDER — OXYCODONE AND ACETAMINOPHEN 10; 325 MG/1; MG/1
1 TABLET ORAL EVERY 6 HOURS PRN
Qty: 120 TABLET | Refills: 0 | Status: SHIPPED | OUTPATIENT
Start: 2022-01-06 | End: 2022-01-19 | Stop reason: SDUPTHER

## 2022-01-07 RX ORDER — PENTOXIFYLLINE 400 MG/1
400 TABLET, EXTENDED RELEASE ORAL
Qty: 270 TABLET | Refills: 3 | Status: SHIPPED | OUTPATIENT
Start: 2022-01-07 | End: 2022-04-11 | Stop reason: SDUPTHER

## 2022-01-07 RX ORDER — AMLODIPINE BESYLATE 10 MG/1
10 TABLET ORAL DAILY
Qty: 90 TABLET | Refills: 3 | Status: SHIPPED | OUTPATIENT
Start: 2022-01-07 | End: 2022-06-20

## 2022-01-07 RX ORDER — RANOLAZINE 1000 MG/1
1000 TABLET, EXTENDED RELEASE ORAL 2 TIMES DAILY
Qty: 180 TABLET | Refills: 3 | Status: SHIPPED | OUTPATIENT
Start: 2022-01-07 | End: 2022-11-01 | Stop reason: SDUPTHER

## 2022-01-07 RX ORDER — NEBIVOLOL 20 MG/1
20 TABLET ORAL DAILY
Qty: 90 TABLET | Refills: 3 | Status: SHIPPED | OUTPATIENT
Start: 2022-01-07 | End: 2022-02-11 | Stop reason: HOSPADM

## 2022-01-12 ENCOUNTER — PRIOR AUTHORIZATION (OUTPATIENT)
Dept: FAMILY MEDICINE CLINIC | Facility: CLINIC | Age: 67
End: 2022-01-12

## 2022-01-19 DIAGNOSIS — G89.29 CHRONIC LOW BACK PAIN WITH BILATERAL SCIATICA, UNSPECIFIED BACK PAIN LATERALITY: ICD-10-CM

## 2022-01-19 DIAGNOSIS — M54.41 CHRONIC LOW BACK PAIN WITH BILATERAL SCIATICA, UNSPECIFIED BACK PAIN LATERALITY: ICD-10-CM

## 2022-01-19 DIAGNOSIS — M54.42 CHRONIC LOW BACK PAIN WITH BILATERAL SCIATICA, UNSPECIFIED BACK PAIN LATERALITY: ICD-10-CM

## 2022-01-19 NOTE — TELEPHONE ENCOUNTER
Rx Refill Note  Requested Prescriptions     Pending Prescriptions Disp Refills   • oxyCODONE-acetaminophen (PERCOCET)  MG per tablet 120 tablet 0     Sig: Take 1 tablet by mouth Every 6 (Six) Hours As Needed for Severe Pain .      Last office visit with prescribing clinician: 1/3/2022      Next office visit with prescribing clinician: 4/6/2022     CSA: 10/25/2021  UDS:  Not on file        Susie Perera MA  01/19/22, 10:35 EST

## 2022-01-19 NOTE — TELEPHONE ENCOUNTER
Caller: [id number] - [place], [place] - [address] AT [facility] [address] - [phone] [facility] [phone] FX    Relationship: Pharmacy    Best call back number: [phone]    Requested Prescriptions:   Requested Prescriptions     Pending Prescriptions Disp Refills   • oxyCODONE-acetaminophen (PERCOCET)  MG per tablet 120 tablet 0     Sig: Take 1 tablet by mouth Every 6 (Six) Hours As Needed for Severe Pain .        Pharmacy where request should be sent: [id number] - [place], [place] - [address] AT [facility] - [phone] [facility] [phone] FX     Additional details provided by patient: PATIENT IS OUT OF MEDICATION     Does the patient have less than a 3 day supply:  [x] Yes  [] No    Kristina King Rep   01/19/22 10:30 EST

## 2022-01-20 RX ORDER — OXYCODONE AND ACETAMINOPHEN 10; 325 MG/1; MG/1
1 TABLET ORAL EVERY 6 HOURS PRN
Qty: 120 TABLET | Refills: 0 | Status: SHIPPED | OUTPATIENT
Start: 2022-01-20 | End: 2022-02-25 | Stop reason: SDUPTHER

## 2022-02-07 ENCOUNTER — TELEPHONE (OUTPATIENT)
Dept: FAMILY MEDICINE CLINIC | Facility: CLINIC | Age: 67
End: 2022-02-07

## 2022-02-07 DIAGNOSIS — R26.89 BALANCE DISORDER: ICD-10-CM

## 2022-02-07 DIAGNOSIS — M54.42 CHRONIC LOW BACK PAIN WITH BILATERAL SCIATICA, UNSPECIFIED BACK PAIN LATERALITY: ICD-10-CM

## 2022-02-07 DIAGNOSIS — M54.41 CHRONIC LOW BACK PAIN WITH BILATERAL SCIATICA, UNSPECIFIED BACK PAIN LATERALITY: ICD-10-CM

## 2022-02-07 DIAGNOSIS — R53.81 PHYSICAL DECONDITIONING: Primary | ICD-10-CM

## 2022-02-07 DIAGNOSIS — G89.29 CHRONIC LOW BACK PAIN WITH BILATERAL SCIATICA, UNSPECIFIED BACK PAIN LATERALITY: ICD-10-CM

## 2022-02-07 DIAGNOSIS — R29.898 WEAKNESS OF BOTH LOWER EXTREMITIES: ICD-10-CM

## 2022-02-07 NOTE — TELEPHONE ENCOUNTER
----- Message from Rodríguez Lopez DO sent at 2/7/2022  4:08 PM EST -----  Regarding: FW: cesar  Order is in [facility], please send to medical supply store of patient preference    ----- Message -----  From: Triny Laureano MA  Sent: 2/7/2022   1:06 PM EST  To: Rodríguez Lopez DO  Subject: FW: walker                                         ----- Message -----  From: Mahendra Yates  Sent: 2/7/2022  11:05 AM EST  To: [facility] [address] [facility]  Subject: walker                                           This is Alan, Aleksey guerrero broke.  This is the 2nd time it has broken, and I don't think it can be repaired again.  He needs a new one.  He needs the kind with 4 wheels and a seat.  Please let us know where the rx for it will be sent.     Thank you

## 2022-02-07 NOTE — TELEPHONE ENCOUNTER
Contacted patient, he requested the walker be sent to a facility close to him. I have sent this to [facility].

## 2022-02-09 ENCOUNTER — HOSPITAL ENCOUNTER (INPATIENT)
Facility: HOSPITAL | Age: 67
LOS: 1 days | Discharge: HOME | End: 2022-02-11
Attending: EMERGENCY MEDICINE | Admitting: INTERNAL MEDICINE

## 2022-02-09 ENCOUNTER — APPOINTMENT (OUTPATIENT)
Dept: GENERAL RADIOLOGY | Facility: HOSPITAL | Age: 67
End: 2022-02-09

## 2022-02-09 ENCOUNTER — APPOINTMENT (OUTPATIENT)
Dept: CT IMAGING | Facility: HOSPITAL | Age: 67
End: 2022-02-09
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
## 2022-02-09 DIAGNOSIS — R09.02 HYPOXIA: ICD-10-CM

## 2022-02-09 DIAGNOSIS — I50.9 ACUTE CONGESTIVE HEART FAILURE, UNSPECIFIED HEART FAILURE TYPE: Primary | ICD-10-CM

## 2022-02-09 DIAGNOSIS — S09.90XA INJURY OF HEAD, INITIAL ENCOUNTER: ICD-10-CM

## 2022-02-09 DIAGNOSIS — Z94.4 HISTORY OF LIVER TRANSPLANT: ICD-10-CM

## 2022-02-09 DIAGNOSIS — R53.1 GENERALIZED WEAKNESS: ICD-10-CM

## 2022-02-09 LAB
ALBUMIN SERPL-MCNC: 4 G/DL (ref 3.5–5.2)
ALBUMIN/GLOB SERPL: 1.5 G/DL
ALP SERPL-CCNC: 88 U/L (ref 39–117)
ALT SERPL W P-5'-P-CCNC: 19 U/L (ref 1–41)
ANION GAP SERPL CALCULATED.3IONS-SCNC: 11 MMOL/L (ref 5–15)
AST SERPL-CCNC: 19 U/L (ref 1–40)
BASOPHILS # BLD AUTO: 0.03 10*3/MM3 (ref 0–0.2)
BASOPHILS NFR BLD AUTO: 0.3 % (ref 0–1.5)
BILIRUB SERPL-MCNC: 0.6 MG/DL (ref 0–1.2)
BUN SERPL-MCNC: 61 MG/DL (ref 8–23)
BUN/CREAT SERPL: 35.9 (ref 7–25)
CALCIUM SPEC-SCNC: 9.4 MG/DL (ref 8.6–10.5)
CHLORIDE SERPL-SCNC: 98 MMOL/L (ref 98–107)
CO2 SERPL-SCNC: 20 MMOL/L (ref 22–29)
CREAT SERPL-MCNC: 1.7 MG/DL (ref 0.76–1.27)
DEPRECATED RDW RBC AUTO: 40.2 FL (ref 37–54)
EOSINOPHIL # BLD AUTO: 0.15 10*3/MM3 (ref 0–0.4)
EOSINOPHIL NFR BLD AUTO: 1.7 % (ref 0.3–6.2)
ERYTHROCYTE [DISTWIDTH] IN BLOOD BY AUTOMATED COUNT: 13.4 % (ref 12.3–15.4)
GFR SERPL CREATININE-BSD FRML MDRD: 40 ML/MIN/1.73
GLOBULIN UR ELPH-MCNC: 2.7 GM/DL
GLUCOSE SERPL-MCNC: 190 MG/DL (ref 65–99)
HCT VFR BLD AUTO: 32.7 % (ref 37.5–51)
HGB BLD-MCNC: 10.9 G/DL (ref 13–17.7)
IMM GRANULOCYTES # BLD AUTO: 0.04 10*3/MM3 (ref 0–0.05)
IMM GRANULOCYTES NFR BLD AUTO: 0.4 % (ref 0–0.5)
LYMPHOCYTES # BLD AUTO: 1.23 10*3/MM3 (ref 0.7–3.1)
LYMPHOCYTES NFR BLD AUTO: 13.7 % (ref 19.6–45.3)
MAGNESIUM SERPL-MCNC: 2.6 MG/DL (ref 1.6–2.4)
MCH RBC QN AUTO: 27.7 PG (ref 26.6–33)
MCHC RBC AUTO-ENTMCNC: 33.3 G/DL (ref 31.5–35.7)
MCV RBC AUTO: 83 FL (ref 79–97)
MONOCYTES # BLD AUTO: 1 10*3/MM3 (ref 0.1–0.9)
MONOCYTES NFR BLD AUTO: 11.1 % (ref 5–12)
NEUTROPHILS NFR BLD AUTO: 6.56 10*3/MM3 (ref 1.7–7)
NEUTROPHILS NFR BLD AUTO: 72.8 % (ref 42.7–76)
NRBC BLD AUTO-RTO: 0 /100 WBC (ref 0–0.2)
NT-PROBNP SERPL-MCNC: 2887 PG/ML (ref 0–900)
PLATELET # BLD AUTO: 284 10*3/MM3 (ref 140–450)
PMV BLD AUTO: 11.2 FL (ref 6–12)
POTASSIUM SERPL-SCNC: 5.3 MMOL/L (ref 3.5–5.2)
PROT SERPL-MCNC: 6.7 G/DL (ref 6–8.5)
RBC # BLD AUTO: 3.94 10*6/MM3 (ref 4.14–5.8)
SODIUM SERPL-SCNC: 129 MMOL/L (ref 136–145)
TROPONIN T SERPL-MCNC: 0.08 NG/ML (ref 0–0.03)
WBC NRBC COR # BLD: 9.01 10*3/MM3 (ref 3.4–10.8)

## 2022-02-09 PROCEDURE — 84484 ASSAY OF TROPONIN QUANT: CPT

## 2022-02-09 PROCEDURE — 83735 ASSAY OF MAGNESIUM: CPT

## 2022-02-09 PROCEDURE — 87636 SARSCOV2 & INF A&B AMP PRB: CPT | Performed by: EMERGENCY MEDICINE

## 2022-02-09 PROCEDURE — 85025 COMPLETE CBC W/AUTO DIFF WBC: CPT

## 2022-02-09 PROCEDURE — 80053 COMPREHEN METABOLIC PANEL: CPT

## 2022-02-09 PROCEDURE — 83880 ASSAY OF NATRIURETIC PEPTIDE: CPT | Performed by: EMERGENCY MEDICINE

## 2022-02-09 PROCEDURE — 85045 AUTOMATED RETICULOCYTE COUNT: CPT | Performed by: INTERNAL MEDICINE

## 2022-02-09 PROCEDURE — 36415 COLL VENOUS BLD VENIPUNCTURE: CPT

## 2022-02-09 PROCEDURE — 99284 EMERGENCY DEPT VISIT MOD MDM: CPT

## 2022-02-09 PROCEDURE — 71045 X-RAY EXAM CHEST 1 VIEW: CPT

## 2022-02-09 PROCEDURE — 93005 ELECTROCARDIOGRAM TRACING: CPT

## 2022-02-09 PROCEDURE — 99285 EMERGENCY DEPT VISIT HI MDM: CPT

## 2022-02-09 RX ORDER — SODIUM CHLORIDE 0.9 % (FLUSH) 0.9 %
10 SYRINGE (ML) INJECTION AS NEEDED
Status: DISCONTINUED | OUTPATIENT
Start: 2022-02-09 | End: 2022-02-11 | Stop reason: HOSPADM

## 2022-02-10 ENCOUNTER — APPOINTMENT (OUTPATIENT)
Dept: CT IMAGING | Facility: HOSPITAL | Age: 67
End: 2022-02-10

## 2022-02-10 ENCOUNTER — APPOINTMENT (OUTPATIENT)
Dept: CARDIOLOGY | Facility: HOSPITAL | Age: 67
End: 2022-02-10

## 2022-02-10 PROBLEM — D64.9 ANEMIA: Status: ACTIVE | Noted: 2022-02-10

## 2022-02-10 PROBLEM — R09.02 HYPOXIA: Status: ACTIVE | Noted: 2022-02-10

## 2022-02-10 PROBLEM — R00.1 BRADYCARDIA: Status: ACTIVE | Noted: 2022-02-10

## 2022-02-10 PROBLEM — I50.9 ACUTE CONGESTIVE HEART FAILURE: Status: ACTIVE | Noted: 2022-02-10

## 2022-02-10 LAB
ABO GROUP BLD: NORMAL
ABO GROUP BLD: NORMAL
ANION GAP SERPL CALCULATED.3IONS-SCNC: 12 MMOL/L (ref 5–15)
BACTERIA UR QL AUTO: NORMAL /HPF
BASOPHILS # BLD AUTO: 0.02 10*3/MM3 (ref 0–0.2)
BASOPHILS NFR BLD AUTO: 0.3 % (ref 0–1.5)
BH CV ECHO MEAS - AO MAX PG (FULL): 17.6 MMHG
BH CV ECHO MEAS - AO MAX PG: 24 MMHG
BH CV ECHO MEAS - AO MEAN PG (FULL): 9.3 MMHG
BH CV ECHO MEAS - AO MEAN PG: 13 MMHG
BH CV ECHO MEAS - AO ROOT AREA (BSA CORRECTED): 1.4
BH CV ECHO MEAS - AO ROOT AREA: 8.5 CM^2
BH CV ECHO MEAS - AO ROOT DIAM: 3.3 CM
BH CV ECHO MEAS - AO V2 MAX: 246 CM/SEC
BH CV ECHO MEAS - AO V2 MEAN: 170 CM/SEC
BH CV ECHO MEAS - AO V2 VTI: 56 CM
BH CV ECHO MEAS - ASC AORTA: 3.6 CM
BH CV ECHO MEAS - AVA(I,A): 1.5 CM^2
BH CV ECHO MEAS - AVA(I,D): 1.5 CM^2
BH CV ECHO MEAS - AVA(V,A): 1.6 CM^2
BH CV ECHO MEAS - AVA(V,D): 1.6 CM^2
BH CV ECHO MEAS - BSA(HAYCOCK): 2.4 M^2
BH CV ECHO MEAS - BSA: 2.3 M^2
BH CV ECHO MEAS - BZI_BMI: 31.9 KILOGRAMS/M^2
BH CV ECHO MEAS - BZI_METRIC_HEIGHT: 182.9 CM
BH CV ECHO MEAS - BZI_METRIC_WEIGHT: 106.6 KG
BH CV ECHO MEAS - EDV(CUBED): 114.9 ML
BH CV ECHO MEAS - EDV(MOD-SP2): 179 ML
BH CV ECHO MEAS - EDV(MOD-SP4): 130 ML
BH CV ECHO MEAS - EDV(TEICH): 110.7 ML
BH CV ECHO MEAS - EF(CUBED): 50.5 %
BH CV ECHO MEAS - EF(MOD-BP): 60 %
BH CV ECHO MEAS - EF(MOD-SP2): 64.2 %
BH CV ECHO MEAS - EF(MOD-SP4): 56.2 %
BH CV ECHO MEAS - EF(TEICH): 42.5 %
BH CV ECHO MEAS - ESV(CUBED): 56.8 ML
BH CV ECHO MEAS - ESV(MOD-SP2): 64 ML
BH CV ECHO MEAS - ESV(MOD-SP4): 57 ML
BH CV ECHO MEAS - ESV(TEICH): 63.7 ML
BH CV ECHO MEAS - FS: 20.9 %
BH CV ECHO MEAS - IVS/LVPW: 1.1
BH CV ECHO MEAS - IVSD: 1.1 CM
BH CV ECHO MEAS - LA DIMENSION: 5 CM
BH CV ECHO MEAS - LA/AO: 1.5
BH CV ECHO MEAS - LAD MAJOR: 6.5 CM
BH CV ECHO MEAS - LAT PEAK E' VEL: 7.1 CM/SEC
BH CV ECHO MEAS - LATERAL E/E' RATIO: 18.4
BH CV ECHO MEAS - LV DIASTOLIC VOL/BSA (35-75): 57 ML/M^2
BH CV ECHO MEAS - LV IVRT: 0.05 SEC
BH CV ECHO MEAS - LV MASS(C)D: 197.1 GRAMS
BH CV ECHO MEAS - LV MASS(C)DI: 86.4 GRAMS/M^2
BH CV ECHO MEAS - LV MAX PG: 6.3 MMHG
BH CV ECHO MEAS - LV MEAN PG: 3.1 MMHG
BH CV ECHO MEAS - LV SYSTOLIC VOL/BSA (12-30): 25 ML/M^2
BH CV ECHO MEAS - LV V1 MAX: 125.9 CM/SEC
BH CV ECHO MEAS - LV V1 MEAN: 82.3 CM/SEC
BH CV ECHO MEAS - LV V1 VTI: 27.8 CM
BH CV ECHO MEAS - LVIDD: 4.9 CM
BH CV ECHO MEAS - LVIDS: 3.8 CM
BH CV ECHO MEAS - LVLD AP2: 8.8 CM
BH CV ECHO MEAS - LVLD AP4: 8.5 CM
BH CV ECHO MEAS - LVLS AP2: 6.7 CM
BH CV ECHO MEAS - LVLS AP4: 7.1 CM
BH CV ECHO MEAS - LVOT AREA (M): 3.1 CM^2
BH CV ECHO MEAS - LVOT AREA: 3 CM^2
BH CV ECHO MEAS - LVOT DIAM: 2 CM
BH CV ECHO MEAS - LVPWD: 1.1 CM
BH CV ECHO MEAS - MED PEAK E' VEL: 9.7 CM/SEC
BH CV ECHO MEAS - MEDIAL E/E' RATIO: 13.6
BH CV ECHO MEAS - MV DEC SLOPE: 540.1 CM/SEC^2
BH CV ECHO MEAS - MV DEC TIME: 0.24 SEC
BH CV ECHO MEAS - MV E MAX VEL: 133.7 CM/SEC
BH CV ECHO MEAS - MV MAX PG: 12.1 MMHG
BH CV ECHO MEAS - MV MEAN PG: 5.1 MMHG
BH CV ECHO MEAS - MV P1/2T MAX VEL: 181 CM/SEC
BH CV ECHO MEAS - MV P1/2T: 98.2 MSEC
BH CV ECHO MEAS - MV V2 MAX: 173.7 CM/SEC
BH CV ECHO MEAS - MV V2 MEAN: 101.5 CM/SEC
BH CV ECHO MEAS - MV V2 VTI: 47.9 CM
BH CV ECHO MEAS - MVA P1/2T LCG: 1.2 CM^2
BH CV ECHO MEAS - MVA(P1/2T): 2.2 CM^2
BH CV ECHO MEAS - MVA(VTI): 1.8 CM^2
BH CV ECHO MEAS - PA ACC SLOPE: 414.9 CM/SEC^2
BH CV ECHO MEAS - PA ACC TIME: 0.18 SEC
BH CV ECHO MEAS - PA MAX PG: 5.6 MMHG
BH CV ECHO MEAS - PA PR(ACCEL): -0.22 MMHG
BH CV ECHO MEAS - PA V2 MAX: 118.3 CM/SEC
BH CV ECHO MEAS - RAP SYSTOLE: 8 MMHG
BH CV ECHO MEAS - RVSP: 27 MMHG
BH CV ECHO MEAS - SI(AO): 209.1 ML/M^2
BH CV ECHO MEAS - SI(CUBED): 25.4 ML/M^2
BH CV ECHO MEAS - SI(LVOT): 37 ML/M^2
BH CV ECHO MEAS - SI(MOD-SP2): 50.4 ML/M^2
BH CV ECHO MEAS - SI(MOD-SP4): 32 ML/M^2
BH CV ECHO MEAS - SI(TEICH): 20.6 ML/M^2
BH CV ECHO MEAS - SV(AO): 477 ML
BH CV ECHO MEAS - SV(CUBED): 58.1 ML
BH CV ECHO MEAS - SV(LVOT): 84.3 ML
BH CV ECHO MEAS - SV(MOD-SP2): 115 ML
BH CV ECHO MEAS - SV(MOD-SP4): 73 ML
BH CV ECHO MEAS - SV(TEICH): 47.1 ML
BH CV ECHO MEAS - TAPSE (>1.6): 1.6 CM
BH CV ECHO MEAS - TR MAX PG: 19 MMHG
BH CV ECHO MEAS - TR MAX VEL: 217.9 CM/SEC
BH CV ECHO MEASUREMENTS AVERAGE E/E' RATIO: 15.92
BH CV XLRA - RV BASE: 3.6 CM
BH CV XLRA - RV LENGTH: 5.7 CM
BH CV XLRA - RV MID: 2.4 CM
BH CV XLRA - TDI S': 8.22 CM/SEC
BILIRUB UR QL STRIP: NEGATIVE
BLD GP AB SCN SERPL QL: NEGATIVE
BUN SERPL-MCNC: 64 MG/DL (ref 8–23)
BUN/CREAT SERPL: 36.4 (ref 7–25)
CALCIUM SPEC-SCNC: 9.4 MG/DL (ref 8.6–10.5)
CHLORIDE SERPL-SCNC: 98 MMOL/L (ref 98–107)
CHOLEST SERPL-MCNC: 132 MG/DL (ref 0–200)
CLARITY UR: CLEAR
CO2 SERPL-SCNC: 19 MMOL/L (ref 22–29)
COLOR UR: YELLOW
CREAT SERPL-MCNC: 1.76 MG/DL (ref 0.76–1.27)
DEPRECATED RDW RBC AUTO: 41 FL (ref 37–54)
EOSINOPHIL # BLD AUTO: 0.09 10*3/MM3 (ref 0–0.4)
EOSINOPHIL NFR BLD AUTO: 1.1 % (ref 0.3–6.2)
ERYTHROCYTE [DISTWIDTH] IN BLOOD BY AUTOMATED COUNT: 13.8 % (ref 12.3–15.4)
FERRITIN SERPL-MCNC: 68.39 NG/ML (ref 30–400)
FLUAV RNA RESP QL NAA+PROBE: NOT DETECTED
FLUBV RNA RESP QL NAA+PROBE: NOT DETECTED
FOLATE SERPL-MCNC: <2 NG/ML (ref 4.78–24.2)
GFR SERPL CREATININE-BSD FRML MDRD: 39 ML/MIN/1.73
GLUCOSE BLDC GLUCOMTR-MCNC: 134 MG/DL (ref 70–130)
GLUCOSE BLDC GLUCOMTR-MCNC: 135 MG/DL (ref 70–130)
GLUCOSE SERPL-MCNC: 152 MG/DL (ref 65–99)
GLUCOSE UR STRIP-MCNC: NEGATIVE MG/DL
HBA1C MFR BLD: 6.6 % (ref 4.8–5.6)
HCT VFR BLD AUTO: 32 % (ref 37.5–51)
HDLC SERPL-MCNC: 34 MG/DL (ref 40–60)
HGB BLD-MCNC: 10.9 G/DL (ref 13–17.7)
HGB UR QL STRIP.AUTO: NEGATIVE
HOLD SPECIMEN: NORMAL
HYALINE CASTS UR QL AUTO: NORMAL /LPF
IMM GRANULOCYTES # BLD AUTO: 0.04 10*3/MM3 (ref 0–0.05)
IMM GRANULOCYTES NFR BLD AUTO: 0.5 % (ref 0–0.5)
IRON 24H UR-MRATE: 25 MCG/DL (ref 59–158)
IRON SATN MFR SERPL: 7 % (ref 20–50)
KETONES UR QL STRIP: NEGATIVE
LDLC SERPL CALC-MCNC: 70 MG/DL (ref 0–100)
LDLC/HDLC SERPL: 1.94 {RATIO}
LEFT ATRIUM VOLUME INDEX: 31.6 ML/M^2
LEFT ATRIUM VOLUME: 72 ML
LEUKOCYTE ESTERASE UR QL STRIP.AUTO: ABNORMAL
LV EF 2D ECHO EST: 55 %
LYMPHOCYTES # BLD AUTO: 1.12 10*3/MM3 (ref 0.7–3.1)
LYMPHOCYTES NFR BLD AUTO: 14.1 % (ref 19.6–45.3)
MAXIMAL PREDICTED HEART RATE: 152 BPM
MCH RBC QN AUTO: 28.1 PG (ref 26.6–33)
MCHC RBC AUTO-ENTMCNC: 34.1 G/DL (ref 31.5–35.7)
MCV RBC AUTO: 82.5 FL (ref 79–97)
MONOCYTES # BLD AUTO: 0.87 10*3/MM3 (ref 0.1–0.9)
MONOCYTES NFR BLD AUTO: 11 % (ref 5–12)
NEUTROPHILS NFR BLD AUTO: 5.8 10*3/MM3 (ref 1.7–7)
NEUTROPHILS NFR BLD AUTO: 73 % (ref 42.7–76)
NITRITE UR QL STRIP: NEGATIVE
NRBC BLD AUTO-RTO: 0 /100 WBC (ref 0–0.2)
OSMOLALITY UR: 458 MOSM/KG (ref 300–1100)
PH UR STRIP.AUTO: <=5 [PH] (ref 5–8)
PLATELET # BLD AUTO: 252 10*3/MM3 (ref 140–450)
PMV BLD AUTO: 11.3 FL (ref 6–12)
POTASSIUM SERPL-SCNC: 5.1 MMOL/L (ref 3.5–5.2)
PROT UR QL STRIP: NEGATIVE
QT INTERVAL: 492 MS
QTC INTERVAL: 430 MS
RBC # BLD AUTO: 3.88 10*6/MM3 (ref 4.14–5.8)
RBC # UR STRIP: NORMAL /HPF
REF LAB TEST METHOD: NORMAL
RETICS # AUTO: 0.08 10*6/MM3 (ref 0.02–0.13)
RETICS/RBC NFR AUTO: 2 % (ref 0.7–1.9)
RH BLD: POSITIVE
RH BLD: POSITIVE
SARS-COV-2 RNA RESP QL NAA+PROBE: NOT DETECTED
SODIUM SERPL-SCNC: 129 MMOL/L (ref 136–145)
SODIUM UR-SCNC: 42 MMOL/L
SP GR UR STRIP: 1.02 (ref 1–1.03)
SQUAMOUS #/AREA URNS HPF: NORMAL /HPF
STRESS TARGET HR: 129 BPM
T&S EXPIRATION DATE: NORMAL
TIBC SERPL-MCNC: 341 MCG/DL (ref 298–536)
TRANSFERRIN SERPL-MCNC: 229 MG/DL (ref 200–360)
TRIGL SERPL-MCNC: 160 MG/DL (ref 0–150)
TROPONIN T SERPL-MCNC: 0.07 NG/ML (ref 0–0.03)
TROPONIN T SERPL-MCNC: 0.07 NG/ML (ref 0–0.03)
UROBILINOGEN UR QL STRIP: ABNORMAL
VIT B12 BLD-MCNC: <150 PG/ML (ref 211–946)
VLDLC SERPL-MCNC: 28 MG/DL (ref 5–40)
WBC # UR STRIP: NORMAL /HPF
WBC NRBC COR # BLD: 7.94 10*3/MM3 (ref 3.4–10.8)
WHOLE BLOOD HOLD SPECIMEN: NORMAL
WHOLE BLOOD HOLD SPECIMEN: NORMAL

## 2022-02-10 PROCEDURE — 93306 TTE W/DOPPLER COMPLETE: CPT | Performed by: INTERNAL MEDICINE

## 2022-02-10 PROCEDURE — 84466 ASSAY OF TRANSFERRIN: CPT | Performed by: INTERNAL MEDICINE

## 2022-02-10 PROCEDURE — 84484 ASSAY OF TROPONIN QUANT: CPT | Performed by: EMERGENCY MEDICINE

## 2022-02-10 PROCEDURE — 86900 BLOOD TYPING SEROLOGIC ABO: CPT

## 2022-02-10 PROCEDURE — 99223 1ST HOSP IP/OBS HIGH 75: CPT | Performed by: INTERNAL MEDICINE

## 2022-02-10 PROCEDURE — 71250 CT THORAX DX C-: CPT

## 2022-02-10 PROCEDURE — 99222 1ST HOSP IP/OBS MODERATE 55: CPT | Performed by: INTERNAL MEDICINE

## 2022-02-10 PROCEDURE — 82728 ASSAY OF FERRITIN: CPT | Performed by: INTERNAL MEDICINE

## 2022-02-10 PROCEDURE — 83540 ASSAY OF IRON: CPT | Performed by: INTERNAL MEDICINE

## 2022-02-10 PROCEDURE — 25010000002 FUROSEMIDE PER 20 MG: Performed by: NURSE PRACTITIONER

## 2022-02-10 PROCEDURE — 84484 ASSAY OF TROPONIN QUANT: CPT | Performed by: NURSE PRACTITIONER

## 2022-02-10 PROCEDURE — 80061 LIPID PANEL: CPT | Performed by: NURSE PRACTITIONER

## 2022-02-10 PROCEDURE — 25010000002 SULFUR HEXAFLUORIDE MICROSPH 60.7-25 MG RECONSTITUTED SUSPENSION: Performed by: NURSE PRACTITIONER

## 2022-02-10 PROCEDURE — 82607 VITAMIN B-12: CPT | Performed by: INTERNAL MEDICINE

## 2022-02-10 PROCEDURE — 82570 ASSAY OF URINE CREATININE: CPT | Performed by: NURSE PRACTITIONER

## 2022-02-10 PROCEDURE — 82746 ASSAY OF FOLIC ACID SERUM: CPT | Performed by: INTERNAL MEDICINE

## 2022-02-10 PROCEDURE — 70450 CT HEAD/BRAIN W/O DYE: CPT

## 2022-02-10 PROCEDURE — 83036 HEMOGLOBIN GLYCOSYLATED A1C: CPT | Performed by: NURSE PRACTITIONER

## 2022-02-10 PROCEDURE — 25010000002 FUROSEMIDE PER 20 MG: Performed by: EMERGENCY MEDICINE

## 2022-02-10 PROCEDURE — 86901 BLOOD TYPING SEROLOGIC RH(D): CPT | Performed by: INTERNAL MEDICINE

## 2022-02-10 PROCEDURE — 63710000001 MYCOPHENOLATE MOFETIL PER 250 MG: Performed by: NURSE PRACTITIONER

## 2022-02-10 PROCEDURE — 85025 COMPLETE CBC W/AUTO DIFF WBC: CPT | Performed by: NURSE PRACTITIONER

## 2022-02-10 PROCEDURE — 83935 ASSAY OF URINE OSMOLALITY: CPT | Performed by: NURSE PRACTITIONER

## 2022-02-10 PROCEDURE — 86850 RBC ANTIBODY SCREEN: CPT | Performed by: INTERNAL MEDICINE

## 2022-02-10 PROCEDURE — 81001 URINALYSIS AUTO W/SCOPE: CPT

## 2022-02-10 PROCEDURE — 86900 BLOOD TYPING SEROLOGIC ABO: CPT | Performed by: INTERNAL MEDICINE

## 2022-02-10 PROCEDURE — 63710000001 TACROLIMUS PER 1 MG: Performed by: NURSE PRACTITIONER

## 2022-02-10 PROCEDURE — 80048 BASIC METABOLIC PNL TOTAL CA: CPT | Performed by: NURSE PRACTITIONER

## 2022-02-10 PROCEDURE — 93005 ELECTROCARDIOGRAM TRACING: CPT | Performed by: EMERGENCY MEDICINE

## 2022-02-10 PROCEDURE — 82962 GLUCOSE BLOOD TEST: CPT

## 2022-02-10 PROCEDURE — 84300 ASSAY OF URINE SODIUM: CPT | Performed by: NURSE PRACTITIONER

## 2022-02-10 PROCEDURE — 86901 BLOOD TYPING SEROLOGIC RH(D): CPT

## 2022-02-10 PROCEDURE — 93306 TTE W/DOPPLER COMPLETE: CPT

## 2022-02-10 PROCEDURE — 80197 ASSAY OF TACROLIMUS: CPT | Performed by: INTERNAL MEDICINE

## 2022-02-10 RX ORDER — PENTOXIFYLLINE 400 MG/1
400 TABLET, EXTENDED RELEASE ORAL
Status: DISCONTINUED | OUTPATIENT
Start: 2022-02-10 | End: 2022-02-11 | Stop reason: HOSPADM

## 2022-02-10 RX ORDER — AMLODIPINE BESYLATE 10 MG/1
10 TABLET ORAL DAILY
Status: DISCONTINUED | OUTPATIENT
Start: 2022-02-10 | End: 2022-02-11 | Stop reason: HOSPADM

## 2022-02-10 RX ORDER — PANTOPRAZOLE SODIUM 40 MG/1
40 TABLET, DELAYED RELEASE ORAL DAILY
Status: DISCONTINUED | OUTPATIENT
Start: 2022-02-10 | End: 2022-02-11 | Stop reason: HOSPADM

## 2022-02-10 RX ORDER — OXYCODONE AND ACETAMINOPHEN 10; 325 MG/1; MG/1
1 TABLET ORAL EVERY 6 HOURS PRN
Status: DISCONTINUED | OUTPATIENT
Start: 2022-02-10 | End: 2022-02-11 | Stop reason: HOSPADM

## 2022-02-10 RX ORDER — PIMECROLIMUS 10 MG/G
CREAM TOPICAL EVERY 12 HOURS SCHEDULED
Status: DISCONTINUED | OUTPATIENT
Start: 2022-02-10 | End: 2022-02-11 | Stop reason: HOSPADM

## 2022-02-10 RX ORDER — ISOSORBIDE MONONITRATE 60 MG/1
120 TABLET, EXTENDED RELEASE ORAL DAILY
Status: DISCONTINUED | OUTPATIENT
Start: 2022-02-10 | End: 2022-02-11

## 2022-02-10 RX ORDER — TACROLIMUS 1 MG/1
1 CAPSULE ORAL DAILY
Status: DISCONTINUED | OUTPATIENT
Start: 2022-02-10 | End: 2022-02-11 | Stop reason: HOSPADM

## 2022-02-10 RX ORDER — PRAVASTATIN SODIUM 40 MG
80 TABLET ORAL DAILY
Status: DISCONTINUED | OUTPATIENT
Start: 2022-02-10 | End: 2022-02-11 | Stop reason: HOSPADM

## 2022-02-10 RX ORDER — DEXTROSE MONOHYDRATE 25 G/50ML
25 INJECTION, SOLUTION INTRAVENOUS
Status: DISCONTINUED | OUTPATIENT
Start: 2022-02-10 | End: 2022-02-11 | Stop reason: HOSPADM

## 2022-02-10 RX ORDER — SODIUM CHLORIDE 0.9 % (FLUSH) 0.9 %
10 SYRINGE (ML) INJECTION AS NEEDED
Status: DISCONTINUED | OUTPATIENT
Start: 2022-02-10 | End: 2022-02-11 | Stop reason: HOSPADM

## 2022-02-10 RX ORDER — LORAZEPAM 0.5 MG/1
0.5 TABLET ORAL 2 TIMES DAILY PRN
Status: DISCONTINUED | OUTPATIENT
Start: 2022-02-10 | End: 2022-02-11 | Stop reason: HOSPADM

## 2022-02-10 RX ORDER — MYCOPHENOLATE MOFETIL 250 MG/1
500 CAPSULE ORAL EVERY 12 HOURS SCHEDULED
Status: DISCONTINUED | OUTPATIENT
Start: 2022-02-10 | End: 2022-02-11 | Stop reason: HOSPADM

## 2022-02-10 RX ORDER — RANOLAZINE 500 MG/1
1000 TABLET, EXTENDED RELEASE ORAL 2 TIMES DAILY
Status: DISCONTINUED | OUTPATIENT
Start: 2022-02-10 | End: 2022-02-11 | Stop reason: HOSPADM

## 2022-02-10 RX ORDER — NICOTINE POLACRILEX 4 MG
15 LOZENGE BUCCAL
Status: DISCONTINUED | OUTPATIENT
Start: 2022-02-10 | End: 2022-02-11 | Stop reason: HOSPADM

## 2022-02-10 RX ORDER — CLOPIDOGREL BISULFATE 75 MG/1
75 TABLET ORAL DAILY
Status: DISCONTINUED | OUTPATIENT
Start: 2022-02-10 | End: 2022-02-11 | Stop reason: HOSPADM

## 2022-02-10 RX ORDER — ASPIRIN 81 MG/1
81 TABLET ORAL DAILY
Status: DISCONTINUED | OUTPATIENT
Start: 2022-02-10 | End: 2022-02-11 | Stop reason: HOSPADM

## 2022-02-10 RX ORDER — CETIRIZINE HYDROCHLORIDE 10 MG/1
5 TABLET ORAL DAILY
Status: DISCONTINUED | OUTPATIENT
Start: 2022-02-10 | End: 2022-02-11 | Stop reason: HOSPADM

## 2022-02-10 RX ORDER — MULTIPLE VITAMINS W/ MINERALS TAB 9MG-400MCG
1 TAB ORAL DAILY
Status: DISCONTINUED | OUTPATIENT
Start: 2022-02-10 | End: 2022-02-11 | Stop reason: HOSPADM

## 2022-02-10 RX ORDER — FUROSEMIDE 10 MG/ML
40 INJECTION INTRAMUSCULAR; INTRAVENOUS ONCE
Status: COMPLETED | OUTPATIENT
Start: 2022-02-10 | End: 2022-02-10

## 2022-02-10 RX ORDER — FUROSEMIDE 10 MG/ML
60 INJECTION INTRAMUSCULAR; INTRAVENOUS ONCE
Status: COMPLETED | OUTPATIENT
Start: 2022-02-10 | End: 2022-02-10

## 2022-02-10 RX ORDER — BUMETANIDE 0.25 MG/ML
2 INJECTION INTRAMUSCULAR; INTRAVENOUS DAILY
Status: DISCONTINUED | OUTPATIENT
Start: 2022-02-11 | End: 2022-02-11

## 2022-02-10 RX ORDER — GABAPENTIN 300 MG/1
300 CAPSULE ORAL 3 TIMES DAILY
Status: DISCONTINUED | OUTPATIENT
Start: 2022-02-10 | End: 2022-02-11 | Stop reason: HOSPADM

## 2022-02-10 RX ORDER — SODIUM CHLORIDE 0.9 % (FLUSH) 0.9 %
10 SYRINGE (ML) INJECTION EVERY 12 HOURS SCHEDULED
Status: DISCONTINUED | OUTPATIENT
Start: 2022-02-10 | End: 2022-02-11 | Stop reason: HOSPADM

## 2022-02-10 RX ORDER — SODIUM PHOSPHATE,MONO-DIBASIC 19G-7G/118
500 ENEMA (ML) RECTAL DAILY
Status: ON HOLD | COMMUNITY
End: 2022-08-16

## 2022-02-10 RX ORDER — DULOXETIN HYDROCHLORIDE 60 MG/1
60 CAPSULE, DELAYED RELEASE ORAL DAILY
Status: DISCONTINUED | OUTPATIENT
Start: 2022-02-10 | End: 2022-02-11 | Stop reason: HOSPADM

## 2022-02-10 RX ORDER — MELATONIN
1000 DAILY
COMMUNITY
End: 2022-11-21

## 2022-02-10 RX ADMIN — DULOXETINE 60 MG: 60 CAPSULE, DELAYED RELEASE ORAL at 09:38

## 2022-02-10 RX ADMIN — PIMECROLIMUS: 10 CREAM TOPICAL at 09:39

## 2022-02-10 RX ADMIN — GABAPENTIN 300 MG: 300 CAPSULE ORAL at 20:44

## 2022-02-10 RX ADMIN — ISOSORBIDE MONONITRATE 120 MG: 120 TABLET, EXTENDED RELEASE ORAL at 09:39

## 2022-02-10 RX ADMIN — PENTOXIFYLLINE 400 MG: 400 TABLET, EXTENDED RELEASE ORAL at 17:18

## 2022-02-10 RX ADMIN — CLOPIDOGREL BISULFATE 75 MG: 75 TABLET, FILM COATED ORAL at 09:44

## 2022-02-10 RX ADMIN — GABAPENTIN 300 MG: 300 CAPSULE ORAL at 17:18

## 2022-02-10 RX ADMIN — ASPIRIN 81 MG: 81 TABLET, COATED ORAL at 09:43

## 2022-02-10 RX ADMIN — GABAPENTIN 300 MG: 300 CAPSULE ORAL at 09:44

## 2022-02-10 RX ADMIN — MYCOPHENOLATE MOFETIL 500 MG: 250 CAPSULE ORAL at 20:37

## 2022-02-10 RX ADMIN — MYCOPHENOLATE MOFETIL 500 MG: 250 CAPSULE ORAL at 09:37

## 2022-02-10 RX ADMIN — Medication 1 TABLET: at 09:38

## 2022-02-10 RX ADMIN — FUROSEMIDE 40 MG: 10 INJECTION, SOLUTION INTRAMUSCULAR; INTRAVENOUS at 02:35

## 2022-02-10 RX ADMIN — PIMECROLIMUS: 10 CREAM TOPICAL at 20:36

## 2022-02-10 RX ADMIN — RANOLAZINE 1000 MG: 500 TABLET, FILM COATED, EXTENDED RELEASE ORAL at 20:35

## 2022-02-10 RX ADMIN — AMLODIPINE BESYLATE 10 MG: 10 TABLET ORAL at 09:43

## 2022-02-10 RX ADMIN — PENTOXIFYLLINE 400 MG: 400 TABLET, EXTENDED RELEASE ORAL at 12:39

## 2022-02-10 RX ADMIN — TACROLIMUS 1 MG: 1 CAPSULE ORAL at 09:38

## 2022-02-10 RX ADMIN — FUROSEMIDE 60 MG: 10 INJECTION INTRAMUSCULAR; INTRAVENOUS at 06:56

## 2022-02-10 RX ADMIN — RANOLAZINE 1000 MG: 500 TABLET, FILM COATED, EXTENDED RELEASE ORAL at 09:39

## 2022-02-10 RX ADMIN — PRAVASTATIN SODIUM 80 MG: 40 TABLET ORAL at 09:39

## 2022-02-10 RX ADMIN — SODIUM CHLORIDE, PRESERVATIVE FREE 10 ML: 5 INJECTION INTRAVENOUS at 09:47

## 2022-02-10 RX ADMIN — PANTOPRAZOLE SODIUM 40 MG: 40 TABLET, DELAYED RELEASE ORAL at 09:47

## 2022-02-10 RX ADMIN — SULFUR HEXAFLUORIDE 2 ML: KIT at 11:59

## 2022-02-10 RX ADMIN — PENTOXIFYLLINE 400 MG: 400 TABLET, EXTENDED RELEASE ORAL at 09:33

## 2022-02-10 RX ADMIN — CETIRIZINE HYDROCHLORIDE 5 MG: 10 TABLET, FILM COATED ORAL at 09:44

## 2022-02-10 NOTE — ED PROVIDER NOTES
EMERGENCY DEPARTMENT ENCOUNTER    Pt Name: Mahendra Yates  MRN: [id number]  Pt :   1955  Room Number:  1515  Date of encounter:  2022  PCP: Rodríguez Lopez DO  ED Provider: Parth Hassan MD    Historian: Patient      HPI:  Chief Complaint: Near syncope        Context: Mahendra Yates is a 67 y.o. male who presents to the ED c/o generalized weakness ongoing for the last 6 months but significant weakness starting at 9:30 PM when he had a fall to the floor.  He fell forward striking his head/face on a door jam.  He had no full loss of consciousness.  He describes his weakness as severe.  He has not had any recent medication changes.  Mild cough which is chronic.  No history of pulmonary embolism, UTIs, recurrent pneumonia.  The patient has received his Covid vaccinations and booster.    The patient complains mostly of pain in his left arm and leg.  He has had no vomiting or vertigo.  No unilateral weakness.  Patient underwent liver transplant 11 years ago and CABG 15 years ago.  He is followed by Dr. Dillon of cardiology.      PAST MEDICAL HISTORY  Past Medical History:   Diagnosis Date   • Arthritis    • Atherosclerosis    • B12 deficiency    • Cancer (HCC)    • Cancer of liver (HCC)    • Chronic headaches    • Coronary artery disease    • Diabetes mellitus (HCC)    • Gastroesophageal reflux disease    • History of transfusion    • Hyperlipidemia    • Hypertension    • Myocardial infarction (HCC)    • Sleep apnea    • Stroke (HCC)          PAST SURGICAL HISTORY  Past Surgical History:   Procedure Laterality Date   • ANGIOPLASTY     • CARDIAC SURGERY     • CARPAL TUNNEL RELEASE Right 2017    Procedure: CARPAL TUNNEL RELEASE RIGHT ;  Surgeon: Luis Hayden MD;  Location: [facility];  Service:    • CHOLECYSTECTOMY     • CORONARY ARTERY BYPASS GRAFT     • CORONARY STENT PLACEMENT      x2   • LIVER TRANSPLANTATION     • TUMOR REMOVAL Right     axilla         FAMILY  HISTORY  Family History   Problem Relation Age of Onset   • Heart disease Mother    • Stroke Mother    • Diabetes Father    • Liver cancer Brother    • No Known Problems Sister    • Macular degeneration Brother    • No Known Problems Sister    • Dementia Brother          SOCIAL HISTORY  Social History     Socioeconomic History   • Marital status:    Tobacco Use   • Smoking status: Former Smoker     Packs/day: 4.00     Years: 30.00     Pack years: 120.00     Types: Cigarettes     Quit date: 5/3/1993     Years since quittin.7   • Smokeless tobacco: Never Used   Vaping Use   • Vaping Use: Never used   Substance and Sexual Activity   • Alcohol use: No   • Drug use: No   • Sexual activity: Defer         ALLERGIES  Contrast dye, Penicillins, and Sulfasalazine        REVIEW OF SYSTEMS  Review of Systems       All systems reviewed and negative except for those discussed in HPI.       PHYSICAL EXAM    I have reviewed the triage vital signs and nursing notes.    ED Triage Vitals [228]   Temp Heart Rate Resp BP SpO2   97.8 °F (36.6 °C) (!) 44 16 120/60 91 %      Temp src Heart Rate Source Patient Position BP Location FiO2 (%)   Oral Monitor Lying Left arm --       Physical Exam  GENERAL:   Appears generally weak but nontoxic.  HENT: Nares patent.  EYES: No scleral icterus.  CV: Regular rhythm, regular rate.  No murmurs gallops rubs   RESPIRATORY: Normal effort.  No audible wheezes, rales or rhonchi.  Clear to auscultation upper lung fields and somewhat diminished in lower lung fields.  ABDOMEN: Soft, nontender  MUSCULOSKELETAL: No deformities.   NEURO: Alert, moves all extremities, follows commands.  SKIN: Warm, dry, no rash visualized.  Bilateral lower extremity edema, 1+.        LAB RESULTS  No results found for this or any previous visit (from the past 24 hour(s)).    If labs were ordered, I independently reviewed the results.        RADIOLOGY  No Radiology Exams Resulted Within Past 24 Hours    I  ordered and reviewed the above noted radiographic studies.        See radiologist's dictation for official interpretation.        PROCEDURES    Procedures    ECG 12 Lead             MEDICATIONS GIVEN IN ER    Medications   sodium chloride 0.9 % flush 10 mL (has no administration in time range)         PROGRESS, DATA ANALYSIS, CONSULTS, AND MEDICAL DECISION MAKING    All labs have been independently reviewed by me.  All radiology studies have been reviewed by me and the radiologist dictating the report.   EKG's have been independently viewed and interpreted by me.          ED Course as of 02/12/22 1533   Wed Feb 09, 2022   2322 89% RA  91% ON 4 L/min [MS]   Thu Feb 10, 2022   0132 Given chest x-ray findings, BNP, troponin the patient's presentation is most consistent with CHF, and new onset. I ordered Lasix 40 mg IV.  I have paged Dr. Mcconnell, hospitalist for admission. [MS]   0140 I spoke with Dr. López who will admit. [MS]      ED Course User Index  [MS] Parth Hassan MD             AS OF 23:10 EST VITALS:    BP - 120/60  HR - (!) 44  TEMP - 97.8 °F (36.6 °C) (Oral)  O2 SATS - 91%                  DIAGNOSIS  Final diagnoses:   Acute congestive heart failure, unspecified heart failure type (HCC)   Generalized weakness   Fall, initial encounter   Injury of head, initial encounter   Hypoxia   History of liver transplant (HCC)         DISPOSITION  Admission           Parth Hassan MD  02/12/22 6603

## 2022-02-10 NOTE — PLAN OF CARE
Problem: Adult Inpatient Plan of Care  Goal: Plan of Care Review  Outcome: Ongoing, Progressing  Flowsheets (Taken 2/10/2022 8846)  Outcome Summary: Pt admitted from ED. VSS, RA. Up with assist x1 and walker. Fall precautions in place. Continue to monitor   Goal Outcome Evaluation:              Outcome Summary: Pt admitted from ED. VSS, RA. Up with assist x1 and walker. Fall precautions in place. Continue to monitor

## 2022-02-10 NOTE — PROGRESS NOTES
[facility] Medicine Services  ADMISSION FOLLOW-UP NOTE          Patient admitted after midnight, H&P by my partner performed earlier on today's date reviewed.  Interim findings, labs, and charting also reviewed.        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     66 yo M with past medical history significant for degenerative disc disease, diabetes mellitus type 1, lumbar stenosis, cervical stenosis, liver cancer s/p liver transplant in 2009, CVA, HTN, HLD, and CAD s/p stents who presents to the ED due shortness of air and chest pain over the past three days, worse with exertion. He also reports worsening BLE edema. Workup in the ER concerning for congestive heart failure with moderate bilateral pleural effusions on CT chest.    ADDITIONAL PLAN:  --s/p 2 doses of IV Lasix. Hold further doses for now until Cardiology can evaluate the patient.   --Cr remains elevated at 1.76 this morning, previous baseline appears to be ~1.1. Hold nephrotoxic meds.  --Check Echo, last Echo in 2019 showed diastolic dysfunction.  --Cardiology to evaluate chest pain, has hx of CAD with stents. Elevated troponin but stable. EKG no acute findings. Continue DAPT, PRN nitro.   --PT/OT evaluation given his ongoing BLE weakness in setting of severe lumbar  stenosis--non-surgical candidate due to cardiovascular issues (follows with Neurosurgery), has been referred to pain management for injections.     Shaista Hallman MD  02/10/22

## 2022-02-10 NOTE — CONSULTS
Patient Care Team:  Rodríguez Lopez DO as PCP - General (Internal Medicine)  Natanael Estrella MD as Consulting Physician (Neurology)  Oren Pena MD as Consulting Physician (Cardiology)  Everardo Tate MD as Consulting Physician (Pain Medicine)  Scout Grant MD as Consulting Physician (Otolaryngology)  Christin Handley PA-C as Physician Assistant (Physician Assistant)  Radha Briggs APRN as Nurse Practitioner (Nurse Practitioner)    Chief complaint: Bilateral lower extremity weakness, SOA    Subjective      History of Present Illness   Mr. Yates is a 67-year-old male with medical history including DJD, DM1, lumbar stenosis, cervical stenosis, liver transplant and CAD. He presented to ED with complaints of bilateral lower extremity weakness, s/p fall and SOA x 3 days. Additional complaints included increased SOA with exertion, nausea, sweating and chest tightness with radiation down the left arm. On evaluation EKG shows sinus tara first-degree AV block, creatinine 1.7 and elevated troponin. Five months ago creatinine was noted to be 1.1. He is hyponatremic with serum na 129. We have been noted for renal management this admission.  Pt denies prior history of renal disease. He has never seen a nephrologist. He is a well controlled diabetic with A1C 6.6. No history of gout or renal stones. No known familial history of renal disase. He does endorses daily NSAID use for the last couple of years for chronic pain. He denies hematuria, dysuria , hemoptysis, epistaxis or LUTS.     Review of Systems   Constitutional: Positive for fatigue.   HENT: Negative for nosebleeds and sore throat.    Eyes: Negative for visual disturbance.   Respiratory: Positive for chest tightness and shortness of breath. Negative for cough.    Cardiovascular: Positive for chest pain and leg swelling. Negative for palpitations.   Gastrointestinal: Negative for constipation, diarrhea, nausea and vomiting.    Endocrine: Negative for polydipsia and polyuria.   Genitourinary: Negative for decreased urine volume, dysuria, flank pain and hematuria.   Musculoskeletal: Positive for arthralgias, back pain and gait problem.   Skin: Negative for rash.   Neurological: Positive for syncope, weakness and light-headedness. Negative for headaches.   Psychiatric/Behavioral: Negative for agitation. The patient is nervous/anxious.         Past Medical History:   Diagnosis Date   • Arthritis    • Atherosclerosis    • B12 deficiency    • Cancer (HCC)    • Cancer of liver (HCC)    • Chronic headaches    • Coronary artery disease    • Diabetes mellitus (HCC)    • Gastroesophageal reflux disease    • History of transfusion    • Hyperlipidemia    • Hypertension    • Myocardial infarction (HCC)    • Sleep apnea    • Stroke (HCC)    ,   Past Surgical History:   Procedure Laterality Date   • ANGIOPLASTY     • CARDIAC SURGERY     • CARPAL TUNNEL RELEASE Right 2017    Procedure: CARPAL TUNNEL RELEASE RIGHT ;  Surgeon: Luis Hayden MD;  Location: [facility];  Service:    • CHOLECYSTECTOMY     • CORONARY ARTERY BYPASS GRAFT     • CORONARY STENT PLACEMENT      x2   • LIVER TRANSPLANTATION     • TUMOR REMOVAL Right     axilla   ,   Family History   Problem Relation Age of Onset   • Heart disease Mother    • Stroke Mother    • Diabetes Father    • Liver cancer Brother    • No Known Problems Sister    • Macular degeneration Brother    • No Known Problems Sister    • Dementia Brother    ,   Social History     Socioeconomic History   • Marital status:    Tobacco Use   • Smoking status: Former Smoker     Packs/day: 4.00     Years: 30.00     Pack years: 120.00     Types: Cigarettes     Quit date: 5/3/1993     Years since quittin.7   • Smokeless tobacco: Never Used   Vaping Use   • Vaping Use: Never used   Substance and Sexual Activity   • Alcohol use: No   • Drug use: No   • Sexual activity: Defer     E-cigarette/Vaping   •  E-cigarette/Vaping Use Never User      E-cigarette/Vaping Substances     E-cigarette/Vaping Devices       ,   Medications Prior to Admission   Medication Sig Dispense Refill Last Dose   • cholecalciferol (VITAMIN D3) 25 MCG (1000 UT) tablet Take 1,000 Units by mouth Daily.      • glucosamine sulfate 500 MG capsule capsule Take 500 mg by mouth Daily.      • amLODIPine (NORVASC) 10 MG tablet Take 1 tablet by mouth Daily. 90 tablet 3    • aspirin 81 MG EC tablet Take 81 mg by mouth Daily. Continues per doctors orders      • DHEA 50 MG tablet Take 1 tablet by mouth Daily.      • Dulaglutide (Trulicity) 0.75 MG/0.5ML solution pen-injector Inject 0.75 mg under the skin into the appropriate area as directed 1 (One) Time Per Week.      • DULoxetine (Cymbalta) 60 MG capsule Take 1 capsule by mouth Daily. 90 capsule 3    • fenofibrate micronized (LOFIBRA) 134 MG capsule Take 134 mg by mouth Every Evening.      • gabapentin (NEURONTIN) 300 MG capsule Take 1 capsule by mouth 3 (Three) Times a Day. 90 capsule 2    • isosorbide mononitrate (IMDUR) 120 MG 24 hr tablet Take 1 tablet by mouth Daily. AM and 6 hours after. 60 tablet 11    • Loratadine 10 MG capsule Take 1 capsule by mouth Daily.      • LORazepam (ATIVAN) 0.5 MG tablet Take 0.5 mg by mouth 2 (Two) Times a Day As Needed for Anxiety (2 tabs BID PRN).      • losartan-hydrochlorothiazide (Hyzaar) 50-12.5 MG per tablet Take 1 tablet by mouth Daily. 30 tablet 0    • magnesium oxide (MAG-OX) 400 MG tablet Take 400 mg by mouth 2 (Two) Times a Day. 3 tabs in the AM and 2 tabs in the PM per transplant team      • multivitamin with minerals (MULTIVITAMIN ADULTS 50+ PO) Take 1 tablet by mouth Daily.      • mycophenolate (CELLCEPT) 500 MG tablet Take 1,000 mg by mouth 2 (Two) Times a Day.      • nebivolol (BYSTOLIC) 20 MG tablet Take 1 tablet by mouth Daily. 90 tablet 3    • nitroglycerin (NITROSTAT) 0.4 MG SL tablet Place 1 tablet under the tongue Every 5 (Five) Minutes As Needed  for Chest Pain. Take no more than 3 doses in 15 minutes. 30 tablet 5    • omeprazole (priLOSEC) 40 MG capsule Take 1 capsule by mouth 2 (Two) Times a Day. 180 capsule 3    • oxyCODONE-acetaminophen (PERCOCET)  MG per tablet Take 1 tablet by mouth Every 6 (Six) Hours As Needed for Severe Pain . 120 tablet 0    • pentoxifylline (TRENtal) 400 MG CR tablet Take 1 tablet by mouth 3 (Three) Times a Day With Meals. (Patient taking differently: Take 400 mg by mouth Every Evening.) 270 tablet 3    • pravastatin (PRAVACHOL) 80 MG tablet Take 1 tablet by mouth Daily. 90 tablet 3    • ranolazine (RANEXA) 1000 MG 12 hr tablet Take 1 tablet by mouth 2 (Two) Times a Day. 180 tablet 3    • spironolactone (ALDACTONE) 50 MG tablet Take 1 tablet by mouth Daily. (Patient taking differently: Take 100 mg by mouth Daily.) 90 tablet 3    • tacrolimus (PROGRAF) 1 MG capsule Take 1 mg by mouth 2 (Two) Times a Day. Patient takes at 10 am and 10 pm.      • tacrolimus (PROTOPIC) 0.1 % ointment Apply 1 application topically to the appropriate area as directed 2 (Two) Times a Day. 100 g 0    , Scheduled Meds:  amLODIPine, 10 mg, Oral, Daily  aspirin, 81 mg, Oral, Daily  cetirizine, 5 mg, Oral, Daily  clopidogrel, 75 mg, Oral, Daily  DULoxetine, 60 mg, Oral, Daily  gabapentin, 300 mg, Oral, TID  insulin lispro, 0-7 Units, Subcutaneous, TID AC  isosorbide mononitrate, 120 mg, Oral, Daily  multivitamin with minerals, 1 tablet, Oral, Daily  mycophenolate, 500 mg, Oral, Q12H  pantoprazole, 40 mg, Oral, Daily  pentoxifylline, 400 mg, Oral, TID With Meals  [START ON 2/11/2022] pharmacy consult - [place], , Does not apply, Daily  pimecrolimus, , Topical, Q12H  pravastatin, 80 mg, Oral, Daily  ranolazine, 1,000 mg, Oral, BID  sodium chloride, 10 mL, Intravenous, Q12H  tacrolimus, 1 mg, Oral, Daily    , Continuous Infusions:   , PRN Meds:  dextrose  •  dextrose  •  glucagon (human recombinant)  •  LORazepam  •  oxyCODONE-acetaminophen  •  sodium  chloride  •  sodium chloride and Allergies:  Contrast dye, Penicillins, and Sulfasalazine    Objective     Vital Signs  Temp:  [97.6 °F (36.4 °C)-97.8 °F (36.6 °C)] 97.8 °F (36.6 °C)  Heart Rate:  [44-83] 73  Resp:  [16-18] 18  BP: (114-147)/(60-94) 129/71    No intake/output data recorded.  I/O last 3 completed shifts:  In: -   Out: 325 [Urine:325]    Physical Exam  Constitutional:       General: He is not in acute distress.  HENT:      Head: Normocephalic.      Mouth/Throat:      Mouth: Mucous membranes are moist.      Pharynx: Oropharynx is clear.   Eyes:      Conjunctiva/sclera: Conjunctivae normal.   Cardiovascular:      Rate and Rhythm: Bradycardia present.      Heart sounds: Murmur heard.       Pulmonary:      Effort: Pulmonary effort is normal. No respiratory distress.   Abdominal:      General: Bowel sounds are normal.      Palpations: Abdomen is soft.      Tenderness: There is no abdominal tenderness.   Skin:     General: Skin is warm and dry.   Neurological:      General: No focal deficit present.      Mental Status: He is alert and oriented to person, place, and time.   Psychiatric:         Mood and Affect: Mood normal.         Behavior: Behavior normal.         Thought Content: Thought content normal.         Judgment: Judgment normal.         Results Review:    I reviewed the patient's new clinical results.    WBC WBC   Date Value Ref Range Status   02/10/2022 7.94 3.40 - 10.80 10*3/mm3 Final   02/09/2022 9.01 3.40 - 10.80 10*3/mm3 Final      HGB Hemoglobin   Date Value Ref Range Status   02/10/2022 10.9 (L) 13.0 - 17.7 g/dL Final   02/09/2022 10.9 (L) 13.0 - 17.7 g/dL Final      HCT Hematocrit   Date Value Ref Range Status   02/10/2022 32.0 (L) 37.5 - 51.0 % Final   02/09/2022 32.7 (L) 37.5 - 51.0 % Final      Platlets No results found for: LABPLAT   MCV MCV   Date Value Ref Range Status   02/10/2022 82.5 79.0 - 97.0 fL Final   02/09/2022 83.0 79.0 - 97.0 fL Final          Sodium Sodium   Date Value  Ref Range Status   02/10/2022 129 (L) 136 - 145 mmol/L Final   02/09/2022 129 (L) 136 - 145 mmol/L Final      Potassium Potassium   Date Value Ref Range Status   02/10/2022 5.1 3.5 - 5.2 mmol/L Final     Comment:     Slight hemolysis detected by analyzer. Results may be affected.   02/09/2022 5.3 (H) 3.5 - 5.2 mmol/L Final      Chloride Chloride   Date Value Ref Range Status   02/10/2022 98 98 - 107 mmol/L Final   02/09/2022 98 98 - 107 mmol/L Final      CO2 CO2   Date Value Ref Range Status   02/10/2022 19.0 (L) 22.0 - 29.0 mmol/L Final   02/09/2022 20.0 (L) 22.0 - 29.0 mmol/L Final      BUN BUN   Date Value Ref Range Status   02/10/2022 64 (H) 8 - 23 mg/dL Final   02/09/2022 61 (H) 8 - 23 mg/dL Final      Creatinine Creatinine   Date Value Ref Range Status   02/10/2022 1.76 (H) 0.76 - 1.27 mg/dL Final   02/09/2022 1.70 (H) 0.76 - 1.27 mg/dL Final      Calcium Calcium   Date Value Ref Range Status   02/10/2022 9.4 8.6 - 10.5 mg/dL Final   02/09/2022 9.4 8.6 - 10.5 mg/dL Final      PO4 No results found for: CAPO4   Albumin Albumin   Date Value Ref Range Status   02/09/2022 4.00 3.50 - 5.20 g/dL Final      Magnesium Magnesium   Date Value Ref Range Status   02/09/2022 2.6 (H) 1.6 - 2.4 mg/dL Final      Uric Acid No results found for: URICACID         Assessment/Plan       DDD (degenerative disc disease), cervical    Presence of stent in coronary artery in patient with coronary artery disease    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 SANTOS: Baseline creatinine noted to be 1.12 09/2021.No proteinuria or hematuria on UA. Offending agents on hold include losartan, spironolactone and HCTZ  SANTOS multifactorial with decreased renal profusion in the presence of bradycardia and volume overload.  2. Hyponatremia: Likely hypervolemic  hyponatremia in the presence of volume overload+ excessive water intake   3. Volume overload  4. Hx of diastolic HF  5. Bradycardia: Beta blockers have been stopped. Cardiology following  6. Anemia: Low TSAT, 7%  7. Hyperkalemia: Mild. Now off losartan and spironolactone  8. S/P liver transplant: Currently on cellcept 500mg BID and tacro 1mg daily    Plan  SANTOS likely in the setting of decompensated cardio renal syndrome with passive renal venous congestion vs hemodynamic injury due to tacrolimus use. Agree with optimization of volume status with diuretics. He was drinking 4-6 bottles of 800-1 liter daily. This explains hyponatremia with worsening renal function Would recommend starting Bumex 2 mg daily. Up titrate depending upon the response. Will hold MRA for now. Strict I/o. Limit free water intake to 1.2 liter/day. Low Na diet <2g/d. Check prograf levels ( trough level)  I discussed the patients findings and my recommendations with patient    Lisa Duenas, SERJIO  02/10/22  16:27 EST

## 2022-02-10 NOTE — CONSULTS
[facility] at [facility]  Cardiovascular Consultation Note    Reason for consultation: #1 heart failure with preserved EF #2 ischemic heart disease with elevated troponin #3 SANTOS #4 bradycardia    History of Present Illness:  67-year-old male with CAD and prior bypass multiple years ago.  Last cath in 2019 showed a patent LIMA to the LAD with diffuse disease beyond the graft.  Vein graft to the circumflex occluded.  Vein graft to the right patent with disease beyond the graft.  The patient has had a liver transplant for liver cancer.  This was done in 2009.  Patient also has hyperlipidemia hypertension prior stroke and sleep apnea he presents with increasing shortness of breath and lower extremity edema.  The patient states about a 5 months ago he had no problems with breathing or edema.  Over the last several days he has gained multiple pounds.  He states he has not taken more doses of his diuretics.  He states he had a heart attack in 2019 and was taken to [facility].  Apparently he had a stent at that time.  States for a few months his chest discomfort went away.  Now he gets chest discomfort on a regular basis.  It occurs with exertion but also occurs when he sleeping.  He gets it multiple times daily describes as a tightness going up into his neck and down his arm.  Patient states that recently has been more tired sleeping anywhere from 12 to 18 hours a day.  He is not had any sunita syncope.  He does get lightheaded when he stands up.  He also has occasional palpitations.    Cardiac risk factors: #1 male sex #2 age greater than 45 #3 hypertension #4 hyperlipidemia #5 documented prior CAD    Past medical and surgical history, social and family history reviewed in EMR.    REVIEW OF SYSTEMS:     Past Medical History:   Diagnosis Date   • Arthritis    • Atherosclerosis    • B12 deficiency    • Cancer (HCC)    • Cancer of liver (HCC)    • Chronic headaches    • Coronary artery disease     • Diabetes mellitus (HCC)    • Gastroesophageal reflux disease    • History of transfusion    • Hyperlipidemia    • Hypertension    • Myocardial infarction (HCC)    • Sleep apnea    • Stroke (HCC)      Past Surgical History:   Procedure Laterality Date   • ANGIOPLASTY     • CARDIAC SURGERY     • CARPAL TUNNEL RELEASE Right 2017    Procedure: CARPAL TUNNEL RELEASE RIGHT ;  Surgeon: Luis Hayden MD;  Location: [facility];  Service:    • CHOLECYSTECTOMY     • CORONARY ARTERY BYPASS GRAFT     • CORONARY STENT PLACEMENT      x2   • LIVER TRANSPLANTATION     • TUMOR REMOVAL Right     axilla     Social History     Socioeconomic History   • Marital status:    Tobacco Use   • Smoking status: Former Smoker     Packs/day: 4.00     Years: 30.00     Pack years: 120.00     Types: Cigarettes     Quit date: 5/3/1993     Years since quittin.7   • Smokeless tobacco: Never Used   Vaping Use   • Vaping Use: Never used   Substance and Sexual Activity   • Alcohol use: No   • Drug use: No   • Sexual activity: Defer     Family History   Problem Relation Age of Onset   • Heart disease Mother    • Stroke Mother    • Diabetes Father    • Liver cancer Brother    • No Known Problems Sister    • Macular degeneration Brother    • No Known Problems Sister    • Dementia Brother        H&P ROS reviewed and pertinent CV ROS as noted in HPI.    Cardiac: Patient has CAD with prior CABG.  Last cath was 2019 at [facility] and he received a stent.  Complaining of multiple episodes of tightness in his chest daily going up into his neck and down his arm.  Is increased in frequency.  Also has significant dyspnea on exertion orthopnea.  Complains of profound tiredness and weakness  Respiratory: Complains of dyspnea  Lower Extremities: Complains of significant lower extremity edema  GI: Has a history of liver cancer for which he status post a liver transplant  Neuro: Prior history of stroke  Hematology: No bleeding diathesis ecchymosis or  petechia  Renal: Has never had any significant renal diseas  Endocrine: Patient has no diabetes or hypothyroidism  Constitutional: No fever or chills.  Has had significant weight gain due to volume retention  Psych: Denies suicidal ideation.      Physical Exam: General very obese male in ER bed at a 75 degree angle with resting tachypnea and dyspnea.  Room air sat was 93% current heart rate 71       HEENT: Positive JVP, no bruits.  No icterus tongue midline       Respiratory: Equal bilateral symmetrical expansion with bilateral rails       Cardiovascular: Regular rate and rhythm with a grade 4/6 systolic ejection murmur loudest at the cardiac apex.  Significant 2+ pitting edema of the feet and lower extremities he has 2+ DP pulse on the left and 1+ on the right       GI: Obese positive bowel sounds nontender       Lower Extremities: No lesions       Neuro: Facial expressions are symmetrical moves all 4 extremities       Skin: Warm and dry with diffuse pitting edema       Psych: Pleasant affect oriented x3    Results Review: I reviewed EKGs which show sinus bradycardia first-degree AV block.  The BNP is elevated 2887 but 5 months ago was normal.  Creatinine 5 months ago was 1.12 is now 1.76.  The troponins are elevated but they are chronically elevated over the last 5 months.  I personally reviewed the CT scans which show pleural effusions GFR is down to 39 review of telemetry shows no significant pauses.  Patient is on a beta-blocker at home.  He has iron deficiency anemia as well last known EF 70% blood pressures 1 14-1 47 heart rates in the 40s           Vital Sign Min/Max for last 24 hours  Temp  Min: 97.8 °F (36.6 °C)  Max: 97.8 °F (36.6 °C)   BP  Min: 114/72  Max: 147/71   Pulse  Min: 44  Max: 73   Resp  Min: 16  Max: 18   SpO2  Min: 91 %  Max: 95 %   No data recorded      Intake/Output Summary (Last 24 hours) at 2/10/2022 1014  Last data filed at 2/10/2022 0408  Gross per 24 hour   Intake --   Output 325 ml    Net -325 ml             Current Facility-Administered Medications:   •  amLODIPine (NORVASC) tablet 10 mg, 10 mg, Oral, Daily, Johnnie, Lupe, APRN, 10 mg at 02/10/22 0943  •  aspirin EC tablet 81 mg, 81 mg, Oral, Daily, Johnnie, Lupe, APRN, 81 mg at 02/10/22 0943  •  cetirizine (zyrTEC) tablet 5 mg, 5 mg, Oral, Daily, Johnnie, Lupe, APRN, 5 mg at 02/10/22 0944  •  clopidogrel (PLAVIX) tablet 75 mg, 75 mg, Oral, Daily, Johnnie, Lupe, APRN, 75 mg at 02/10/22 0944  •  dextrose (D50W) (25 g/50 mL) IV injection 25 g, 25 g, Intravenous, Q15 Min PRN, Johnnie, Lupe, APRN  •  dextrose (GLUTOSE) oral gel 15 g, 15 g, Oral, Q15 Min PRN, Johnnie, Lupe, APRN  •  DULoxetine (CYMBALTA) DR capsule 60 mg, 60 mg, Oral, Daily, Johnnie, Lupe, APRN, 60 mg at 02/10/22 0938  •  gabapentin (NEURONTIN) capsule 300 mg, 300 mg, Oral, TID, Johnnie, Lupe, APRN, 300 mg at 02/10/22 0944  •  glucagon (human recombinant) (GLUCAGEN DIAGNOSTIC) injection 1 mg, 1 mg, Intramuscular, Q15 Min PRN, Johnnie, Lupe, APRN  •  insulin lispro (humaLOG) injection 0-7 Units, 0-7 Units, Subcutaneous, TID AC, Johnnie, Lupe, APRN  •  isosorbide mononitrate (IMDUR) 24 hr tablet 120 mg, 120 mg, Oral, Daily, Johnnie, Lupe, APRN, 120 mg at 02/10/22 0939  •  LORazepam (ATIVAN) tablet 0.5 mg, 0.5 mg, Oral, BID PRN, Johnnie, Lupe, APRN  •  multivitamin with minerals 1 tablet, 1 tablet, Oral, Daily, Johnnie, Lupe, APRN, 1 tablet at 02/10/22 0938  •  mycophenolate (CELLCEPT) capsule 500 mg, 500 mg, Oral, Q12H, Johnnie, Lupe, APRN, 500 mg at 02/10/22 0937  •  oxyCODONE-acetaminophen (PERCOCET)  MG per tablet 1 tablet, 1 tablet, Oral, Q6H PRN, Johnnie, Lupe, APRN  •  pantoprazole (PROTONIX) EC tablet 40 mg, 40 mg, Oral, Daily, Johnnie, Lupe, APRN, 40 mg at 02/10/22 0947  •  pentoxifylline (TRENtal) CR tablet 400 mg, 400 mg, Oral, TID With Meals, Johnnie, Lupe, APRN, 400 mg at 02/10/22 0933  •  pimecrolimus (ELIDEL) 1 %  cream, , Topical, Q12H, Johnnie, Lupe, APRN, Given at 02/10/22 0939  •  pravastatin (PRAVACHOL) tablet 80 mg, 80 mg, Oral, Daily, Johnnie, Lupe, APRN, 80 mg at 02/10/22 0939  •  ranolazine (RANEXA) 12 hr tablet 1,000 mg, 1,000 mg, Oral, BID, Johnnie, Lupe, APRN, 1,000 mg at 02/10/22 0939  •  sodium chloride 0.9 % flush 10 mL, 10 mL, Intravenous, PRN, Emergency, Triage Protocol, MD  •  sodium chloride 0.9 % flush 10 mL, 10 mL, Intravenous, Q12H, Johnnie, Lupe, APRN, 10 mL at 02/10/22 0947  •  sodium chloride 0.9 % flush 10 mL, 10 mL, Intravenous, PRN, Johnnie, Lupe, APRN  •  tacrolimus (PROGRAF) capsule 1 mg, 1 mg, Oral, Daily, Johnnie, Lupe, APRN, 1 mg at 02/10/22 0938    Current Outpatient Medications:   •  amLODIPine (NORVASC) 10 MG tablet, Take 1 tablet by mouth Daily., Disp: 90 tablet, Rfl: 3  •  aspirin 81 MG EC tablet, Take 81 mg by mouth Daily. Continues per doctors orders, Disp: , Rfl:   •  clopidogrel (PLAVIX) 75 MG tablet, Take 50 mg by mouth Daily., Disp: , Rfl:   •  DHEA 50 MG tablet, Take 1 tablet by mouth Daily., Disp: , Rfl:   •  Dulaglutide (Trulicity) 0.75 MG/0.5ML solution pen-injector, Inject  under the skin into the appropriate area as directed 1 (One) Time Per Week., Disp: , Rfl:   •  DULoxetine (Cymbalta) 60 MG capsule, Take 1 capsule by mouth Daily., Disp: 90 capsule, Rfl: 3  •  fenofibrate micronized (LOFIBRA) 134 MG capsule, Take 134 mg by mouth Every Morning Before Breakfast., Disp: , Rfl:   •  gabapentin (NEURONTIN) 300 MG capsule, Take 1 capsule by mouth 3 (Three) Times a Day., Disp: 90 capsule, Rfl: 2  •  Gel Base gel, CAPSAICIN 0.001% IMIPRAMINE 3% LIDOCAINE 10% PRILOCAINE 2% MANNITOL 20%. APPLY 1-2 G TO AFFECTED AREA 3-4 TIMES DAILY, Disp: 240 g, Rfl: PRN  •  insulin NPH-insulin regular (humuLIN 70/30,novoLIN 70/30) (70-30) 100 UNIT/ML injection, Inject  under the skin into the appropriate area as directed As Needed., Disp: , Rfl:   •  isosorbide mononitrate (IMDUR)  120 MG 24 hr tablet, Take 1 tablet by mouth Daily. AM and 6 hours after., Disp: 60 tablet, Rfl: 11  •  Loratadine 10 MG capsule, Take 1 capsule by mouth Daily As Needed., Disp: , Rfl:   •  LORazepam (ATIVAN) 0.5 MG tablet, Take 0.5 mg by mouth 2 (Two) Times a Day As Needed for Anxiety (2 tabs BID PRN)., Disp: , Rfl:   •  losartan-hydrochlorothiazide (Hyzaar) 50-12.5 MG per tablet, Take 1 tablet by mouth Daily., Disp: 30 tablet, Rfl: 0  •  magnesium oxide (MAG-OX) 400 MG tablet, Take 400 mg by mouth Daily., Disp: , Rfl:   •  multivitamin with minerals (MULTIVITAMIN ADULTS 50+ PO), Take 1 tablet by mouth Daily., Disp: , Rfl:   •  mycophenolate (CELLCEPT) 500 MG tablet, Take 360 mg by mouth 2 (Two) Times a Day., Disp: , Rfl:   •  nebivolol (BYSTOLIC) 20 MG tablet, Take 1 tablet by mouth Daily., Disp: 90 tablet, Rfl: 3  •  nitroglycerin (NITROSTAT) 0.4 MG SL tablet, Place 1 tablet under the tongue Every 5 (Five) Minutes As Needed for Chest Pain. Take no more than 3 doses in 15 minutes., Disp: 30 tablet, Rfl: 5  •  omeprazole (priLOSEC) 40 MG capsule, Take 1 capsule by mouth 2 (Two) Times a Day., Disp: 180 capsule, Rfl: 3  •  oxyCODONE-acetaminophen (PERCOCET)  MG per tablet, Take 1 tablet by mouth Every 6 (Six) Hours As Needed for Severe Pain ., Disp: 120 tablet, Rfl: 0  •  pentoxifylline (TRENtal) 400 MG CR tablet, Take 1 tablet by mouth 3 (Three) Times a Day With Meals., Disp: 270 tablet, Rfl: 3  •  pravastatin (PRAVACHOL) 80 MG tablet, Take 1 tablet by mouth Daily., Disp: 90 tablet, Rfl: 3  •  ranolazine (RANEXA) 1000 MG 12 hr tablet, Take 1 tablet by mouth 2 (Two) Times a Day., Disp: 180 tablet, Rfl: 3  •  spironolactone (ALDACTONE) 50 MG tablet, Take 1 tablet by mouth Daily., Disp: 90 tablet, Rfl: 3  •  tacrolimus (PROGRAF) 1 MG capsule, Take 1 mg by mouth Every Morning., Disp: , Rfl:   •  tacrolimus (PROTOPIC) 0.1 % ointment, Apply 1 application topically to the appropriate area as directed 2 (Two) Times a  Day., Disp: 100 g, Rfl: 0    Lab Review:   Results from last 7 days   Lab Units 02/10/22  0657 02/09/22  2307   WBC 10*3/mm3 7.94 9.01   HEMOGLOBIN g/dL 10.9* 10.9*   PLATELETS 10*3/mm3 252 284     Results from last 7 days   Lab Units 02/10/22  0656 02/09/22  2307   SODIUM mmol/L 129* 129*   POTASSIUM mmol/L 5.1 5.3*   CO2 mmol/L 19.0* 20.0*   BUN mg/dL 64* 61*   CREATININE mg/dL 1.76* 1.70*   MAGNESIUM mg/dL  --  2.6*   GLUCOSE mg/dL 152* 190*     Estimated Creatinine Clearance: 50.8 mL/min (A) (by C-G formula based on SCr of 1.76 mg/dL (H)).    Results from last 7 days   Lab Units 02/10/22  0657   HEMOGLOBIN A1C % 6.60*     .lipd  Lab Results   Component Value Date    TRIG 160 (H) 02/10/2022    HDL 34 (L) 02/10/2022    AST 19 02/09/2022    ALT 19 02/09/2022       Radiology Reports:  Imaging Results (Last 72 Hours)     Procedure Component Value Units Date/Time    CT Chest Without Contrast Diagnostic [[id number]] Collected: 02/10/22 0212     Updated: 02/10/22 0216    Narrative:      Exam: CT Chest without contrast.    Date: 2/10/2022.     Comparison: None    History: Shortness of breath with pneumonia versus vascular overload.    Technique: CT examination of the chest was performed without contrast. Coronal and sagittal reformats were performed. CT dose lowering techniques were used, to include: automated exposure control, adjustment for patient size, and/or use of iterative   reconstruction.    FINDINGS:    Mediastinum and Tere: There is no axillary, mediastinal or hilar lymphadenopathy.    Pleural and Pericardial spaces: There are moderate bilateral pleural effusions, right greater than left.    Upper Abdomen: There is a small amount of ascites around the liver and spleen.    Cardiovascular: There is mild vascular calcification throughout the thoracic aorta without evidence of aneurysmal dilation. There are midline sternotomy wires. Severe diffuse coronary artery calcifications are noted. Prior CABG.    Lung  Parenchyma and Airways: There are 2 patchy groundglass areas of nodularity within the right upper lobe which could be inflammatory or infectious. No definitive septal thickening is otherwise seen 2 suggest edema. There is some mild compressive   atelectatic changes in the lower lobes bilaterally.    Bones: No fracture or aggressive osseous lesion.      Impression:        1. Moderate bilateral pleural effusions.  2. Several patchy groundglass areas of opacity in the right upper lobe may be inflammatory or infectious.  3. Small amount of ascites.    Electronically signed by:  Maxwell Macias D.O.    2/10/2022 12:14 AM Mountain Time    CT Head Without Contrast [[id number]] Collected: 02/10/22 0035     Updated: 02/10/22 0038    Narrative:      EXAMINATION: CT HEAD WO CONTRAST    DATE: 2/10/2022 12:05 AM     INDICATION: Fall.     COMPARISON: None available.     TECHNIQUE: Thin section noncontrast axial images were obtained through the head. Coronal reformatted images were created.  CT dose lowering techniques were used, to include: automated exposure control, adjustment for patient size, and or use of iterative   reconstruction    FINDINGS:    No hemorrhage or extra-axial fluid collection.  No acute territorial infarct. No intracranial mass or mass effect.   Mild burden of low attenuation in the white matter is nonspecific, but likely reflects sequelae of chronic microvascular ischemia.  Mild diffuse parenchymal volume loss.  No hydrocephalus. Basal cisterns are patent.   Intracranial atherosclerosis.     Bilateral ocular lens surgery.   Trace mucosal thickening in the left maxillary sinus. Mastoid air cells are clear.  Calvarium is intact.        Impression:        1.  No acute intracranial abnormality.  2.  Mild chronic small vessel ischemic changes and volume loss.        Electronically signed by:  Isaac Barney DO    2/9/2022 10:37 PM Mountain Time    XR Chest 1 View [[id number]] Collected: 02/09/22 2321     Updated:  02/09/22 9212    Narrative:      EXAMINATION: XR CHEST 1 VW-     INDICATION: Weak/Dizzy/AMS triage protocol     COMPARISON: 9/7/2021     FINDINGS: Sternotomy wires are noted. Heart shadow is in the upper range  of normal size. There is now a mild diffuse perihilar edema pattern  present, and cephalization of the vasculature. There is either minimal  left basilar atelectasis or effusion.          Impression:      1. Pulmonary vascular congestion with suspected early interstitial  edema.  2. Minimal left effusion or atelectasis.         This report was finalized on 2/9/2022 11:23 PM by Dr. Bj Grewal MD.             Assessment/Plan: 1 increase shortness of breath lower extremity edema with elevated BNP consistent with heart failure with preserved EF-his echocardiogram was just performed and I am waiting to interpret that.  He has a loud heart murmur.  He states he is been compliant with his medication.  In addition he is profoundly bradycardic when he arrived.  The bradycardia could have caused some of this fluid retention.  We will obviously leave him off his beta-blocker at this time.  The patient still has significant volume overload and even though his renal function has gotten worse I will going give him a dose of diuretic IV  2 sinus bradycardia-this is playing a role in his CHF.  He is off beta-blockers currently.  Will be reinstituted based on heart rate response  3 ischemic heart disease-patient has elevated troponins but they are chronically elevated.  The patient has multiple episodes of chest pain today.  His renal function now is worse.  Dr. Dillon will resume his case in the morning  4 SANTOS on CKD-the patient's GFR is gone down but he still volume overloaded.  I will give him a dose of IV Lasix 40 mg.  We will also check a tacrolimus level.  We will consult nephrology to help with his renal dysfunction and volume management      Colten Helms MD  02/10/22  10:14 EST

## 2022-02-11 ENCOUNTER — READMISSION MANAGEMENT (OUTPATIENT)
Dept: CALL CENTER | Facility: HOSPITAL | Age: 67
End: 2022-02-11

## 2022-02-11 VITALS
TEMPERATURE: 97.9 F | WEIGHT: 256.7 LBS | BODY MASS INDEX: 34.77 KG/M2 | SYSTOLIC BLOOD PRESSURE: 122 MMHG | RESPIRATION RATE: 18 BRPM | OXYGEN SATURATION: 95 % | HEART RATE: 69 BPM | HEIGHT: 72 IN | DIASTOLIC BLOOD PRESSURE: 64 MMHG

## 2022-02-11 LAB
ALBUMIN SERPL-MCNC: 3.8 G/DL (ref 3.5–5.2)
ANION GAP SERPL CALCULATED.3IONS-SCNC: 13 MMOL/L (ref 5–15)
BUN SERPL-MCNC: 54 MG/DL (ref 8–23)
BUN/CREAT SERPL: 37.2 (ref 7–25)
CALCIUM SPEC-SCNC: 9.5 MG/DL (ref 8.6–10.5)
CHLORIDE SERPL-SCNC: 100 MMOL/L (ref 98–107)
CO2 SERPL-SCNC: 20 MMOL/L (ref 22–29)
CREAT SERPL-MCNC: 1.45 MG/DL (ref 0.76–1.27)
CREAT UR-MCNC: 66.4 MG/DL
GFR SERPL CREATININE-BSD FRML MDRD: 48 ML/MIN/1.73
GLUCOSE BLDC GLUCOMTR-MCNC: 125 MG/DL (ref 70–130)
GLUCOSE BLDC GLUCOMTR-MCNC: 128 MG/DL (ref 70–130)
GLUCOSE SERPL-MCNC: 126 MG/DL (ref 65–99)
IRON 24H UR-MRATE: 17 MCG/DL (ref 59–158)
IRON SATN MFR SERPL: 5 % (ref 20–50)
OSMOLALITY SERPL: 296 MOSM/KG (ref 275–295)
PHOSPHATE SERPL-MCNC: 4.4 MG/DL (ref 2.5–4.5)
POTASSIUM SERPL-SCNC: 4.2 MMOL/L (ref 3.5–5.2)
SODIUM SERPL-SCNC: 133 MMOL/L (ref 136–145)
TIBC SERPL-MCNC: 347 MCG/DL (ref 298–536)
TRANSFERRIN SERPL-MCNC: 233 MG/DL (ref 200–360)

## 2022-02-11 PROCEDURE — 63710000001 TACROLIMUS PER 1 MG: Performed by: NURSE PRACTITIONER

## 2022-02-11 PROCEDURE — 82962 GLUCOSE BLOOD TEST: CPT

## 2022-02-11 PROCEDURE — 84466 ASSAY OF TRANSFERRIN: CPT | Performed by: NURSE PRACTITIONER

## 2022-02-11 PROCEDURE — 99232 SBSQ HOSP IP/OBS MODERATE 35: CPT | Performed by: INTERNAL MEDICINE

## 2022-02-11 PROCEDURE — 97165 OT EVAL LOW COMPLEX 30 MIN: CPT

## 2022-02-11 PROCEDURE — 99239 HOSP IP/OBS DSCHRG MGMT >30: CPT | Performed by: INTERNAL MEDICINE

## 2022-02-11 PROCEDURE — 83930 ASSAY OF BLOOD OSMOLALITY: CPT | Performed by: NURSE PRACTITIONER

## 2022-02-11 PROCEDURE — 80069 RENAL FUNCTION PANEL: CPT | Performed by: NURSE PRACTITIONER

## 2022-02-11 PROCEDURE — 83540 ASSAY OF IRON: CPT | Performed by: NURSE PRACTITIONER

## 2022-02-11 PROCEDURE — 97535 SELF CARE MNGMENT TRAINING: CPT

## 2022-02-11 PROCEDURE — 63710000001 MYCOPHENOLATE MOFETIL PER 250 MG: Performed by: NURSE PRACTITIONER

## 2022-02-11 RX ORDER — ISOSORBIDE MONONITRATE 60 MG/1
120 TABLET, EXTENDED RELEASE ORAL 2 TIMES DAILY
Status: DISCONTINUED | OUTPATIENT
Start: 2022-02-11 | End: 2022-02-11 | Stop reason: HOSPADM

## 2022-02-11 RX ORDER — CLOPIDOGREL BISULFATE 75 MG/1
75 TABLET ORAL DAILY
Qty: 30 TABLET | Refills: 2 | Status: SHIPPED | OUTPATIENT
Start: 2022-02-12 | End: 2022-06-20

## 2022-02-11 RX ORDER — BUMETANIDE 1 MG/1
1 TABLET ORAL 2 TIMES DAILY
Qty: 60 TABLET | Refills: 2 | Status: SHIPPED | OUTPATIENT
Start: 2022-02-12 | End: 2022-06-01 | Stop reason: SDUPTHER

## 2022-02-11 RX ORDER — ISOSORBIDE MONONITRATE 120 MG/1
120 TABLET, EXTENDED RELEASE ORAL 2 TIMES DAILY
Qty: 60 TABLET | Refills: 0 | Status: SHIPPED | OUTPATIENT
Start: 2022-02-11 | End: 2022-07-27 | Stop reason: SDUPTHER

## 2022-02-11 RX ORDER — BUMETANIDE 1 MG/1
1 TABLET ORAL 2 TIMES DAILY
Status: DISCONTINUED | OUTPATIENT
Start: 2022-02-12 | End: 2022-02-11 | Stop reason: HOSPADM

## 2022-02-11 RX ADMIN — GABAPENTIN 300 MG: 300 CAPSULE ORAL at 08:33

## 2022-02-11 RX ADMIN — AMLODIPINE BESYLATE 10 MG: 10 TABLET ORAL at 08:34

## 2022-02-11 RX ADMIN — PRAVASTATIN SODIUM 80 MG: 40 TABLET ORAL at 08:34

## 2022-02-11 RX ADMIN — Medication 1 TABLET: at 08:34

## 2022-02-11 RX ADMIN — PENTOXIFYLLINE 400 MG: 400 TABLET, EXTENDED RELEASE ORAL at 12:49

## 2022-02-11 RX ADMIN — TACROLIMUS 1 MG: 1 CAPSULE ORAL at 08:33

## 2022-02-11 RX ADMIN — ISOSORBIDE MONONITRATE 120 MG: 120 TABLET, EXTENDED RELEASE ORAL at 08:33

## 2022-02-11 RX ADMIN — PANTOPRAZOLE SODIUM 40 MG: 40 TABLET, DELAYED RELEASE ORAL at 08:34

## 2022-02-11 RX ADMIN — PIMECROLIMUS: 10 CREAM TOPICAL at 08:35

## 2022-02-11 RX ADMIN — RANOLAZINE 1000 MG: 500 TABLET, FILM COATED, EXTENDED RELEASE ORAL at 08:33

## 2022-02-11 RX ADMIN — CETIRIZINE HYDROCHLORIDE 5 MG: 10 TABLET, FILM COATED ORAL at 08:33

## 2022-02-11 RX ADMIN — PENTOXIFYLLINE 400 MG: 400 TABLET, EXTENDED RELEASE ORAL at 08:33

## 2022-02-11 RX ADMIN — CLOPIDOGREL BISULFATE 75 MG: 75 TABLET, FILM COATED ORAL at 08:33

## 2022-02-11 RX ADMIN — BUMETANIDE 2 MG: 0.25 INJECTION INTRAMUSCULAR; INTRAVENOUS at 08:42

## 2022-02-11 RX ADMIN — DULOXETINE 60 MG: 60 CAPSULE, DELAYED RELEASE ORAL at 08:34

## 2022-02-11 RX ADMIN — MYCOPHENOLATE MOFETIL 500 MG: 250 CAPSULE ORAL at 08:34

## 2022-02-11 RX ADMIN — ASPIRIN 81 MG: 81 TABLET, COATED ORAL at 08:34

## 2022-02-11 NOTE — DISCHARGE SUMMARY
[facility] Medicine Services  DISCHARGE SUMMARY    Patient Name: Mahendra Yates  : 1955  MRN: [id number]    Date of Admission: 2022 10:38 PM  Date of Discharge:  2022  Primary Care Physician: Rodríguez Lopez,     Consults     Date and Time Order Name Status Description    2/10/2022 10:36 AM Inpatient Nephrology Consult      2/10/2022  6:16 AM Inpatient Cardiology Consult Completed           Hospital Course     Presenting Problem:   Acute congestive heart failure, unspecified heart failure type (HCC) [I50.9]    Active Hospital Problems    Diagnosis  POA   • Acute congestive heart failure (HCC) [I50.9]  Yes   • Bradycardia [R00.1]  Unknown   • Anemia [D64.9]  Unknown   • Hypoxia [R09.02]  Unknown   • Presence of stent in coronary artery in patient with coronary artery disease [I25.10, Z95.5]  Not Applicable   • Lumbar stenosis with neurogenic claudication [M48.062]  Yes   • History of liver transplant (HCC) [Z94.4]  Not Applicable   • 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          Hospital Course:  Mahendra Yates is a   68 yo M with past medical history significant for degenerative disc disease, diabetes mellitus type 1, lumbar stenosis, cervical stenosis, liver cancer s/p liver transplant in , CVA, HTN, HLD, and CAD s/p stents who presents to the ED due shortness of air and chest pain over the past three days, worse with exertion. He also reports worsening BLE edema. Workup in the ER concerning for congestive heart failure with moderate bilateral pleural effusions on CT chest.     Patient was evaluated by both cardiology and nephrology during admission. He was diuresed with IV diuretics and felt symptomatically improved. His nephrotoxins including ACE/spirinolactone were held during admission due to  his SANTOS which improved.     Cardiology ultimately cleared patient for home discharge and recommended holding off on Bblocker, increased imdur to BID dosing . Recommended taking SL nitro prior to pool exercising. F/u with Dr Dillon in 1 month.     In terms of kidney fxn, Cr improved. Nephrology recommended to limit oral intake and sodium intake. Recommended bumex 1 mg BID and to f/u in 1-2 weeks.       Discharge Follow Up Recommendations for outpatient labs/diagnostics:  PCP Dr Lopez 1-2 weeks  NAL 1-2 weeks  Cardiology 1 month    Day of Discharge     HPI:   Doing well. Feels back to baseline. Wants to go home. Wife at bedside    Review of Systems  Gen- No fevers, chills  CV- No chest pain, palpitations  Resp- No cough, dyspnea  GI- No N/V/D, abd pain        Vital Signs:   Temp:  [97.8 °F (36.6 °C)-98 °F (36.7 °C)] 97.9 °F (36.6 °C)  Heart Rate:  [] 72  Resp:  [18] 18  BP: (122-141)/(64-84) 122/64  Flow (L/min):  [2] 2      Physical Exam:  GEN- no acute distress noted, resting in bed, awake  HEENT- atraumatic, normocephalic, eomi  NECK- supple, trachea midline, no masses  RESP: ctab, normal effort  CV: no murmurs, s1/s2, rrr  MSK: no edema noted, spontaneous movement of all extremities  NEURO: alert, oriented, no focal deficits  SKIN: no rashes  PSYCH: appropriate mood and affect       Pertinent  and/or Most Recent Results     LAB RESULTS:      Lab 02/10/22  0657 02/09/22  2307   WBC 7.94 9.01   HEMOGLOBIN 10.9* 10.9*   HEMATOCRIT 32.0* 32.7*   PLATELETS 252 284   NEUTROS ABS 5.80 6.56   IMMATURE GRANS (ABS) 0.04 0.04   LYMPHS ABS 1.12 1.23   MONOS ABS 0.87 1.00*   EOS ABS 0.09 0.15   MCV 82.5 83.0         Lab 02/11/22  0558 02/10/22  0657 02/10/22  0656 02/09/22  2307   SODIUM 133*  --  129* 129*   POTASSIUM 4.2  --  5.1 5.3*   CHLORIDE 100  --  98 98   CO2 20.0*  --  19.0* 20.0*   ANION GAP 13.0  --  12.0 11.0   BUN 54*  --  64* 61*   CREATININE 1.45*  --  1.76* 1.70*   GLUCOSE 126*  --  152* 190*    CALCIUM 9.5  --  9.4 9.4   MAGNESIUM  --   --   --  2.6*   PHOSPHORUS 4.4  --   --   --    HEMOGLOBIN A1C  --  6.60*  --   --          Lab 02/11/22  0558 02/09/22 2307   TOTAL PROTEIN  --  6.7   ALBUMIN 3.80 4.00   GLOBULIN  --  2.7   ALT (SGPT)  --  19   AST (SGOT)  --  19   BILIRUBIN  --  0.6   ALK PHOS  --  88         Lab 02/10/22  0656 02/10/22  0212 02/09/22  2307   PROBNP  --   --  2,887.0*   TROPONIN T 0.069* 0.075* 0.080*         Lab 02/10/22  0656   CHOLESTEROL 132   LDL CHOL 70   HDL CHOL 34*   TRIGLYCERIDES 160*         Lab 02/11/22  0558 02/10/22  1326 02/10/22  0705 02/10/22  0705 02/10/22  0550 02/10/22  0212 02/10/22  0212   IRON 17*  --   --   --   --    < > 25*   IRON SATURATION 5*  --   --   --   --    < > 7*   TIBC 347  --   --   --   --    < > 341   TRANSFERRIN 233  --   --   --   --    < > 229   FERRITIN  --   --   --   --   --   --  68.39   FOLATE  --   --   --   --  <2.00*  --   --    VITAMIN B 12  --   --   --   --  <150*  --   --    ABO TYPING  --  O   < > O  --   --   --    RH TYPING  --  Positive   < > Positive  --   --   --    ANTIBODY SCREEN  --   --   --  Negative  --   --   --     < > = values in this interval not displayed.         Brief Urine Lab Results  (Last result in the past 365 days)      Color   Clarity   Blood   Leuk Est   Nitrite   Protein   CREAT   Urine HCG        02/10/22 2155             66.4             Microbiology Results (last 10 days)     Procedure Component Value - Date/Time    COVID PRE-OP / PRE-PROCEDURE SCREENING ORDER (NO ISOLATION) - Swab, Nasopharynx [[id number]]  (Normal) Collected: 02/09/22 2346    Lab Status: Final result Specimen: Swab from Nasopharynx Updated: 02/10/22 0113    Narrative:      The following orders were created for panel order COVID PRE-OP / PRE-PROCEDURE SCREENING ORDER (NO ISOLATION) - Swab, Nasopharynx.  Procedure                               Abnormality         Status                     ---------                                -----------         ------                     COVID-19 and FLU A/B PCR...[[id number]]  Normal              Final result                 Please view results for these tests on the individual orders.    COVID-19 and FLU A/B PCR - Swab, Nasopharynx [[id number]]  (Normal) Collected: 02/09/22 4476    Lab Status: Final result Specimen: Swab from Nasopharynx Updated: 02/10/22 0113     COVID19 Not Detected     Influenza A PCR Not Detected     Influenza B PCR Not Detected    Narrative:      Fact sheet for providers: https://www.fda.gov/media/061037/download    Fact sheet for patients: https://www.fda.gov/media/041712/download    Test performed by PCR.          Adult Transthoracic Echo Complete W/ Cont if Necessary Per Protocol    Result Date: 2/10/2022  · Estimated left ventricular EF = 55% Left ventricular systolic function is normal. · Mild aortic valve stenosis is present. · Aortic valve mean pressure gradient is 13 mmHg. · Trace to mild mitral valve regurgitation is present · Mild tricuspid valve regurgitation is present · Calculated right ventricular systolic pressure from tricuspid regurgitation is 27 mmHg.      CT Head Without Contrast    Result Date: 2/10/2022  EXAMINATION: CT HEAD WO CONTRAST DATE: 2/10/2022 12:05 AM  INDICATION: Fall.  COMPARISON: None available.  TECHNIQUE: Thin section noncontrast axial images were obtained through the head. Coronal reformatted images were created.  CT dose lowering techniques were used, to include: automated exposure control, adjustment for patient size, and or use of iterative  reconstruction FINDINGS: No hemorrhage or extra-axial fluid collection. No acute territorial infarct. No intracranial mass or mass effect. Mild burden of low attenuation in the white matter is nonspecific, but likely reflects sequelae of chronic microvascular ischemia.  Mild diffuse parenchymal volume loss. No hydrocephalus. Basal cisterns are patent. Intracranial atherosclerosis. Bilateral ocular lens  surgery. Trace mucosal thickening in the left maxillary sinus. Mastoid air cells are clear. Calvarium is intact.     1.  No acute intracranial abnormality. 2.  Mild chronic small vessel ischemic changes and volume loss. Electronically signed by:  Isaac Barney DO  2/9/2022 10:37 PM Mountain Time    CT Chest Without Contrast Diagnostic    Result Date: 2/10/2022  Exam: CT Chest without contrast. Date: 2/10/2022. Comparison: None History: Shortness of breath with pneumonia versus vascular overload. Technique: CT examination of the chest was performed without contrast. Coronal and sagittal reformats were performed. CT dose lowering techniques were used, to include: automated exposure control, adjustment for patient size, and/or use of iterative reconstruction. FINDINGS: Mediastinum and Tere: There is no axillary, mediastinal or hilar lymphadenopathy. Pleural and Pericardial spaces: There are moderate bilateral pleural effusions, right greater than left. Upper Abdomen: There is a small amount of ascites around the liver and spleen. Cardiovascular: There is mild vascular calcification throughout the thoracic aorta without evidence of aneurysmal dilation. There are midline sternotomy wires. Severe diffuse coronary artery calcifications are noted. Prior CABG. Lung Parenchyma and Airways: There are 2 patchy groundglass areas of nodularity within the right upper lobe which could be inflammatory or infectious. No definitive septal thickening is otherwise seen 2 suggest edema. There is some mild compressive atelectatic changes in the lower lobes bilaterally. Bones: No fracture or aggressive osseous lesion.     1. Moderate bilateral pleural effusions. 2. Several patchy groundglass areas of opacity in the right upper lobe may be inflammatory or infectious. 3. Small amount of ascites. Electronically signed by:  Maxwell Macias D.O.  2/10/2022 12:14 AM Mountain Time    XR Chest 1 View    Result Date: 2/9/2022  EXAMINATION: XR CHEST  1 VW-  INDICATION: Weak/Dizzy/AMS triage protocol  COMPARISON: 9/7/2021  FINDINGS: Sternotomy wires are noted. Heart shadow is in the upper range of normal size. There is now a mild diffuse perihilar edema pattern present, and cephalization of the vasculature. There is either minimal left basilar atelectasis or effusion.       1. Pulmonary vascular congestion with suspected early interstitial edema. 2. Minimal left effusion or atelectasis.    This report was finalized on 2/9/2022 11:23 PM by Dr. Bj Grewal MD.        Results for orders placed during the hospital encounter of 09/07/21    Duplex Venous Lower Extremity - Bilateral CAR    Interpretation Summary  · No evidence of deep or superficial venous thrombosis in the right or left lower extremities.      Results for orders placed during the hospital encounter of 09/07/21    Duplex Venous Lower Extremity - Bilateral CAR    Interpretation Summary  · No evidence of deep or superficial venous thrombosis in the right or left lower extremities.      Results for orders placed during the hospital encounter of 02/09/22    Adult Transthoracic Echo Complete W/ Cont if Necessary Per Protocol    Interpretation Summary  · Estimated left ventricular EF = 55% Left ventricular systolic function is normal.  · Mild aortic valve stenosis is present.  · Aortic valve mean pressure gradient is 13 mmHg.  · Trace to mild mitral valve regurgitation is present  · Mild tricuspid valve regurgitation is present  · Calculated right ventricular systolic pressure from tricuspid regurgitation is 27 mmHg.      Plan for Follow-up of Pending Labs/Results: f/u as above  Pending Labs     Order Current Status    Tacrolimus Level In process        Discharge Details        Discharge Medications      New Medications      Instructions Start Date   bumetanide 1 MG tablet  Commonly known as: BUMEX   1 mg, Oral, 2 Times Daily   Start Date: February 12, 2022        Changes to Medications      Instructions Start  Date   clopidogrel 75 MG tablet  Commonly known as: PLAVIX  What changed: how much to take   75 mg, Oral, Daily   Start Date: February 12, 2022     isosorbide mononitrate 120 MG 24 hr tablet  Commonly known as: IMDUR  What changed:   · when to take this  · additional instructions   120 mg, Oral, 2 Times Daily      pentoxifylline 400 MG CR tablet  Commonly known as: TRENtal  What changed: when to take this   400 mg, Oral, 3 Times Daily With Meals         Continue These Medications      Instructions Start Date   amLODIPine 10 MG tablet  Commonly known as: NORVASC   10 mg, Oral, Daily      aspirin 81 MG EC tablet   81 mg, Oral, Daily, Continues per doctors orders       cholecalciferol 25 MCG (1000 UT) tablet  Commonly known as: VITAMIN D3   1,000 Units, Oral, Daily      DHEA 50 MG tablet   1 tablet, Oral, Daily      DULoxetine 60 MG capsule  Commonly known as: Cymbalta   60 mg, Oral, Daily      fenofibrate micronized 134 MG capsule  Commonly known as: LOFIBRA   134 mg, Oral, Every Evening      gabapentin 300 MG capsule  Commonly known as: NEURONTIN   300 mg, Oral, 3 Times Daily      glucosamine sulfate 500 MG capsule capsule   500 mg, Oral, Daily      Loratadine 10 MG capsule   1 capsule, Oral, Daily      LORazepam 0.5 MG tablet  Commonly known as: ATIVAN   0.5 mg, Oral, 2 Times Daily PRN      magnesium oxide 400 MG tablet  Commonly known as: MAG-OX   400 mg, Oral, 2 Times Daily, 3 tabs in the AM and 2 tabs in the PM per transplant team      multivitamin with minerals tablet tablet   1 tablet, Oral, Daily      mycophenolate 500 MG tablet  Commonly known as: CELLCEPT   1,000 mg, Oral, 2 Times Daily      nitroglycerin 0.4 MG SL tablet  Commonly known as: NITROSTAT   0.4 mg, Sublingual, Every 5 Minutes PRN, Take no more than 3 doses in 15 minutes.       omeprazole 40 MG capsule  Commonly known as: priLOSEC   40 mg, Oral, 2 Times Daily      oxyCODONE-acetaminophen  MG per tablet  Commonly known as: PERCOCET   1  tablet, Oral, Every 6 Hours PRN      pravastatin 80 MG tablet  Commonly known as: PRAVACHOL   80 mg, Oral, Daily      ranolazine 1000 MG 12 hr tablet  Commonly known as: RANEXA   1,000 mg, Oral, 2 Times Daily      tacrolimus 0.1 % ointment  Commonly known as: PROTOPIC   1 application, Topical, 2 Times Daily      tacrolimus 1 MG capsule  Commonly known as: PROGRAF   1 mg, Oral, 2 Times Daily, Patient takes at 10 am and 10 pm.      Trulicity 0.75 MG/0.5ML solution pen-injector  Generic drug: Dulaglutide   0.75 mg, Subcutaneous, Weekly         Stop These Medications    losartan-hydrochlorothiazide 50-12.5 MG per tablet  Commonly known as: Hyzaar     nebivolol 20 MG tablet  Commonly known as: BYSTOLIC     spironolactone 50 MG tablet  Commonly known as: ALDACTONE            Allergies   Allergen Reactions   • Contrast Dye Other (See Comments)     Cardiac Arrest   • Penicillins Rash   • Sulfasalazine Rash         Discharge Disposition:  Home or Self Care    Diet:  Hospital:  Diet Order   Procedures   • Diet Regular; Cardiac, Consistent Carbohydrate       Activity:  as tolerated    Restrictions or Other Recommendations:  As tolerated    CODE STATUS:    Code Status and Medical Interventions:   Ordered at: 02/10/22 0357     Level Of Support Discussed With:    Patient     Code Status (Patient has no pulse and is not breathing):    CPR (Attempt to Resuscitate)     Medical Interventions (Patient has pulse or is breathing):    Full Support       Future Appointments   Date Time Provider Department Center   4/6/2022 12:30 PM Rodríguez Lopez DO [facility]       Additional Instructions for the Follow-ups that You Need to Schedule     Discharge Follow-up with PCP   As directed       Currently Documented PCP:    Rodríguez Lopez DO    PCP Phone Number:    [phone]     Follow Up Details: 1-2 weeks         Discharge Follow-up with Specified Provider: Cardiology; 1 Month   As directed      To: Cardiology    Follow  Up: 1 Month         Discharge Follow-up with Specified Provider: Renal; 2 Weeks   As directed      To: Renal    Follow Up: 2 Weeks                     Tatiana Oneal MD  02/11/22      Time Spent on Discharge:  I spent  35  minutes on this discharge activity which included: face-to-face encounter with the patient, reviewing the data in the system, coordination of the care with the nursing staff as well as consultants, documentation, and entering orders.

## 2022-02-11 NOTE — PLAN OF CARE
Pt VSS. RA/2L NC. Normal sinus on telemetry. Resting well throughout night. Pt has limited his oral fluid intake. Urine sample obtained. Alert and oriented. Up with x1 assist and walker.     Goal Outcome Evaluation:  Plan of Care Reviewed With: patient        Progress: improving

## 2022-02-11 NOTE — CASE MANAGEMENT/SOCIAL WORK
Discharge Planning Assessment  [facility]     Patient Name: Mahendra Yates  MRN: [id number]  Today's Date: 2/11/2022    Admit Date: 2/9/2022     Discharge Needs Assessment     Row Name 02/11/22 1030       Living Environment    Lives With spouse  pt resides in [facility]    Unique Family Situation daughter and grandson live across the street and grndsons stays with pt and wife sverla days a week    Current Living Arrangements home/apartment/condo    Primary Care Provided by self    Provides Primary Care For no one    Family Caregiver if Needed spouse    Family Caregiver Names wife- Kisha    Quality of Family Relationships helpful; involved; supportive    Able to Return to Prior Arrangements yes       Resource/Environmental Concerns    Resource/Environmental Concerns none       Transition Planning    Patient/Family Anticipates Transition to home with family    Patient/Family Anticipated Services at Transition none    Transportation Anticipated family or friend will provide       Discharge Needs Assessment    Readmission Within the Last 30 Days no previous admission in last 30 days    Equipment Currently Used at Home bath bench; glucometer; walker, rolling    Concerns to be Addressed discharge planning    Anticipated Changes Related to Illness none    Equipment Needed After Discharge rollator    Provided Post Acute Provider List? N/A    Provided Post Acute Provider Quality & Resource List? N/A               Discharge Plan     Row Name 02/11/22 1032       Plan    Plan home    Patient/Family in Agreement with Plan yes    Plan Comments CM spoke with pt and wife Kisha at bedside. Pt resides in [facility] and daughter and grandson live across the street. Pt has minimal assistance with adls as needed and uses a rolling walker. Pt has a bath bench, raised toilet and glucometer. Pt is not current with home health or outpatient medical services. CM discussed home health services with pt and wife and they do not  feel hh would be beneficial at this time. Pt has a living will, not on file and pt aware. Pt denies having a POA. Pt has received his covid vaccinations.Pt confirms he has [employer] Medicare, denies concerns or disruption in coverage. Pt has prescription drug coverage and assistance programs as pt reports he is on anti rejection medications and monthly cost of medications is substantial. Pt reports he is able to afford his current medications and does not do without them. Pt is requesting a rollator, pt reports he paid out of pocket for previous walker 2 years ago. Pt would like rollator through [employer] and delivered to their home. CM will place order. Pt denies further needs at this time and plans to return home with his wife. Pt will have private transportation at time of discharge.    Final Discharge Disposition Code 01 - home or self-care    Row Name 02/11/22 0846       Plan    Final Discharge Disposition Code 01 - home or self-care              Continued Care and Services - Admitted Since 2/9/2022    Coordination has not been started for this encounter.          Demographic Summary     Row Name 02/11/22 1028       General Information    Referral Source admission list    Reason for Consult discharge planning    Preferred Language English     Used During This Interaction no    General Information Comments PCP- Rodríguez Lopez       Contact Information    Permission Granted to Share Info With                Functional Status     Row Name 02/11/22 1029       Functional Status    Usual Activity Tolerance fair    Current Activity Tolerance fair       Functional Status, IADL    Medications independent    Meal Preparation assistive equipment and person    Housekeeping assistive equipment and person    Laundry assistive equipment and person    Shopping assistive equipment and person    IADL Comments pt has minimal assistance as needed with personal adls       Mental Status    General  Appearance WDL WDL       Mental Status Summary    Recent Changes in Mental Status/Cognitive Functioning no changes       Employment/    Employment/ Comments Pt confirms he has [employer] Medicare, denies concerns or disruption in coverage. Pt has prescription drug coverage and assistance programs as pt reports he is on anti rejection medications and monthly cost of medications is substantial. Pt reports he is able to afford his current medications and does not do without them.               Psychosocial    No documentation.                Abuse/Neglect    No documentation.                Legal    No documentation.                Substance Abuse    No documentation.                Patient Forms    No documentation.                   Tequila Devries RN

## 2022-02-11 NOTE — PROGRESS NOTES
" LOS: 1 day   Patient Care Team:  Rodríguez Lopez DO as PCP - General (Internal Medicine)  Natanael Estrella MD as Consulting Physician (Neurology)  Oren Pena MD as Consulting Physician (Cardiology)  Everardo Tate MD as Consulting Physician (Pain Medicine)  Scout Grant MD as Consulting Physician (Otolaryngology)  Christin Handley PA-C as Physician Assistant (Physician Assistant)  Radha Briggs APRN as Nurse Practitioner (Nurse Practitioner)    Chief Complaint: SANTOS    Subjective       Subjective:  Symptoms:  Stable.  No shortness of breath, chest pain, chest pressure or anxiety.    Diet:  Adequate intake.    Pain:  He reports no pain.        History taken from: patient    Objective     Vital Sign Min/Max for last 24 hours  Temp  Min: 97.6 °F (36.4 °C)  Max: 98 °F (36.7 °C)   BP  Min: 122/64  Max: 141/84   Pulse  Min: 70  Max: 105   Resp  Min: 18  Max: 18   SpO2  Min: 90 %  Max: 96 %   Flow (L/min)  Min: 2  Max: 2   Weight  Min: 116 kg (256 lb 11.2 oz)  Max: 116 kg (256 lb 11.2 oz)     Flowsheet Rows      First Filed Value   Admission Height 182.9 cm (72\") Documented at 02/09/2022 2238   Admission Weight 107 kg (235 lb) Documented at 02/09/2022 2238          No intake/output data recorded.  I/O last 3 completed shifts:  In: -   Out: 825 [Urine:825]    Objective:  General Appearance:  Comfortable.    Vital signs: (most recent): Blood pressure 122/64, pulse 72, temperature 97.9 °F (36.6 °C), temperature source Oral, resp. rate 18, height 182.9 cm (72\"), weight 116 kg (256 lb 11.2 oz), SpO2 95 %.  Vital signs are normal.  No fever.    Output: Producing urine.    Lungs:  Normal effort and normal respiratory rate.  Breath sounds clear to auscultation.    Heart: Normal rate.  Regular rhythm.  S1 normal and S2 normal.  No murmur or gallop.   Abdomen: Abdomen is soft.  Bowel sounds are normal.     Extremities: Normal range of motion.  There is dependent edema.    Neurological: Patient is " alert and oriented to person, place and time.  Normal strength.              Results Review:     I reviewed the patient's new clinical results.    WBC WBC   Date Value Ref Range Status   02/10/2022 7.94 3.40 - 10.80 10*3/mm3 Final   02/09/2022 9.01 3.40 - 10.80 10*3/mm3 Final      HGB Hemoglobin   Date Value Ref Range Status   02/10/2022 10.9 (L) 13.0 - 17.7 g/dL Final   02/09/2022 10.9 (L) 13.0 - 17.7 g/dL Final      HCT Hematocrit   Date Value Ref Range Status   02/10/2022 32.0 (L) 37.5 - 51.0 % Final   02/09/2022 32.7 (L) 37.5 - 51.0 % Final      Platlets No results found for: LABPLAT   MCV MCV   Date Value Ref Range Status   02/10/2022 82.5 79.0 - 97.0 fL Final   02/09/2022 83.0 79.0 - 97.0 fL Final          Sodium Sodium   Date Value Ref Range Status   02/11/2022 133 (L) 136 - 145 mmol/L Final   02/10/2022 129 (L) 136 - 145 mmol/L Final   02/09/2022 129 (L) 136 - 145 mmol/L Final      Potassium Potassium   Date Value Ref Range Status   02/11/2022 4.2 3.5 - 5.2 mmol/L Final   02/10/2022 5.1 3.5 - 5.2 mmol/L Final     Comment:     Slight hemolysis detected by analyzer. Results may be affected.   02/09/2022 5.3 (H) 3.5 - 5.2 mmol/L Final      Chloride Chloride   Date Value Ref Range Status   02/11/2022 100 98 - 107 mmol/L Final   02/10/2022 98 98 - 107 mmol/L Final   02/09/2022 98 98 - 107 mmol/L Final      CO2 CO2   Date Value Ref Range Status   02/11/2022 20.0 (L) 22.0 - 29.0 mmol/L Final   02/10/2022 19.0 (L) 22.0 - 29.0 mmol/L Final   02/09/2022 20.0 (L) 22.0 - 29.0 mmol/L Final      BUN BUN   Date Value Ref Range Status   02/11/2022 54 (H) 8 - 23 mg/dL Final   02/10/2022 64 (H) 8 - 23 mg/dL Final   02/09/2022 61 (H) 8 - 23 mg/dL Final      Creatinine Creatinine   Date Value Ref Range Status   02/11/2022 1.45 (H) 0.76 - 1.27 mg/dL Final   02/10/2022 1.76 (H) 0.76 - 1.27 mg/dL Final   02/09/2022 1.70 (H) 0.76 - 1.27 mg/dL Final      Calcium Calcium   Date Value Ref Range Status   02/11/2022 9.5 8.6 - 10.5 mg/dL  Final   02/10/2022 9.4 8.6 - 10.5 mg/dL Final   02/09/2022 9.4 8.6 - 10.5 mg/dL Final      PO4 No results found for: CAPO4   Albumin Albumin   Date Value Ref Range Status   02/11/2022 3.80 3.50 - 5.20 g/dL Final   02/09/2022 4.00 3.50 - 5.20 g/dL Final      Magnesium Magnesium   Date Value Ref Range Status   02/09/2022 2.6 (H) 1.6 - 2.4 mg/dL Final      Uric Acid No results found for: URICACID     Medication Review: yes    Assessment/Plan       DDD (degenerative disc disease), cervical    Presence of stent in coronary artery in patient with coronary artery disease    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 SANTOS: Baseline creatinine noted to be 1.12 09/2021. No proteinuria or hematuria on UA. Offending agents on hold include losartan, spironolactone and HCTZ  SANTOS multifactorial with decreased renal profusion in the presence of bradycardia and volume overload.  2. Hyponatremia: Likely hypervolemic hyponatremia in the presence of volume overload+ excessive water intake   3. Volume overload  4. Hx of diastolic HF  5. Bradycardia: Beta blockers have been stopped. Cardiology following  6. Anemia: Low TSAT, 7%  7. Hyperkalemia: Mild. Now off losartan and spironolactone  8. S/P liver transplant: Currently on cellcept 500mg BID and tacro 1mg daily     Plan  SANTOS likely in the setting of decompensated cardio renal syndrome with passive renal venous congestion vs hemodynamic injury due to tacrolimus use. Hyponatremia improved with diuretics and limiting fluid intake. Stable for discharge from renal standpoint. Continue Bumex 1 mg BID on discharge. Limit water intake to 1.2 liter/day. Low sodium diet <2gm/day. Hold ARB and spironolactone on discharge can be restarted as outpatient.  F/u in renal clinic in 2 weeks after discharge    I  discussed the patients findings and my recommendations with patient      Quan Hawthorne MD  02/11/22  12:14 EST

## 2022-02-11 NOTE — PLAN OF CARE
Goal Outcome Evaluation:  Plan of Care Reviewed With: patient, spouse        Progress: improving  Outcome Summary: OT evaluation complete with education on home safety on dressing tasks in standing and reacher use for item retrieval in standing.  Educated on posture and placement in walker for increase safety.  Recommended gait belt use for getting in and out of pool pt. and spouse plan to use and educated wife on how to use/operate. Reacher issued for item retrieval from floor and long shoe horn for tighter shoes.  Pt. demonstrated use of each. Pt. plans home with spouse assist and going for pool therapy on own.  Educated on gait belt use to assist with safety in and out of pool.

## 2022-02-11 NOTE — CASE MANAGEMENT/SOCIAL WORK
Continued Stay Note  [facility]     Patient Name: Mahendra Yates  MRN: [id number]  Today's Date: 2/11/2022    Admit Date: 2/9/2022     Discharge Plan     Row Name 02/11/22 1041       Plan    Plan Comments CM attempted to order Rollator and pt's PCP Dr. Lopez has already ordered rollator. CM called and spoke with Latosha at [facility] and they have the order from Dr. Lopez and they are requesting additional information from his office and aware pt wants DME delivered to their home. CM will update pt and wife of rollator status and ordered by Dr. Lopez.    Row Name 02/11/22 1032       Plan    Plan home    Patient/Family in Agreement with Plan yes    Plan Comments CM spoke with pt and wife Kisha at bedside. Pt resides in [facility] and daughter and grandson live across the street. Pt has minimal assistance with adls as needed and uses a rolling walker. Pt has a bath bench, raised toilet and glucometer. Pt is not current with home health or outpatient medical services. CM discussed home health services with pt and wife and they do not feel hh would be beneficial at this time. Pt has a living will, not on file and pt aware. Pt denies having a POA. Pt has received his covid vaccinations.Pt confirms he has [employer] Medicare, denies concerns or disruption in coverage. Pt has prescription drug coverage and assistance programs as pt reports he is on anti rejection medications and monthly cost of medications is substantial. Pt reports he is able to afford his current medications and does not do without them. Pt is requesting a rollator, pt reports he paid out of pocket for previous walker 2 years ago. Pt would like rollator through [facility] and delivered to their home. CM will place order. Pt denies further needs at this time and plans to return home with his wife. Pt will have private transportation at time of discharge.    Final Discharge Disposition Code 01 - home or self-care    Row Name 02/11/22 4635       Plan     Final Discharge Disposition Code 01 - home or self-care               Discharge Codes    No documentation.                     Tequila Devries RN

## 2022-02-11 NOTE — OUTREACH NOTE
Prep Survey      Responses   [facility] [place] patient discharged from? [place]   Is LACE score < 7 ? No   Emergency Room discharge w/ pulse ox? No   Eligibility [facility]   Date of Admission 02/09/22   Date of Discharge 02/11/22   Discharge Disposition Home or Self Care   Discharge diagnosis Acute congestive heart failure, unspecified heart failure type    Does the patient have one of the following disease processes/diagnoses(primary or secondary)? CHF   Does the patient have Home health ordered? No   Is there a DME ordered? No   Prep survey completed? Yes          Ana Basilio RN

## 2022-02-11 NOTE — THERAPY DISCHARGE NOTE
Acute Care - Occupational Therapy Discharge  [facility]    Patient Name: Mahendra Yates  : 1955    MRN: [id number]                              Today's Date: 2022       Admit Date: 2022    Visit Dx:     ICD-10-CM ICD-9-CM   1. Acute congestive heart failure, unspecified heart failure type (HCC)  I50.9 428.0   2. Generalized weakness  R53.1 780.79   3. Fall, initial encounter  W19.XXXA E888.9   4. Injury of head, initial encounter  S09.90XA 959.01   5. 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)   • Hx of CABG   • History of liver transplant (HCC)   • Physical deconditioning   • Lumbar stenosis with neurogenic claudication   • Herniated lumbar intervertebral disc   • Radicular syndrome of right leg   • Degenerative lumbar spinal stenosis   • Bowel and bladder incontinence   • Closed fracture of multiple ribs of left side   • Acute congestive heart failure (HCC)   • Bradycardia   • Anemia   • Hypoxia     Past Medical History:   Diagnosis Date   • Arthritis    • Atherosclerosis    • B12 deficiency    • Cancer (HCC)    • Cancer of liver (HCC)    • Chronic headaches    • Coronary artery disease    • Diabetes mellitus (HCC)    • Gastroesophageal reflux disease    • History of transfusion    • Hyperlipidemia    • Hypertension    • Myocardial infarction (HCC)    • Sleep apnea    • Stroke (HCC)      Past Surgical History:   Procedure Laterality Date   • ANGIOPLASTY     • CARDIAC SURGERY     • CARPAL TUNNEL RELEASE Right 2017    Procedure: CARPAL TUNNEL RELEASE RIGHT ;  Surgeon: Luis DUONG  MD Catracho;  Location: [facility];  Service:    • CHOLECYSTECTOMY     • CORONARY ARTERY BYPASS GRAFT     • CORONARY STENT PLACEMENT      x2   • LIVER TRANSPLANTATION     • TUMOR REMOVAL Right     axilla      General Information     Row Name 02/11/22 1304          OT Time and Intention    Document Type evaluation  -COLIN     Mode of Treatment individual therapy; occupational therapy  -COLIN     Row Name 02/11/22 1304          General Information    Patient Profile Reviewed yes  -COLIN     Prior Level of Function independent:; all household mobility; gait; transfer; bed mobility; min assist:; dressing; bathing; cooking; max assist:; cleaning  -COLIN     Existing Precautions/Restrictions fall  foot drop with a brace at home  -COLIN     Barriers to Rehab previous functional deficit; impaired sensation  pt. reports he has numbness in bottoms of feet and LE pain and weakness from diabetic neuropathy  -     Row Name 02/11/22 1304          Occupational Profile    Environmental Supports and Barriers (Occupational Profile) tub shower, which wife assist one LE over into and out of tub, shower seat and grab bars there, RTS on toilet, has a wx, but wheel broken, rollator ordered  -     Row Name 02/11/22 1304          Living Environment    Lives With spouse  spouse works  -     Row Name 02/11/22 1304          Stairs Within Home, Primary    Stairs, Within Home, Primary see PT note for steps  -COLIN     Row Name 02/11/22 1304          Cognition    Orientation Status (Cognition) oriented x 3  used board in room for date  -     Row Name 02/11/22 1304          Safety Issues, Functional Mobility    Impairments Affecting Function (Mobility) balance; range of motion (ROM); strength; endurance/activity tolerance; postural/trunk control  -           User Key  (r) = Recorded By, (t) = Taken By, (c) = Cosigned By    Initials Name Provider Type    Rafaela Hopson, SAUMYA Occupational Therapist               Mobility/ADL's     Row Name 02/11/22 1887           Bed Mobility    Comment (Bed Mobility) sitting on EOB pre and post session  -     Row Name 02/11/22 1308          Transfers    Transfers sit-stand transfer  -     Comment (Transfers) CGA given for evaluation purposes, pt. with good hand placement  -     Sit-Stand [place] (Transfers) contact guard  -     Row Name 02/11/22 1308          Sit-Stand Transfer    Assistive Device (Sit-Stand Transfers) walker, front-wheeled  -[facility] Name 02/11/22 1308          Functional Mobility    Functional Mobility- Ind. Level contact guard assist; verbal cues required  -     Functional Mobility- Device rolling walker  -     Functional Mobility-Distance (Feet) 40  -     Functional Mobility- Safety Issues step length decreased  -     Functional Mobility- Comment cues to stay closer to walker and cues for more upright posture, pt. slides feet in slippers instead of picking up, repors AFO for foot drop at home  -[facility] Name 02/11/22 1308          Activities of Daily Living    BADL Assessment/Intervention lower body dressing; upper body dressing; grooming; toileting  -[facility] Name 02/11/22 1308          Lower Body Dressing Assessment/Training    [place] Level (Lower Body Dressing) doff; don; socks; independent  -     Position (Lower Body Dressing) edge of bed sitting  -     Comment (Lower Body Dressing) with donning socks simulated ability to start pants over feet and when stood simulated pulling up pants holding to walker with at least one hand at a time.  Pt. was able to inocencio slippers but long handle shoe horn issued to tighter shoes at home. Pt. simulated understanding with slippers.  -[facility] Name 02/11/22 1308          Upper Body Dressing Assessment/Training    Comment (Upper Body Dressing) pt. simulated skills with UE's to be able to inocencio a shirt.  -[facility] Name 02/11/22 1308          Toileting Assessment/Training    Comment (Toileting) Pt. simulated skills for I completion.  -            User Key  (r) = Recorded By, (t) = Taken By, (c) = Cosigned By    Initials Name Provider Type    Rafaela Hopson, OT Occupational Therapist               Obj/Interventions     [facility] Name 02/11/22 1312          Sensory Assessment (Somatosensory)    Sensory Assessment (Somatosensory) UE sensation intact  -COLIN     Row Name 02/11/22 1312          Sensory Interventions    Comment, Sensory Intervention pt. reports that BLE's numb on bottom of feet  -COLIN     Row Name 02/11/22 1312          Vision Assessment/Intervention    Visual Impairment/Limitations WFL  functiona. for evaluation purposes, pt. able to read sign in room  -[facility] Name 02/11/22 1312          Range of Motion Comprehensive    General Range of Motion bilateral upper extremity ROM WNL  -COLIN     Row Name 02/11/22 1312          Strength Comprehensive (MMT)    General Manual Muscle Testing (MMT) Assessment no strength deficits identified  -     Comment, General Manual Muscle Testing (MMT) Assessment BUE 5/5, see PTnote for LE's  -COLIN     Row Name 02/11/22 1312          Balance    Balance Assessment sitting static balance; sitting dynamic balance; standing static balance; standing dynamic balance  -     Static Sitting Balance WFL; unsupported; sitting, edge of bed  -     Dynamic Sitting Balance WFL; unsupported; sitting, edge of bed  -     Static Standing Balance mild impairment; supported; unsupported  -     Dynamic Standing Balance mild impairment; supported; standing  -     Balance Interventions sit to stand; occupation based/functional task  -           User Key  (r) = Recorded By, (t) = Taken By, (c) = Cosigned By    Initials Name Provider Type    Rafaela Hopson OT Occupational Therapist               Goals/Plan    No documentation.                Clinical Impression     Row Name 02/11/22 1314          Pain Assessment    Additional Documentation Pain Scale: Numbers Pre/Post-Treatment (Group)  -[facility] Name 02/11/22 1314           Pain Scale: Numbers Pre/Post-Treatment    Pretreatment Pain Rating 6/10  -COLIN     Posttreatment Pain Rating 6/10  -COLIN     Pain Location - Side Bilateral  -COLIN     Pain Location back  -COLIN     Pain Intervention(s) Ambulation/increased activity; Repositioned  -COLIN     Row Name 02/11/22 1314          Plan of Care Review    Plan of Care Reviewed With patient; spouse  -COLIN     Progress improving  -     Outcome Summary OT evaluation complete with education on home safety on dressing tasks in standing and reacher use for item retrieval in standing.  Educated on posture and placement in walker for increase safety.  Recommended gait belt use for getting in and out of pool pt. and spouse plan to use and educated wife on how to use/operate. Reacher issued for item retrieval from floor and long shoe horn for tighter shoes.  Pt. demonstrated use of each. Pt. plans home with spouse assist and going for pool therapy on own.  Educated on gait belt use to assist with safety in and out of pool.  -     Row Name 02/11/22 1314          Therapy Assessment/Plan (OT)    Criteria for Skilled Therapeutic Interventions Met (OT) yes; meets criteria; skilled treatment is necessary  -     Therapy Frequency (OT) evaluation only  plans for discharge home today so unable to set further goals  -COLIN     Row Name 02/11/22 1314          Therapy Plan Review/Discharge Plan (OT)    Anticipated Discharge Disposition (OT) home with assist  OP therapy would be beneficial, but per chart pt. plans home and doing pool therapy with spouse  -COLIN     [place] Name 02/11/22 1314          Vital Signs    Posttreatment Heart Rate (beats/min) 73  -COLIN     Post SpO2 (%) 95  -COLIN     O2 Delivery Post Treatment room air  -COLIN     Pre Patient Position Sitting  -COLIN     Intra Patient Position Standing  -COLIN     Post Patient Position Sitting  -COLIN     Row Name 02/11/22 7754          Positioning and Restraints    Pre-Treatment Position in bed  sitting EOB  -COLIN     Post Treatment  Position bed  -COLIN     In Bed call light within reach; encouraged to call for assist; exit alarm on; sitting EOB; with family/caregiver  -COLIN           User Key  (r) = Recorded By, (t) = Taken By, (c) = Cosigned By    Initials Name Provider Type    Rafaela Hopson, OT Occupational Therapist               Outcome Measures     Row Name 02/11/22 1321          How much help from another is currently needed...    Putting on and taking off regular lower body clothing? 3  wife sets out clothing  -COLIN     Bathing (including washing, rinsing, and drying) 3  -COLIN     Toileting (which includes using toilet bed pan or urinal) 4  -COLIN     Putting on and taking off regular upper body clothing 4  -COLIN     Taking care of personal grooming (such as brushing teeth) 4  -COLIN     Eating meals 4  -COLIN     AM-PAC 6 Clicks Score (OT) 22  -COLIN     Row Name 02/11/22 0800          How much help from another person do you currently need...    Turning from your back to your side while in flat bed without using bedrails? 3  -EN (r) MH (t) EN (c)     Moving from lying on back to sitting on the side of a flat bed without bedrails? 3  -EN (r) MH (t) EN (c)     Moving to and from a bed to a chair (including a wheelchair)? 2  -EN (r) MH (t) EN (c)     Standing up from a chair using your arms (e.g., wheelchair, bedside chair)? 3  -EN (r) MH (t) EN (c)     Climbing 3-5 steps with a railing? 1  -EN (r) MH (t) EN (c)     To walk in hospital room? 2  -EN (r) MH (t) EN (c)     AM-PAC 6 Clicks Score (PT) 14  -EN (r) MH (t)     Row Name 02/11/22 1321          Functional Assessment    Outcome Measure Options AM-PAC 6 Clicks Daily Activity (OT)  -COLIN           User Key  (r) = Recorded By, (t) = Taken By, (c) = Cosigned By    Initials Name Provider Type    Rafaela Hopson, OT Occupational Therapist    Lily Avelar, Nursing Student Nursing Student    Juanita Tovar RN Registered Nurse              Occupational Therapy Education                 Title:  PT OT SLP Therapies (In Progress)     Topic: Occupational Therapy (In Progress)     Point: ADL training (Done)     Description:   Instruct learner(s) on proper safety adaptation and remediation techniques during self care or transfers.   Instruct in proper use of assistive devices.              Learning Progress Summary           Patient Acceptance, E,D, VU,DU by COLIN at 2/11/2022 1322    Comment: reason for consult, reacher/LH shoe horn/gait belt use for improved independence and safety   Family Acceptance, E,D, VU,DU by COLIN at 2/11/2022 1322    Comment: reason for consult, reacher/LH shoe horn/gait belt use for improved independence and safety                   Point: Home exercise program (Not Started)     Description:   Instruct learner(s) on appropriate technique for monitoring, assisting and/or progressing therapeutic exercises/activities.              Learner Progress:  Not documented in this visit.          Point: Precautions (Done)     Description:   Instruct learner(s) on prescribed precautions during self-care and functional transfers.              Learning Progress Summary           Patient Acceptance, E,D, VU,DU by COLIN at 2/11/2022 1322    Comment: reason for consult, reacher/LH shoe horn/gait belt use for improved independence and safety   Family Acceptance, E,D, VU,DU by COLIN at 2/11/2022 1322    Comment: reason for consult, reacher/LH shoe horn/gait belt use for improved independence and safety                   Point: Body mechanics (Done)     Description:   Instruct learner(s) on proper positioning and spine alignment during self-care, functional mobility activities and/or exercises.              Learning Progress Summary           Patient Acceptance, E,D, VU,DU by COLIN at 2/11/2022 1322    Comment: reason for consult, reacher/LH shoe horn/gait belt use for improved independence and safety   Family Acceptance, E,D, VU,DU by COLIN at 2/11/2022 1322    Comment: reason for consult, reacher/LH shoe horn/gait belt  use for improved independence and safety                               User Key     Initials Effective Dates Name Provider Type Discipline    COLIN 06/16/21 -  Rafaela Fonseca, OT Occupational Therapist OT              OT Recommendation and Plan  Retired Outcome Summary/Treatment Plan (OT)  Anticipated Discharge Disposition (OT): home with assist (OP therapy would be beneficial, but per chart pt. plans home and doing pool therapy with spouse)  Therapy Frequency (OT): evaluation only (plans for discharge home today so unable to set further goals)  Plan of Care Review  Plan of Care Reviewed With: patient, spouse  Progress: improving  Outcome Summary: OT evaluation complete with education on home safety on dressing tasks in standing and reacher use for item retrieval in standing.  Educated on posture and placement in walker for increase safety.  Recommended gait belt use for getting in and out of pool pt. and spouse plan to use and educated wife on how to use/operate. Reacher issued for item retrieval from floor and long shoe horn for tighter shoes.  Pt. demonstrated use of each. Pt. plans home with spouse assist and going for pool therapy on own.  Educated on gait belt use to assist with safety in and out of pool.  Plan of Care Reviewed With: patient, spouse  Outcome Summary: OT evaluation complete with education on home safety on dressing tasks in standing and reacher use for item retrieval in standing.  Educated on posture and placement in walker for increase safety.  Recommended gait belt use for getting in and out of pool pt. and spouse plan to use and educated wife on how to use/operate. Reacher issued for item retrieval from floor and long shoe horn for tighter shoes.  Pt. demonstrated use of each. Pt. plans home with spouse assist and going for pool therapy on own.  Educated on gait belt use to assist with safety in and out of pool.     Time Calculation:    Time Calculation- OT     Row Name 02/11/22 1326              Time Calculation- OT    OT Start Time 1129  -COLIN      OT Received On 02/11/22  -COLIN              Timed Charges    98290 - OT Therapeutic Activity Minutes 7  -COLIN      24744 - OT Self Care/Mgmt Minutes 7  -COLIN              Untimed Charges    OT Eval/Re-eval Minutes 38  -COLIN              Total Minutes    Timed Charges Total Minutes 14  -COLIN      Untimed Charges Total Minutes 38  -COLIN       Total Minutes 52  -COLIN            User Key  (r) = Recorded By, (t) = Taken By, (c) = Cosigned By    Initials Name Provider Type    Rafaela Hopson OT Occupational Therapist              Therapy Charges for Today     Code Description Service Date Service Provider Modifiers Qty    55745904500  OT EVAL LOW COMPLEXITY 3 2/11/2022 Rafaela Fonseca, OT GO 1    [id number] HC OT SELF CARE/MGMT/TRAIN EA 15 MIN 2/11/2022 Rafaela Fonseca OT GO 1               Rafaela Fonseca OT  2/11/2022

## 2022-02-11 NOTE — PROGRESS NOTES
[facility] Cardiology Daily Note       LOS: 1 day   Patient Care Team:  Rodríguez Lopez DO as PCP - General (Internal Medicine)  Natanael Estrella MD as Consulting Physician (Neurology)  Oren Pena MD as Consulting Physician (Cardiology)  Everardo Tate MD as Consulting Physician (Pain Medicine)  Scout Grant MD as Consulting Physician (Otolaryngology)  Christin Handley PA-C as Physician Assistant (Physician Assistant)  Radha Briggs APRN as Nurse Practitioner (Nurse Practitioner)    Chief Complaint: Diastolic heart failure/ischemic heart disease/elevated troponin/bradycardia    Subjective     Subjective: Wants to go home.  92% on room air.  Heart rates were in the 40s on admission but now in the 70s.  No chest pain.  He did not really have any chest pain at home either.  Breathing is much better.  Lower extremity edema is much better.    We had a long discussion about not drinking too much fluid and not eating too much salt.  According to him and his wife he eats a lot of salt.    They are also discussing going back to the pool for exercise and I encouraged that.      Review of Systems:   As above.    Medications:  amLODIPine, 10 mg, Oral, Daily  aspirin, 81 mg, Oral, Daily  bumetanide, 2 mg, Intravenous, Daily  cetirizine, 5 mg, Oral, Daily  clopidogrel, 75 mg, Oral, Daily  DULoxetine, 60 mg, Oral, Daily  gabapentin, 300 mg, Oral, TID  insulin lispro, 0-7 Units, Subcutaneous, TID AC  isosorbide mononitrate, 120 mg, Oral, Daily  multivitamin with minerals, 1 tablet, Oral, Daily  mycophenolate, 500 mg, Oral, Q12H  pantoprazole, 40 mg, Oral, Daily  pentoxifylline, 400 mg, Oral, TID With Meals  pharmacy consult - MTM, , Does not apply, Daily  pimecrolimus, , Topical, Q12H  pravastatin, 80 mg, Oral, Daily  ranolazine, 1,000 mg, Oral, BID  sodium chloride, 10 mL, Intravenous, Q12H  tacrolimus, 1 mg, Oral, Daily        Objective     Vital Sign Min/Max for last 24  "hours  Temp  Min: 97.6 °F (36.4 °C)  Max: 98 °F (36.7 °C)   BP  Min: 124/66  Max: 147/71   Pulse  Min: 69  Max: 105   Resp  Min: 18  Max: 18   SpO2  Min: 90 %  Max: 100 %   Flow (L/min)  Min: 2  Max: 2   Weight  Min: 116 kg (256 lb 11.2 oz)  Max: 116 kg (256 lb 11.2 oz)      Intake/Output Summary (Last 24 hours) at 2/11/2022 0719  Last data filed at 2/10/2022 2100  Gross per 24 hour   Intake --   Output 500 ml   Net -500 ml        Flowsheet Rows      First Filed Value   Admission Height 182.9 cm (72\") Documented at 02/09/2022 2238   Admission Weight 107 kg (235 lb) Documented at 02/09/2022 2238          Physical Exam:    General: Alert and oriented.   Cardiovascular: Heart has a nondisplaced focal PMI. Regular rate and rhythm with 1-2/6 SE murmur, no gallop or rub.  Lungs: Very few basilar rales are heard.  Abdomen: Soft, nontender.  Extremities: Show 1+ edema.   Skin: warm and dry.     Results Review:    I reviewed the patient's new clinical results.  EKG:  Tele: Sinus rhythm    Labs:    Results from last 7 days   Lab Units 02/11/22  0558 02/10/22  0656 02/09/22  2307   SODIUM mmol/L 133* 129* 129*   POTASSIUM mmol/L 4.2 5.1 5.3*   CHLORIDE mmol/L 100 98 98   CO2 mmol/L 20.0* 19.0* 20.0*   BUN mg/dL 54* 64* 61*   CREATININE mg/dL 1.45* 1.76* 1.70*   CALCIUM mg/dL 9.5 9.4 9.4   BILIRUBIN mg/dL  --   --  0.6   ALK PHOS U/L  --   --  88   ALT (SGPT) U/L  --   --  19   AST (SGOT) U/L  --   --  19   GLUCOSE mg/dL 126* 152* 190*     Results from last 7 days   Lab Units 02/10/22  0657 02/09/22  2307   WBC 10*3/mm3 7.94 9.01   HEMOGLOBIN g/dL 10.9* 10.9*   HEMATOCRIT % 32.0* 32.7*   PLATELETS 10*3/mm3 252 284     Lab Results   Component Value Date    TROPONINT 0.069 (C) 02/10/2022    TROPONINT 0.075 (C) 02/10/2022    TROPONINT 0.080 (C) 02/09/2022     Lab Results   Component Value Date    CHOL 132 02/10/2022    CHOL 198 08/08/2021     Lab Results   Component Value Date    TRIG 160 (H) 02/10/2022    TRIG 230 (H) 08/08/2021 "     Lab Results   Component Value Date    HDL 34 (L) 02/10/2022    HDL 56 08/08/2021     No components found for: LDLCALC  Lab Results   Component Value Date    INR 1.10 09/07/2021    PROTIME 13.9 09/07/2021         Ejection Fraction:    Assessment   Assessment:  1. Coronary artery disease with recent angina  a. CABG 7/2009  b. History of stents, ICDB  c. NSTEMI/LHC, 2019 (Dr. Pena): Patent LIMA to LAD with diffuse distal LAD disease. VG to OM occluded, unable to be intervened upon. VG to RCA patent with diffuse target vessel disease.  d. Troponins are flat.  2. Hypertension  3. Dyslipidemia  4. Lower extremity edema   a. Venous duplex, 9/7/2021: No DVT or superficial venous thrombosis bilaterally.  5. Diabetes  6. History of CVA 12/2018  7. History of liver cancer  a. Liver transplant 2009  b. [place], [place]  8. Arthritis  9. GERD      Plan:    Home today is okay with me  Remain off nebivolol.  Increase Imdur to twice daily dosing as he took at home.  The patient and his wife will begin exercising at the pool  The patient will begin taking 0.4 mg of sublingual nitroglycerin prior to exercise to precondition his coronaries  Limit oral intake as noted by nephrology.  Limit sodium intake.  We discussed that today.  Follow-up with me 1 month    April Dillon MD  02/11/22  07:19 EST

## 2022-02-12 LAB
QT INTERVAL: 522 MS
QTC INTERVAL: 451 MS

## 2022-02-14 ENCOUNTER — TRANSITIONAL CARE MANAGEMENT TELEPHONE ENCOUNTER (OUTPATIENT)
Dept: CALL CENTER | Facility: HOSPITAL | Age: 67
End: 2022-02-14

## 2022-02-14 LAB — TACROLIMUS BLD LC/MS/MS-MCNC: 6.9 NG/ML (ref 2–20)

## 2022-02-14 NOTE — OUTREACH NOTE
Call Center TCM Note      Responses   [facility] patient discharged from? [facility]   Does the patient have one of the following disease processes/diagnoses(primary or secondary)? CHF   TCM attempt successful? Yes   Call start time 1114   Call end time 1124   Discharge diagnosis Acute congestive heart failure   Is patient permission given to speak with other caregiver? Yes   List who call center can speak with Kisha Yates, spouse   Person spoke with today (if not patient) and relationship spouse   Meds reviewed with patient/caregiver? Yes   Does the patient have all medications ordered at discharge? Yes   Is the patient taking all medications as directed (includes completed medication regime)? Yes   Does the patient have a primary care provider?  Yes   Does the patient have an appointment with their PCP within 7 days of discharge? Greater than 7 days   Comments regarding PCP Hospital Follow Up with YARED Lopez DO Friday Feb 25, 2022 1:15 PM   Nursing Interventions Verified appointment date/time/provider   Has the patient kept scheduled appointments due by today? N/A   Comments Follow up with April Dillon MD in 1 month, Renal,  2 Weeks   Has home health visited the patient within 72 hours of discharge? N/A   Pulse Ox monitoring None   Psychosocial issues? No   Did the patient receive a copy of their discharge instructions? Yes   Nursing interventions Reviewed instructions with patient   What is the patient's perception of their health status since discharge? Same   Nursing interventions Nurse provided patient education   Is the patient weighing daily? Yes  [Wife reports stable weight. Reports ankles small in the morning, swells throughout the day. ]   Does the patient have scales? Yes   Daily weight interventions Education provided on importance of daily weight   Is the patient able to teach back Heart Failure diet management? Yes  [Limiting oral and sodium intake]   Is the patient able to teach  back signs and symptoms of worsening condition? (i.e. weight gain, shortness of air, etc.) Yes   If the patient is a current smoker, are they able to teach back resources for cessation? Not a smoker   Is the patient/caregiver able to teach back the hierarchy of who to call/visit for symptoms/problems? PCP, Specialist, Home health nurse, Urgent Care, ED, 911 Yes   TCM call completed? Yes           Marisa Alex RN    2/14/2022, 11:24 EST

## 2022-02-22 ENCOUNTER — READMISSION MANAGEMENT (OUTPATIENT)
Dept: CALL CENTER | Facility: HOSPITAL | Age: 67
End: 2022-02-22

## 2022-02-22 ENCOUNTER — TELEPHONE (OUTPATIENT)
Dept: FAMILY MEDICINE CLINIC | Facility: CLINIC | Age: 67
End: 2022-02-22

## 2022-02-22 DIAGNOSIS — K21.9 GASTROESOPHAGEAL REFLUX DISEASE, UNSPECIFIED WHETHER ESOPHAGITIS PRESENT: ICD-10-CM

## 2022-02-22 RX ORDER — OMEPRAZOLE 40 MG/1
40 CAPSULE, DELAYED RELEASE ORAL 2 TIMES DAILY
Qty: 180 CAPSULE | Refills: 1 | Status: SHIPPED | OUTPATIENT
Start: 2022-02-22 | End: 2022-11-21

## 2022-02-22 NOTE — OUTREACH NOTE
CHF Week 2 Survey      Responses   [facility] patient discharged from? [place]   Does the patient have one of the following disease processes/diagnoses(primary or secondary)? CHF   Week 2 attempt successful? Yes   Call start time 1210   Call end time 1213   Discharge diagnosis Acute congestive heart failure   Meds reviewed with patient/caregiver? Yes   Is the patient having any side effects they believe may be caused by any medication additions or changes? No   Does the patient have all medications ordered at discharge? Yes   Is the patient taking all medications as directed (includes completed medication regime)? Yes   Does the patient have a primary care provider?  Yes   Does the patient have an appointment with their PCP within 7 days of discharge? Greater than 7 days   Comments regarding PCP Hospital Follow Up with YARED Lopez DO Friday Feb 25, 2022 1:15 PM   What is preventing the patient from scheduling follow up appointments within 7 days of discharge? Earlier appointment not available   Nursing Interventions Verified appointment date/time/provider   Has the patient kept scheduled appointments due by today? N/A   Has home health visited the patient within 72 hours of discharge? N/A   Pulse Ox monitoring None   Psychosocial issues? No   Did the patient receive a copy of their discharge instructions? Yes   Nursing interventions Reviewed instructions with patient   What is the patient's perception of their health status since discharge? Same   Nursing interventions Nurse provided patient education   Is the patient weighing daily? Yes   Does the patient have scales? Yes   Daily weight interventions Education provided on importance of daily weight   Is the patient able to teach back Heart Failure diet management? Yes   Is the patient able to teach back Heart Failure Zones? Yes   Is the patient able to teach back signs and symptoms of worsening condition? (i.e. weight gain, shortness of air, etc.) Yes    If the patient is a current smoker, are they able to teach back resources for cessation? Not a smoker   Is the patient/caregiver able to teach back the hierarchy of who to call/visit for symptoms/problems? PCP, Specialist, Home health nurse, Urgent Care, ED, 911 Yes   CHF Week 2 call completed? Yes          Jono Peña RN

## 2022-02-22 NOTE — TELEPHONE ENCOUNTER
Rx Refill Note  Requested Prescriptions     Pending Prescriptions Disp Refills   • omeprazole (priLOSEC) 40 MG capsule 180 capsule 3     Sig: Take 1 capsule by mouth 2 (Two) Times a Day.      Last office visit with prescribing clinician: 1/3/2022      Next office visit with prescribing clinician: 2/25/2022            Latia Headley MA  02/22/22, 11:03 EST

## 2022-02-25 ENCOUNTER — OFFICE VISIT (OUTPATIENT)
Dept: FAMILY MEDICINE CLINIC | Facility: CLINIC | Age: 67
End: 2022-02-25

## 2022-02-25 VITALS
SYSTOLIC BLOOD PRESSURE: 128 MMHG | HEART RATE: 71 BPM | WEIGHT: 205 LBS | HEIGHT: 72 IN | BODY MASS INDEX: 27.77 KG/M2 | OXYGEN SATURATION: 98 % | DIASTOLIC BLOOD PRESSURE: 66 MMHG

## 2022-02-25 DIAGNOSIS — Z94.4 HISTORY OF LIVER TRANSPLANT: ICD-10-CM

## 2022-02-25 DIAGNOSIS — M54.41 CHRONIC LOW BACK PAIN WITH BILATERAL SCIATICA, UNSPECIFIED BACK PAIN LATERALITY: ICD-10-CM

## 2022-02-25 DIAGNOSIS — R26.89 BALANCE PROBLEM: ICD-10-CM

## 2022-02-25 DIAGNOSIS — I50.32 CHRONIC DIASTOLIC (CONGESTIVE) HEART FAILURE: ICD-10-CM

## 2022-02-25 DIAGNOSIS — I25.10 PRESENCE OF STENT IN CORONARY ARTERY IN PATIENT WITH CORONARY ARTERY DISEASE: ICD-10-CM

## 2022-02-25 DIAGNOSIS — M54.42 CHRONIC LOW BACK PAIN WITH BILATERAL SCIATICA, UNSPECIFIED BACK PAIN LATERALITY: ICD-10-CM

## 2022-02-25 DIAGNOSIS — R29.898 WEAKNESS OF BOTH LOWER EXTREMITIES: ICD-10-CM

## 2022-02-25 DIAGNOSIS — Z95.1 HX OF CABG: ICD-10-CM

## 2022-02-25 DIAGNOSIS — Z95.5 PRESENCE OF STENT IN CORONARY ARTERY IN PATIENT WITH CORONARY ARTERY DISEASE: ICD-10-CM

## 2022-02-25 DIAGNOSIS — D64.9 ANEMIA, UNSPECIFIED TYPE: Primary | ICD-10-CM

## 2022-02-25 DIAGNOSIS — G89.29 CHRONIC LOW BACK PAIN WITH BILATERAL SCIATICA, UNSPECIFIED BACK PAIN LATERALITY: ICD-10-CM

## 2022-02-25 DIAGNOSIS — M21.372 LEFT FOOT DROP: ICD-10-CM

## 2022-02-25 PROCEDURE — 1111F DSCHRG MED/CURRENT MED MERGE: CPT | Performed by: INTERNAL MEDICINE

## 2022-02-25 PROCEDURE — 99495 TRANSJ CARE MGMT MOD F2F 14D: CPT | Performed by: INTERNAL MEDICINE

## 2022-02-25 RX ORDER — OXYCODONE AND ACETAMINOPHEN 10; 325 MG/1; MG/1
1 TABLET ORAL EVERY 6 HOURS PRN
Qty: 120 TABLET | Refills: 0 | Status: SHIPPED | OUTPATIENT
Start: 2022-02-25 | End: 2022-04-06 | Stop reason: SDUPTHER

## 2022-02-25 NOTE — PROGRESS NOTES
Chief Complaint   Patient presents with   • Congestive Heart Failure     [facility] ED f/u admission  2022 -  discharge 2022    • Foot Problem     Needs another brace for left leg. bluegrass bracing    Current outpatient and discharge medications have been reconciled for the patient.  Reviewed by: Rodríguez Lopez DO      HPI:  Mahendra Yates is a 67 y.o. male who presents today for hospital follow-up CHF exacerbation.  Doing well post discharge.  No acute concerns today.  Requesting another brace for foot drop on the left.    ROS:  Constitutional: no fevers, night sweats or unexplained weight loss  Eyes: no vision changes  ENT: no runny nose, ear pain, sore throat  Cardio: no chest pain, palpitations  Pulm: no shortness of breath, wheezing, or cough  GI: no abdominal pain or changes in bowel movements  : no difficulty urinating  MSK: no difficulty ambulating, no joint pain  Neuro: no weakness, dizziness or headache  Psych: no trouble sleeping  Endo: no change in appetite      Past Medical History:   Diagnosis Date   • Arthritis    • Atherosclerosis    • B12 deficiency    • Cancer (HCC)    • Cancer of liver (HCC)    • Chronic headaches    • Coronary artery disease    • Diabetes mellitus (HCC)    • Gastroesophageal reflux disease    • History of transfusion    • Hyperlipidemia    • Hypertension    • Myocardial infarction (HCC)    • Sleep apnea    • Stroke (HCC)       Family History   Problem Relation Age of Onset   • Heart disease Mother    • Stroke Mother    • Diabetes Father    • Liver cancer Brother    • No Known Problems Sister    • Macular degeneration Brother    • No Known Problems Sister    • Dementia Brother       Social History     Socioeconomic History   • Marital status:    Tobacco Use   • Smoking status: Former Smoker     Packs/day: 4.00     Years: 30.00     Pack years: 120.00     Types: Cigarettes     Quit date: 5/3/1993     Years since quittin.8   • Smokeless tobacco: Never  Used   Vaping Use   • Vaping Use: Never used   Substance and Sexual Activity   • Alcohol use: No   • Drug use: No   • Sexual activity: Defer      Allergies   Allergen Reactions   • Contrast Dye Other (See Comments)     Cardiac Arrest   • Penicillins Rash   • Sulfasalazine Rash      Immunization History   Administered Date(s) Administered   • COVID-19 (PFIZER) PURPLE CAP 01/15/2021, 02/05/2021, 09/17/2021   • Flu Vaccine Quad PF >36MO 10/12/2017, 12/28/2018   • Fluzone High Dose =>65 Years ([facility] ONLY) 09/24/2019   • Fluzone High-Dose 65+yrs 09/14/2020, 01/03/2022   • Influenza Quad Vaccine (Inpatient) 02/12/2017        PE:  Vitals:    02/25/22 1304   BP: 128/66   Pulse: 71   SpO2: 98%      Body mass index is 27.8 kg/m².    Gen Appearance: NAD  HEENT: Normocephalic, PERRLA, no thyromegaly, trache midline  Heart: RRR, normal S1 and S2, no murmur  Lungs: CTA b/l, no wheezing, no crackles  Abdomen: Soft, non-tender, non-distended, no guarding and BSx4  MSK: Moves all extremities well, normal gait, no peripheral edema  Pulses: Palpable and equal b/l  Lymph nodes: No palpable lymphadenopathy   Neuro: No focal deficits      Current Outpatient Medications   Medication Sig Dispense Refill   • amLODIPine (NORVASC) 10 MG tablet Take 1 tablet by mouth Daily. 90 tablet 3   • aspirin 81 MG EC tablet Take 81 mg by mouth Daily. Continues per doctors orders     • bumetanide (BUMEX) 1 MG tablet Take 1 tablet by mouth 2 (Two) Times a Day. 60 tablet 2   • cholecalciferol (VITAMIN D3) 25 MCG (1000 UT) tablet Take 1,000 Units by mouth Daily.     • clopidogrel (PLAVIX) 75 MG tablet Take 1 tablet by mouth Daily. 30 tablet 2   • DHEA 50 MG tablet Take 1 tablet by mouth Daily.     • Dulaglutide (Trulicity) 0.75 MG/0.5ML solution pen-injector Inject 0.75 mg under the skin into the appropriate area as directed 1 (One) Time Per Week.     • DULoxetine (Cymbalta) 60 MG capsule Take 1 capsule by mouth Daily. 90 capsule 3   • fenofibrate  micronized (LOFIBRA) 134 MG capsule Take 134 mg by mouth Every Evening.     • gabapentin (NEURONTIN) 300 MG capsule Take 1 capsule by mouth 3 (Three) Times a Day. 90 capsule 2   • glucosamine sulfate 500 MG capsule capsule Take 500 mg by mouth Daily.     • isosorbide mononitrate (IMDUR) 120 MG 24 hr tablet Take 1 tablet by mouth 2 (Two) Times a Day for 30 days. 60 tablet 0   • Loratadine 10 MG capsule Take 1 capsule by mouth Daily.     • LORazepam (ATIVAN) 0.5 MG tablet Take 0.5 mg by mouth 2 (Two) Times a Day As Needed for Anxiety (2 tabs BID PRN).     • magnesium oxide (MAG-OX) 400 MG tablet Take 400 mg by mouth 2 (Two) Times a Day. 3 tabs in the AM and 2 tabs in the PM per transplant team     • multivitamin with minerals (MULTIVITAMIN ADULTS 50+ PO) Take 1 tablet by mouth Daily.     • mycophenolate (CELLCEPT) 500 MG tablet Take 1,000 mg by mouth 2 (Two) Times a Day.     • nitroglycerin (NITROSTAT) 0.4 MG SL tablet Place 1 tablet under the tongue Every 5 (Five) Minutes As Needed for Chest Pain. Take no more than 3 doses in 15 minutes. 30 tablet 5   • omeprazole (priLOSEC) 40 MG capsule Take 1 capsule by mouth 2 (Two) Times a Day. 180 capsule 1   • oxyCODONE-acetaminophen (PERCOCET)  MG per tablet Take 1 tablet by mouth Every 6 (Six) Hours As Needed for Severe Pain . 120 tablet 0   • pentoxifylline (TRENtal) 400 MG CR tablet Take 1 tablet by mouth 3 (Three) Times a Day With Meals. (Patient taking differently: Take 400 mg by mouth Every Evening.) 270 tablet 3   • pravastatin (PRAVACHOL) 80 MG tablet Take 1 tablet by mouth Daily. 90 tablet 3   • ranolazine (RANEXA) 1000 MG 12 hr tablet Take 1 tablet by mouth 2 (Two) Times a Day. 180 tablet 3   • tacrolimus (PROGRAF) 1 MG capsule Take 1 mg by mouth 2 (Two) Times a Day. Patient takes at 10 am and 10 pm.     • tacrolimus (PROTOPIC) 0.1 % ointment Apply 1 application topically to the appropriate area as directed 2 (Two) Times a Day. 100 g 0     No current  facility-administered medications for this visit.      Rechecking labs on Monday occluding CBC and CMP.  Refill oxycodone for chronic back pain.  He is in the process of establishing with pain management.  No other acute concerns today.  No change in medications.  Will need left-sided brace for foot drop.  Faxing over prescription today.    Diagnoses and all orders for this visit:    1. Anemia, unspecified type (Primary)    2. Chronic low back pain with bilateral sciatica, unspecified back pain laterality  -     oxyCODONE-acetaminophen (PERCOCET)  MG per tablet; Take 1 tablet by mouth Every 6 (Six) Hours As Needed for Severe Pain .  Dispense: 120 tablet; Refill: 0    3. Presence of stent in coronary artery in patient with coronary artery disease    4. Hx of CABG    5. History of liver transplant (HCC)         No follow-ups on file.     Dictated Utilizing Dragon Dictation    Please note that portions of this note were completed with a voice recognition program.    Part of this note may be an electronic transcription/translation of spoken language to printed text using the Dragon Dictation System.

## 2022-03-01 ENCOUNTER — READMISSION MANAGEMENT (OUTPATIENT)
Dept: CALL CENTER | Facility: HOSPITAL | Age: 67
End: 2022-03-01

## 2022-03-01 NOTE — OUTREACH NOTE
CHF Week 3 Survey      Responses   [facility] patient discharged from? [place]   Does the patient have one of the following disease processes/diagnoses(primary or secondary)? CHF   Week 3 attempt successful? Yes   Call start time 1619   Call end time 1623   Is the patient taking all medications as directed (includes completed medication regime)? Yes   Does the patient have a primary care provider?  Yes   Has the patient kept scheduled appointments due by today? Yes   Comments Cardiology on Monday, pain managment  tomorrow   Pulse Ox monitoring None   What is the patient's perception of their health status since discharge? Same  [stated he is fair]   Is the patient weighing daily? Yes  [weight 205 wa s between 230-250]   Is the patient able to teach back Heart Failure Zones? No  [ discussed with the patient feels he is in the yellow zone]   Is the patient able to teach back signs and symptoms of worsening condition? (i.e. weight gain, shortness of air, etc.) Yes   CHF Week 3 call completed? Yes   Wrap up additional comments feels he is doing fair. will probablly having another  heart cath in the future          Anais Singleton RN

## 2022-03-02 DIAGNOSIS — D64.9 ANEMIA, UNSPECIFIED TYPE: Primary | ICD-10-CM

## 2022-03-07 ENCOUNTER — OFFICE VISIT (OUTPATIENT)
Dept: CARDIOLOGY | Facility: CLINIC | Age: 67
End: 2022-03-07

## 2022-03-07 VITALS
WEIGHT: 202 LBS | DIASTOLIC BLOOD PRESSURE: 50 MMHG | SYSTOLIC BLOOD PRESSURE: 112 MMHG | BODY MASS INDEX: 27.36 KG/M2 | HEART RATE: 88 BPM | OXYGEN SATURATION: 98 % | HEIGHT: 72 IN

## 2022-03-07 DIAGNOSIS — R53.81 PHYSICAL DECONDITIONING: ICD-10-CM

## 2022-03-07 DIAGNOSIS — I25.10 PRESENCE OF STENT IN CORONARY ARTERY IN PATIENT WITH CORONARY ARTERY DISEASE: Primary | ICD-10-CM

## 2022-03-07 DIAGNOSIS — Z95.5 PRESENCE OF STENT IN CORONARY ARTERY IN PATIENT WITH CORONARY ARTERY DISEASE: Primary | ICD-10-CM

## 2022-03-07 DIAGNOSIS — E66.01 MORBID OBESITY DUE TO EXCESS CALORIES: ICD-10-CM

## 2022-03-07 DIAGNOSIS — Z95.1 HX OF CABG: ICD-10-CM

## 2022-03-07 PROCEDURE — 99214 OFFICE O/P EST MOD 30 MIN: CPT | Performed by: NURSE PRACTITIONER

## 2022-03-07 NOTE — PROGRESS NOTES
[facility] Cardiology    Patient ID: Mahendra Yates is a 67 y.o. male.  : 1955   Contact: [phone]    Encounter date: 2022    PCP: Rodríguez Lopez DO      Chief complaint:   Chief Complaint   Patient presents with   • Presence of stent in coronary artery in patient with corona     PROBLEM LIST:  1. Coronary artery disease  a. CABG 2009  b. History of stents, ICDB  c. NSTEMI/LHC, 2019 (Dr. Pena): Patent LIMA to LAD with diffuse distal LAD disease. VG to OM occluded, unable to be intervened upon. VG to RCA patent with diffuse target vessel disease.  d. Echo 2/10/22: EF 55%, mild AS, Ao mean PG 13 mmHg, trace to mild MR. RVSP 27 mmHg.   2. Hypertension  3. Dyslipidemia  4. Lower extremity edema   a. Venous duplex, 2021: No DVT or superficial venous thrombosis bilaterally.  5. Diabetes  6. History of CVA 2018    7. History of liver cancer  a. Liver transplant   b. [place], [place]  8. Arthritis  9. GERD      Allergies and Medications:  Allergies   Allergen Reactions   • Contrast Dye Other (See Comments)     Cardiac Arrest   • Penicillins Rash   • Sulfasalazine Rash       Current Medications:    Current Outpatient Medications:   •  amLODIPine (NORVASC) 10 MG tablet, Take 1 tablet by mouth Daily., Disp: 90 tablet, Rfl: 3  •  aspirin 81 MG EC tablet, Take 81 mg by mouth Daily. Continues per doctors orders, Disp: , Rfl:   •  bumetanide (BUMEX) 1 MG tablet, Take 1 tablet by mouth 2 (Two) Times a Day., Disp: 60 tablet, Rfl: 2  •  cholecalciferol (VITAMIN D3) 25 MCG (1000 UT) tablet, Take 1,000 Units by mouth Daily., Disp: , Rfl:   •  clopidogrel (PLAVIX) 75 MG tablet, Take 1 tablet by mouth Daily., Disp: 30 tablet, Rfl: 2  •  DHEA 50 MG tablet, Take 1 tablet by mouth Daily., Disp: , Rfl:   •  Dulaglutide (Trulicity) 0.75 MG/0.5ML solution pen-injector, Inject 0.75 mg under the skin into the appropriate area as directed 1 (One) Time Per  Week., Disp: , Rfl:   •  DULoxetine (Cymbalta) 60 MG capsule, Take 1 capsule by mouth Daily., Disp: 90 capsule, Rfl: 3  •  fenofibrate micronized (LOFIBRA) 134 MG capsule, Take 134 mg by mouth Every Evening., Disp: , Rfl:   •  gabapentin (NEURONTIN) 300 MG capsule, Take 1 capsule by mouth 3 (Three) Times a Day., Disp: 90 capsule, Rfl: 2  •  glucosamine sulfate 500 MG capsule capsule, Take 500 mg by mouth Daily., Disp: , Rfl:   •  isosorbide mononitrate (IMDUR) 120 MG 24 hr tablet, Take 1 tablet by mouth 2 (Two) Times a Day for 30 days., Disp: 60 tablet, Rfl: 0  •  Loratadine 10 MG capsule, Take 1 capsule by mouth Daily., Disp: , Rfl:   •  LORazepam (ATIVAN) 0.5 MG tablet, Take 0.5 mg by mouth 2 (Two) Times a Day As Needed for Anxiety (2 tabs BID PRN)., Disp: , Rfl:   •  magnesium oxide (MAG-OX) 400 MG tablet, Take 400 mg by mouth 2 (Two) Times a Day. 3 tabs in the AM and 2 tabs in the PM per transplant team, Disp: , Rfl:   •  multivitamin with minerals tablet tablet, Take 1 tablet by mouth Daily., Disp: , Rfl:   •  mycophenolate (CELLCEPT) 500 MG tablet, Take 1,000 mg by mouth 2 (Two) Times a Day., Disp: , Rfl:   •  nitroglycerin (NITROSTAT) 0.4 MG SL tablet, Place 1 tablet under the tongue Every 5 (Five) Minutes As Needed for Chest Pain. Take no more than 3 doses in 15 minutes., Disp: 30 tablet, Rfl: 5  •  omeprazole (priLOSEC) 40 MG capsule, Take 1 capsule by mouth 2 (Two) Times a Day., Disp: 180 capsule, Rfl: 1  •  oxyCODONE-acetaminophen (PERCOCET)  MG per tablet, Take 1 tablet by mouth Every 6 (Six) Hours As Needed for Severe Pain ., Disp: 120 tablet, Rfl: 0  •  pentoxifylline (TRENtal) 400 MG CR tablet, Take 1 tablet by mouth 3 (Three) Times a Day With Meals. (Patient taking differently: Take 400 mg by mouth Every Evening.), Disp: 270 tablet, Rfl: 3  •  pravastatin (PRAVACHOL) 80 MG tablet, Take 1 tablet by mouth Daily., Disp: 90 tablet, Rfl: 3  •  ranolazine (RANEXA) 1000 MG 12 hr tablet, Take 1 tablet  "by mouth 2 (Two) Times a Day., Disp: 180 tablet, Rfl: 3  •  tacrolimus (PROGRAF) 1 MG capsule, Take 1 mg by mouth 2 (Two) Times a Day. Patient takes at 10 am and 10 pm., Disp: , Rfl:   •  tacrolimus (PROTOPIC) 0.1 % ointment, Apply 1 application topically to the appropriate area as directed 2 (Two) Times a Day., Disp: 100 g, Rfl: 0    HPI    Mahendra Yates is a 67 y.o. male who presents today for follow up on CAD, HTN and HLD. Since last visit, patient has continued to have exertional chest pain and BARRIENTOS. He has not been taking NTG prior to exercise. He also complains of lower extremity edema. He is compliant with low sodium diet. He is drinks a little more than his fluid restrictions. He is mostly sedentary. BP controlled.       The following portions of the patient's history were reviewed and updated as appropriate: allergies, current medications and problem list.    Pertinent positives as listed in the HPI.  All other systems reviewed are negative.       Vitals:    03/07/22 1118   BP: 112/50   BP Location: Right arm   Patient Position: Sitting   Pulse: 88   SpO2: 98%   Weight: 91.6 kg (202 lb)   Height: 182.9 cm (72\")       Physical Exam:  General: Alert and oriented.  Neck: Jugular venous pressure is within normal limits. Carotids have normal upstrokes without bruits.   Cardiovascular: Heart has a nondisplaced focal PMI. Regular rate and rhythm without murmur, gallop or rub.  Lungs: Clear without rales or wheezes. Equal expansion is noted.   Extremities: Show no edema.  Skin: Warm and dry.  Neurologic: Nonfocal.     Diagnostic Data:  Lab Results   Component Value Date    GLUCOSE 126 (H) 02/11/2022    BUN 54 (H) 02/11/2022    CREATININE 1.45 (H) 02/11/2022    EGFRIFNONA 48 (L) 02/11/2022    BCR 37.2 (H) 02/11/2022    K 4.2 02/11/2022    CO2 20.0 (L) 02/11/2022    CALCIUM 9.5 02/11/2022    ALBUMIN 3.80 02/11/2022    AST 19 02/09/2022    ALT 19 02/09/2022     Lab Results   Component Value Date    GLUCOSE 126 " (H) 02/11/2022    CALCIUM 9.5 02/11/2022     (L) 02/11/2022    K 4.2 02/11/2022    CO2 20.0 (L) 02/11/2022     02/11/2022    BUN 54 (H) 02/11/2022    CREATININE 1.45 (H) 02/11/2022    EGFRIFNONA 48 (L) 02/11/2022    BCR 37.2 (H) 02/11/2022    ANIONGAP 13.0 02/11/2022     Lab Results   Component Value Date    CHOL 132 02/10/2022    TRIG 160 (H) 02/10/2022    HDL 34 (L) 02/10/2022    LDL 70 02/10/2022     Lab Results   Component Value Date    WBC 7.94 02/10/2022    HGB 10.9 (L) 02/10/2022    HCT 32.0 (L) 02/10/2022    MCV 82.5 02/10/2022     02/10/2022     No results found for: TSH    Procedures    Advance Care Planning   ACP discussion was held with the patient during this visit. Patient has an advance directive in EMR which is still valid.        ASSESSMENT:    ICD-10-CM ICD-9-CM   1. Presence of stent in coronary artery in patient with coronary artery disease  I25.10 414.01    Z95.5 V45.82   2. Hx of CABG  Z95.1 V45.81   3. Morbid obesity due to excess calories (HCC)  E66.01 278.01   4. Physical deconditioning  R53.81 799.3     Lab results found above were reviewed with the patient.      PLAN:  1. Will discuss with Dr. Dillon.  2. Continue ASA, Plavix, Imdur BID, Renexa BID, statin for CAD.   3. Continue Norvasc for HTN and angina.   4. Patient was counseled to begin aerobic exercise 30 min per day for at least 4 days per week.   5. Take NTG a few minutes before exercise.   6. Continue all other current medications.  7. F/up in 6 weeks, sooner if needed.      Electronically signed by SERJIO Castrejon, 03/09/22, 9:06 AM EST.      ADDENDUM: I spoke with Dr. Dillon who recommended medical management and early follow up with her. Spoke with patient and wife to relay this plan. Both are agreeable.

## 2022-03-10 ENCOUNTER — READMISSION MANAGEMENT (OUTPATIENT)
Dept: CALL CENTER | Facility: HOSPITAL | Age: 67
End: 2022-03-10

## 2022-03-10 NOTE — OUTREACH NOTE
CHF Week 4 Survey    Flowsheet Row Responses   [facility] facility patient discharged from? [place]   Does the patient have one of the following disease processes/diagnoses(primary or secondary)? CHF   Week 4 attempt successful? No          CT S - Registered Nurse

## 2022-03-25 ENCOUNTER — PATIENT MESSAGE (OUTPATIENT)
Dept: CARDIOLOGY | Facility: CLINIC | Age: 67
End: 2022-03-25

## 2022-03-25 ENCOUNTER — TELEPHONE (OUTPATIENT)
Dept: CARDIOLOGY | Facility: CLINIC | Age: 67
End: 2022-03-25

## 2022-04-02 LAB
ALBUMIN SERPL-MCNC: 4.5 G/DL (ref 3.8–4.8)
ALBUMIN/GLOB SERPL: 1.9 {RATIO} (ref 1.2–2.2)
ALP SERPL-CCNC: 64 IU/L (ref 44–121)
ALT SERPL-CCNC: 9 IU/L (ref 0–44)
AMBIG ABBREV CMP14 DEFAULT: NORMAL
AST SERPL-CCNC: 16 IU/L (ref 0–40)
BASOPHILS # BLD AUTO: 0.1 X10E3/UL (ref 0–0.2)
BASOPHILS NFR BLD AUTO: 1 %
BILIRUB SERPL-MCNC: 0.5 MG/DL (ref 0–1.2)
BUN SERPL-MCNC: 21 MG/DL (ref 8–27)
BUN/CREAT SERPL: 15 (ref 10–24)
CALCIUM SERPL-MCNC: 10.4 MG/DL (ref 8.6–10.2)
CHLORIDE SERPL-SCNC: 96 MMOL/L (ref 96–106)
CO2 SERPL-SCNC: 25 MMOL/L (ref 20–29)
CREAT SERPL-MCNC: 1.42 MG/DL (ref 0.76–1.27)
EGFRCR SERPLBLD CKD-EPI 2021: 54 ML/MIN/1.73
EOSINOPHIL # BLD AUTO: 0.2 X10E3/UL (ref 0–0.4)
EOSINOPHIL NFR BLD AUTO: 3 %
ERYTHROCYTE [DISTWIDTH] IN BLOOD BY AUTOMATED COUNT: 15.3 % (ref 11.6–15.4)
GLOBULIN SER CALC-MCNC: 2.4 G/DL (ref 1.5–4.5)
GLUCOSE SERPL-MCNC: 145 MG/DL (ref 65–99)
HCT VFR BLD AUTO: 41.1 % (ref 37.5–51)
HGB BLD-MCNC: 13.4 G/DL (ref 13–17.7)
IMM GRANULOCYTES # BLD AUTO: 0 X10E3/UL (ref 0–0.1)
IMM GRANULOCYTES NFR BLD AUTO: 0 %
LYMPHOCYTES # BLD AUTO: 2 X10E3/UL (ref 0.7–3.1)
LYMPHOCYTES NFR BLD AUTO: 29 %
MCH RBC QN AUTO: 27.5 PG (ref 26.6–33)
MCHC RBC AUTO-ENTMCNC: 32.6 G/DL (ref 31.5–35.7)
MCV RBC AUTO: 84 FL (ref 79–97)
MONOCYTES # BLD AUTO: 0.7 X10E3/UL (ref 0.1–0.9)
MONOCYTES NFR BLD AUTO: 10 %
NEUTROPHILS # BLD AUTO: 3.8 X10E3/UL (ref 1.4–7)
NEUTROPHILS NFR BLD AUTO: 57 %
PLATELET # BLD AUTO: 377 X10E3/UL (ref 150–450)
POTASSIUM SERPL-SCNC: 4.2 MMOL/L (ref 3.5–5.2)
PROT SERPL-MCNC: 6.9 G/DL (ref 6–8.5)
RBC # BLD AUTO: 4.88 X10E6/UL (ref 4.14–5.8)
SODIUM SERPL-SCNC: 138 MMOL/L (ref 134–144)
WBC # BLD AUTO: 6.9 X10E3/UL (ref 3.4–10.8)

## 2022-04-06 ENCOUNTER — OFFICE VISIT (OUTPATIENT)
Dept: FAMILY MEDICINE CLINIC | Facility: CLINIC | Age: 67
End: 2022-04-06

## 2022-04-06 VITALS
DIASTOLIC BLOOD PRESSURE: 88 MMHG | HEART RATE: 83 BPM | SYSTOLIC BLOOD PRESSURE: 138 MMHG | BODY MASS INDEX: 27.4 KG/M2 | HEIGHT: 72 IN | OXYGEN SATURATION: 99 %

## 2022-04-06 DIAGNOSIS — M21.372 LEFT FOOT DROP: ICD-10-CM

## 2022-04-06 DIAGNOSIS — F41.9 ANXIETY AND DEPRESSION: ICD-10-CM

## 2022-04-06 DIAGNOSIS — M54.42 CHRONIC LOW BACK PAIN WITH BILATERAL SCIATICA, UNSPECIFIED BACK PAIN LATERALITY: ICD-10-CM

## 2022-04-06 DIAGNOSIS — F32.A ANXIETY AND DEPRESSION: ICD-10-CM

## 2022-04-06 DIAGNOSIS — I50.32 CHRONIC DIASTOLIC (CONGESTIVE) HEART FAILURE: ICD-10-CM

## 2022-04-06 DIAGNOSIS — G89.29 CHRONIC LOW BACK PAIN WITH BILATERAL SCIATICA, UNSPECIFIED BACK PAIN LATERALITY: ICD-10-CM

## 2022-04-06 DIAGNOSIS — Z12.5 ENCOUNTER FOR PROSTATE CANCER SCREENING: ICD-10-CM

## 2022-04-06 DIAGNOSIS — Z00.00 MEDICARE ANNUAL WELLNESS VISIT, SUBSEQUENT: Primary | ICD-10-CM

## 2022-04-06 DIAGNOSIS — M54.41 CHRONIC LOW BACK PAIN WITH BILATERAL SCIATICA, UNSPECIFIED BACK PAIN LATERALITY: ICD-10-CM

## 2022-04-06 PROCEDURE — 1159F MED LIST DOCD IN RCRD: CPT | Performed by: INTERNAL MEDICINE

## 2022-04-06 PROCEDURE — 99397 PER PM REEVAL EST PAT 65+ YR: CPT | Performed by: INTERNAL MEDICINE

## 2022-04-06 PROCEDURE — G0439 PPPS, SUBSEQ VISIT: HCPCS | Performed by: INTERNAL MEDICINE

## 2022-04-06 PROCEDURE — 1170F FXNL STATUS ASSESSED: CPT | Performed by: INTERNAL MEDICINE

## 2022-04-06 RX ORDER — OXYCODONE AND ACETAMINOPHEN 10; 325 MG/1; MG/1
1 TABLET ORAL EVERY 6 HOURS PRN
Qty: 120 TABLET | Refills: 0 | Status: SHIPPED | OUTPATIENT
Start: 2022-04-06 | End: 2022-05-12 | Stop reason: SDUPTHER

## 2022-04-06 RX ORDER — BUSPIRONE HYDROCHLORIDE 10 MG/1
10 TABLET ORAL 2 TIMES DAILY
Qty: 60 TABLET | Refills: 5 | Status: SHIPPED | OUTPATIENT
Start: 2022-04-06 | End: 2022-11-22 | Stop reason: SDUPTHER

## 2022-04-06 NOTE — PROGRESS NOTES
QUICK REFERENCE INFORMATION:  The ABCs of the Annual Wellness Visit  Mahendra Yates is a 67 y.o. male presenting forMedicare Subsequent Wellness visit and  Medicare Wellness-subsequent, Leg Pain, Constipation, and Headache  .     Medicare Subsequent Wellness Visit    Chief Complaint   Patient presents with   • Medicare Wellness-subsequent   • Leg Pain   • Constipation   • Headache   phyiscal     HPI   Pt here for medicare wellness visit.  He continues to have mobility issues due to foot drop and chronic back pain.   ROS:  Constitutional: no fevers, night sweats or unexplained weight loss  Eyes: no vision changes  ENT: no runny nose, ear pain, sore throat  Cardio: no chest pain, palpitations  Pulm: no shortness of breath, wheezing, or cough  GI: no abdominal pain or changes in bowel movements  : no difficulty urinating  MSK: no difficulty ambulating, no joint pain  Neuro: no weakness, dizziness or headache  Psych: no trouble sleeping  Endo: no change in appetite     Past Medical History:   Diagnosis Date   • Arthritis    • Atherosclerosis    • B12 deficiency    • Cancer (HCC)    • Cancer of liver (HCC)    • Chronic headaches    • Coronary artery disease    • Diabetes mellitus (HCC)    • Gastroesophageal reflux disease    • History of transfusion    • Hyperlipidemia    • Hypertension    • Myocardial infarction (HCC)    • Sleep apnea    • Stroke (HCC)         Past Surgical History:   Procedure Laterality Date   • ANGIOPLASTY     • CARDIAC SURGERY     • CARPAL TUNNEL RELEASE Right 2/8/2017    Procedure: CARPAL TUNNEL RELEASE RIGHT ;  Surgeon: Luis Hayden MD;  Location: [facility];  Service:    • CHOLECYSTECTOMY     • CORONARY ARTERY BYPASS GRAFT     • CORONARY STENT PLACEMENT      x2   • LIVER TRANSPLANTATION     • TUMOR REMOVAL Right     axilla       Family History   Problem Relation Age of Onset   • Heart disease Mother         Stroke   • Stroke Mother    • Diabetes Father    • Liver cancer Brother    • No  Known Problems Sister    • Macular degeneration Brother    • No Known Problems Sister    • Dementia Brother         Social History     Socioeconomic History   • Marital status:    Tobacco Use   • Smoking status: Former Smoker     Packs/day: 3.00     Years: 30.00     Pack years: 90.00     Types: Cigarettes     Quit date: 1991     Years since quittin.1   • Smokeless tobacco: Never Used   Vaping Use   • Vaping Use: Never used   Substance and Sexual Activity   • Alcohol use: No   • Drug use: No   • Sexual activity: Not Currently     Partners: Female        Current Outpatient Medications   Medication Sig Dispense Refill   • amLODIPine (NORVASC) 10 MG tablet Take 1 tablet by mouth Daily. 90 tablet 3   • aspirin 81 MG EC tablet Take 81 mg by mouth Daily. Continues per doctors orders     • bumetanide (BUMEX) 1 MG tablet Take 1 tablet by mouth 2 (Two) Times a Day. 60 tablet 2   • cholecalciferol (VITAMIN D3) 25 MCG (1000 UT) tablet Take 1,000 Units by mouth Daily.     • clopidogrel (PLAVIX) 75 MG tablet Take 1 tablet by mouth Daily. 30 tablet 2   • DHEA 50 MG tablet Take 1 tablet by mouth Daily.     • Dulaglutide (Trulicity) 0.75 MG/0.5ML solution pen-injector Inject 0.75 mg under the skin into the appropriate area as directed 1 (One) Time Per Week.     • DULoxetine (Cymbalta) 60 MG capsule Take 1 capsule by mouth Daily. 90 capsule 3   • fenofibrate micronized (LOFIBRA) 134 MG capsule Take 134 mg by mouth Every Evening.     • gabapentin (NEURONTIN) 300 MG capsule Take 1 capsule by mouth 3 (Three) Times a Day. 90 capsule 2   • glucosamine sulfate 500 MG capsule capsule Take 500 mg by mouth Daily.     • Loratadine 10 MG capsule Take 1 capsule by mouth Daily.     • LORazepam (ATIVAN) 0.5 MG tablet Take 0.5 mg by mouth 2 (Two) Times a Day As Needed for Anxiety (2 tabs BID PRN).     • magnesium oxide (MAG-OX) 400 MG tablet Take 400 mg by mouth 2 (Two) Times a Day. 3 tabs in the AM and 2 tabs in the PM per  transplant team     • multivitamin with minerals tablet tablet Take 1 tablet by mouth Daily.     • mycophenolate (CELLCEPT) 500 MG tablet Take 1,000 mg by mouth 2 (Two) Times a Day.     • nitroglycerin (NITROSTAT) 0.4 MG SL tablet Place 1 tablet under the tongue Every 5 (Five) Minutes As Needed for Chest Pain. Take no more than 3 doses in 15 minutes. 30 tablet 5   • omeprazole (priLOSEC) 40 MG capsule Take 1 capsule by mouth 2 (Two) Times a Day. 180 capsule 1   • oxyCODONE-acetaminophen (PERCOCET)  MG per tablet Take 1 tablet by mouth Every 6 (Six) Hours As Needed for Severe Pain . 120 tablet 0   • pentoxifylline (TRENtal) 400 MG CR tablet Take 1 tablet by mouth 3 (Three) Times a Day With Meals. (Patient taking differently: Take 400 mg by mouth Every Evening.) 270 tablet 3   • pravastatin (PRAVACHOL) 80 MG tablet Take 1 tablet by mouth Daily. 90 tablet 3   • ranolazine (RANEXA) 1000 MG 12 hr tablet Take 1 tablet by mouth 2 (Two) Times a Day. 180 tablet 3   • tacrolimus (PROGRAF) 1 MG capsule Take 1 mg by mouth 2 (Two) Times a Day. Patient takes at 10 am and 10 pm.     • tacrolimus (PROTOPIC) 0.1 % ointment Apply 1 application topically to the appropriate area as directed 2 (Two) Times a Day. 100 g 0   • busPIRone (BUSPAR) 10 MG tablet Take 1 tablet by mouth 2 (Two) Times a Day. 60 tablet 5   • isosorbide mononitrate (IMDUR) 120 MG 24 hr tablet Take 1 tablet by mouth 2 (Two) Times a Day for 30 days. 60 tablet 0     No current facility-administered medications for this visit.        Allergies   Allergen Reactions   • Contrast Dye Other (See Comments)     Cardiac Arrest   • Penicillins Rash   • Sulfasalazine Rash        Immunization History   Administered Date(s) Administered   • COVID-19 (PFIZER) PURPLE CAP 01/15/2021, 02/05/2021, 09/17/2021   • Flu Vaccine Quad PF >36MO 10/12/2017, 12/28/2018   • Fluzone High Dose =>65 Years ([facility] ONLY) 09/24/2019   • Fluzone High-Dose 65+yrs 09/14/2020, 01/03/2022   •  "Influenza Quad Vaccine (Inpatient) 2017        The following portions of the patient's history were reviewed and updated as appropriate: allergies, current medications, past family history, past medical history, past social history, past surgical history and problem list.      Objective    Visit Vitals  /88   Pulse 83   Ht 182.9 cm (72\")   SpO2 99%   BMI 27.40 kg/m²        Physical Exam  Gen Appearance: NAD  HEENT: Normocephalic, PERRLA, no thyromegaly, trache midline  Heart: RRR, normal S1 and S2, no murmur  Lungs: CTA b/l, no wheezing, no crackles  Abdomen: Soft, non-tender, non-distended, no guarding and BSx4  MSK: Moves all extremities well, normal gait, no peripheral edema  Pulses: Palpable and equal b/l  Lymph nodes: No palpable lymphadenopathy   Neuro: No focal deficits       HEALTH RISK ASSESSMENT    1955    Recent Hospitalizations:  No hospitalization(s) within the last year..      Current Medical Providers:  Patient Care Team:  Rodríguez Lopez DO as PCP - General (Internal Medicine)  Natanael Estrella MD as Consulting Physician (Neurology)  Oren Pena MD as Consulting Physician (Cardiology)  Everardo Tate MD as Consulting Physician (Pain Medicine)  Scout Grant MD as Consulting Physician (Otolaryngology)  Christin Handley PA-C as Physician Assistant (Physician Assistant)  Radha Briggs APRN as Nurse Practitioner (Nurse Practitioner)  April Dillon MD as Consulting Physician (Cardiology)      Smoking Status:  Social History     Tobacco Use   Smoking Status Former Smoker   • Packs/day: 3.00   • Years: 30.00   • Pack years: 90.00   • Types: Cigarettes   • Quit date: 1991   • Years since quittin.1   Smokeless Tobacco Never Used       Alcohol Consumption:  Social History     Substance and Sexual Activity   Alcohol Use No       Depression Screen:   PHQ-2/PHQ-9 Depression Screening 2022   Little Interest or Pleasure in Doing Things " 3-->nearly every day   Feeling Down, Depressed or Hopeless 3-->nearly every day   Trouble Falling or Staying Asleep, or Sleeping Too Much 3-->nearly every day   Feeling Tired or Having Little Energy 3-->nearly every day   Poor Appetite or Overeating 3-->nearly every day   Feeling Bad about Yourself - or that You are a Failure or Have Let Yourself or Your Family Down 3-->nearly every day   Trouble Concentrating on Things, Such as Reading the Newspaper or Watching Television 3-->nearly every day   Moving or Speaking So Slowly that Other People Could Have Noticed? Or the Opposite - Being So Fidgety 0-->not at all   Thoughts that You Would be Better Off Dead or of Hurting Yourself in Some Way 3-->nearly every day   PHQ-9: Brief Depression Severity Measure Score 24   If You Checked Off Any Problems, How Difficult Have These Problems Made It For You to Do Your Work, Take Care of Things at Home, or Get Along with Other People? extremely difficult       Health Habits and Functional and Cognitive Screening:  Functional & Cognitive Status 4/6/2022   Do you have difficulty preparing food and eating? Yes   Do you have difficulty bathing yourself, getting dressed or grooming yourself? Yes   Do you have difficulty using the toilet? Yes   Do you have difficulty moving around from place to place? Yes   Do you have trouble with steps or getting out of a bed or a chair? Yes   Current Diet Well Balanced Diet   Dental Exam Not up to date   Eye Exam Up to date   Exercise (times per week) 0 times per week   Current Exercises Include No Regular Exercise   Do you need help using the phone?  No   Are you deaf or do you have serious difficulty hearing?  No   Do you need help with transportation? Yes   Do you need help shopping? Yes   Do you need help preparing meals?  Yes   Do you need help with housework?  Yes   Do you need help with laundry? Yes   Do you need help taking your medications? Yes   Do you need help managing money? No   Do you  ever drive or ride in a car without wearing a seat belt? No   Have you felt unusual stress, anger or loneliness in the last month? Yes   Who do you live with? Spouse   If you need help, do you have trouble finding someone available to you? No   Have you been bothered in the last four weeks by sexual problems? No   Do you have difficulty concentrating, remembering or making decisions? Yes         Does the patient have evidence of cognitive impairment? No    Aspirin use counseling? Taking ASA appropriately as indicated      Recent Lab Results:  CMP:  Lab Results   Component Value Date    BUN 21 04/01/2022    CREATININE 1.42 (H) 04/01/2022    EGFRIFNONA 48 (L) 02/11/2022    BCR 15 04/01/2022     04/01/2022    K 4.2 04/01/2022    CO2 25 04/01/2022    CALCIUM 10.4 (H) 04/01/2022    PROTENTOTREF 6.9 04/01/2022    ALBUMIN 4.5 04/01/2022    LABGLOBREF 2.4 04/01/2022    LABIL2 1.9 04/01/2022    BILITOT 0.5 04/01/2022    ALKPHOS 64 04/01/2022    AST 16 04/01/2022    ALT 9 04/01/2022     Lipid Panel:  Lab Results   Component Value Date    CHOL 132 02/10/2022    TRIG 160 (H) 02/10/2022    HDL 34 (L) 02/10/2022    VLDL 28 02/10/2022    LDLHDL 1.94 02/10/2022     HbA1c:  Lab Results   Component Value Date    HGBA1C 6.60 (H) 02/10/2022       Visual Acuity:  No exam data present    Age-appropriate Screening Schedule:  Refer to the list below for future screening recommendations based on patient's age, sex and/or medical conditions. Orders for these recommended tests are listed in the plan section. The patient has been provided with a written plan.    Health Maintenance   Topic Date Due   • TDAP/TD VACCINES (1 - Tdap) Never done   • ZOSTER VACCINE (1 of 2) Never done   • DIABETIC FOOT EXAM  Never done   • DIABETIC EYE EXAM  Never done   • INFLUENZA VACCINE  08/01/2022   • URINE MICROALBUMIN  08/08/2022   • HEMOGLOBIN A1C  08/10/2022   • LIPID PANEL  02/10/2023          Advance Care Planning:  ACP discussion was declined by the  patient. Patient does not have an advance directive, declines further assistance.    Identification of Risk Factors:  Risk factors include: Advance Directive Discussion  Cardiovascular risk  Colon Cancer Screening  Prostate Cancer Screening .    Compared to one year ago, the patient feels his physical health is the same.  Compared to one year ago, the patient feels his mental health is the same.  Reviewed use of high risk medication in the elderly: yes  Reviewed for potential of harmful drug interactions in the elderly: yes    Diagnoses and all orders for this visit:    1. Medicare annual wellness visit, subsequent (Primary)  Counseled on healthy weight, nutrition, physical activity, cancer screening, and immunizations.    2. Anxiety and depression  -     busPIRone (BUSPAR) 10 MG tablet; Take 1 tablet by mouth 2 (Two) Times a Day.  Dispense: 60 tablet; Refill: 5  Add on BuSpar, continue Cymbalta.  Patient declined psychiatric services.  Previously was taking low-dose Ativan.  May consider if no improvement on BuSpar.  He has significant anxiety and depression about his overall health condition.  3. Chronic low back pain with bilateral sciatica, unspecified back pain laterality  -     oxyCODONE-acetaminophen (PERCOCET)  MG per tablet; Take 1 tablet by mouth Every 6 (Six) Hours As Needed for Severe Pain .  Dispense: 120 tablet; Refill: 0    4. Chronic diastolic (congestive) heart failure (HCC)    5. Left foot drop          Patient Self-Management and Personalized Health Advice  The patient has been provided with information about: diet, exercise, weight management, prevention of cardiac or vascular disease and designing advance directives and preventive services including:   · Annual Wellness Visit (AWV)  · Cardiovascular Disease Screening Tests (may do this order every 5 years in beneficiaries without signs or symptoms of cardiovascular disease).      Follow Up:  Return in about 3 months (around 7/6/2022).      An After Visit Summary and PPPS with all of these plans were given to the patient.           Dictated Utilizing Dragon Dictation    Please note that portions of this note were completed with a voice recognition program.    Part of this note may be an electronic transcription/translation of spoken language to printed text using the Dragon Dictation System.

## 2022-04-19 NOTE — PROGRESS NOTES
[facility] Cardiology    Patient ID: Mahendra Yates is a 67 y.o. male.  : 1955   Contact: [phone]    Encounter date: 2022    PCP: Rodríguez Lopez DO      Chief complaint:   Chief Complaint   Patient presents with   • Presence of stent in coronary artery in patient with corona       Problem List:  1. Coronary artery disease  a. CABG 2009  b. History of stents, ICDB  c. NSTEMI/LHC, 2019 (Dr. Pena): Patent LIMA to LAD with diffuse distal LAD disease. VG to OM occluded, unable to be intervened upon. VG to RCA patent with diffuse target vessel disease.  d. Echo 2/10/22: EF 55%, mild AS, Ao mean PG 13 mmHg, trace to mild MR. RVSP 27 mmHg.   2. Hypertension  3. Dyslipidemia  4. Lower extremity edema  a. Venous duplex, 2021: No DVT or superficial venous thrombosis bilaterally.  5. Diabetes  6. History of CVA 2018    7. History of liver cancer  a. Liver transplant   b. [place]  8. Arthritis  9. GERD    Allergies   Allergen Reactions   • Contrast Dye Other (See Comments)     Cardiac Arrest   • Penicillins Rash   • Sulfasalazine Rash       Current Medications:    Current Outpatient Medications:   •  amLODIPine (NORVASC) 10 MG tablet, Take 1 tablet by mouth Daily., Disp: 90 tablet, Rfl: 3  •  aspirin 81 MG EC tablet, Take 81 mg by mouth Daily. Continues per doctors orders, Disp: , Rfl:   •  bumetanide (BUMEX) 1 MG tablet, Take 1 tablet by mouth 2 (Two) Times a Day., Disp: 60 tablet, Rfl: 2  •  busPIRone (BUSPAR) 10 MG tablet, Take 1 tablet by mouth 2 (Two) Times a Day., Disp: 60 tablet, Rfl: 5  •  cholecalciferol (VITAMIN D3) 25 MCG (1000 UT) tablet, Take 1,000 Units by mouth Daily., Disp: , Rfl:   •  clopidogrel (PLAVIX) 75 MG tablet, Take 1 tablet by mouth Daily., Disp: 30 tablet, Rfl: 2  •  DHEA 50 MG tablet, Take 1 tablet by mouth Daily., Disp: , Rfl:   •  Dulaglutide (Trulicity) 0.75 MG/0.5ML solution pen-injector, Inject  0.75 mg under the skin into the appropriate area as directed 1 (One) Time Per Week., Disp: , Rfl:   •  DULoxetine (Cymbalta) 60 MG capsule, Take 1 capsule by mouth Daily., Disp: 90 capsule, Rfl: 3  •  fenofibrate micronized (LOFIBRA) 134 MG capsule, Take 134 mg by mouth Every Evening., Disp: , Rfl:   •  gabapentin (NEURONTIN) 300 MG capsule, Take 1 capsule by mouth 3 (Three) Times a Day., Disp: 90 capsule, Rfl: 2  •  glucosamine sulfate 500 MG capsule capsule, Take 500 mg by mouth Daily., Disp: , Rfl:   •  isosorbide mononitrate (IMDUR) 120 MG 24 hr tablet, Take 1 tablet by mouth 2 (Two) Times a Day for 30 days., Disp: 60 tablet, Rfl: 0  •  Loratadine 10 MG capsule, Take 1 capsule by mouth Daily., Disp: , Rfl:   •  LORazepam (ATIVAN) 0.5 MG tablet, Take 0.5 mg by mouth 2 (Two) Times a Day As Needed for Anxiety (2 tabs BID PRN)., Disp: , Rfl:   •  magnesium oxide (MAG-OX) 400 MG tablet, Take 400 mg by mouth 2 (Two) Times a Day. 3 tabs in the AM and 2 tabs in the PM per transplant team, Disp: , Rfl:   •  multivitamin with minerals tablet tablet, Take 1 tablet by mouth Daily., Disp: , Rfl:   •  mycophenolate (CELLCEPT) 500 MG tablet, Take 1,000 mg by mouth 2 (Two) Times a Day., Disp: , Rfl:   •  nitroglycerin (NITROSTAT) 0.4 MG SL tablet, Place 1 tablet under the tongue Every 5 (Five) Minutes As Needed for Chest Pain. Take no more than 3 doses in 15 minutes., Disp: 30 tablet, Rfl: 5  •  omeprazole (priLOSEC) 40 MG capsule, Take 1 capsule by mouth 2 (Two) Times a Day., Disp: 180 capsule, Rfl: 1  •  oxyCODONE-acetaminophen (PERCOCET)  MG per tablet, Take 1 tablet by mouth Every 6 (Six) Hours As Needed for Severe Pain ., Disp: 120 tablet, Rfl: 0  •  pentoxifylline (TRENtal) 400 MG CR tablet, Take 1 tablet by mouth 3 (Three) Times a Day With Meals. (Patient taking differently: Take 400 mg by mouth Every Evening.), Disp: 270 tablet, Rfl: 3  •  pravastatin (PRAVACHOL) 80 MG tablet, Take 1 tablet by mouth Daily., Disp:  "90 tablet, Rfl: 3  •  ranolazine (RANEXA) 1000 MG 12 hr tablet, Take 1 tablet by mouth 2 (Two) Times a Day., Disp: 180 tablet, Rfl: 3  •  tacrolimus (PROGRAF) 1 MG capsule, Take 1 mg by mouth 2 (Two) Times a Day. Patient takes at 10 am and 10 pm., Disp: , Rfl:   •  tacrolimus (PROTOPIC) 0.1 % ointment, Apply 1 application topically to the appropriate area as directed 2 (Two) Times a Day., Disp: 100 g, Rfl: 0    HPI    Mahendra Yates is a 67 y.o. male who presents today for a 6 week follow up of stent in coronary artery in patient with coronary artery disease and cardiac risk factors. Since last visit, patient reports he has had to take nitroglycerin every day and 2 nitroglycerins at times and they do help alleviate his chest pain. He was going to the [facility] and walking and swimming but complains he cannot sleep the night after due to back pain. Patient states he does want to go forward with the heart catheterization. He reports some bilateral lower extremity edema. Patient denies shortness of breath, orthopnea, palpitations, edema, dizziness, and syncope.     The following portions of the patient's history were reviewed and updated as appropriate: allergies, current medications and problem list.    Pertinent positives as listed in the HPI.  All other systems reviewed are negative.         Vitals:    04/20/22 0951   BP: 122/62   BP Location: Right arm   Patient Position: Sitting   Cuff Size: Adult   Pulse: 84   SpO2: 99%   Weight: 81.6 kg (180 lb)   Height: 182.9 cm (72\")       Physical Exam:  General: Alert and oriented.  Neck: Jugular venous pressure is within normal limits. Carotids have normal upstrokes without bruits.   Cardiovascular: Heart has a nondisplaced focal PMI. Regular rate and rhythm. No murmur, gallop or rub.  Lungs: Clear, no rales or wheezes. Equal expansion is noted.   Extremities: Show trace edema.  Skin: Warm and dry.  Neurologic: Nonfocal.     Diagnostic Data (reviewed with patient):  Lab " Results   Component Value Date    GLUCOSE 145 (H) 04/01/2022    BUN 21 04/01/2022    CREATININE 1.42 (H) 04/01/2022    EGFRIFNONA 48 (L) 02/11/2022    BCR 15 04/01/2022     04/01/2022    K 4.2 04/01/2022    CL 96 04/01/2022    CO2 25 04/01/2022    CALCIUM 10.4 (H) 04/01/2022    ALBUMIN 4.5 04/01/2022    ALKPHOS 64 04/01/2022    AST 16 04/01/2022    ALT 9 04/01/2022     Lab Results   Component Value Date    CHOL 132 02/10/2022    TRIG 160 (H) 02/10/2022    HDL 34 (L) 02/10/2022    LDL 70 02/10/2022      Lab Results   Component Value Date    WBC 6.9 04/01/2022    RBC 4.88 04/01/2022    HGB 13.4 04/01/2022    HCT 41.1 04/01/2022    MCV 84 04/01/2022     04/01/2022        Procedures      Assessment:    ICD-10-CM ICD-9-CM   1. Presence of stent in coronary artery in patient with coronary artery disease  I25.10 414.01    Z95.5 V45.82   2. Hx of CABG  Z95.1 V45.81   3. Dyslipidemia  E78.5 272.4   4. Morbid obesity due to excess calories (HCC)  E66.01 278.01   5. Physical deconditioning  R53.81 799.3         Plan:  1. LHC +/- CBI.  Right femoral artery.  2. Discontinue amlodipine as it may be a cause for his edema and initiate Diltiazem 240 mg daily for unstable angina.  This will also benefit his heart rate and as a result possibly his angina  3. Continue on Bumex 1 mg BID for fluid retention.   4. Continue on aspirin 81 mg for antiplatelet therapy.   5. Continue on Plavix 75 mg daily for antiplatelet therapy.   6. Continue on fenofibrate 134 mg daily for hyperlipidemia.   7. Continue on Imdur 120 mg BID for chest pain.   8. Continue on nitroglycerin 0.4 mg as needed for angina.   9. Continue on pravastatin 80 mg daily for hyperlipidemia.   10. Continue on Ranexa 1000 mg BID for chronic chest pain.   11. Continue all other current medications.  12. F/up in 6 months, sooner if needed.    Scribed for April Dillon MD by oRxy Bradshaw. 4/20/2022 10:13 EDT        I April Dillon MD personally  performed the services described in this documentation as scribed by the above individual in my presence, and it is both accurate and complete.    April Dillon MD, FACC

## 2022-04-20 ENCOUNTER — OFFICE VISIT (OUTPATIENT)
Dept: CARDIOLOGY | Facility: CLINIC | Age: 67
End: 2022-04-20

## 2022-04-20 VITALS
DIASTOLIC BLOOD PRESSURE: 62 MMHG | HEIGHT: 72 IN | HEART RATE: 84 BPM | BODY MASS INDEX: 24.38 KG/M2 | OXYGEN SATURATION: 99 % | SYSTOLIC BLOOD PRESSURE: 122 MMHG | WEIGHT: 180 LBS

## 2022-04-20 DIAGNOSIS — Z95.5 PRESENCE OF STENT IN CORONARY ARTERY IN PATIENT WITH CORONARY ARTERY DISEASE: Primary | ICD-10-CM

## 2022-04-20 DIAGNOSIS — I20.0 UNSTABLE ANGINA: ICD-10-CM

## 2022-04-20 DIAGNOSIS — R53.81 PHYSICAL DECONDITIONING: ICD-10-CM

## 2022-04-20 DIAGNOSIS — I25.10 PRESENCE OF STENT IN CORONARY ARTERY IN PATIENT WITH CORONARY ARTERY DISEASE: Primary | ICD-10-CM

## 2022-04-20 DIAGNOSIS — E78.5 DYSLIPIDEMIA: ICD-10-CM

## 2022-04-20 DIAGNOSIS — I25.10 CAD IN NATIVE ARTERY: ICD-10-CM

## 2022-04-20 DIAGNOSIS — Z95.1 HX OF CABG: ICD-10-CM

## 2022-04-20 DIAGNOSIS — E66.01 MORBID OBESITY DUE TO EXCESS CALORIES: ICD-10-CM

## 2022-04-20 PROCEDURE — 99214 OFFICE O/P EST MOD 30 MIN: CPT | Performed by: INTERNAL MEDICINE

## 2022-04-20 RX ORDER — DILTIAZEM HYDROCHLORIDE 240 MG/1
240 CAPSULE, COATED, EXTENDED RELEASE ORAL DAILY
Qty: 90 CAPSULE | Refills: 3 | Status: ON HOLD | OUTPATIENT
Start: 2022-04-20 | End: 2022-08-06 | Stop reason: SDUPTHER

## 2022-04-21 ENCOUNTER — TELEPHONE (OUTPATIENT)
Dept: CARDIOLOGY | Facility: CLINIC | Age: 67
End: 2022-04-21

## 2022-04-21 NOTE — TELEPHONE ENCOUNTER
"PT called and postponed his heart cath scheduled for Friday 4/22/2022 with Dr. Dillon     \"He said he wants to see if the meds work and isnt feeling very comfortable doing the cath right now.\" he will reach back out if and when he is ready to reschedule     Will ask office scheduling to schedule him a f/u appt for 3 months just in case he decides not to proceed with LHC   "

## 2022-05-04 DIAGNOSIS — E10.8 DIABETES MELLITUS TYPE 1 WITH COMPLICATIONS: Primary | ICD-10-CM

## 2022-05-04 RX ORDER — PENTOXIFYLLINE 400 MG/1
400 TABLET, EXTENDED RELEASE ORAL NIGHTLY
Qty: 90 TABLET | Refills: 3 | Status: ON HOLD | OUTPATIENT
Start: 2022-05-04 | End: 2022-08-16

## 2022-05-04 RX ORDER — DULAGLUTIDE 0.75 MG/.5ML
0.75 INJECTION, SOLUTION SUBCUTANEOUS WEEKLY
Qty: 5 PEN | Refills: 2 | Status: SHIPPED | OUTPATIENT
Start: 2022-05-04 | End: 2023-02-27 | Stop reason: SDUPTHER

## 2022-05-04 NOTE — TELEPHONE ENCOUNTER
Rx Refill Note  Requested Prescriptions     Signed Prescriptions Disp Refills   • Dulaglutide (Trulicity) 0.75 MG/0.5ML solution pen-injector 5 pen 2     Sig: Inject 0.75 mg under the skin into the appropriate area as directed 1 (One) Time Per Week.     Authorizing Provider: JOJO MOHR     Ordering User: TRINY LAUREANO      Last office visit with prescribing clinician: 4/6/2022      Next office visit with prescribing clinician: 7/6/2022            Triny Laureano MA  05/04/22, 11:51 EDT

## 2022-05-12 DIAGNOSIS — M54.42 CHRONIC LOW BACK PAIN WITH BILATERAL SCIATICA, UNSPECIFIED BACK PAIN LATERALITY: ICD-10-CM

## 2022-05-12 DIAGNOSIS — G89.29 CHRONIC LOW BACK PAIN WITH BILATERAL SCIATICA, UNSPECIFIED BACK PAIN LATERALITY: ICD-10-CM

## 2022-05-12 DIAGNOSIS — M54.41 CHRONIC LOW BACK PAIN WITH BILATERAL SCIATICA, UNSPECIFIED BACK PAIN LATERALITY: ICD-10-CM

## 2022-05-12 RX ORDER — OXYCODONE AND ACETAMINOPHEN 10; 325 MG/1; MG/1
1 TABLET ORAL EVERY 6 HOURS PRN
Qty: 120 TABLET | Refills: 0 | Status: SHIPPED | OUTPATIENT
Start: 2022-05-12 | End: 2022-06-16 | Stop reason: SDUPTHER

## 2022-05-26 DIAGNOSIS — M48.062 LUMBAR STENOSIS WITH NEUROGENIC CLAUDICATION: ICD-10-CM

## 2022-05-28 RX ORDER — GABAPENTIN 300 MG/1
CAPSULE ORAL
Qty: 90 CAPSULE | Refills: 2 | Status: SHIPPED | OUTPATIENT
Start: 2022-05-28 | End: 2022-08-29 | Stop reason: DRUGHIGH

## 2022-06-01 RX ORDER — BUMETANIDE 1 MG/1
1 TABLET ORAL 2 TIMES DAILY
Qty: 60 TABLET | Refills: 2 | Status: SHIPPED | OUTPATIENT
Start: 2022-06-01

## 2022-06-01 NOTE — TELEPHONE ENCOUNTER
Rx Refill Note  Requested Prescriptions     Signed Prescriptions Disp Refills   • bumetanide (BUMEX) 1 MG tablet 60 tablet 2     Sig: Take 1 tablet by mouth 2 (Two) Times a Day.     Authorizing Provider: JOJO MOHR     Ordering User: TRINY LAUREANO      Last office visit with prescribing clinician: 4/6/2022      Next office visit with prescribing clinician: 7/6/2022       {TIP  Please add Last Relevant Lab Date if appropriate:23}     Triny Laureano MA  06/01/22, 10:41 EDT

## 2022-06-10 NOTE — H&P
"    [facility] Medicine Services  HISTORY AND PHYSICAL    Patient Name: Mahendra Yates  : 1955  MRN: [id number]  Primary Care Physician: Rodríguez Lopez DO  Date of admission: 2022    Subjective   Subjective     Chief Complaint:  Bilateral lower extremity weakness, shortness of air     HPI:  Mahendra Yates is a 67 y.o. male with a past medical history significant for degenerative disc disease, diabetes mellitus type 1, lumbar stenosis, cervical stenosis, liver transplant and CAD presents to the ED accompanied by wife due to intermittent bilateral lower extremity weakness s/p fall and shortness of air over the past three days.  Patient reports a prior history of BLE weakness that has waxed and waned for about 18 months.  He currently follows with Dr. Sanz.  Appears recent MRI of lumbar spine showed severe stenosis at L2-L3 and L3-4. In reviewing records, it appears the patients cardiovascular history puts him at a very high risk for any type of general anesthesia.  He was referred to pain clinic for potential injections to help with pain.     Additionally patient reports increased shortness of air specifically with exertion along with chest tightness radiating into his left arm with associated nausea and diaphoresis.  He currently follows with Dr. Dillon.  His last LHC was in 2019 at [facility].  He was told that he has \"one coronary artery that is good, however when stents are placed they dissolve.\"    Patient reports he is currently being treated medically for his CAD.  He notes yesterday he did take 5 nitroglycerin tablets and today he took 3 tablets for ongoing chest pain.  Currently he denies any chest pain.  He denies any recent fever, chills, abdominal pain, cough, palpitations, diarrhea or vomiting.  He does report worsening BLE edema.  Workup in the ED tonight reveals troponin 0.080, 0.075, proBNP 2, 887.0 and CT of chest without " contrast with  Moderate bilateral pleural effusions along with several patchy groudglass areas of opacity in the right upper lobe that may be infectious vs inflammatory.  Small amount of ascites.  Patient will be admitted to [facility] under the care of the Hospitalist for further evaluation and treatment.       COVID Details:    Symptoms:    [] NONE [] Fever []  Cough [x] Shortness of breath [] Change in taste/smell      Review of Systems   Constitutional: Positive for activity change, chills, diaphoresis and fatigue. Negative for appetite change, fever and unexpected weight change.   HENT: Negative.    Eyes: Negative for photophobia and visual disturbance.   Respiratory: Positive for shortness of breath. Negative for cough.    Cardiovascular: Positive for chest pain and leg swelling. Negative for palpitations.   Gastrointestinal: Positive for nausea. Negative for abdominal distention, abdominal pain, blood in stool, constipation, diarrhea and vomiting.   Genitourinary: Negative.    Musculoskeletal: Negative.    Skin: Negative.    Neurological: Positive for weakness. Negative for dizziness, speech difficulty, light-headedness, numbness and headaches.   Psychiatric/Behavioral: Negative.       All other systems reviewed and are negative.     Personal History     Past Medical History:   Diagnosis Date   • Arthritis    • Atherosclerosis    • B12 deficiency    • Cancer (HCC)    • Cancer of liver (HCC)    • Chronic headaches    • Coronary artery disease    • Diabetes mellitus (HCC)    • Gastroesophageal reflux disease    • History of transfusion    • Hyperlipidemia    • Hypertension    • Myocardial infarction (HCC)    • Sleep apnea    • Stroke (HCC)        Past Surgical History:   Procedure Laterality Date   • ANGIOPLASTY     • CARDIAC SURGERY     • CARPAL TUNNEL RELEASE Right 2/8/2017    Procedure: CARPAL TUNNEL RELEASE RIGHT ;  Surgeon: Luis Hayden MD;  Location: [facility];  Service:    • CHOLECYSTECTOMY     • CORONARY  ARTERY BYPASS GRAFT     • CORONARY STENT PLACEMENT      x2   • LIVER TRANSPLANTATION     • TUMOR REMOVAL Right     axilla       Family History: family history includes Dementia in his brother; Diabetes in his father; Heart disease in his mother; Liver cancer in his brother; Macular degeneration in his brother; No Known Problems in his sister and sister; Stroke in his mother. Otherwise pertinent FHx was reviewed and unremarkable.     Social History:  reports that he quit smoking about 28 years ago. His smoking use included cigarettes. He has a 120.00 pack-year smoking history. He has never used smokeless tobacco. He reports that he does not drink alcohol and does not use drugs.  Social History     Social History Narrative   • Not on file       Medications:  DHEA, DULoxetine, Dulaglutide, Gel Base, LORazepam, Loratadine, amLODIPine, aspirin, clopidogrel, fenofibrate micronized, gabapentin, insulin NPH-insulin regular, isosorbide mononitrate, losartan-hydrochlorothiazide, magnesium oxide, multivitamin with minerals, mycophenolate, nebivolol, nitroglycerin, omeprazole, oxyCODONE-acetaminophen, pentoxifylline, pravastatin, ranolazine, spironolactone, and tacrolimus    Allergies   Allergen Reactions   • Contrast Dye Other (See Comments)     Cardiac Arrest   • Penicillins Rash   • Sulfasalazine Rash       Objective   Objective     Vital Signs:   Temp:  [97.8 °F (36.6 °C)] 97.8 °F (36.6 °C)  Heart Rate:  [44] 44  Resp:  [16] 16  BP: (120)/(60) 120/60  Flow (L/min):  [4] 4    Physical Exam  Vitals and nursing note reviewed.   Constitutional:       General: He is not in acute distress.     Appearance: Normal appearance. He is obese. He is not ill-appearing, toxic-appearing or diaphoretic.   HENT:      Head: Normocephalic and atraumatic.      Nose: Nose normal.      Mouth/Throat:      Mouth: Mucous membranes are dry.      Pharynx: Oropharynx is clear.   Eyes:      General: No scleral icterus.        Right eye: No discharge.          Left eye: No discharge.      Extraocular Movements: Extraocular movements intact.      Conjunctiva/sclera: Conjunctivae normal.      Pupils: Pupils are equal, round, and reactive to light.   Cardiovascular:      Rate and Rhythm: Normal rate and regular rhythm.      Pulses: Normal pulses.      Heart sounds: Murmur heard.       Pulmonary:      Effort: Pulmonary effort is normal. No respiratory distress.      Breath sounds: Normal breath sounds. No stridor. No wheezing, rhonchi or rales.   Chest:      Chest wall: No tenderness.   Abdominal:      General: Bowel sounds are normal. There is no distension.      Palpations: Abdomen is soft. There is no mass.      Tenderness: There is no abdominal tenderness. There is no right CVA tenderness, left CVA tenderness, guarding or rebound.      Hernia: No hernia is present.   Musculoskeletal:         General: No swelling, tenderness, deformity or signs of injury. Normal range of motion.      Cervical back: Normal range of motion and neck supple.      Right lower leg: Edema present.      Left lower leg: Edema present.   Skin:     General: Skin is warm and dry.   Neurological:      General: No focal deficit present.      Mental Status: He is alert and oriented to person, place, and time. Mental status is at baseline.   Psychiatric:         Mood and Affect: Mood normal.         Behavior: Behavior normal.         Thought Content: Thought content normal.         Judgment: Judgment normal.            Results Reviewed:  I have personally reviewed most recent indicated data and agree with findings including:  [x]  Laboratory  [x]  Radiology  [x]  EKG/Telemetry  []  Pathology  []  Cardiac/Vascular Studies  []  Old records  []  Other:  Most pertinent findings include:      LAB RESULTS:      Lab 02/09/22  2307   WBC 9.01   HEMOGLOBIN 10.9*   HEMATOCRIT 32.7*   PLATELETS 284   NEUTROS ABS 6.56   IMMATURE GRANS (ABS) 0.04   LYMPHS ABS 1.23   MONOS ABS 1.00*   EOS ABS 0.15   MCV 83.0          Lab 02/09/22 2307   SODIUM 129*   POTASSIUM 5.3*   CHLORIDE 98   CO2 20.0*   ANION GAP 11.0   BUN 61*   CREATININE 1.70*   GLUCOSE 190*   CALCIUM 9.4   MAGNESIUM 2.6*         Lab 02/09/22 2307   TOTAL PROTEIN 6.7   ALBUMIN 4.00   GLOBULIN 2.7   ALT (SGPT) 19   AST (SGOT) 19   BILIRUBIN 0.6   ALK PHOS 88         Lab 02/10/22  0212 02/09/22 2307   PROBNP  --  2,887.0*   TROPONIN T 0.075* 0.080*                 Brief Urine Lab Results  (Last result in the past 365 days)      Color   Clarity   Blood   Leuk Est   Nitrite   Protein   CREAT   Urine HCG        09/07/21 0850 Orange   Clear   Negative   Trace   Positive   Negative               Microbiology Results (last 10 days)     Procedure Component Value - Date/Time    COVID PRE-OP / PRE-PROCEDURE SCREENING ORDER (NO ISOLATION) - Swab, Nasopharynx [[id number]]  (Normal) Collected: 02/09/22 2346    Lab Status: Final result Specimen: Swab from Nasopharynx Updated: 02/10/22 0113    Narrative:      The following orders were created for panel order COVID PRE-OP / PRE-PROCEDURE SCREENING ORDER (NO ISOLATION) - Swab, Nasopharynx.  Procedure                               Abnormality         Status                     ---------                               -----------         ------                     COVID-19 and FLU A/B PCR...[[id number]]  Normal              Final result                 Please view results for these tests on the individual orders.    COVID-19 and FLU A/B PCR - Swab, Nasopharynx [[id number]]  (Normal) Collected: 02/09/22 2346    Lab Status: Final result Specimen: Swab from Nasopharynx Updated: 02/10/22 0113     COVID19 Not Detected     Influenza A PCR Not Detected     Influenza B PCR Not Detected    Narrative:      Fact sheet for providers: https://www.fda.gov/media/893803/download    Fact sheet for patients: https://www.fda.gov/media/470094/download    Test performed by PCR.          CT Head Without Contrast    Result Date: 2/10/2022  EXAMINATION: CT HEAD  WO CONTRAST DATE: 2/10/2022 12:05 AM  INDICATION: Fall.  COMPARISON: None available.  TECHNIQUE: Thin section noncontrast axial images were obtained through the head. Coronal reformatted images were created.  CT dose lowering techniques were used, to include: automated exposure control, adjustment for patient size, and or use of iterative  reconstruction FINDINGS: No hemorrhage or extra-axial fluid collection. No acute territorial infarct. No intracranial mass or mass effect. Mild burden of low attenuation in the white matter is nonspecific, but likely reflects sequelae of chronic microvascular ischemia.  Mild diffuse parenchymal volume loss. No hydrocephalus. Basal cisterns are patent. Intracranial atherosclerosis. Bilateral ocular lens surgery. Trace mucosal thickening in the left maxillary sinus. Mastoid air cells are clear. Calvarium is intact.     Impression: 1.  No acute intracranial abnormality. 2.  Mild chronic small vessel ischemic changes and volume loss. Electronically signed by:  Isaac Barney DO  2/9/2022 10:37 PM Mountain Time    CT Chest Without Contrast Diagnostic    Result Date: 2/10/2022  Exam: CT Chest without contrast. Date: 2/10/2022. Comparison: None History: Shortness of breath with pneumonia versus vascular overload. Technique: CT examination of the chest was performed without contrast. Coronal and sagittal reformats were performed. CT dose lowering techniques were used, to include: automated exposure control, adjustment for patient size, and/or use of iterative reconstruction. FINDINGS: Mediastinum and Tere: There is no axillary, mediastinal or hilar lymphadenopathy. Pleural and Pericardial spaces: There are moderate bilateral pleural effusions, right greater than left. Upper Abdomen: There is a small amount of ascites around the liver and spleen. Cardiovascular: There is mild vascular calcification throughout the thoracic aorta without evidence of aneurysmal dilation. There are midline  sternotomy wires. Severe diffuse coronary artery calcifications are noted. Prior CABG. Lung Parenchyma and Airways: There are 2 patchy groundglass areas of nodularity within the right upper lobe which could be inflammatory or infectious. No definitive septal thickening is otherwise seen 2 suggest edema. There is some mild compressive atelectatic changes in the lower lobes bilaterally. Bones: No fracture or aggressive osseous lesion.     Impression: 1. Moderate bilateral pleural effusions. 2. Several patchy groundglass areas of opacity in the right upper lobe may be inflammatory or infectious. 3. Small amount of ascites. Electronically signed by:  Maxwell Macias D.O.  2/10/2022 12:14 AM Mountain Time    XR Chest 1 View    Result Date: 2/9/2022  EXAMINATION: XR CHEST 1 VW-  INDICATION: Weak/Dizzy/AMS triage protocol  COMPARISON: 9/7/2021  FINDINGS: Sternotomy wires are noted. Heart shadow is in the upper range of normal size. There is now a mild diffuse perihilar edema pattern present, and cephalization of the vasculature. There is either minimal left basilar atelectasis or effusion.       Impression: 1. Pulmonary vascular congestion with suspected early interstitial edema. 2. Minimal left effusion or atelectasis.    This report was finalized on 2/9/2022 11:23 PM by Dr. Bj Grewal MD.        Results for orders placed during the hospital encounter of 02/10/19    Adult Transthoracic Echo Complete W/ Cont if Necessary Per Protocol    Interpretation Summary  · Estimated EF = 70%.  · Left ventricular wall thickness is consistent with mild concentric hypertrophy.  · Left ventricular diastolic dysfunction (grade I) consistent with impaired relaxation.  · There is thickening of the left coronary cusp of the aortic valve  · Aortic valve maximum pressure gradient is 16.7 mmHg.      Assessment/Plan   Assessment & Plan       DDD (degenerative disc disease), cervical    Presence of stent in coronary artery in patient with  "coronary artery disease    Diabetes mellitus type 1 with complications (HCC)    History of liver transplant (HCC)    Lumbar stenosis with neurogenic claudication    Acute congestive heart failure (HCC)    Bradycardia      67 year old male presents to the ED with worsening shortness of air along, chest pain and waxing and waning of BLE weakness over the past three days.       Acute congestive heart failure  Hypoxia  -Setting 89% on room air, currently requiring 4 L.  Not on home oxygen  -proBNP 2, 887.0  -CT of chest with moderate bilateral pleural effusions, ascites  -s/p 40 mg of IV lasix in the ED, will give 60 mg IV lasix in am   -repeat echo, last echo 2019 with grade 1 diastolic dysfunction   -consult cardiology  -strict I &O   -daily weight   -hold BB due to bradycardia     Chest pain   Bradycardia  CAD   -troponin 0.080, 0.075  -continue to trend, appears to be chronically elevated  -per patient has \"one clean artery left.\"   -consult cardiology in am   -DAPT  -prn nitro   -EKG no acute changes, will repeat EKG in am   -check FLP, Hgb A1c   -hold BB due to bradycardia (Hr in 40's)     SANTOS   Hyperkalemia, mild  -prior creatinine 1.12 on 9/2021  -hold home losartan, spironolactone, HCTZ  -check urine studies   -repeat labs in am     BLE weakness   Lumbar stenosis with neurogenic claudication   -appears this has been intermittent over the past 18 months  -follows with neurosurgery outpatient as mentioned above, non-surgical candidate due to cardiovascular issues.    -last MRI of lumbar spine 9/7/21  -consider neurosurgery consult   -+/- repeat imaging of lumbar spine   -fall precautions  -up with assistance     Diabetes mellitus type 1  -check hgb A1c   -start ldssi   -fingersticks achs     hx of liver transplant  -follows with transplant team at [facility]   -on Cellcept and tacrolimus  -Ascites on CT imaging, likely due to CHF.  But may need to rule out liver dysfunction as well    Anemia  -Decreased from prior " studies  -A.m. labs  -Anemia studies    DVT prophylaxis:  scds    CODE STATUS:  Full code        This note has been completed as part of a split-shared workflow.     Signature: Electronically signed by SERJIO Car, 02/10/22, 3:47 AM EST.      Attending   Admission Attestation       I have seen and examined the patient, performing an independent face-to-face diagnostic evaluation with plan of care reviewed and developed with the advanced practice clinician (APC).      Brief Summary Statement:   Mahendra Yates is a 67 y.o. male With a PMH significant for CAD, diabetes mellitus type 1, lumbar stenosis, history of liver transplant due to hepatocellular carcinoma who presents to the ED due to shortness of breath as well as multiple other complaints.  Patient says 2 nights ago he began to experience left-sided chest pain radiating into his left arm.  Over this time he began to experience increasing bilateral lower extremity edema, shortness of breath, abdominal distention and increased fatigue.  Today patient says he was too weak to stand, says he just did not feel well.  He is eating a difficulty time sleeping secondary to chest pain.  Reports stable 2-3 pillow orthopnea, nausea without vomiting and urinary frequency.  He denies fever, dysuria.  He does report new frequency.  Bowel incontinence that began 1 week ago but says is currently improved.  He reports recent 10 pound weight gain which she attributes to fluid    Remainder of detailed HPI is as noted by APC and has been reviewed and/or edited by me for completeness.    Attending Physical Exam:  Constitutional: Awake, alert  Eyes: PERRLA, sclerae anicteric, no conjunctival injection  HENT: NCAT, mucous membranes moist  Neck: Supple, no thyromegaly, no lymphadenopathy, trachea midline  Respiratory: Bilateral inspiratory crackles  Cardiovascular: RRR, no murmurs, rubs, or gallops, palpable pedal pulses bilaterally  Gastrointestinal: Positive bowel  sounds, soft, nontender, nondistended  Musculoskeletal: 3+ pitting bilateral ankle edema, no clubbing or cyanosis to extremities  Psychiatric: Appropriate affect, cooperative  Neurologic: Oriented x 3, strength symmetric in all extremities, Cranial Nerves grossly intact to confrontation, speech clear  Skin: No rashes    Brief Assessment/Plan :  See detailed assessment and plan developed with APC which I have reviewed and/or edited for completeness.        Admission Status: I believe that this patient meets INPATIENT status due to acute exacerbation of CHF with hypoxia.  I feel patient’s risk for adverse outcomes and need for care warrant INPATIENT evaluation and I predict the patient’s care encounter to likely last beyond 2 midnights.        Cassidy Mcconnell, DO  02/10/22                           PAST SURGICAL HISTORY:  No significant past surgical history

## 2022-06-14 ENCOUNTER — TELEPHONE (OUTPATIENT)
Dept: FAMILY MEDICINE CLINIC | Facility: CLINIC | Age: 67
End: 2022-06-14

## 2022-06-14 NOTE — TELEPHONE ENCOUNTER
----- Message from Mahendra Yates sent at 6/14/2022  1:06 PM EDT -----  Regarding: referal  Kisha again.  Mahendra is having a really hard time getting around with the walker.  He needs a wheelchair.  The problem is he does not have the upper strength to roll himself around, and I have back problems that prevent me from pushing.  We have used wheelchairs in stores that didn't have electric carts, and I was down with icepacks by the time we got home.  So, if Medicare allows, could you try a request for an electric chair, Hoveround, or something that will keep him mobile.  also, I am guessing that there is something we would need to attach to the car to transport the chair.  Oh, PLEASE NOT PATIENT AIDS!!!!  Thank you,  Kisha

## 2022-06-15 DIAGNOSIS — G89.29 CHRONIC BACK PAIN, UNSPECIFIED BACK LOCATION, UNSPECIFIED BACK PAIN LATERALITY: Primary | ICD-10-CM

## 2022-06-15 DIAGNOSIS — R29.898 WEAKNESS OF BOTH LOWER EXTREMITIES: Primary | ICD-10-CM

## 2022-06-15 DIAGNOSIS — R29.898 WEAKNESS OF BOTH LOWER EXTREMITIES: ICD-10-CM

## 2022-06-15 DIAGNOSIS — M54.9 CHRONIC BACK PAIN, UNSPECIFIED BACK LOCATION, UNSPECIFIED BACK PAIN LATERALITY: Primary | ICD-10-CM

## 2022-06-16 ENCOUNTER — PATIENT MESSAGE (OUTPATIENT)
Dept: FAMILY MEDICINE CLINIC | Facility: CLINIC | Age: 67
End: 2022-06-16

## 2022-06-16 DIAGNOSIS — M54.42 CHRONIC LOW BACK PAIN WITH BILATERAL SCIATICA, UNSPECIFIED BACK PAIN LATERALITY: ICD-10-CM

## 2022-06-16 DIAGNOSIS — G89.29 CHRONIC LOW BACK PAIN WITH BILATERAL SCIATICA, UNSPECIFIED BACK PAIN LATERALITY: ICD-10-CM

## 2022-06-16 DIAGNOSIS — M54.41 CHRONIC LOW BACK PAIN WITH BILATERAL SCIATICA, UNSPECIFIED BACK PAIN LATERALITY: ICD-10-CM

## 2022-06-16 RX ORDER — OXYCODONE AND ACETAMINOPHEN 10; 325 MG/1; MG/1
1 TABLET ORAL EVERY 6 HOURS PRN
Qty: 120 TABLET | Refills: 0 | Status: SHIPPED | OUTPATIENT
Start: 2022-06-16 | End: 2022-07-19 | Stop reason: SDUPTHER

## 2022-06-16 NOTE — TELEPHONE ENCOUNTER
Serena from [facility] called to let provider know that they no longer do durable medical equipment due to an order for a scooter coming in from [facility].

## 2022-06-16 NOTE — TELEPHONE ENCOUNTER
Last fill: 5/12/22  Last office visit: 4/6/22  Next office visit: 6/20/22  CSA up-to-date? no  Date of last UDS: none  UDS consistent: n/a

## 2022-06-20 ENCOUNTER — OFFICE VISIT (OUTPATIENT)
Dept: FAMILY MEDICINE CLINIC | Facility: CLINIC | Age: 67
End: 2022-06-20

## 2022-06-20 VITALS
OXYGEN SATURATION: 99 % | BODY MASS INDEX: 24.41 KG/M2 | SYSTOLIC BLOOD PRESSURE: 122 MMHG | DIASTOLIC BLOOD PRESSURE: 64 MMHG | HEIGHT: 72 IN | HEART RATE: 52 BPM

## 2022-06-20 DIAGNOSIS — K59.03 DRUG INDUCED CONSTIPATION: ICD-10-CM

## 2022-06-20 DIAGNOSIS — M54.41 CHRONIC LOW BACK PAIN WITH BILATERAL SCIATICA, UNSPECIFIED BACK PAIN LATERALITY: Primary | ICD-10-CM

## 2022-06-20 DIAGNOSIS — G89.29 CHRONIC PAIN OF BOTH SHOULDERS: ICD-10-CM

## 2022-06-20 DIAGNOSIS — R26.89 BALANCE DISORDER: ICD-10-CM

## 2022-06-20 DIAGNOSIS — M25.511 CHRONIC PAIN OF BOTH SHOULDERS: ICD-10-CM

## 2022-06-20 DIAGNOSIS — R29.898 WEAKNESS OF BOTH LOWER EXTREMITIES: ICD-10-CM

## 2022-06-20 DIAGNOSIS — G89.29 CHRONIC LOW BACK PAIN WITH BILATERAL SCIATICA, UNSPECIFIED BACK PAIN LATERALITY: Primary | ICD-10-CM

## 2022-06-20 DIAGNOSIS — M54.42 CHRONIC LOW BACK PAIN WITH BILATERAL SCIATICA, UNSPECIFIED BACK PAIN LATERALITY: Primary | ICD-10-CM

## 2022-06-20 DIAGNOSIS — M25.512 CHRONIC PAIN OF BOTH SHOULDERS: ICD-10-CM

## 2022-06-20 PROCEDURE — 99214 OFFICE O/P EST MOD 30 MIN: CPT | Performed by: INTERNAL MEDICINE

## 2022-06-20 RX ORDER — AMOXICILLIN 250 MG
1 CAPSULE ORAL DAILY
Qty: 90 TABLET | Refills: 3 | Status: SHIPPED | OUTPATIENT
Start: 2022-06-20 | End: 2023-01-04 | Stop reason: SDUPTHER

## 2022-06-20 RX ORDER — TACROLIMUS 0.5 MG/1
CAPSULE ORAL
COMMUNITY
Start: 2022-06-16

## 2022-06-20 NOTE — PROGRESS NOTES
Chief Complaint   Patient presents with   • Back Pain     F/u        HPI:  Mahendra Yates is a 67 y.o. male who presents today for follow-up chronic low back pain.  He is requesting a motorized scooter, specifically a Hoveround.  He has chronic back pain issues to cause difficulty with balance and lower extremity weakness leading to difficulties ambulating on his own.  Currently using a walker but this has been ineffective recently.    ROS:  Constitutional: no fevers, night sweats or unexplained weight loss  Eyes: no vision changes  ENT: no runny nose, ear pain, sore throat  Cardio: no chest pain, palpitations  Pulm: no shortness of breath, wheezing, or cough  GI: no abdominal pain or changes in bowel movements  : no difficulty urinating  MSK: no difficulty ambulating, no joint pain  Neuro: + weakness, dizziness or headache  Psych: no trouble sleeping  Endo: no change in appetite      Past Medical History:   Diagnosis Date   • Arthritis    • Atherosclerosis    • B12 deficiency    • Cancer (HCC)    • Cancer of liver (HCC)    • Chronic headaches    • Coronary artery disease    • Diabetes mellitus (HCC)    • Gastroesophageal reflux disease    • History of transfusion    • Hyperlipidemia    • Hypertension    • Myocardial infarction (HCC)    • Sleep apnea    • Stroke (HCC)       Family History   Problem Relation Age of Onset   • Heart disease Mother         Stroke   • Stroke Mother    • Diabetes Father    • Liver cancer Brother    • No Known Problems Sister    • Macular degeneration Brother    • No Known Problems Sister    • Dementia Brother       Social History     Socioeconomic History   • Marital status:    Tobacco Use   • Smoking status: Former Smoker     Packs/day: 3.00     Years: 30.00     Pack years: 90.00     Types: Cigarettes     Quit date: 1991     Years since quittin.3   • Smokeless tobacco: Never Used   Vaping Use   • Vaping Use: Never used   Substance and Sexual Activity   • Alcohol  use: No   • Drug use: No   • Sexual activity: Not Currently     Partners: Female      Allergies   Allergen Reactions   • Contrast Dye Other (See Comments)     Cardiac Arrest   • Penicillins Rash   • Sulfasalazine Rash      Immunization History   Administered Date(s) Administered   • COVID-19 (PFIZER) PURPLE CAP 01/15/2021, 02/05/2021, 09/17/2021   • Flu Vaccine Quad PF >36MO 10/12/2017, 12/28/2018   • Fluzone High Dose =>65 Years ([facility] ONLY) 09/24/2019   • Fluzone High-Dose 65+yrs 09/14/2020, 01/03/2022   • Influenza Quad Vaccine (Inpatient) 02/12/2017        PE:  Vitals:    06/20/22 1051   BP: 122/64   Pulse: 52   SpO2: 99%      Body mass index is 24.41 kg/m².    Gen Appearance: NAD  HEENT: Normocephalic, PERRLA, no thyromegaly, trache midline  Heart: RRR, normal S1 and S2, no murmur  Lungs: CTA b/l, no wheezing, no crackles  Abdomen: Soft, non-tender, non-distended, no guarding and BSx4  MSK: Ambulates with walker, antalgic gait, no peripheral edema  Pulses: Palpable and equal b/l  Lymph nodes: No palpable lymphadenopathy   Neuro: No focal deficits      Current Outpatient Medications   Medication Sig Dispense Refill   • aspirin 81 MG EC tablet Take 81 mg by mouth Daily. Continues per doctors orders     • bumetanide (BUMEX) 1 MG tablet Take 1 tablet by mouth 2 (Two) Times a Day. 60 tablet 2   • busPIRone (BUSPAR) 10 MG tablet Take 1 tablet by mouth 2 (Two) Times a Day. 60 tablet 5   • cholecalciferol (VITAMIN D3) 25 MCG (1000 UT) tablet Take 1,000 Units by mouth Daily.     • DHEA 50 MG tablet Take 1 tablet by mouth Daily.     • dilTIAZem CD (CARDIZEM CD) 240 MG 24 hr capsule Take 1 capsule by mouth Daily. Stop amlodipine 90 capsule 3   • Dulaglutide (Trulicity) 0.75 MG/0.5ML solution pen-injector Inject 0.75 mg under the skin into the appropriate area as directed 1 (One) Time Per Week. 5 pen 2   • DULoxetine (Cymbalta) 60 MG capsule Take 1 capsule by mouth Daily. 90 capsule 3   • fenofibrate micronized (LOFIBRA)  134 MG capsule Take 134 mg by mouth Every Evening.     • gabapentin (NEURONTIN) 300 MG capsule TAKE 1 CAPSULE BY MOUTH THREE TIMES DAILY 90 capsule 2   • glucosamine sulfate 500 MG capsule capsule Take 500 mg by mouth Daily.     • Loratadine 10 MG capsule Take 1 capsule by mouth Daily.     • magnesium oxide (MAG-OX) 400 MG tablet Take 400 mg by mouth 2 (Two) Times a Day. 3 tabs in the AM and 2 tabs in the PM per transplant team     • multivitamin with minerals tablet tablet Take 1 tablet by mouth Daily.     • mycophenolate (CELLCEPT) 500 MG tablet Take 1,000 mg by mouth 2 (Two) Times a Day.     • nitroglycerin (NITROSTAT) 0.4 MG SL tablet Place 1 tablet under the tongue Every 5 (Five) Minutes As Needed for Chest Pain. Take no more than 3 doses in 15 minutes. 30 tablet 5   • omeprazole (priLOSEC) 40 MG capsule Take 1 capsule by mouth 2 (Two) Times a Day. 180 capsule 1   • oxyCODONE-acetaminophen (PERCOCET)  MG per tablet Take 1 tablet by mouth Every 6 (Six) Hours As Needed for Severe Pain . 120 tablet 0   • pentoxifylline (TRENtal) 400 MG CR tablet Take 1 tablet by mouth Every Night. 90 tablet 3   • pravastatin (PRAVACHOL) 80 MG tablet Take 1 tablet by mouth Daily. 90 tablet 3   • ranolazine (RANEXA) 1000 MG 12 hr tablet Take 1 tablet by mouth 2 (Two) Times a Day. 180 tablet 3   • tacrolimus (PROGRAF) 0.5 MG capsule      • tacrolimus (PROTOPIC) 0.1 % ointment Apply 1 application topically to the appropriate area as directed 2 (Two) Times a Day. 100 g 0   • isosorbide mononitrate (IMDUR) 120 MG 24 hr tablet Take 1 tablet by mouth 2 (Two) Times a Day for 30 days. 60 tablet 0   • sennosides-docusate (SB Docusate Sodium/Senna) 8.6-50 MG per tablet Take 1 tablet by mouth Daily. 90 tablet 3     No current facility-administered medications for this visit.      It is my medical opinion that the patient would benefit from a wheelchair or motorized scooter.  He has failed conservative therapy for balance, lower extremity  weakness and low back pain.    Chronic pain is stable, [facility] reviewed, refill oxycodone.    Diagnoses and all orders for this visit:    1. Chronic low back pain with bilateral sciatica, unspecified back pain laterality (Primary)    2. Balance disorder    3. Chronic pain of both shoulders    4. Weakness of both lower extremities    5. Drug induced constipation    Other orders  -     sennosides-docusate (SB Docusate Sodium/Senna) 8.6-50 MG per tablet; Take 1 tablet by mouth Daily.  Dispense: 90 tablet; Refill: 3         Return in about 3 months (around 9/20/2022).     Dictated Utilizing Dragon Dictation    Please note that portions of this note were completed with a voice recognition program.    Part of this note may be an electronic transcription/translation of spoken language to printed text using the Dragon Dictation System.

## 2022-07-18 ENCOUNTER — TRANSCRIBE ORDERS (OUTPATIENT)
Dept: LAB | Facility: HOSPITAL | Age: 67
End: 2022-07-18

## 2022-07-18 ENCOUNTER — LAB (OUTPATIENT)
Dept: LAB | Facility: HOSPITAL | Age: 67
End: 2022-07-18

## 2022-07-18 DIAGNOSIS — Z79.891 ENCOUNTER FOR LONG-TERM METHADONE USE: ICD-10-CM

## 2022-07-18 DIAGNOSIS — Z94.4 LIVER REPLACED BY TRANSPLANT: Primary | ICD-10-CM

## 2022-07-18 DIAGNOSIS — Z12.5 ENCOUNTER FOR PROSTATE CANCER SCREENING: ICD-10-CM

## 2022-07-18 DIAGNOSIS — D64.9 ANEMIA, UNSPECIFIED TYPE: ICD-10-CM

## 2022-07-18 DIAGNOSIS — Z94.4 LIVER REPLACED BY TRANSPLANT: ICD-10-CM

## 2022-07-18 LAB
ALBUMIN SERPL-MCNC: 4.5 G/DL (ref 3.5–5.2)
ALBUMIN/GLOB SERPL: 2 G/DL
ALP SERPL-CCNC: 81 U/L (ref 39–117)
ALT SERPL W P-5'-P-CCNC: 11 U/L (ref 1–41)
ANION GAP SERPL CALCULATED.3IONS-SCNC: 10.8 MMOL/L (ref 5–15)
AST SERPL-CCNC: 15 U/L (ref 1–40)
BASOPHILS # BLD AUTO: 0.06 10*3/MM3 (ref 0–0.2)
BASOPHILS NFR BLD AUTO: 1.1 % (ref 0–1.5)
BILIRUB SERPL-MCNC: 0.4 MG/DL (ref 0–1.2)
BUN SERPL-MCNC: 23 MG/DL (ref 8–23)
BUN/CREAT SERPL: 19.3 (ref 7–25)
CALCIUM SPEC-SCNC: 10.1 MG/DL (ref 8.6–10.5)
CHLORIDE SERPL-SCNC: 99 MMOL/L (ref 98–107)
CHOLEST SERPL-MCNC: 331 MG/DL (ref 0–200)
CO2 SERPL-SCNC: 29.2 MMOL/L (ref 22–29)
CREAT SERPL-MCNC: 1.19 MG/DL (ref 0.76–1.27)
DEPRECATED RDW RBC AUTO: 43.5 FL (ref 37–54)
EGFRCR SERPLBLD CKD-EPI 2021: 66.5 ML/MIN/1.73
EOSINOPHIL # BLD AUTO: 0.3 10*3/MM3 (ref 0–0.4)
EOSINOPHIL NFR BLD AUTO: 5.5 % (ref 0.3–6.2)
ERYTHROCYTE [DISTWIDTH] IN BLOOD BY AUTOMATED COUNT: 14.2 % (ref 12.3–15.4)
GLOBULIN UR ELPH-MCNC: 2.2 GM/DL
GLUCOSE SERPL-MCNC: 109 MG/DL (ref 65–99)
HBA1C MFR BLD: 6 % (ref 4.8–5.6)
HCT VFR BLD AUTO: 41.9 % (ref 37.5–51)
HGB BLD-MCNC: 14 G/DL (ref 13–17.7)
IMM GRANULOCYTES # BLD AUTO: 0.02 10*3/MM3 (ref 0–0.05)
IMM GRANULOCYTES NFR BLD AUTO: 0.4 % (ref 0–0.5)
LYMPHOCYTES # BLD AUTO: 1.54 10*3/MM3 (ref 0.7–3.1)
LYMPHOCYTES NFR BLD AUTO: 28.1 % (ref 19.6–45.3)
MAGNESIUM SERPL-MCNC: 2 MG/DL (ref 1.6–2.4)
MCH RBC QN AUTO: 29 PG (ref 26.6–33)
MCHC RBC AUTO-ENTMCNC: 33.4 G/DL (ref 31.5–35.7)
MCV RBC AUTO: 86.7 FL (ref 79–97)
MONOCYTES # BLD AUTO: 0.49 10*3/MM3 (ref 0.1–0.9)
MONOCYTES NFR BLD AUTO: 8.9 % (ref 5–12)
NEUTROPHILS NFR BLD AUTO: 3.07 10*3/MM3 (ref 1.7–7)
NEUTROPHILS NFR BLD AUTO: 56 % (ref 42.7–76)
NRBC BLD AUTO-RTO: 0 /100 WBC (ref 0–0.2)
PLATELET # BLD AUTO: 317 10*3/MM3 (ref 140–450)
PMV BLD AUTO: 11 FL (ref 6–12)
POTASSIUM SERPL-SCNC: 4.2 MMOL/L (ref 3.5–5.2)
PROT SERPL-MCNC: 6.7 G/DL (ref 6–8.5)
PSA SERPL-MCNC: 0.23 NG/ML (ref 0–4)
RBC # BLD AUTO: 4.83 10*6/MM3 (ref 4.14–5.8)
SODIUM SERPL-SCNC: 139 MMOL/L (ref 136–145)
TRIGL SERPL-MCNC: 496 MG/DL (ref 0–150)
WBC NRBC COR # BLD: 5.48 10*3/MM3 (ref 3.4–10.8)

## 2022-07-18 PROCEDURE — 82105 ALPHA-FETOPROTEIN SERUM: CPT

## 2022-07-18 PROCEDURE — G0103 PSA SCREENING: HCPCS

## 2022-07-18 PROCEDURE — 85025 COMPLETE CBC W/AUTO DIFF WBC: CPT

## 2022-07-18 PROCEDURE — 80197 ASSAY OF TACROLIMUS: CPT

## 2022-07-18 PROCEDURE — 80053 COMPREHEN METABOLIC PANEL: CPT

## 2022-07-18 PROCEDURE — 36415 COLL VENOUS BLD VENIPUNCTURE: CPT

## 2022-07-18 PROCEDURE — 84478 ASSAY OF TRIGLYCERIDES: CPT

## 2022-07-18 PROCEDURE — 83036 HEMOGLOBIN GLYCOSYLATED A1C: CPT

## 2022-07-18 PROCEDURE — 82465 ASSAY BLD/SERUM CHOLESTEROL: CPT

## 2022-07-18 PROCEDURE — 83735 ASSAY OF MAGNESIUM: CPT

## 2022-07-19 DIAGNOSIS — M54.42 CHRONIC LOW BACK PAIN WITH BILATERAL SCIATICA, UNSPECIFIED BACK PAIN LATERALITY: ICD-10-CM

## 2022-07-19 DIAGNOSIS — M54.41 CHRONIC LOW BACK PAIN WITH BILATERAL SCIATICA, UNSPECIFIED BACK PAIN LATERALITY: ICD-10-CM

## 2022-07-19 DIAGNOSIS — G89.29 CHRONIC LOW BACK PAIN WITH BILATERAL SCIATICA, UNSPECIFIED BACK PAIN LATERALITY: ICD-10-CM

## 2022-07-19 LAB — ALPHA-FETOPROTEIN: 5 NG/ML (ref 0–8.3)

## 2022-07-19 RX ORDER — OXYCODONE AND ACETAMINOPHEN 10; 325 MG/1; MG/1
1 TABLET ORAL EVERY 6 HOURS PRN
Qty: 120 TABLET | Refills: 0 | Status: SHIPPED | OUTPATIENT
Start: 2022-07-19 | End: 2022-08-25 | Stop reason: SDUPTHER

## 2022-07-22 LAB — TACROLIMUS BLD LC/MS/MS-MCNC: 4.1 NG/ML (ref 2–20)

## 2022-07-27 RX ORDER — ISOSORBIDE MONONITRATE 120 MG/1
120 TABLET, EXTENDED RELEASE ORAL DAILY
Qty: 90 TABLET | Refills: 3 | Status: SHIPPED | OUTPATIENT
Start: 2022-07-27 | End: 2022-07-29 | Stop reason: SDUPTHER

## 2022-07-29 DIAGNOSIS — M79.673 PAIN OF FOOT, UNSPECIFIED LATERALITY: Primary | ICD-10-CM

## 2022-07-29 RX ORDER — ISOSORBIDE MONONITRATE 120 MG/1
120 TABLET, EXTENDED RELEASE ORAL 2 TIMES DAILY
Qty: 180 TABLET | Refills: 3 | Status: SHIPPED | OUTPATIENT
Start: 2022-07-29

## 2022-08-04 ENCOUNTER — APPOINTMENT (OUTPATIENT)
Dept: CT IMAGING | Facility: HOSPITAL | Age: 67
End: 2022-08-04

## 2022-08-04 ENCOUNTER — HOSPITAL ENCOUNTER (OUTPATIENT)
Facility: HOSPITAL | Age: 67
Setting detail: OBSERVATION
Discharge: HOME | End: 2022-08-06
Attending: EMERGENCY MEDICINE | Admitting: HOSPITALIST

## 2022-08-04 ENCOUNTER — APPOINTMENT (OUTPATIENT)
Dept: MRI IMAGING | Facility: HOSPITAL | Age: 67
End: 2022-08-04

## 2022-08-04 ENCOUNTER — APPOINTMENT (OUTPATIENT)
Dept: GENERAL RADIOLOGY | Facility: HOSPITAL | Age: 67
End: 2022-08-04

## 2022-08-04 DIAGNOSIS — R41.841 COGNITIVE COMMUNICATION DEFICIT: ICD-10-CM

## 2022-08-04 DIAGNOSIS — R77.8 ELEVATED TROPONIN: ICD-10-CM

## 2022-08-04 DIAGNOSIS — R47.9 DIFFICULTY WITH SPEECH: ICD-10-CM

## 2022-08-04 DIAGNOSIS — R41.82 ALTERED MENTAL STATUS, UNSPECIFIED ALTERED MENTAL STATUS TYPE: Primary | ICD-10-CM

## 2022-08-04 DIAGNOSIS — R29.898 WEAKNESS OF BOTH LOWER EXTREMITIES: ICD-10-CM

## 2022-08-04 PROBLEM — R47.1 DYSARTHRIA: Status: ACTIVE | Noted: 2022-08-04

## 2022-08-04 PROBLEM — G45.9 TRANSIENT ISCHEMIC ATTACK (TIA): Status: ACTIVE | Noted: 2022-08-04

## 2022-08-04 PROBLEM — R00.1 BRADYCARDIA: Status: ACTIVE | Noted: 2022-08-04

## 2022-08-04 PROBLEM — R53.1 WEAKNESS: Status: ACTIVE | Noted: 2022-08-04

## 2022-08-04 LAB
ALBUMIN SERPL-MCNC: 3.9 G/DL (ref 3.5–5.2)
ALBUMIN/GLOB SERPL: 1.6 G/DL
ALP SERPL-CCNC: 78 U/L (ref 39–117)
ALT SERPL W P-5'-P-CCNC: 13 U/L (ref 1–41)
AMMONIA BLD-SCNC: 10 UMOL/L (ref 16–60)
ANION GAP SERPL CALCULATED.3IONS-SCNC: 8 MMOL/L (ref 5–15)
AST SERPL-CCNC: 19 U/L (ref 1–40)
BASOPHILS # BLD AUTO: 0.05 10*3/MM3 (ref 0–0.2)
BASOPHILS NFR BLD AUTO: 0.5 % (ref 0–1.5)
BILIRUB SERPL-MCNC: 0.4 MG/DL (ref 0–1.2)
BILIRUB UR QL STRIP: NEGATIVE
BUN SERPL-MCNC: 30 MG/DL (ref 8–23)
BUN/CREAT SERPL: 24.2 (ref 7–25)
CALCIUM SPEC-SCNC: 9.5 MG/DL (ref 8.6–10.5)
CHLORIDE SERPL-SCNC: 99 MMOL/L (ref 98–107)
CLARITY UR: CLEAR
CO2 SERPL-SCNC: 29 MMOL/L (ref 22–29)
COLOR UR: YELLOW
CREAT SERPL-MCNC: 1.24 MG/DL (ref 0.76–1.27)
DEPRECATED RDW RBC AUTO: 45.4 FL (ref 37–54)
EGFRCR SERPLBLD CKD-EPI 2021: 63.3 ML/MIN/1.73
EOSINOPHIL # BLD AUTO: 0.23 10*3/MM3 (ref 0–0.4)
EOSINOPHIL NFR BLD AUTO: 2.2 % (ref 0.3–6.2)
ERYTHROCYTE [DISTWIDTH] IN BLOOD BY AUTOMATED COUNT: 14.2 % (ref 12.3–15.4)
FLUAV SUBTYP SPEC NAA+PROBE: NOT DETECTED
FLUBV RNA ISLT QL NAA+PROBE: NOT DETECTED
GLOBULIN UR ELPH-MCNC: 2.5 GM/DL
GLUCOSE BLDC GLUCOMTR-MCNC: 109 MG/DL (ref 70–130)
GLUCOSE SERPL-MCNC: 129 MG/DL (ref 65–99)
GLUCOSE UR STRIP-MCNC: NEGATIVE MG/DL
HCT VFR BLD AUTO: 39.2 % (ref 37.5–51)
HGB BLD-MCNC: 13.1 G/DL (ref 13–17.7)
HGB UR QL STRIP.AUTO: NEGATIVE
HOLD SPECIMEN: NORMAL
IMM GRANULOCYTES # BLD AUTO: 0.03 10*3/MM3 (ref 0–0.05)
IMM GRANULOCYTES NFR BLD AUTO: 0.3 % (ref 0–0.5)
KETONES UR QL STRIP: NEGATIVE
LEUKOCYTE ESTERASE UR QL STRIP.AUTO: NEGATIVE
LYMPHOCYTES # BLD AUTO: 1.59 10*3/MM3 (ref 0.7–3.1)
LYMPHOCYTES NFR BLD AUTO: 14.9 % (ref 19.6–45.3)
MAGNESIUM SERPL-MCNC: 2 MG/DL (ref 1.6–2.4)
MCH RBC QN AUTO: 29.1 PG (ref 26.6–33)
MCHC RBC AUTO-ENTMCNC: 33.4 G/DL (ref 31.5–35.7)
MCV RBC AUTO: 87.1 FL (ref 79–97)
MONOCYTES # BLD AUTO: 0.73 10*3/MM3 (ref 0.1–0.9)
MONOCYTES NFR BLD AUTO: 6.8 % (ref 5–12)
NEUTROPHILS NFR BLD AUTO: 75.3 % (ref 42.7–76)
NEUTROPHILS NFR BLD AUTO: 8.04 10*3/MM3 (ref 1.7–7)
NITRITE UR QL STRIP: NEGATIVE
NRBC BLD AUTO-RTO: 0 /100 WBC (ref 0–0.2)
NT-PROBNP SERPL-MCNC: 457.3 PG/ML (ref 0–900)
PH UR STRIP.AUTO: 7.5 [PH] (ref 5–8)
PLATELET # BLD AUTO: 301 10*3/MM3 (ref 140–450)
PMV BLD AUTO: 10.4 FL (ref 6–12)
POTASSIUM SERPL-SCNC: 4.8 MMOL/L (ref 3.5–5.2)
PROT SERPL-MCNC: 6.4 G/DL (ref 6–8.5)
PROT UR QL STRIP: NEGATIVE
QT INTERVAL: 496 MS
QTC INTERVAL: 461 MS
RBC # BLD AUTO: 4.5 10*6/MM3 (ref 4.14–5.8)
SARS-COV-2 RNA PNL SPEC NAA+PROBE: NOT DETECTED
SODIUM SERPL-SCNC: 136 MMOL/L (ref 136–145)
SP GR UR STRIP: 1.01 (ref 1–1.03)
TROPONIN T SERPL-MCNC: 0.1 NG/ML (ref 0–0.03)
TSH SERPL DL<=0.05 MIU/L-ACNC: 2.8 UIU/ML (ref 0.27–4.2)
UROBILINOGEN UR QL STRIP: NORMAL
WBC NRBC COR # BLD: 10.67 10*3/MM3 (ref 3.4–10.8)
WHOLE BLOOD HOLD COAG: NORMAL
WHOLE BLOOD HOLD SPECIMEN: NORMAL

## 2022-08-04 PROCEDURE — 93005 ELECTROCARDIOGRAM TRACING: CPT | Performed by: EMERGENCY MEDICINE

## 2022-08-04 PROCEDURE — 81003 URINALYSIS AUTO W/O SCOPE: CPT | Performed by: EMERGENCY MEDICINE

## 2022-08-04 PROCEDURE — 99214 OFFICE O/P EST MOD 30 MIN: CPT

## 2022-08-04 PROCEDURE — 83735 ASSAY OF MAGNESIUM: CPT | Performed by: EMERGENCY MEDICINE

## 2022-08-04 PROCEDURE — 70547 MR ANGIOGRAPHY NECK W/O DYE: CPT

## 2022-08-04 PROCEDURE — 70450 CT HEAD/BRAIN W/O DYE: CPT

## 2022-08-04 PROCEDURE — C9803 HOPD COVID-19 SPEC COLLECT: HCPCS

## 2022-08-04 PROCEDURE — 84484 ASSAY OF TROPONIN QUANT: CPT | Performed by: EMERGENCY MEDICINE

## 2022-08-04 PROCEDURE — 84443 ASSAY THYROID STIM HORMONE: CPT | Performed by: EMERGENCY MEDICINE

## 2022-08-04 PROCEDURE — 93005 ELECTROCARDIOGRAM TRACING: CPT

## 2022-08-04 PROCEDURE — 87636 SARSCOV2 & INF A&B AMP PRB: CPT | Performed by: EMERGENCY MEDICINE

## 2022-08-04 PROCEDURE — 82140 ASSAY OF AMMONIA: CPT | Performed by: EMERGENCY MEDICINE

## 2022-08-04 PROCEDURE — 71045 X-RAY EXAM CHEST 1 VIEW: CPT

## 2022-08-04 PROCEDURE — 85025 COMPLETE CBC W/AUTO DIFF WBC: CPT

## 2022-08-04 PROCEDURE — G0378 HOSPITAL OBSERVATION PER HR: HCPCS

## 2022-08-04 PROCEDURE — 82962 GLUCOSE BLOOD TEST: CPT

## 2022-08-04 PROCEDURE — 99285 EMERGENCY DEPT VISIT HI MDM: CPT

## 2022-08-04 PROCEDURE — 99220 PR INITIAL OBSERVATION CARE/DAY 70 MINUTES: CPT | Performed by: INTERNAL MEDICINE

## 2022-08-04 PROCEDURE — 63710000001 TACROLIMUS PER 1 MG: Performed by: INTERNAL MEDICINE

## 2022-08-04 PROCEDURE — 70551 MRI BRAIN STEM W/O DYE: CPT

## 2022-08-04 PROCEDURE — 83880 ASSAY OF NATRIURETIC PEPTIDE: CPT | Performed by: EMERGENCY MEDICINE

## 2022-08-04 PROCEDURE — 72148 MRI LUMBAR SPINE W/O DYE: CPT

## 2022-08-04 PROCEDURE — 70544 MR ANGIOGRAPHY HEAD W/O DYE: CPT

## 2022-08-04 PROCEDURE — 63710000001 MYCOPHENOLATE MOFETIL PER 250 MG: Performed by: INTERNAL MEDICINE

## 2022-08-04 PROCEDURE — 80053 COMPREHEN METABOLIC PANEL: CPT | Performed by: EMERGENCY MEDICINE

## 2022-08-04 RX ORDER — AMOXICILLIN 250 MG
1 CAPSULE ORAL DAILY
Status: DISCONTINUED | OUTPATIENT
Start: 2022-08-05 | End: 2022-08-04 | Stop reason: SDUPTHER

## 2022-08-04 RX ORDER — BUSPIRONE HYDROCHLORIDE 10 MG/1
10 TABLET ORAL 2 TIMES DAILY
Status: DISCONTINUED | OUTPATIENT
Start: 2022-08-04 | End: 2022-08-06 | Stop reason: HOSPADM

## 2022-08-04 RX ORDER — ACETAMINOPHEN 325 MG/1
650 TABLET ORAL EVERY 4 HOURS PRN
Status: DISCONTINUED | OUTPATIENT
Start: 2022-08-04 | End: 2022-08-06 | Stop reason: HOSPADM

## 2022-08-04 RX ORDER — ASPIRIN 325 MG
325 TABLET ORAL DAILY
Status: DISCONTINUED | OUTPATIENT
Start: 2022-08-04 | End: 2022-08-05

## 2022-08-04 RX ORDER — ACETAMINOPHEN 160 MG/5ML
650 SOLUTION ORAL EVERY 4 HOURS PRN
Status: DISCONTINUED | OUTPATIENT
Start: 2022-08-04 | End: 2022-08-06 | Stop reason: HOSPADM

## 2022-08-04 RX ORDER — DEXTROSE MONOHYDRATE 25 G/50ML
25 INJECTION, SOLUTION INTRAVENOUS
Status: DISCONTINUED | OUTPATIENT
Start: 2022-08-04 | End: 2022-08-06 | Stop reason: HOSPADM

## 2022-08-04 RX ORDER — SODIUM CHLORIDE 0.9 % (FLUSH) 0.9 %
10 SYRINGE (ML) INJECTION AS NEEDED
Status: DISCONTINUED | OUTPATIENT
Start: 2022-08-04 | End: 2022-08-06 | Stop reason: HOSPADM

## 2022-08-04 RX ORDER — OXYCODONE AND ACETAMINOPHEN 10; 325 MG/1; MG/1
1 TABLET ORAL EVERY 6 HOURS PRN
Status: DISCONTINUED | OUTPATIENT
Start: 2022-08-04 | End: 2022-08-06 | Stop reason: HOSPADM

## 2022-08-04 RX ORDER — ASPIRIN 300 MG/1
300 SUPPOSITORY RECTAL DAILY
Status: DISCONTINUED | OUTPATIENT
Start: 2022-08-04 | End: 2022-08-05

## 2022-08-04 RX ORDER — PRAVASTATIN SODIUM 40 MG
80 TABLET ORAL DAILY
Status: DISCONTINUED | OUTPATIENT
Start: 2022-08-05 | End: 2022-08-04 | Stop reason: SDUPTHER

## 2022-08-04 RX ORDER — GABAPENTIN 300 MG/1
300 CAPSULE ORAL 3 TIMES DAILY
Status: DISCONTINUED | OUTPATIENT
Start: 2022-08-04 | End: 2022-08-06 | Stop reason: HOSPADM

## 2022-08-04 RX ORDER — ATORVASTATIN CALCIUM 40 MG/1
80 TABLET, FILM COATED ORAL NIGHTLY
Status: DISCONTINUED | OUTPATIENT
Start: 2022-08-04 | End: 2022-08-06 | Stop reason: HOSPADM

## 2022-08-04 RX ORDER — PENTOXIFYLLINE 400 MG/1
400 TABLET, EXTENDED RELEASE ORAL NIGHTLY
Status: DISCONTINUED | OUTPATIENT
Start: 2022-08-04 | End: 2022-08-06 | Stop reason: HOSPADM

## 2022-08-04 RX ORDER — SODIUM CHLORIDE 0.9 % (FLUSH) 0.9 %
10 SYRINGE (ML) INJECTION EVERY 12 HOURS SCHEDULED
Status: DISCONTINUED | OUTPATIENT
Start: 2022-08-04 | End: 2022-08-06 | Stop reason: HOSPADM

## 2022-08-04 RX ORDER — ACETAMINOPHEN 650 MG/1
650 SUPPOSITORY RECTAL EVERY 4 HOURS PRN
Status: DISCONTINUED | OUTPATIENT
Start: 2022-08-04 | End: 2022-08-06 | Stop reason: HOSPADM

## 2022-08-04 RX ORDER — MULTIPLE VITAMINS W/ MINERALS TAB 9MG-400MCG
1 TAB ORAL DAILY
Status: DISCONTINUED | OUTPATIENT
Start: 2022-08-05 | End: 2022-08-06 | Stop reason: HOSPADM

## 2022-08-04 RX ORDER — BISACODYL 10 MG
10 SUPPOSITORY, RECTAL RECTAL DAILY PRN
Status: DISCONTINUED | OUTPATIENT
Start: 2022-08-04 | End: 2022-08-06 | Stop reason: HOSPADM

## 2022-08-04 RX ORDER — MYCOPHENOLATE MOFETIL 250 MG/1
1000 CAPSULE ORAL EVERY 12 HOURS SCHEDULED
Status: DISCONTINUED | OUTPATIENT
Start: 2022-08-04 | End: 2022-08-06 | Stop reason: HOSPADM

## 2022-08-04 RX ORDER — CALCIUM CARBONATE 200(500)MG
2 TABLET,CHEWABLE ORAL 2 TIMES DAILY PRN
Status: DISCONTINUED | OUTPATIENT
Start: 2022-08-04 | End: 2022-08-06 | Stop reason: HOSPADM

## 2022-08-04 RX ORDER — RANOLAZINE 500 MG/1
1000 TABLET, EXTENDED RELEASE ORAL 2 TIMES DAILY
Status: DISCONTINUED | OUTPATIENT
Start: 2022-08-04 | End: 2022-08-06 | Stop reason: HOSPADM

## 2022-08-04 RX ORDER — BISACODYL 5 MG/1
5 TABLET, DELAYED RELEASE ORAL DAILY PRN
Status: DISCONTINUED | OUTPATIENT
Start: 2022-08-04 | End: 2022-08-06 | Stop reason: HOSPADM

## 2022-08-04 RX ORDER — INSULIN LISPRO 100 [IU]/ML
0-9 INJECTION, SOLUTION INTRAVENOUS; SUBCUTANEOUS
Status: DISCONTINUED | OUTPATIENT
Start: 2022-08-05 | End: 2022-08-06 | Stop reason: HOSPADM

## 2022-08-04 RX ORDER — PANTOPRAZOLE SODIUM 40 MG/1
40 TABLET, DELAYED RELEASE ORAL EVERY MORNING
Status: DISCONTINUED | OUTPATIENT
Start: 2022-08-05 | End: 2022-08-06 | Stop reason: HOSPADM

## 2022-08-04 RX ORDER — NICOTINE POLACRILEX 4 MG
15 LOZENGE BUCCAL
Status: DISCONTINUED | OUTPATIENT
Start: 2022-08-04 | End: 2022-08-06 | Stop reason: HOSPADM

## 2022-08-04 RX ORDER — PIMECROLIMUS 10 MG/G
CREAM TOPICAL EVERY 12 HOURS SCHEDULED
Status: DISCONTINUED | OUTPATIENT
Start: 2022-08-04 | End: 2022-08-06 | Stop reason: HOSPADM

## 2022-08-04 RX ORDER — ISOSORBIDE MONONITRATE 60 MG/1
120 TABLET, EXTENDED RELEASE ORAL
Status: DISCONTINUED | OUTPATIENT
Start: 2022-08-05 | End: 2022-08-06 | Stop reason: HOSPADM

## 2022-08-04 RX ORDER — POLYETHYLENE GLYCOL 3350 17 G/17G
17 POWDER, FOR SOLUTION ORAL DAILY PRN
Status: DISCONTINUED | OUTPATIENT
Start: 2022-08-04 | End: 2022-08-06 | Stop reason: HOSPADM

## 2022-08-04 RX ORDER — ASPIRIN 81 MG/1
81 TABLET ORAL DAILY
Status: DISCONTINUED | OUTPATIENT
Start: 2022-08-05 | End: 2022-08-06 | Stop reason: HOSPADM

## 2022-08-04 RX ORDER — TACROLIMUS 0.5 MG/1
0.5 CAPSULE ORAL EVERY 12 HOURS
Status: DISCONTINUED | OUTPATIENT
Start: 2022-08-04 | End: 2022-08-06 | Stop reason: HOSPADM

## 2022-08-04 RX ORDER — AMOXICILLIN 250 MG
2 CAPSULE ORAL 2 TIMES DAILY
Status: DISCONTINUED | OUTPATIENT
Start: 2022-08-04 | End: 2022-08-06 | Stop reason: HOSPADM

## 2022-08-04 RX ORDER — DULOXETIN HYDROCHLORIDE 60 MG/1
60 CAPSULE, DELAYED RELEASE ORAL DAILY
Status: DISCONTINUED | OUTPATIENT
Start: 2022-08-05 | End: 2022-08-06 | Stop reason: HOSPADM

## 2022-08-04 RX ADMIN — TACROLIMUS 0.5 MG: 0.5 CAPSULE ORAL at 21:47

## 2022-08-04 RX ADMIN — ASPIRIN 325 MG ORAL TABLET 325 MG: 325 PILL ORAL at 19:09

## 2022-08-04 RX ADMIN — PENTOXIFYLLINE 400 MG: 400 TABLET, EXTENDED RELEASE ORAL at 21:47

## 2022-08-04 RX ADMIN — BUSPIRONE HYDROCHLORIDE 10 MG: 10 TABLET ORAL at 21:47

## 2022-08-04 RX ADMIN — GABAPENTIN 300 MG: 300 CAPSULE ORAL at 21:47

## 2022-08-04 RX ADMIN — RANOLAZINE 1000 MG: 500 TABLET, EXTENDED RELEASE ORAL at 21:45

## 2022-08-04 RX ADMIN — MYCOPHENOLATE MOFETIL 1000 MG: 250 CAPSULE ORAL at 21:43

## 2022-08-04 NOTE — CONSULTS
Stroke Consult Note    Patient Name: Mahendra Yates   MRN: [id number]  Age: 67 y.o.  Sex: male  : 1955    Primary Care Physician: Rodríguez Lopez DO  Referring Physician: Dr. Mars Aguilar    TIME STROKE TEAM CALLED: 1533 EST     TIME PATIENT SEEN: 1624 EST    Handedness: Right  Race:     Chief Complaint/Reason for Consultation: New right-sided lower extremity weakness, left upper extremity weakness, dysarthria, and altered mental status    HPI: Mr. Yates is a 67-year-old male with known medical diagnosis of essential hypertension, hyperlipidemia, diabetes mellitus type 2, coronary artery disease s/p CABG and coronary stents x2, remote liver cancer s/p transplant in 2018, remote myocardial infarction, obstructive sleep apnea, remote tobacco abuse, and TIA (approximately 3 years ago, aphasia) who presents to the emergency department today for further evaluation of fatigue and generalized weakness.  Upon further evaluation by the ED physician the stroke team was consulted given the patient's new right lower extremity weakness, intermittent episodes of lethargy, and dysarthria.    The patient tells me that he has had difficulty with his left lower extremity for several years now secondary to sciatica however is normally able to ambulate with the assistance of a rolling walker.  He reports that last evening he did notice that he felt somewhat weaker in his left upper extremity, which is new for him.  He states that when he awoke this morning he went to take a shower and upon getting out of the shower suddenly was unable to ambulate secondary to extreme right lower extremity weakness.  He was able to call out to his wife who was able to help safely lower him to the floor.  Throughout the day the patient's weakness has continued to persist requiring multiple family members to assist him when transitioning from chair to walker.    The patient tells me that he takes ASA 81 mg daily however  no other antiplatelet agents.  His wife does report that he was previously on Plavix however was taken off of this medication.  He takes no anticoagulation.  The patient does have a history of TIA (approximately 3 years ago) and a family history of stroke in his mother.  He is a former smoker, denies EtOH use, and denies illicit drug use.    Last Known Normal Date/Time: 8/3/2022, unknown time EST     Review of Systems   Constitutional: Positive for activity change and fatigue. Negative for chills and fever.   HENT: Negative for congestion, rhinorrhea, sore throat and trouble swallowing.    Eyes: Negative for photophobia and visual disturbance.   Respiratory: Positive for shortness of breath. Negative for cough and chest tightness.    Cardiovascular: Positive for chest pain and palpitations. Negative for leg swelling.   Gastrointestinal: Positive for diarrhea. Negative for blood in stool, constipation, nausea and vomiting.   Genitourinary: Negative for difficulty urinating and hematuria.   Musculoskeletal: Positive for arthralgias, back pain and gait problem.   Skin: Negative.    Neurological: Positive for speech difficulty, weakness and headaches. Negative for dizziness, seizures, syncope and numbness.   Hematological: Negative.    Psychiatric/Behavioral: Negative.       Past Medical History:   Diagnosis Date   • Arthritis    • Atherosclerosis    • B12 deficiency    • Cancer (HCC)    • Cancer of liver (HCC)    • Chronic headaches    • Coronary artery disease    • Diabetes mellitus (HCC)    • Gastroesophageal reflux disease    • History of transfusion    • Hyperlipidemia    • Hypertension    • Myocardial infarction (HCC)    • Sleep apnea    • Stroke (HCC)      Past Surgical History:   Procedure Laterality Date   • ANGIOPLASTY     • CARDIAC SURGERY     • CARPAL TUNNEL RELEASE Right 2/8/2017    Procedure: CARPAL TUNNEL RELEASE RIGHT ;  Surgeon: Luis Hayden MD;  Location: [facility];  Service:    • CHOLECYSTECTOMY      • CORONARY ARTERY BYPASS GRAFT     • CORONARY STENT PLACEMENT      x2   • LIVER TRANSPLANTATION     • TUMOR REMOVAL Right     axilla     Family History   Problem Relation Age of Onset   • Heart disease Mother         Stroke   • Stroke Mother    • Diabetes Father    • Liver cancer Brother    • No Known Problems Sister    • Macular degeneration Brother    • No Known Problems Sister    • Dementia Brother      Social History     Socioeconomic History   • Marital status:    Tobacco Use   • Smoking status: Former Smoker     Packs/day: 3.00     Years: 30.00     Pack years: 90.00     Types: Cigarettes     Quit date: 1991     Years since quittin.5   • Smokeless tobacco: Never Used   Vaping Use   • Vaping Use: Never used   Substance and Sexual Activity   • Alcohol use: No   • Drug use: No   • Sexual activity: Not Currently     Partners: Female     Allergies   Allergen Reactions   • Contrast Dye Other (See Comments)     Cardiac Arrest   • Penicillins Rash   • Sulfasalazine Rash     Prior to Admission medications    Medication Sig Start Date End Date Taking? Authorizing Provider   aspirin 81 MG EC tablet Take 81 mg by mouth Daily. Continues per doctors orders    Estrada Becerra MD   bumetanide (BUMEX) 1 MG tablet Take 1 tablet by mouth 2 (Two) Times a Day. 22   Rodríguez Lopez DO   busPIRone (BUSPAR) 10 MG tablet Take 1 tablet by mouth 2 (Two) Times a Day. 22   Rodríguez Lopez DO   cholecalciferol (VITAMIN D3) 25 MCG (1000 UT) tablet Take 1,000 Units by mouth Daily.    ProviderEstrada MD   DHEA 50 MG tablet Take 1 tablet by mouth Daily.    ProviderEstrada MD   dilTIAZem CD (CARDIZEM CD) 240 MG 24 hr capsule Take 1 capsule by mouth Daily. Stop amlodipine 22   April Dillon MD   Dulaglutide (Trulicity) 0.75 MG/0.5ML solution pen-injector Inject 0.75 mg under the skin into the appropriate area as directed 1 (One) Time Per Week. 22   Rodríguez Lopez  DO   DULoxetine (Cymbalta) 60 MG capsule Take 1 capsule by mouth Daily. 1/3/22   Rodríguez Lopez DO   fenofibrate micronized (LOFIBRA) 134 MG capsule Take 134 mg by mouth Every Evening.    Estrada Becerra MD   gabapentin (NEURONTIN) 300 MG capsule TAKE 1 CAPSULE BY MOUTH THREE TIMES DAILY 5/28/22   Rodríguez Lopez DO   glucosamine sulfate 500 MG capsule capsule Take 500 mg by mouth Daily.    Estrada Becerra MD   isosorbide mononitrate (IMDUR) 120 MG 24 hr tablet Take 1 tablet by mouth 2 (Two) Times a Day. 7/29/22   Rodríguez Lopez DO   Loratadine 10 MG capsule Take 1 capsule by mouth Daily.    Estrada Becerra MD   magnesium oxide (MAG-OX) 400 MG tablet Take 400 mg by mouth 2 (Two) Times a Day. 3 tabs in the AM and 2 tabs in the PM per transplant team    Estrada Becerra MD   multivitamin with minerals tablet tablet Take 1 tablet by mouth Daily.    Estrada Becerra MD   mycophenolate (CELLCEPT) 500 MG tablet Take 1,000 mg by mouth 2 (Two) Times a Day.    Estrada Becerra MD   nitroglycerin (NITROSTAT) 0.4 MG SL tablet Place 1 tablet under the tongue Every 5 (Five) Minutes As Needed for Chest Pain. Take no more than 3 doses in 15 minutes. 1/3/22   Rodríguez Lopez DO   omeprazole (priLOSEC) 40 MG capsule Take 1 capsule by mouth 2 (Two) Times a Day. 2/22/22   Rodríguez Lopez DO   oxyCODONE-acetaminophen (PERCOCET)  MG per tablet Take 1 tablet by mouth Every 6 (Six) Hours As Needed for Severe Pain . 7/19/22   Rodríguez Lopez DO   pentoxifylline (TRENtal) 400 MG CR tablet Take 1 tablet by mouth Every Night. 5/4/22   Rodríguez Lopez DO   pravastatin (PRAVACHOL) 80 MG tablet Take 1 tablet by mouth Daily. 1/3/22   Rodríguez Lopez DO   ranolazine (RANEXA) 1000 MG 12 hr tablet Take 1 tablet by mouth 2 (Two) Times a Day. 1/7/22   Renetta Salinas APRN   sennosides-docusate (SB Docusate Sodium/Senna) 8.6-50 MG per tablet Take 1  tablet by mouth Daily. 6/20/22   Rodríguez Lopez DO   tacrolimus (PROGRAF) 0.5 MG capsule  6/16/22   Provider, MD Estrada   tacrolimus (PROTOPIC) 0.1 % ointment Apply 1 application topically to the appropriate area as directed 2 (Two) Times a Day. 1/6/22   Rodríguez Lopez DO         Temp:  [98.1 °F (36.7 °C)] 98.1 °F (36.7 °C)  Heart Rate:  [45-48] 47  Resp:  [18] 18  BP: (121-152)/(61-72) 121/66  Neurological Exam  Mental Status  Awake and alert. Oriented to person, place, time and situation. Oriented to person, place, and time. Mild dysarthria present. Language is fluent with no aphasia. Fund of knowledge is appropriate for level of education.    Cranial Nerves  CN II: Visual fields full to confrontation.  CN III, IV, VI: Extraocular movements intact bilaterally. Pupils equal round and reactive to light bilaterally.  CN V: Facial sensation is normal.  CN VII:  Left: There is central facial weakness.  CN VIII: Hearing intact bilaterally.  CN IX, X: Palate elevates symmetrically  CN XI: Shoulder shrug strength is normal.  CN XII: Tongue midline without atrophy or fasciculations.    Motor   The following abnormal movements were seen: Tremors and bilateral upper extremities with movement.    -Bilateral upper extremities with no drift, 4 -/5 strength  -Bilateral lower extremities with no antigravity strength, left weaker than right.  Left lower extremity weakness is baseline however right lower extremity weakness is new.  1/5 strength bilaterally..    Sensory  Light touch is normal in upper and lower extremities.     Reflexes                                            Right                      Left  Patellar                                0                         0  Achilles                                0                         0  Plantar                           Downgoing                Downgoing    Coordination  Right: Finger-to-nose normal. Unable to perform secondary to weakness.Left:  Finger-to-nose normal. Unable to perform secondary to weakness.    Gait    Not observed.      Physical Exam  Vitals and nursing note reviewed.   Constitutional:       General: He is awake. He is not in acute distress.     Appearance: Normal appearance. He is not ill-appearing.   HENT:      Head: Normocephalic and atraumatic.      Mouth/Throat:      Mouth: Mucous membranes are dry.   Eyes:      Extraocular Movements: Extraocular movements intact.      Pupils: Pupils are equal, round, and reactive to light.   Cardiovascular:      Rate and Rhythm: Regular rhythm. Bradycardia present.   Pulmonary:      Effort: Pulmonary effort is normal. No respiratory distress.      Comments: On room air  Musculoskeletal:      Right lower leg: No edema.      Left lower leg: No edema.   Skin:     General: Skin is warm and dry.      Coloration: Skin is pale.   Neurological:      Mental Status: He is alert and oriented to person, place, and time.      Cranial Nerves: Cranial nerve deficit and dysarthria present.      Sensory: No sensory deficit.      Motor: Weakness present.      Coordination: Coordination normal.      Deep Tendon Reflexes:      Reflex Scores:       Patellar reflexes are 0 on the right side and 0 on the left side.       Achilles reflexes are 0 on the right side and 0 on the left side.  Psychiatric:         Mood and Affect: Mood normal.         Behavior: Behavior normal.       Acute Stroke Data    Thrombolytic Inclusion / Exclusion Criteria    Time: 1533 EDT  Person Administering Scale: SERJIO Gonzalez    Inclusion Criteria  [x]   18 years of age or greater   []   Onset of symptoms < 4.5 hours before beginning treatment (stroke onset = time patient was last seen well or without symptoms).   []   Diagnosis of acute ischemic stroke causing measurable disabling deficit (Complete Hemianopia, Any Aphasia, Visual or Sensory Extinction, Any weakness limiting sustained effort against gravity)   []   Any remaining  deficit considered potentially disabling in view of patient and practitioner   Exclusion criteria (Do not proceed with Alteplase if any are checked under exclusion criteria)  [x]   Onset unknown or GREATER than 4.5 hours   []   ICH on CT/MRI   []   CT demonstrates hypodensity representing acute or subacute infarct   []   Significant head trauma or prior stroke in the previous 3 months   []   Symptoms suggestive of subarachnoid hemorrhage   []   History of un-ruptured intracranial aneurysm GREATER than 10 mm   []   Recent intracranial or intraspinal surgery within the last 3 months   []   Arterial puncture at a non-compressible site in the previous 7 days   []   Active internal bleeding   []   Acute bleeding tendency   []   Platelet count LESS than 100,000 for known hematological diseases such as leukemia, thrombocytopenia or chronic cirrhosis   []   Current use of anticoagulant with INR GREATER than 1.7 or PT GREATER than 15 seconds, aPTT GREATER than 40 seconds   []   Heparin received within 48 hours, resulting in abnormally elevated aPTT GREATER than upper limit of normal   []   Current use of direct thrombin inhibitors or direct factor Xa inhibitors in the past 48 hours   []   Elevated blood pressure refractory to treatment (systolic GREATER than 185 mm/Hg or diastolic  GREATER than 110 mm/Hg   []   Suspected infective endocarditis and aortic arch dissection   []   Current use of therapeutic treatment dose of low-molecular-weight heparin (LMWH) within the previous 24 hours   []   Structural GI malignancy or bleed   Relative exclusion for all patients  []   Only minor non-disabling symptoms   []   Pregnancy   []   Seizure at onset with postictal residual neurological impairments   []   Major surgery or previous trauma within past 14 days   []   History of previous spontaneous ICH, intracranial neoplasm, or AV malformation   []   Postpartum (within previous 14 days)   []   Recent GI or urinary tract hemorrhage  (within previous 21 days)   []   Recent acute MI (within previous 3 months)   []   History of un-ruptured intracranial aneurysm LESS than 10 mm   []   History of ruptured intracranial aneurysm   []   Blood glucose LESS than 50 mg/dL (2.7 mmol/L)   []   Dural puncture within the last 7 days   []   Known GREATER than 10 cerebral microbleeds   Additional exclusions for patients with symptoms onset between 3 and 4.5 hours.  []   Age > 80.   []   On any anticoagulants regardless of INR  >>> Warfarin (Coumadin), Heparin, Enoxaparin (Lovenox), fondaparinux (Arixtra), bivalirudin (Angiomax), Argatroban, dabigatran (Pradaxa), rivaroxaban (Xarelto), or apixaban (Eliquis)   []   Severe stroke (NIHSS > 25).   []   History of BOTH diabetes and previous ischemic stroke.   []   The risks and benefits have been discussed with the patient or family related to the administration of IV thrombolytic therapy for stroke symptoms.   []   I have discussed and reviewed the patient's case and imaging with the attending prior to IV thrombolytic therapy.   N/A Time IV thrombolytic administered       Hospital Meds:  Scheduled- aspirin, 325 mg, Oral, Daily   Or  aspirin, 300 mg, Rectal, Daily      Infusions-     PRNs- sodium chloride    Functional Status Prior to Current Stroke/Farber Score: 3-4      NIH Stroke Scale  Time: 1627 EDT  Person Administering Scale: SERJIO Gonzalez    Interval: baseline  1a. Level of Consciousness: 0-->Alert, keenly responsive  1b. LOC Questions: 0-->Answers both questions correctly  1c. LOC Commands: 0-->Performs both tasks correctly  2. Best Gaze: 0-->Normal  3. Visual: 0-->No visual loss  4. Facial Palsy: 1-->Minor paralysis (flattened nasolabial fold, asymmetry on smiling) (lefft)  5a. Motor Arm, Left: 0-->No drift, limb holds 90 (or 45) degrees for full 10 secs  5b. Motor Arm, Right: 0-->No drift, limb holds 90 (or 45) degrees for full 10 secs  6a. Motor Leg, Left: 3-->No effort against gravity,  leg falls to bed immediately  6b. Motor Leg, Right: 3-->No effort against gravity, leg falls to bed immediately  7. Limb Ataxia: 0-->Absent  8. Sensory: 0-->Normal, no sensory loss  9. Best Language: 0-->No aphasia, normal  10. Dysarthria: 1-->Mild-to-moderate dysarthria, patient slurs at least some words and, at worst, can be understood with some difficulty  11. Extinction and Inattention (formerly Neglect): 0-->No abnormality    Total (NIH Stroke Scale): 8    Results Reviewed:  I have personally reviewed current lab, radiology, and data and agree with results.    CT of the head without contrast is negative for hemorrhage and/or acute process.    H/H 13.1/39.2  Platelets 301  Glucose 129  Creatinine 1.24, BUN 30  Sodium 136  AST 19  ALT 13  Troponin 0.096  Urinalysis pending    Results for orders placed during the hospital encounter of 02/09/22    Adult Transthoracic Echo Complete W/ Cont if Necessary Per Protocol    Interpretation Summary  · Estimated left ventricular EF = 55% Left ventricular systolic function is normal.  · Mild aortic valve stenosis is present.  · Aortic valve mean pressure gradient is 13 mmHg.  · Trace to mild mitral valve regurgitation is present  · Mild tricuspid valve regurgitation is present  · Calculated right ventricular systolic pressure from tricuspid regurgitation is 27 mmHg.       Assessment/Plan:    This is a 67-year-old male with known medical diagnosis of essential hypertension, hyperlipidemia, diabetes mellitus type 2, coronary artery disease s/p CABG and coronary stents x2, remote liver cancer s/p transplant in 2018, remote myocardial infarction, obstructive sleep apnea, remote tobacco abuse, and TIA (approximately 3 years ago, aphasia) who presents to the emergency department today with generalized weakness however upon further evaluation was noted to have new right lower extremity weakness and left upper extremity weakness as well as dysarthria.  He is not a candidate for IV  thrombolytic therapy secondary to last known well >4.5 hours.  Symptoms are inconsistent with large vessel occlusive stroke and the patient has a severe IV contrast allergy (cardiac arrest) therefore CTA head/neck and CT perfusion were deferred.  Patient will require admission to the hospital service for further work-up.    Antiplatelet PTA: ASA 81 mg  Anticoagulant PTA: None      1. Suspected acute ischemic stroke  Differentials include seizure versus metabolic process  -TIA/CVA order set without thrombolytic therapy has been initiated  -We will obtain MRI of the brain without contrast as well as MRA head/neck without contrast  -We will also obtain MRI of the lumbar spine given patient's bilateral lower extremity weakness  -N.p.o. until bedside nursing dysphagia screen completed, then the patient to be started on a cardiac healthy/diabetic diet  -Increase ASA to 325 mg oral/300 mg rectal daily  -Bedrest  -Fall precautions  -Seizure precautions  -EEG in a.m.  -TTE from 2/10/2022 with EF of 55%, mild to moderate dilation of left atrial cavity.  No need to repeat this admission.  -Lipid panel in a.m.  -A1c from 7/18/2022; 6.00, no need to recheck  -PT/OT evaluation    2. Essential hypertension  -Allow permissive hypertension for adequate cerebral blood flow, if MRI of the brain without contrast is negative for stroke patient's blood pressure can be normalized  -Hold home antihypertensive medications at this time  -Nicardipine for SBP >220    3. Hyperlipidemia  -Lipid panel in a.m.  -Patient is on Pravachol 80 mg at home, will change to atorvastatin 80 mg to meet high intensity recommendations    4. Diabetes mellitus type 2, well controlled  -A1c on 7/18/2022 was 6.00, no need to repeat  -Management per hospitalist  -Maintain euglycemia, goal blood glucose <140    5. Palpitations  -Patient currently in sinus bradycardia  -TTE from 2/10/2022 with EF of 55%, mild to moderate dilation of left atrial cavity  -If no  evidence of atrial fibrillation documented on this admission patient will need to be discharged home on a cardiac monitor.    6. Obstructive sleep apnea  -Management per hospitalist    Plan of care was discussed with the patient, his wife, Dr. Aguilar.  He will be admitted to the hospital service for further work-up.  Stroke neurology will continue to follow.  Please call with any questions or concerns.  Thank you for this consult.    SERJIO Gonzalez  August 4, 2022  17:17 EDT

## 2022-08-04 NOTE — ED PROVIDER NOTES
[place]    EMERGENCY DEPARTMENT ENCOUNTER      Pt Name: Mahendra Yates  MRN: [id number]  YOB: 1955  Date of evaluation: 8/4/2022  Provider: Mars Cosby DO    CHIEF COMPLAINT       Chief Complaint   Patient presents with   • Fatigue   • Weakness - Generalized         HISTORY OF PRESENT ILLNESS  (Location/Symptom, Timing/Onset, Context/Setting, Quality, Duration, Modifying Factors, Severity.)   Mahendra Yates is a 67 y.o. male who presents to the emergency department via EMS for evaluation of a few complaints.  The patient history is provided briefly by the patient himself, more in depth with his wife who is now at the patient's bedside.  She notes the patient has a history of multiple chronic issues, liver transplant patient who has generalized weakness, mainly left lower extremity he uses a walker to get around at baseline.  She notes he got up today went to the bathroom, showered himself sometime around 6:00 this morning, at 703 she notes he was calling out because he could not get up he could not ambulate legs but the fall down.  The wife notes she was able to hold him between the armpits and gradually helping down to the ground.  Since then the patient has required multiple family members to help him up and put him in a chair where he took another nap then woke him up to use the bathroom and he could not walk on his own, they put him back in the bed woke up couple hours later just after lunch and was still weak which prompted her call to 911 to bring the hospital for evaluation.  The wife notes the patient has had some difficulties with his speech, some mote history of a TIA diagnosis [facility] a few years ago about 3.5 years prior but she notes over the last few weeks he has had some difficulty with his speech, word finding and has been significantly worse since this morning and afternoon.  She notes he does have issues with generalized fatigability throughout the  day.  He is able to drive to [place] couple hours to visit family and drive home yesterday without any significant change from his baseline.  The patient denies any headache, no vision changes, he notes generalized weakness, denies any unilateral weakness.  Does not have any chest pain, shortness of breath, cough, no known sick contacts that he is aware of.  Wife notes patient is normally is able to get around with his walker, is able to hold conversations, generalized fatigability throughout the day.  No other acute stable complaints this time.      Nursing notes were reviewed.    REVIEW OF SYSTEMS    (2-9 systems for level 4, 10 or more for level 5)   ROS:  General:  No fevers, no chills, + generalized weakness  Cardiovascular:  No chest pain, no palpitations  Respiratory:  No shortness of breath, no cough, no wheezing  Gastrointestinal:  No pain, no nausea, no vomiting, no diarrhea  Musculoskeletal:  No muscle pain, no joint pain  Skin:  No rash  Neurologic:  + speech problems, no headache, positive bilateral lower extremity weakness, no sensory loss  Psychiatric:  No anxiety  Genitourinary:  No dysuria, no hematuria    Except as noted above the remainder of the review of systems was reviewed and negative.       PAST MEDICAL HISTORY     Past Medical History:   Diagnosis Date   • Arthritis    • Atherosclerosis    • B12 deficiency    • Cancer (HCC)    • Cancer of liver (HCC)    • Chronic headaches    • Coronary artery disease    • Diabetes mellitus (HCC)    • Gastroesophageal reflux disease    • History of transfusion    • Hyperlipidemia    • Hypertension    • Myocardial infarction (HCC)    • Sleep apnea    • Stroke (HCC)          SURGICAL HISTORY       Past Surgical History:   Procedure Laterality Date   • ANGIOPLASTY     • CARDIAC SURGERY     • CARPAL TUNNEL RELEASE Right 2/8/2017    Procedure: CARPAL TUNNEL RELEASE RIGHT ;  Surgeon: Luis Hayden MD;  Location: [facility];  Service:    • CHOLECYSTECTOMY     •  CORONARY ARTERY BYPASS GRAFT     • CORONARY STENT PLACEMENT      x2   • LIVER TRANSPLANTATION     • TUMOR REMOVAL Right     axilla         CURRENT MEDICATIONS       Current Facility-Administered Medications:   •  aspirin tablet 325 mg, 325 mg, Oral, Daily **OR** aspirin suppository 300 mg, 300 mg, Rectal, Daily, Simona Allred, SERJIO  •  sodium chloride 0.9 % flush 10 mL, 10 mL, Intravenous, PRN, aMrs Cosby,     Current Outpatient Medications:   •  aspirin 81 MG EC tablet, Take 81 mg by mouth Daily. Continues per doctors orders, Disp: , Rfl:   •  bumetanide (BUMEX) 1 MG tablet, Take 1 tablet by mouth 2 (Two) Times a Day., Disp: 60 tablet, Rfl: 2  •  busPIRone (BUSPAR) 10 MG tablet, Take 1 tablet by mouth 2 (Two) Times a Day., Disp: 60 tablet, Rfl: 5  •  cholecalciferol (VITAMIN D3) 25 MCG (1000 UT) tablet, Take 1,000 Units by mouth Daily., Disp: , Rfl:   •  DHEA 50 MG tablet, Take 1 tablet by mouth Daily., Disp: , Rfl:   •  dilTIAZem CD (CARDIZEM CD) 240 MG 24 hr capsule, Take 1 capsule by mouth Daily. Stop amlodipine, Disp: 90 capsule, Rfl: 3  •  Dulaglutide (Trulicity) 0.75 MG/0.5ML solution pen-injector, Inject 0.75 mg under the skin into the appropriate area as directed 1 (One) Time Per Week., Disp: 5 pen, Rfl: 2  •  DULoxetine (Cymbalta) 60 MG capsule, Take 1 capsule by mouth Daily., Disp: 90 capsule, Rfl: 3  •  fenofibrate micronized (LOFIBRA) 134 MG capsule, Take 134 mg by mouth Every Evening., Disp: , Rfl:   •  gabapentin (NEURONTIN) 300 MG capsule, TAKE 1 CAPSULE BY MOUTH THREE TIMES DAILY, Disp: 90 capsule, Rfl: 2  •  glucosamine sulfate 500 MG capsule capsule, Take 500 mg by mouth Daily., Disp: , Rfl:   •  isosorbide mononitrate (IMDUR) 120 MG 24 hr tablet, Take 1 tablet by mouth 2 (Two) Times a Day., Disp: 180 tablet, Rfl: 3  •  Loratadine 10 MG capsule, Take 1 capsule by mouth Daily., Disp: , Rfl:   •  magnesium oxide (MAG-OX) 400 MG tablet, Take 400 mg by mouth 2 (Two) Times a  Day. 3 tabs in the AM and 2 tabs in the PM per transplant team, Disp: , Rfl:   •  multivitamin with minerals tablet tablet, Take 1 tablet by mouth Daily., Disp: , Rfl:   •  mycophenolate (CELLCEPT) 500 MG tablet, Take 1,000 mg by mouth 2 (Two) Times a Day., Disp: , Rfl:   •  nitroglycerin (NITROSTAT) 0.4 MG SL tablet, Place 1 tablet under the tongue Every 5 (Five) Minutes As Needed for Chest Pain. Take no more than 3 doses in 15 minutes., Disp: 30 tablet, Rfl: 5  •  omeprazole (priLOSEC) 40 MG capsule, Take 1 capsule by mouth 2 (Two) Times a Day., Disp: 180 capsule, Rfl: 1  •  oxyCODONE-acetaminophen (PERCOCET)  MG per tablet, Take 1 tablet by mouth Every 6 (Six) Hours As Needed for Severe Pain ., Disp: 120 tablet, Rfl: 0  •  pentoxifylline (TRENtal) 400 MG CR tablet, Take 1 tablet by mouth Every Night., Disp: 90 tablet, Rfl: 3  •  pravastatin (PRAVACHOL) 80 MG tablet, Take 1 tablet by mouth Daily., Disp: 90 tablet, Rfl: 3  •  ranolazine (RANEXA) 1000 MG 12 hr tablet, Take 1 tablet by mouth 2 (Two) Times a Day., Disp: 180 tablet, Rfl: 3  •  sennosides-docusate (SB Docusate Sodium/Senna) 8.6-50 MG per tablet, Take 1 tablet by mouth Daily., Disp: 90 tablet, Rfl: 3  •  tacrolimus (PROGRAF) 0.5 MG capsule, , Disp: , Rfl:   •  tacrolimus (PROTOPIC) 0.1 % ointment, Apply 1 application topically to the appropriate area as directed 2 (Two) Times a Day., Disp: 100 g, Rfl: 0    ALLERGIES     Contrast dye, Penicillins, and Sulfasalazine    FAMILY HISTORY       Family History   Problem Relation Age of Onset   • Heart disease Mother         Stroke   • Stroke Mother    • Diabetes Father    • Liver cancer Brother    • No Known Problems Sister    • Macular degeneration Brother    • No Known Problems Sister    • Dementia Brother           SOCIAL HISTORY       Social History     Socioeconomic History   • Marital status:    Tobacco Use   • Smoking status: Former Smoker     Packs/day: 3.00     Years: 30.00     Pack years:  90.00     Types: Cigarettes     Quit date: 1991     Years since quittin.5   • Smokeless tobacco: Never Used   Vaping Use   • Vaping Use: Never used   Substance and Sexual Activity   • Alcohol use: No   • Drug use: No   • Sexual activity: Not Currently     Partners: Female         PHYSICAL EXAM    (up to 7 for level 4, 8 or more for level 5)     Vitals:    22 1600 22 1615 22 1647 22 1700   BP: 121/61   121/66   BP Location:       Patient Position:       Pulse: (!) 45 (!) 48  (!) 47   Resp:       Temp:       TempSrc:       SpO2: 93% 95%  90%   Weight:   91.2 kg (201 lb)    Height:           Physical Exam  General : Patient is awake, alert, patient is able to open his eyes, answer questions but is very slow to respond General.  Some stuttering of his speech is noted.  HEENT: Pupils are equally round, EOMI, conjunctivae clear, there is no injection no icterus.  Oral mucosa is dry  Neck: Neck is supple, trachea midline  Cardiac: Heart bradycardic rate, regular rhythm  Lungs: Lungs with decreased breath sounds bilaterally, no acute respiratory distress, pulse ox 93% on room air.  Chest wall: There is no tenderness to palpation over the chest wall or over ribs  Abdomen: Abdomen is soft, nontender, nondistended. There are no firm or pulsatile masses, no rebound rigidity or guarding  Musculoskeletal: Patient is able to hold both arms off the bed, somewhat decreased  strength bilaterally, no unilateral weakness.  Lower extremities patient unable to raise either left heel or right heel off the bed and hold it against gravity.  He notes his sensation is intact, does have some generalized muscle atrophy in his bilateral lower extremities.  Pulses are intact distally.  Neuro: Patient is awake, somewhat drowsy, intermittent stuttering of his speech, some difficulty with word finding.  No unilateral weakness appreciated in his upper extremities.  He is generally weak in his bilateral lower  extremities, unable to lift either heel off the bed.  He is answering questions appropriately although somewhat slow to respond.  Difficult to obtain full neurological examination with patient complaints at this time.  Pleaded for stroke navigator for full NIH score.  Dermatology: Skin is warm and dry  Psych: Mentation is grossly normal, cognition is decreased      DIAGNOSTIC RESULTS     EKG: All EKGs are interpreted by the Emergency Department Physician who either signs or Co-signs this chart in the absence of a cardiologist.    ECG 12 Lead   Final Result   Test Reason : Weak/Dizzy/AMS protocol   Blood Pressure :   */*   mmHG   Vent. Rate :  52 BPM     Atrial Rate :  51 BPM      P-R Int : 256 ms          QRS Dur : 118 ms       QT Int : 496 ms       P-R-T Axes :  28  76  75 degrees      QTc Int : 461 ms      Normal sinus rhythm  tara with first degree block   Nonspecific intraventricular conduction delay   Borderline ECG   When compared with ECG of 10-FEB-2022 02:05,   no acute sig change   Confirmed by RIK ESCOBEDO MD ([id number]) on 8/4/2022 3:31:21 PM      Referred By: KALEE           Confirmed By: RIK ESCOBEDO MD          RADIOLOGY:   Non-plain film images such as CT, Ultrasound and MRI are read by the radiologist. Plain radiographic images are visualized and preliminarily interpreted by the emergency physician with the below findings:      [] Radiologist's Report Reviewed:  CT Head Without Contrast Stroke Protocol   Final Result   Age-appropriate generalized cerebral atrophy. No evidence of acute   intracranial disease.       This report was finalized on 8/4/2022 4:49 PM by Dr. Bj Grewal MD.          XR Chest 1 View   Final Result   No acute process.        This report was finalized on 8/4/2022 3:58 PM by Anjum Lan MD.          MRI Lumbar Spine Without Contrast    (Results Pending)   MRI Angiogram Head Without Contrast    (Results Pending)   MRI Angiogram Neck Without Contrast    (Results Pending)   MRI  Brain Without Contrast    (Results Pending)         ED BEDSIDE ULTRASOUND:   Performed by ED Physician - none    LABS:    I have reviewed and interpreted all of the currently available lab results from this visit (if applicable):  Results for orders placed or performed during the hospital encounter of 08/04/22   Comprehensive Metabolic Panel    Specimen: Blood   Result Value Ref Range    Glucose 129 (H) 65 - 99 mg/dL    BUN 30 (H) 8 - 23 mg/dL    Creatinine 1.24 0.76 - 1.27 mg/dL    Sodium 136 136 - 145 mmol/L    Potassium 4.8 3.5 - 5.2 mmol/L    Chloride 99 98 - 107 mmol/L    CO2 29.0 22.0 - 29.0 mmol/L    Calcium 9.5 8.6 - 10.5 mg/dL    Total Protein 6.4 6.0 - 8.5 g/dL    Albumin 3.90 3.50 - 5.20 g/dL    ALT (SGPT) 13 1 - 41 U/L    AST (SGOT) 19 1 - 40 U/L    Alkaline Phosphatase 78 39 - 117 U/L    Total Bilirubin 0.4 0.0 - 1.2 mg/dL    Globulin 2.5 gm/dL    A/G Ratio 1.6 g/dL    BUN/Creatinine Ratio 24.2 7.0 - 25.0    Anion Gap 8.0 5.0 - 15.0 mmol/L    eGFR 63.3 >60.0 mL/min/1.73   Troponin    Specimen: Blood   Result Value Ref Range    Troponin T 0.096 (C) 0.000 - 0.030 ng/mL   Magnesium    Specimen: Blood   Result Value Ref Range    Magnesium 2.0 1.6 - 2.4 mg/dL   CBC Auto Differential    Specimen: Blood   Result Value Ref Range    WBC 10.67 3.40 - 10.80 10*3/mm3    RBC 4.50 4.14 - 5.80 10*6/mm3    Hemoglobin 13.1 13.0 - 17.7 g/dL    Hematocrit 39.2 37.5 - 51.0 %    MCV 87.1 79.0 - 97.0 fL    MCH 29.1 26.6 - 33.0 pg    MCHC 33.4 31.5 - 35.7 g/dL    RDW 14.2 12.3 - 15.4 %    RDW-SD 45.4 37.0 - 54.0 fl    MPV 10.4 6.0 - 12.0 fL    Platelets 301 140 - 450 10*3/mm3    Neutrophil % 75.3 42.7 - 76.0 %    Lymphocyte % 14.9 (L) 19.6 - 45.3 %    Monocyte % 6.8 5.0 - 12.0 %    Eosinophil % 2.2 0.3 - 6.2 %    Basophil % 0.5 0.0 - 1.5 %    Immature Grans % 0.3 0.0 - 0.5 %    Neutrophils, Absolute 8.04 (H) 1.70 - 7.00 10*3/mm3    Lymphocytes, Absolute 1.59 0.70 - 3.10 10*3/mm3    Monocytes, Absolute 0.73 0.10 - 0.90  10*3/mm3    Eosinophils, Absolute 0.23 0.00 - 0.40 10*3/mm3    Basophils, Absolute 0.05 0.00 - 0.20 10*3/mm3    Immature Grans, Absolute 0.03 0.00 - 0.05 10*3/mm3    nRBC 0.0 0.0 - 0.2 /100 WBC   TSH    Specimen: Blood   Result Value Ref Range    TSH 2.800 0.270 - 4.200 uIU/mL   BNP    Specimen: Blood   Result Value Ref Range    proBNP 457.3 0.0 - 900.0 pg/mL   ECG 12 Lead   Result Value Ref Range    QT Interval 496 ms    QTC Interval 461 ms   Green Top (Gel)   Result Value Ref Range    Extra Tube Hold for add-ons.    Lavender Top   Result Value Ref Range    Extra Tube hold for add-on    Gold Top - SST   Result Value Ref Range    Extra Tube Hold for add-ons.    Light Blue Top   Result Value Ref Range    Extra Tube Hold for add-ons.         All other labs were within normal range or not returned as of this dictation.      EMERGENCY DEPARTMENT COURSE and DIFFERENTIAL DIAGNOSIS/MDM:   Vitals:    Vitals:    08/04/22 1600 08/04/22 1615 08/04/22 1647 08/04/22 1700   BP: 121/61   121/66   BP Location:       Patient Position:       Pulse: (!) 45 (!) 48  (!) 47   Resp:       Temp:       TempSrc:       SpO2: 93% 95%  90%   Weight:   91.2 kg (201 lb)    Height:                This is a 67-year-old male with multiple comorbidities, liver transplant who at baseline gets around with a walker but today was having some increased weakness in his lower extremities, increased difficulty with his speech which is acute neurological change per the patient.  Patient presents in the afternoon symptoms onset was about between 6 and 7 AM today.  Patient outside of any window for acute stroke but I did discuss the case after my initial evaluation with stroke navigator, Simona given the concern for his weakness, inability to ambulate and difficulty with his speech.  Initial CT of the head without acute intracranial abnormality, patient has a severe dye allergy including cardiac arrest thus we will move forward with the MRI stat imaging.  His  blood work reveals normal liver function, stable kidney function, troponin 0.096, reviewing his previous blood work patient has had elevated troponins.  No chest pain, no acute ischemic change on EKG.  We will plan on admission the hospital for continued stroke evaluation, neurochecks, neurology consultation.  Case was discussed with hospitalist, Dr. Roy for admission.      MEDICATIONS ADMINISTERED IN ED:  Medications   sodium chloride 0.9 % flush 10 mL (has no administration in time range)   aspirin tablet 325 mg (has no administration in time range)     Or   aspirin suppository 300 mg (has no administration in time range)       PROCEDURES:  Procedures    CRITICAL CARE TIME    Total Critical Care time was 35 minutes, excluding separately reportable procedures.  Acute neurological deficit, weakness, slurred speech, difficulty with ambulation require multiple evaluations, neurochecks, discussion with consultants. There was a high probability of clinically significant/life threatening deterioration in the patient's condition which required my urgent intervention.      FINAL IMPRESSION      1. Altered mental status, unspecified altered mental status type    2. Difficulty with speech    3. Weakness of both lower extremities    4. Elevated troponin          DISPOSITION/PLAN     ED Disposition     ED Disposition   Decision to Admit    Condition   --    Comment   --                 Comment: Please note this report has been produced using speech recognition software.      Mars Cosby DO  Attending Emergency Physician               Mars Cosby,   08/04/22 8130

## 2022-08-05 ENCOUNTER — APPOINTMENT (OUTPATIENT)
Dept: NEUROLOGY | Facility: HOSPITAL | Age: 67
End: 2022-08-05

## 2022-08-05 ENCOUNTER — APPOINTMENT (OUTPATIENT)
Dept: MRI IMAGING | Facility: HOSPITAL | Age: 67
End: 2022-08-05

## 2022-08-05 ENCOUNTER — APPOINTMENT (OUTPATIENT)
Dept: GENERAL RADIOLOGY | Facility: HOSPITAL | Age: 67
End: 2022-08-05

## 2022-08-05 LAB
ANION GAP SERPL CALCULATED.3IONS-SCNC: 10 MMOL/L (ref 5–15)
BASOPHILS # BLD AUTO: 0.03 10*3/MM3 (ref 0–0.2)
BASOPHILS NFR BLD AUTO: 0.3 % (ref 0–1.5)
BUN SERPL-MCNC: 32 MG/DL (ref 8–23)
BUN/CREAT SERPL: 23 (ref 7–25)
CALCIUM SPEC-SCNC: 9.5 MG/DL (ref 8.6–10.5)
CHLORIDE SERPL-SCNC: 100 MMOL/L (ref 98–107)
CHOLEST SERPL-MCNC: 258 MG/DL (ref 0–200)
CO2 SERPL-SCNC: 28 MMOL/L (ref 22–29)
CREAT SERPL-MCNC: 1.39 MG/DL (ref 0.76–1.27)
DEPRECATED RDW RBC AUTO: 43.8 FL (ref 37–54)
EGFRCR SERPLBLD CKD-EPI 2021: 55.2 ML/MIN/1.73
EOSINOPHIL # BLD AUTO: 0.15 10*3/MM3 (ref 0–0.4)
EOSINOPHIL NFR BLD AUTO: 1.7 % (ref 0.3–6.2)
ERYTHROCYTE [DISTWIDTH] IN BLOOD BY AUTOMATED COUNT: 14.1 % (ref 12.3–15.4)
GLUCOSE BLDC GLUCOMTR-MCNC: 103 MG/DL (ref 70–130)
GLUCOSE BLDC GLUCOMTR-MCNC: 108 MG/DL (ref 70–130)
GLUCOSE BLDC GLUCOMTR-MCNC: 117 MG/DL (ref 70–130)
GLUCOSE BLDC GLUCOMTR-MCNC: 93 MG/DL (ref 70–130)
GLUCOSE SERPL-MCNC: 112 MG/DL (ref 65–99)
HBA1C MFR BLD: 6 % (ref 4.8–5.6)
HCT VFR BLD AUTO: 36.9 % (ref 37.5–51)
HDLC SERPL-MCNC: 36 MG/DL (ref 40–60)
HGB BLD-MCNC: 12.8 G/DL (ref 13–17.7)
IMM GRANULOCYTES # BLD AUTO: 0.03 10*3/MM3 (ref 0–0.05)
IMM GRANULOCYTES NFR BLD AUTO: 0.3 % (ref 0–0.5)
LDLC SERPL CALC-MCNC: 150 MG/DL (ref 0–100)
LDLC/HDLC SERPL: 4.03 {RATIO}
LYMPHOCYTES # BLD AUTO: 1.76 10*3/MM3 (ref 0.7–3.1)
LYMPHOCYTES NFR BLD AUTO: 19.7 % (ref 19.6–45.3)
MAGNESIUM SERPL-MCNC: 2 MG/DL (ref 1.6–2.4)
MCH RBC QN AUTO: 29.8 PG (ref 26.6–33)
MCHC RBC AUTO-ENTMCNC: 34.7 G/DL (ref 31.5–35.7)
MCV RBC AUTO: 86 FL (ref 79–97)
MONOCYTES # BLD AUTO: 0.83 10*3/MM3 (ref 0.1–0.9)
MONOCYTES NFR BLD AUTO: 9.3 % (ref 5–12)
NEUTROPHILS NFR BLD AUTO: 6.13 10*3/MM3 (ref 1.7–7)
NEUTROPHILS NFR BLD AUTO: 68.7 % (ref 42.7–76)
NRBC BLD AUTO-RTO: 0 /100 WBC (ref 0–0.2)
PLATELET # BLD AUTO: 266 10*3/MM3 (ref 140–450)
PMV BLD AUTO: 10.3 FL (ref 6–12)
POTASSIUM SERPL-SCNC: 4.2 MMOL/L (ref 3.5–5.2)
QT INTERVAL: 500 MS
QTC INTERVAL: 446 MS
RBC # BLD AUTO: 4.29 10*6/MM3 (ref 4.14–5.8)
SODIUM SERPL-SCNC: 138 MMOL/L (ref 136–145)
TRIGL SERPL-MCNC: 385 MG/DL (ref 0–150)
TROPONIN T SERPL-MCNC: 0.1 NG/ML (ref 0–0.03)
VLDLC SERPL-MCNC: 72 MG/DL (ref 5–40)
WBC NRBC COR # BLD: 8.93 10*3/MM3 (ref 3.4–10.8)

## 2022-08-05 PROCEDURE — 92610 EVALUATE SWALLOWING FUNCTION: CPT

## 2022-08-05 PROCEDURE — G0378 HOSPITAL OBSERVATION PER HR: HCPCS

## 2022-08-05 PROCEDURE — 63710000001 TACROLIMUS PER 1 MG: Performed by: INTERNAL MEDICINE

## 2022-08-05 PROCEDURE — 72141 MRI NECK SPINE W/O DYE: CPT

## 2022-08-05 PROCEDURE — 95819 EEG AWAKE AND ASLEEP: CPT

## 2022-08-05 PROCEDURE — 99214 OFFICE O/P EST MOD 30 MIN: CPT | Performed by: PSYCHIATRY & NEUROLOGY

## 2022-08-05 PROCEDURE — 93005 ELECTROCARDIOGRAM TRACING: CPT | Performed by: NURSE PRACTITIONER

## 2022-08-05 PROCEDURE — 82962 GLUCOSE BLOOD TEST: CPT

## 2022-08-05 PROCEDURE — 83036 HEMOGLOBIN GLYCOSYLATED A1C: CPT

## 2022-08-05 PROCEDURE — 95886 MUSC TEST DONE W/N TEST COMP: CPT

## 2022-08-05 PROCEDURE — 85025 COMPLETE CBC W/AUTO DIFF WBC: CPT | Performed by: INTERNAL MEDICINE

## 2022-08-05 PROCEDURE — 80048 BASIC METABOLIC PNL TOTAL CA: CPT | Performed by: INTERNAL MEDICINE

## 2022-08-05 PROCEDURE — 80061 LIPID PANEL: CPT

## 2022-08-05 PROCEDURE — 63710000001 MYCOPHENOLATE MOFETIL PER 250 MG: Performed by: INTERNAL MEDICINE

## 2022-08-05 PROCEDURE — 84484 ASSAY OF TROPONIN QUANT: CPT | Performed by: NURSE PRACTITIONER

## 2022-08-05 PROCEDURE — 95913 NRV CNDJ TEST 13/> STUDIES: CPT

## 2022-08-05 PROCEDURE — 83735 ASSAY OF MAGNESIUM: CPT | Performed by: INTERNAL MEDICINE

## 2022-08-05 PROCEDURE — 99214 OFFICE O/P EST MOD 30 MIN: CPT | Performed by: STUDENT IN AN ORGANIZED HEALTH CARE EDUCATION/TRAINING PROGRAM

## 2022-08-05 PROCEDURE — 93010 ELECTROCARDIOGRAM REPORT: CPT | Performed by: INTERNAL MEDICINE

## 2022-08-05 PROCEDURE — 99225 PR SBSQ OBSERVATION CARE/DAY 25 MINUTES: CPT | Performed by: HOSPITALIST

## 2022-08-05 PROCEDURE — 99214 OFFICE O/P EST MOD 30 MIN: CPT | Performed by: INTERNAL MEDICINE

## 2022-08-05 PROCEDURE — 73560 X-RAY EXAM OF KNEE 1 OR 2: CPT

## 2022-08-05 PROCEDURE — 92523 SPEECH SOUND LANG COMPREHEN: CPT

## 2022-08-05 PROCEDURE — G0108 DIAB MANAGE TRN  PER INDIV: HCPCS

## 2022-08-05 RX ADMIN — ASPIRIN 81 MG: 81 TABLET, COATED ORAL at 09:53

## 2022-08-05 RX ADMIN — ISOSORBIDE MONONITRATE 120 MG: 60 TABLET, EXTENDED RELEASE ORAL at 09:53

## 2022-08-05 RX ADMIN — RANOLAZINE 1000 MG: 500 TABLET, EXTENDED RELEASE ORAL at 09:53

## 2022-08-05 RX ADMIN — BUSPIRONE HYDROCHLORIDE 10 MG: 10 TABLET ORAL at 21:18

## 2022-08-05 RX ADMIN — SENNOSIDES AND DOCUSATE SODIUM 2 TABLET: 50; 8.6 TABLET ORAL at 21:18

## 2022-08-05 RX ADMIN — MULTIPLE VITAMINS W/ MINERALS TAB 1 TABLET: TAB at 09:54

## 2022-08-05 RX ADMIN — GABAPENTIN 300 MG: 300 CAPSULE ORAL at 09:53

## 2022-08-05 RX ADMIN — ATORVASTATIN CALCIUM 80 MG: 40 TABLET, FILM COATED ORAL at 21:18

## 2022-08-05 RX ADMIN — GABAPENTIN 300 MG: 300 CAPSULE ORAL at 16:28

## 2022-08-05 RX ADMIN — Medication 10 ML: at 21:18

## 2022-08-05 RX ADMIN — RANOLAZINE 1000 MG: 500 TABLET, EXTENDED RELEASE ORAL at 21:18

## 2022-08-05 RX ADMIN — MYCOPHENOLATE MOFETIL 1000 MG: 250 CAPSULE ORAL at 09:55

## 2022-08-05 RX ADMIN — PIMECROLIMUS: 10 CREAM TOPICAL at 10:03

## 2022-08-05 RX ADMIN — OXYCODONE HYDROCHLORIDE AND ACETAMINOPHEN 1 TABLET: 10; 325 TABLET ORAL at 23:17

## 2022-08-05 RX ADMIN — PENTOXIFYLLINE 400 MG: 400 TABLET, EXTENDED RELEASE ORAL at 21:18

## 2022-08-05 RX ADMIN — BUSPIRONE HYDROCHLORIDE 10 MG: 10 TABLET ORAL at 09:54

## 2022-08-05 RX ADMIN — DULOXETINE HYDROCHLORIDE 60 MG: 60 CAPSULE, DELAYED RELEASE ORAL at 09:53

## 2022-08-05 RX ADMIN — OXYCODONE HYDROCHLORIDE AND ACETAMINOPHEN 1 TABLET: 10; 325 TABLET ORAL at 16:59

## 2022-08-05 RX ADMIN — GABAPENTIN 300 MG: 300 CAPSULE ORAL at 21:18

## 2022-08-05 RX ADMIN — POLYETHYLENE GLYCOL 3350 17 G: 17 POWDER, FOR SOLUTION ORAL at 16:28

## 2022-08-05 RX ADMIN — PANTOPRAZOLE SODIUM 40 MG: 40 TABLET, DELAYED RELEASE ORAL at 09:53

## 2022-08-05 RX ADMIN — TACROLIMUS 0.5 MG: 0.5 CAPSULE ORAL at 09:53

## 2022-08-05 RX ADMIN — Medication 10 ML: at 09:55

## 2022-08-05 RX ADMIN — TACROLIMUS 0.5 MG: 0.5 CAPSULE ORAL at 21:18

## 2022-08-05 RX ADMIN — MYCOPHENOLATE MOFETIL 1000 MG: 250 CAPSULE ORAL at 21:18

## 2022-08-05 NOTE — CONSULTS
[facility] at [facility]  CARDIOLOGY CONSULTATION NOTE    Mahendra Yates  : 1955  MRN:[id number]  Home Phone:[phone]    Date of Admission:2022  Date of Consultation: 22   Primary Provider:  Rodríguez Lopez DO    Referring Provider: Bj Schuler MD  Reason for Consultation: Chest Pain    IDENTIFICATION: A 67 y.o. 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 Yates is a 68 y/o male with CAD, liver transplant s/t liver cancer, HTN, HLD and CVA in 2018 who we are seeing in consultation for chest pain.  He has chronic dull retrosternal chest pain that he takes 1-2 nitro/d, imdur 120, ranexa 1000 bid, and diltiazem 240 qd for.  His new chest pain is in his epigastric region and is likely noncardiac in origin. He denies dyspnea beyond baseline, palpitations or syncope.    He is post CABG x3 in  with last catheterization in 2019 showing patent LIMA to LAD with diffuse distal LAD disease, VG to RCA patent, occluded VG to OM.  He sees Dr. Dillon who suggested a left heart cath in April but patient refused in favor of medical management because he had a vision of him dying on the table.  Echo in 2022 with EF 55%, mild AS.     His EKG today showed sinus bradycardia with first-degree heart block and no acute ST changes.  His troponin is mildly elevated to 0.095 and not evolving.     ALLERGIES:   Allergies   Allergen Reactions   • Contrast Dye Other (See Comments)     Cardiac Arrest   • Penicillins Rash   • Sulfasalazine Rash       ROS: All systems have been reviewed and are negative  with the exception of those mentioned in the HPI and problem list above.    HOME MEDICINES:   Current Outpatient Medications   Medication Instructions   • aspirin 81 mg, Oral, Daily, Continues per doctors orders    • bumetanide (BUMEX) 1 mg, Oral, 2 Times Daily   • busPIRone (BUSPAR) 10 mg, Oral, 2 Times Daily   • cholecalciferol (VITAMIN D3) 1,000 Units, Oral, Daily   • DHEA 50 MG tablet 1 tablet, Oral, Daily   • dilTIAZem CD (CARDIZEM CD) 240 mg, Oral, Daily, Stop amlodipine   • DULoxetine (CYMBALTA) 60 mg, Oral, Daily   • fenofibrate micronized (LOFIBRA) 134 mg, Oral, Every Evening   • gabapentin (NEURONTIN) 300 MG capsule TAKE 1 CAPSULE BY MOUTH THREE TIMES DAILY   • glucosamine sulfate 500 mg, Oral, Daily   • isosorbide mononitrate (IMDUR) 120 mg, Oral, 2 Times Daily   • Loratadine 10 MG capsule 1 capsule, Oral, Daily   • magnesium oxide (MAG-OX) 400 mg, Oral, 2 Times Daily, 3 tabs in the AM and 2 tabs in the PM per transplant team   • multivitamin with minerals tablet tablet 1 tablet, Oral, Daily   • mycophenolate (CELLCEPT) 1,000 mg, Oral, 2 Times Daily   • nitroglycerin (NITROSTAT) 0.4 mg, Sublingual, Every 5 Minutes PRN, Take no more than 3 doses in 15 minutes.    • omeprazole (PRILOSEC) 40 mg, Oral, 2 Times Daily   • oxyCODONE-acetaminophen (PERCOCET)  MG per tablet 1 tablet, Oral, Every 6 Hours PRN   • pentoxifylline (TRENTAL) 400 mg, Oral, Nightly   • pravastatin (PRAVACHOL) 80 mg, Oral, Daily   • ranolazine (RANEXA) 1,000 mg, Oral, 2 Times Daily   • sennosides-docusate (SB Docusate Sodium/Senna) 8.6-50 MG per tablet 1 tablet, Oral, Daily   • tacrolimus (PROGRAF) 0.5 MG capsule No dose, route, or frequency recorded.   • tacrolimus (PROTOPIC) 0.1 % ointment 1 application, Topical, 2 Times Daily   • Trulicity 0.75 mg, Subcutaneous, Weekly       Surgical History:   Past Surgical History:   Procedure Laterality Date   • ANGIOPLASTY     • CARDIAC SURGERY     • CARPAL TUNNEL RELEASE Right 2/8/2017     "Procedure: CARPAL TUNNEL RELEASE RIGHT ;  Surgeon: Luis Hayden MD;  Location: [facility];  Service:    • CHOLECYSTECTOMY     • CORONARY ARTERY BYPASS GRAFT     • CORONARY STENT PLACEMENT      x2   • LIVER TRANSPLANTATION     • TUMOR REMOVAL Right     axilla       Social History:   Social History     Socioeconomic History   • Marital status:    Tobacco Use   • Smoking status: Former Smoker     Packs/day: 3.00     Years: 30.00     Pack years: 90.00     Types: Cigarettes     Quit date: 1991     Years since quittin.5   • Smokeless tobacco: Never Used   Vaping Use   • Vaping Use: Never used   Substance and Sexual Activity   • Alcohol use: No   • Drug use: No   • Sexual activity: Not Currently     Partners: Female       Family History:   Family History   Problem Relation Age of Onset   • Heart disease Mother         Stroke   • Stroke Mother    • Diabetes Father    • Liver cancer Brother    • No Known Problems Sister    • Macular degeneration Brother    • No Known Problems Sister    • Dementia Brother        Objective     /67   Pulse 72   Temp 97.6 °F (36.4 °C) (Oral)   Resp 18   Ht 182.9 cm (72\")   Wt 91.2 kg (201 lb)   SpO2 98%   BMI 27.26 kg/m²     Intake/Output Summary (Last 24 hours) at 2022 1034  Last data filed at 2022 0242  Gross per 24 hour   Intake --   Output 600 ml   Net -600 ml       PHYSICAL EXAM:  CONSTITUTIONAL: Well nourished, cooperative, in no acute distress  HEENT: Normocephalic, atraumatic, no JVD  CARDIOVASCULAR:  Regular rhythm and normal rate, 2 out of 6 systolic ejection murmur, no gallops or rub.   RESPIRATORY: Clear to auscultation, normal respiratory effort, no wheezing, rales or rhonchi  EXTREMITIES: No gross deformities, no edema.  Radial and DP pulses are present and equal bilaterally  SKIN: Warm, dry. No bleeding, bruising or rash  NEUROLOGICAL: No gross focal deficits  PSYCHIATRIC: Normal mood and affect. Behavior is normal     Labs/Diagnostic " Data  Results from last 7 days   Lab Units 08/05/22  0242 08/04/22  1517   SODIUM mmol/L 138 136   POTASSIUM mmol/L 4.2 4.8   CHLORIDE mmol/L 100 99   CO2 mmol/L 28.0 29.0   BUN mg/dL 32* 30*   CREATININE mg/dL 1.39* 1.24   GLUCOSE mg/dL 112* 129*   CALCIUM mg/dL 9.5 9.5     Results from last 7 days   Lab Units 08/05/22  0242 08/04/22  1517   TROPONIN T ng/mL 0.095* 0.096*     Results from last 7 days   Lab Units 08/05/22  0242 08/04/22  1517   WBC 10*3/mm3 8.93 10.67   HEMOGLOBIN g/dL 12.8* 13.1   HEMATOCRIT % 36.9* 39.2   PLATELETS 10*3/mm3 266 301     Results from last 7 days   Lab Units 08/05/22  0242   MAGNESIUM mg/dL 2.0     Results from last 7 days   Lab Units 08/05/22  0242   CHOLESTEROL mg/dL 258*   TRIGLYCERIDES mg/dL 385*   HDL CHOL mg/dL 36*   LDL CHOL mg/dL 150*     Results from last 7 days   Lab Units 08/04/22  1517   TSH uIU/mL 2.800     Results from last 7 days   Lab Units 08/05/22  0242   HEMOGLOBIN A1C % 6.00*     Results from last 7 days   Lab Units 08/04/22  1517   PROBNP pg/mL 457.3     EKG: Sinus bradycardia with first-degree AV block, no acute ST changes    Radiology Data:     Echo 2/10/2022:   Interpretation Summary  · Estimated left ventricular EF = 55% Left ventricular systolic function is normal.  · Mild aortic valve stenosis is present.  · Aortic valve mean pressure gradient is 13 mmHg.  · Trace to mild mitral valve regurgitation is present  · Mild tricuspid valve regurgitation is present  · Calculated right ventricular systolic pressure from tricuspid regurgitation is 27 mmHg.    CXR 8/4/2022:  FINDINGS:    Postsurgical changes of prior sternotomy and CABG. No pneumothorax. No  focal consolidation or significant pleural effusion. Mild bibasilar  scarring appears stable from the prior study. Osseous structures grossly  intact.     IMPRESSION:  No acute process.         Current Medications:    aspirin, 81 mg, Oral, Daily  atorvastatin, 80 mg, Oral, Nightly  busPIRone, 10 mg, Oral,  BID  DULoxetine, 60 mg, Oral, Daily  gabapentin, 300 mg, Oral, TID  insulin lispro, 0-9 Units, Subcutaneous, TID AC  isosorbide mononitrate, 120 mg, Oral, Q24H  multivitamin with minerals, 1 tablet, Oral, Daily  mycophenolate, 1,000 mg, Oral, Q12H  pantoprazole, 40 mg, Oral, QAM  pentoxifylline, 400 mg, Oral, Nightly  pimecrolimus, , Topical, Q12H  ranolazine, 1,000 mg, Oral, BID  senna-docusate sodium, 2 tablet, Oral, BID  sodium chloride, 10 mL, Intravenous, Q12H  sodium chloride, 10 mL, Intravenous, Q12H  tacrolimus, 0.5 mg, Oral, Q12H        Assessment and Plan:   1. CAD w Atypical Chest pain and mildly elevated troponin  -hx of CABG x3  -[facility] in 2019: patent LIMA to LAD with diffuse distal LAD disease, VG to RCA patent, occluded VG to OM  -chronic chest pain unchanged from baseline, reports new epigastric painthat does not appear to be cardiac in nature  -troponin .095 and not evolving, EKG with no acute changes  -patient is on appropriate management,   -no need for immediate ischemic evaluation at this time  -f/u with Dr. Dillon in next 1-2 weeks    2. Hypertension, controlled  -continue current regimen    3. Hyperlipidemia, uncontrolled  - ,   -continue pravastatin and fenofibrate    4. Bilateral Lower Extremity Weakness  -per hospitalist/neurology    5. Diabetes  -per hospitalist    Patient is cleared for discharge from cardiovascular standpoint. Thank you for requesting our consultation. Call us for any further recommendation.     Scribed by Aravind Tavarez PA-C for Dr. Colten Wilson on 08/05/22     IColten M.D., personally performed the services described in this documentation as scribed by the above named individual in my presence, and it is both accurate and complete.    Colten Wilson MD, [facility]  08/05/22  16:56 EDT

## 2022-08-05 NOTE — PLAN OF CARE
Goal Outcome Evaluation:  Plan of Care Reviewed With: patient, spouse            SLP evaluation completed. Swallow WFL. Will continue to address mild cognitive-communication difficulties, if does not continue to resolve. Please see note for further details and recommendations.

## 2022-08-05 NOTE — PROGRESS NOTES
Patient: Mahendra Yates  : 1955    Primary Care Provider: Rodríguez Lopez DO    Requesting Provider: As above      Chief Complaint: Fatigue and Weakness - Generalized      History of Present Illness: This is a 67 y.o. male who presented to the hospital yesterday after falling.  This patient is known to me as I evaluated him last fall for lumbar stenosis.  At that time, I recommended a multilevel laminectomy, but due to the patient's extensive cardiac history he did not want to pursue surgery.  He comes in after his fall.  In talking the patient, he is noted progressively worsening left foot weakness over the past several months.  He is not had any acute changes over the past 1 to 2 days.  Additionally, he denies any bowel or bladder incontinence.  He does continue to have significant claudicatory and radicular pain in his legs.  Additionally, patient does endorse pain that starts in his neck and radiates down the right upper extremity.  He has mild weakness in his hands, which he states is longstanding.    PMHX  Allergies:  Allergies   Allergen Reactions   • Contrast Dye Other (See Comments)     Cardiac Arrest   • Penicillins Rash   • Sulfasalazine Rash     Medications    Current Facility-Administered Medications:   •  acetaminophen (TYLENOL) tablet 650 mg, 650 mg, Oral, Q4H PRN **OR** acetaminophen (TYLENOL) 160 MG/5ML solution 650 mg, 650 mg, Oral, Q4H PRN **OR** acetaminophen (TYLENOL) suppository 650 mg, 650 mg, Rectal, Q4H PRN, Cammie Roy DO  •  aspirin EC tablet 81 mg, 81 mg, Oral, Daily, Cammie Roy DO, 81 mg at 22 0953  •  atorvastatin (LIPITOR) tablet 80 mg, 80 mg, Oral, Nightly, Simona Allred APRN  •  sennosides-docusate (PERICOLACE) 8.6-50 MG per tablet 2 tablet, 2 tablet, Oral, BID **AND** polyethylene glycol (MIRALAX) packet 17 g, 17 g, Oral, Daily PRN **AND** bisacodyl (DULCOLAX) EC tablet 5 mg, 5 mg, Oral, Daily PRN **AND** bisacodyl (DULCOLAX)  suppository 10 mg, 10 mg, Rectal, Daily PRN, Cammie Roy DO  •  busPIRone (BUSPAR) tablet 10 mg, 10 mg, Oral, BID, Cammie Roy DO, 10 mg at 08/05/22 0954  •  calcium carbonate (TUMS) chewable tablet 500 mg (200 mg elemental), 2 tablet, Oral, BID PRN, Cammie Roy DO  •  dextrose (D50W) (25 g/50 mL) IV injection 25 g, 25 g, Intravenous, Q15 Min PRN, Cammie Roy DO  •  dextrose (GLUTOSE) oral gel 15 g, 15 g, Oral, Q15 Min PRN, Cammie Roy DO  •  DULoxetine (CYMBALTA) DR capsule 60 mg, 60 mg, Oral, Daily, Cammie Roy, , 60 mg at 08/05/22 0953  •  gabapentin (NEURONTIN) capsule 300 mg, 300 mg, Oral, TID, Cammie Roy DO, 300 mg at 08/05/22 0953  •  glucagon (human recombinant) (GLUCAGEN DIAGNOSTIC) injection 1 mg, 1 mg, Intramuscular, Q15 Min PRN, Cammie Roy DO  •  Insulin Lispro (humaLOG) injection 0-9 Units, 0-9 Units, Subcutaneous, TID AC, Cammie Roy DO  •  isosorbide mononitrate (IMDUR) 24 hr tablet 120 mg, 120 mg, Oral, Q24H, Cammie Roy DO, 120 mg at 08/05/22 0953  •  multivitamin with minerals 1 tablet, 1 tablet, Oral, Daily, Cammie Roy DO, 1 tablet at 08/05/22 0954  •  mycophenolate (CELLCEPT) capsule 1,000 mg, 1,000 mg, Oral, Q12H, Cammie Roy DO, 1,000 mg at 08/05/22 0955  •  oxyCODONE-acetaminophen (PERCOCET)  MG per tablet 1 tablet, 1 tablet, Oral, Q6H PRN, Cammie Roy DO  •  pantoprazole (PROTONIX) EC tablet 40 mg, 40 mg, Oral, QAM, Cammie Roy DO, 40 mg at 08/05/22 0953  •  pentoxifylline (TRENtal) CR tablet 400 mg, 400 mg, Oral, Nightly, Cammie Roy DO, 400 mg at 08/04/22 2147  •  pimecrolimus (ELIDEL) 1 % cream, , Topical, Q12H, Cammie Roy DO, Given at 08/05/22 1003  •  ranolazine (RANEXA) 12 hr tablet 1,000 mg, 1,000 mg, Oral, BID, Cammie Roy DO, 1,000 mg at 08/05/22 0953  •  sodium chloride 0.9 % flush 10 mL, 10 mL, Intravenous, PRN, Cammie Roy DO  •  sodium chloride 0.9 % flush 10 mL, 10 mL,  Intravenous, Q12H, Simona Allred APRN  •  sodium chloride 0.9 % flush 10 mL, 10 mL, Intravenous, PRN, Simona Allred APRN  •  sodium chloride 0.9 % flush 10 mL, 10 mL, Intravenous, Q12H, Cammie Roy, , 10 mL at 22 0955  •  sodium chloride 0.9 % flush 10 mL, 10 mL, Intravenous, PRN, Cammie Roy,   •  tacrolimus (PROGRAF) capsule 0.5 mg, 0.5 mg, Oral, Q12H, Cammie Roy, , 0.5 mg at 22 0953  Past Medical History:  Past Medical History:   Diagnosis Date   • Arthritis    • Atherosclerosis    • B12 deficiency    • Cancer (HCC)    • Cancer of liver (HCC)    • Chronic headaches    • Coronary artery disease    • Diabetes mellitus (HCC)    • Gastroesophageal reflux disease    • History of transfusion    • Hyperlipidemia    • Hypertension    • Myocardial infarction (HCC)    • Sleep apnea    • Stroke (HCC)      Past Surgical History:  Past Surgical History:   Procedure Laterality Date   • ANGIOPLASTY     • CARDIAC SURGERY     • CARPAL TUNNEL RELEASE Right 2017    Procedure: CARPAL TUNNEL RELEASE RIGHT ;  Surgeon: Luis Hayden MD;  [facility];  Service:    • CHOLECYSTECTOMY     • CORONARY ARTERY BYPASS GRAFT     • CORONARY STENT PLACEMENT      x2   • LIVER TRANSPLANTATION     • TUMOR REMOVAL Right     axilla     Social Hx:  Social History     Tobacco Use   • Smoking status: Former Smoker     Packs/day: 3.00     Years: 30.00     Pack years: 90.00     Types: Cigarettes     Quit date: 1991     Years since quittin.5   • Smokeless tobacco: Never Used   Vaping Use   • Vaping Use: Never used   Substance Use Topics   • Alcohol use: No   • Drug use: No     Family Hx:  Family History   Problem Relation Age of Onset   • Heart disease Mother         Stroke   • Stroke Mother    • Diabetes Father    • Liver cancer Brother    • No Known Problems Sister    • Macular degeneration Brother    • No Known Problems Sister    • Dementia Brother      Review of Systems:   "      Review of Systems     Physical Exam:   /74 (BP Location: Right arm, Patient Position: Lying)   Pulse 76   Temp 98.2 °F (36.8 °C) (Oral)   Resp 16   Ht 182.9 cm (72\")   Wt 91.2 kg (201 lb)   SpO2 100%   BMI 27.26 kg/m²   Patient appears comfortable, resting  Awake, alert and oriented x 3  Speech f/c  Opens eyes spont  Pupils 3 mm rx bilaterally  Extraocular muscles intact bilaterally  Normal sensation to light touch in all 3 distributions of CN V bilaterally  Face symmetric bilaterally  Tongue midline  5/5 in the upper extremities bilaterally with the exception of handgrips which are 4+  In the right lower extremity has 5 out of 5 in hip flexion, knee extension.  He is 2 out of 5 in dorsiflexion EHL 4+ out of 5 in plantarflexion  In the left lower extremity has 5 out of 5 in hip flexion and knee extension.  He has essentially no movement in dorsiflexion or plantarflexion of the left foot  No lester's    Intake/Output:     Intake/Output Summary (Last 24 hours) at 8/5/2022 1544  Last data filed at 8/5/2022 0242  Gross per 24 hour   Intake --   Output 600 ml   Net -600 ml       Current Medications:   Current Facility-Administered Medications:   •  acetaminophen (TYLENOL) tablet 650 mg, 650 mg, Oral, Q4H PRN **OR** acetaminophen (TYLENOL) 160 MG/5ML solution 650 mg, 650 mg, Oral, Q4H PRN **OR** acetaminophen (TYLENOL) suppository 650 mg, 650 mg, Rectal, Q4H PRN, Cammie Roy,   •  aspirin EC tablet 81 mg, 81 mg, Oral, Daily, Cammie Roy DO, 81 mg at 08/05/22 0953  •  atorvastatin (LIPITOR) tablet 80 mg, 80 mg, Oral, Nightly, Simona Allred APRN  •  sennosides-docusate (PERICOLACE) 8.6-50 MG per tablet 2 tablet, 2 tablet, Oral, BID **AND** polyethylene glycol (MIRALAX) packet 17 g, 17 g, Oral, Daily PRN **AND** bisacodyl (DULCOLAX) EC tablet 5 mg, 5 mg, Oral, Daily PRN **AND** bisacodyl (DULCOLAX) suppository 10 mg, 10 mg, Rectal, Daily PRN, Cammie Roy, DO  •  busPIRone " (BUSPAR) tablet 10 mg, 10 mg, Oral, BID, Cammie Roy DO, 10 mg at 08/05/22 0954  •  calcium carbonate (TUMS) chewable tablet 500 mg (200 mg elemental), 2 tablet, Oral, BID PRN, Cammie Roy DO  •  dextrose (D50W) (25 g/50 mL) IV injection 25 g, 25 g, Intravenous, Q15 Min PRN, Cammie Roy DO  •  dextrose (GLUTOSE) oral gel 15 g, 15 g, Oral, Q15 Min PRN, Cammie Roy DO  •  DULoxetine (CYMBALTA) DR capsule 60 mg, 60 mg, Oral, Daily, Cammie Roy DO, 60 mg at 08/05/22 0953  •  gabapentin (NEURONTIN) capsule 300 mg, 300 mg, Oral, TID, Cammie Roy DO, 300 mg at 08/05/22 0953  •  glucagon (human recombinant) (GLUCAGEN DIAGNOSTIC) injection 1 mg, 1 mg, Intramuscular, Q15 Min PRN, Cammie Roy DO  •  Insulin Lispro (humaLOG) injection 0-9 Units, 0-9 Units, Subcutaneous, TID AC, Cammie Roy DO  •  isosorbide mononitrate (IMDUR) 24 hr tablet 120 mg, 120 mg, Oral, Q24H, Cammie Roy DO, 120 mg at 08/05/22 0953  •  multivitamin with minerals 1 tablet, 1 tablet, Oral, Daily, Cammie Roy DO, 1 tablet at 08/05/22 0954  •  mycophenolate (CELLCEPT) capsule 1,000 mg, 1,000 mg, Oral, Q12H, Cammie Roy DO, 1,000 mg at 08/05/22 0955  •  oxyCODONE-acetaminophen (PERCOCET)  MG per tablet 1 tablet, 1 tablet, Oral, Q6H PRN, Cammie Roy DO  •  pantoprazole (PROTONIX) EC tablet 40 mg, 40 mg, Oral, QAM, Cammie Roy DO, 40 mg at 08/05/22 0953  •  pentoxifylline (TRENtal) CR tablet 400 mg, 400 mg, Oral, Nightly, Cammie Roy DO, 400 mg at 08/04/22 2147  •  pimecrolimus (ELIDEL) 1 % cream, , Topical, Q12H, Cammie Roy DO, Given at 08/05/22 1003  •  ranolazine (RANEXA) 12 hr tablet 1,000 mg, 1,000 mg, Oral, BID, Cammie Roy DO, 1,000 mg at 08/05/22 0953  •  sodium chloride 0.9 % flush 10 mL, 10 mL, Intravenous, PRN, Cammie Roy DO  •  sodium chloride 0.9 % flush 10 mL, 10 mL, Intravenous, Q12H, Simona Allred APRN  •  sodium chloride 0.9 % flush 10 mL,  10 mL, Intravenous, PRN, Simona Allred, SERJIO  •  sodium chloride 0.9 % flush 10 mL, 10 mL, Intravenous, Q12H, Cammie Roy DO, 10 mL at 08/05/22 0955  •  sodium chloride 0.9 % flush 10 mL, 10 mL, Intravenous, PRN, Cammie Roy DO  •  tacrolimus (PROGRAF) capsule 0.5 mg, 0.5 mg, Oral, Q12H, Cammie Roy, , 0.5 mg at 08/05/22 0953     Laboratory Results:      Lab 08/05/22  0242 08/04/22  1517   WBC 8.93 10.67   HEMOGLOBIN 12.8* 13.1   HEMATOCRIT 36.9* 39.2   PLATELETS 266 301   NEUTROS ABS 6.13 8.04*   IMMATURE GRANS (ABS) 0.03 0.03   LYMPHS ABS 1.76 1.59   MONOS ABS 0.83 0.73   EOS ABS 0.15 0.23   MCV 86.0 87.1         Lab 08/05/22  0242 08/04/22  1517   SODIUM 138 136   POTASSIUM 4.2 4.8   CHLORIDE 100 99   CO2 28.0 29.0   ANION GAP 10.0 8.0   BUN 32* 30*   CREATININE 1.39* 1.24   EGFR 55.2* 63.3   GLUCOSE 112* 129*   CALCIUM 9.5 9.5   MAGNESIUM 2.0 2.0   HEMOGLOBIN A1C 6.00*  --    TSH  --  2.800         Lab 08/04/22  1517   TOTAL PROTEIN 6.4   ALBUMIN 3.90   GLOBULIN 2.5   ALT (SGPT) 13   AST (SGOT) 19   BILIRUBIN 0.4   ALK PHOS 78         Lab 08/05/22  0242 08/04/22  1517   PROBNP  --  457.3   TROPONIN T 0.095* 0.096*         Lab 08/05/22  0242   CHOLESTEROL 258*   LDL CHOL 150*   HDL CHOL 36*   TRIGLYCERIDES 385*             Brief Urine Lab Results  (Last result in the past 365 days)      Color   Clarity   Blood   Leuk Est   Nitrite   Protein   CREAT   Urine HCG        08/04/22 1856 Yellow   Clear   Negative   Negative   Negative   Negative               Microbiology Results (last 10 days)     Procedure Component Value - Date/Time    COVID PRE-OP / PRE-PROCEDURE SCREENING ORDER (NO ISOLATION) - Swab, Nasopharynx [[id number]]  (Normal) Collected: 08/04/22 7329    Lab Status: Final result Specimen: Swab from Nasopharynx Updated: 08/04/22 1930    Narrative:      The following orders were created for panel order COVID PRE-OP / PRE-PROCEDURE SCREENING ORDER (NO ISOLATION) - Swab,  Nasopharynx.  Procedure                               Abnormality         Status                     ---------                               -----------         ------                     COVID-19 and FLU A/B PCR...[[id number]]  Normal              Final result                 Please view results for these tests on the individual orders.    COVID-19 and FLU A/B PCR - Swab, Nasopharynx [[id number]]  (Normal) Collected: 08/04/22 1857    Lab Status: Final result Specimen: Swab from Nasopharynx Updated: 08/04/22 1930     COVID19 Not Detected     Influenza A PCR Not Detected     Influenza B PCR Not Detected    Narrative:      Fact sheet for providers: https://www.fda.gov/media/016387/download    Fact sheet for patients: https://www.fda.gov/media/167447/download    Test performed by PCR.           Diagnostic Imaging: The patient's diagnostic imaging was independently reviewed and interpreted by myself.    Assessment/Plan:  This is a 67-year-old male who I have previously evaluated for lumbar stenosis.  He comes in after a fall.  In talking to the patient, he has noted progressively worsening weakness of his left foot over the past several months.  He denies any acute changes.  He denies any bowel or bladder incontinence.  In reviewing his spine imaging, he has cervical stenosis as well as continued lumbar stenosis.  I think the patient is symptomatic from both of these.  As I have stated previously, I think he would benefit from surgery, but his cardiac history puts him at high risk.  He is scheduled to see Dr. Dillon in 1 week.  I am going to have the patient follow-up with me after this appointment.  If he is able to be cleared from a cardiology standpoint, we can discuss surgical options.  Again, there is no indication for acute intervention given the chronicity of his symptoms.  From my perspective, he can be discharged from the hospital.  We will plan for 2-week follow-up with me.      Rd Sanz,  MD  08/05/22  15:44 EDT

## 2022-08-05 NOTE — PROGRESS NOTES
Neurology       Patient Care Team:  Rodríguez Lopez DO as PCP - General (Internal Medicine)  Natanael Estrella MD as Consulting Physician (Neurology)  Oren Pena MD as Consulting Physician (Cardiology)  Everardo Tate MD as Consulting Physician (Pain Medicine)  Scout Grant MD as Consulting Physician (Otolaryngology)  Christin Handley PA-C as Physician Assistant (Physician Assistant)  Radha Briggs APRN as Nurse Practitioner (Nurse Practitioner)  April Dillon MD as Consulting Physician (Cardiology)    Chief complaint: Bilateral leg weakness    History: 67-year-old man with coronary artery disease liver transplant and known spinal stenosis with claudication comes in complaining of acute week work note worsening of bilateral leg weakness starting with pain in his knee and the numbness down to his great toe on the left.  He did experience a foot drop which is a new finding.    He was unable to walk altogether.    He is been seen by Dr. Sanz earlier this year.    He has coronary disease and was told that his surgery would be high risk by Dr. Dillon.    He is having some chest pain and is scheduled for heart cath later today.    At best he uses a walker.  He uses a wheelchair at home.      Past Medical History:   Diagnosis Date   • Arthritis    • Atherosclerosis    • B12 deficiency    • Cancer (HCC)    • Cancer of liver (HCC)    • Chronic headaches    • Coronary artery disease    • Diabetes mellitus (HCC)    • Gastroesophageal reflux disease    • History of transfusion    • Hyperlipidemia    • Hypertension    • Myocardial infarction (HCC)    • Sleep apnea    • Stroke (HCC)        Vital Signs   Vitals:    08/04/22 2245 08/05/22 0242 08/05/22 0710 08/05/22 0953   BP: 133/64 135/90 139/67 139/67   BP Location: Right arm Right arm Right arm    Patient Position: Lying Lying Lying    Pulse: (!) 48 51 67 72   Resp: 18 16 18    Temp: 98.3 °F (36.8 °C) 98 °F (36.7 °C) 97.6  °F (36.4 °C)    TempSrc: Oral Oral Oral    SpO2: 98% 98% 98%    Weight:       Height:           Physical Exam:   General: Pleasant white male in no distress              Neuro: Awake alert and oriented to person place and time.    Speech is articulate with no word finding problems.    Coordination is normal finger-nose testing bilaterally.    Cranial nerves show benign fundi equal pupils full eye movements.  Facial movement sensation are normal.    Palate elevates normally tongue protrudes normally.    Reflexes are absent in his lower extremities.    He has a decreased triceps jerk on the lateral right side.    Motor testing shows very mild proximal weakness of both legs and both knee extensors.    He has a profound extensor weakness of his left foot and a mild extensor weakness of his right foot.    Sensory testing shows decreased sensory change in the L5 distribution in both legs and in the C7 distribution in the right arm.          Results Review:  MRI of the lumbar spine is notable for advanced degenerative changes most pronounced at L2-3 and L3-4 with a combination of disc disease and arthritis with moderate to severe canal stenosis similar to previous exams.    Brain MRI shows chronic changes with no evidence of acute stroke.    MR angiogram of head and neck showed patent vessels.        Results from last 7 days   Lab Units 08/05/22  0242   WBC 10*3/mm3 8.93   HEMOGLOBIN g/dL 12.8*   HEMATOCRIT % 36.9*   PLATELETS 10*3/mm3 266     Results from last 7 days   Lab Units 08/05/22  0242 08/04/22  1517   SODIUM mmol/L 138 136   POTASSIUM mmol/L 4.2 4.8   CHLORIDE mmol/L 100 99   CO2 mmol/L 28.0 29.0   BUN mg/dL 32* 30*   CREATININE mg/dL 1.39* 1.24   CALCIUM mg/dL 9.5 9.5   BILIRUBIN mg/dL  --  0.4   ALK PHOS U/L  --  78   ALT (SGPT) U/L  --  13   AST (SGOT) U/L  --  19   GLUCOSE mg/dL 112* 129*       Imaging Results (Last 24 Hours)     Procedure Component Value Units Date/Time    XR Knee 1 or 2 View Bilateral  [[id number]] Collected: 08/05/22 0904     Updated: 08/05/22 0913    Narrative:      DATE OF EXAM: 8/5/2022 8:47 AM     PROCEDURE: XR KNEE 1 OR 2 VW BILATERAL-     INDICATIONS: B knee pain, pain with weight bearing; R41.82-Altered  mental status, unspecified; R47.9-Unspecified speech disturbances;  R29.898-Other symptoms and signs involving the musculoskeletal system;  R77.8-Other specified abnormalities of plasma proteins     COMPARISON: No comparisons available.     TECHNIQUE: AP view of the bilateral knees, lateral view the right knee,  lateral view of the left knee     FINDINGS:  The bones are demineralized limiting assessment for occult nondisplaced  fracture.     Right knee: No soft tissue swelling. There are scattered vascular  calcifications. There is a trace knee joint effusion. No acute fracture  or malalignment. There is mild to moderate tricompartmental  osteoarthritic change worse in the patellofemoral compartment and medial  compartment.     Left knee: No soft tissue swelling. There are scattered vascular  calcifications. No significant knee joint effusion. No acute fracture or  malalignment. There is mild to moderate tricompartmental osteoarthritic  change worse in the patellofemoral compartment and medial compartment.       Impression:      No acute fracture or malalignment.     This report was finalized on 8/5/2022 9:10 AM by Rodri Whyte MD.       MRI Lumbar Spine Without Contrast [[id number]] Collected: 08/04/22 1856     Updated: 08/04/22 1908    Narrative:      Examination: MRI LUMBAR SPINE WO CONTRAST-     Date of Exam: 8/4/2022 5:52 PM     Indication: BLE weakness.     Comparison: MRI lumbar spine ON 07/20/2021     Technique: Standard noncontrast MR pulse sequences of the lumbar spine  were obtained.     Findings:  There are 5 nonrib-bearing lumbar-type vertebral bodies. Vertebral body  heights are maintained. There is no acute fracture. Modic type endplate  changes noted with marrow edema at  the L2-3 and L3-4 levels. There is no  acute fracture. No paraspinal fluid collection. The conus medullaris  terminates at the L1 level. Abdominal aorta without aneurysm. Visualized  portions of the sacrum are intact.     T12-L1: There is no disc bulge. Negative for facet arthritis. Negative  for canal or foraminal stenosis.     L1-2: There is mild superficial disc bulge. Mild bilateral facet  arthropathy. Mild central canal stenosis. Mild bilateral foraminal  stenosis.     L2-3: There is a moderate diffuse disc bulge superimposed on moderate  facet arthritis and mild ligament flavum thickening. Findings results in  moderate to severe central canal stenosis with the canal AP diameter  narrowed to 5 mm, unchanged from prior study. Moderate bilateral  foraminal stenosis.     L3-4: There is disc desiccation with moderate diffuse disc bulge.  Moderate facet arthritis and ligamentum flavum thickening. There is  moderate to severe central canal stenosis similar to prior study most  pronounced in the left paracentral canal and left lateral recess. The  canal AP diameter is narrowed to 5 mm. There is severe left foraminal  stenosis with contact of the exiting left L3 nerve root, unchanged.  Moderate to severe right foraminal stenosis.     L4-5: There is a mild diffuse disc bulge. Mild bilateral facet  arthritis. Mild central canal stenosis. Moderate bilateral foraminal  stenosis right greater than left.     L5-S1: There is mild diffuse disc bulge. Mild facet arthritis. Negative  for canal stenosis. Mild to moderate bilateral foraminal stenosis.       Impression:      1. Negative for acute osseous abnormality.  2. Multilevel advanced degenerative findings above most pronounced at  L2-3 and L3-4 levels with combination of disc disease and facet  arthritis contributing to moderate to severe central canal stenosis  similar to prior exam from 09/07/2021.  3. Additional multilevel findings above.     This report was finalized on  8/4/2022 7:05 PM by Anjum Lan MD.       MRI Angiogram Head Without Contrast [[id number]] Collected: 08/04/22 1846     Updated: 08/04/22 1905    Narrative:         DATE OF EXAM:   8/4/2022 5:50 PM     PROCEDURE:   MRI ANGIOGRAM HEAD WO CONTRAST-, MRI ANGIOGRAM NECK WO CONTRAST-     INDICATIONS:   BLE weakness (R new), speech difficulty     COMPARISON:  MRI brain without contrast 08/04/2022     TECHNIQUE:   MR angiography of the neck and head was performed without contrast using  time-of-flight technique. 3-D multiplanar reformats are generated.     FINDINGS:   Neck:  There is normal flow within the distal right and left common carotid  arteries. There is normal flow within the left internal and external  carotid arteries. The left ICA is patent distally to the carotid  terminus. The right internal and external carotid arteries are patent.  The right carotid artery is patent to the carotid terminus. No  high-grade carotid stenosis by NASCET criteria. There is normal flow  within the bilateral vertebral arteries which are codominant.     Head:  There is normal flow within the bilateral middle cerebral arteries.  There is a common left A1 segment supplying the left and right anterior  cerebral artery A2 segments. The distal vertebral arteries are patent.  The basilar artery is patent. Both superior cerebellar arteries are  patent. The posterior cerebral arteries are patent bilaterally. There is  no large vessel intracranial occlusion. No significant aneurysm. Brain  parenchyma better assessed on the separately performed brain MRI.       Impression:      IMPRESSION :   1. Patent major intracranial vasculature.  2. Patent carotid and vertebral arteries.  3. Variant MAIA anatomy with common left A1 segment.     This report was finalized on 8/4/2022 6:56 PM by Anjum Lan MD.       MRI Angiogram Neck Without Contrast [[id number]] Collected: 08/04/22 1846     Updated: 08/04/22 1905    Narrative:         DATE OF EXAM:    8/4/2022 5:50 PM     PROCEDURE:   MRI ANGIOGRAM HEAD WO CONTRAST-, MRI ANGIOGRAM NECK WO CONTRAST-     INDICATIONS:   BLE weakness (R new), speech difficulty     COMPARISON:  MRI brain without contrast 08/04/2022     TECHNIQUE:   MR angiography of the neck and head was performed without contrast using  time-of-flight technique. 3-D multiplanar reformats are generated.     FINDINGS:   Neck:  There is normal flow within the distal right and left common carotid  arteries. There is normal flow within the left internal and external  carotid arteries. The left ICA is patent distally to the carotid  terminus. The right internal and external carotid arteries are patent.  The right carotid artery is patent to the carotid terminus. No  high-grade carotid stenosis by NASCET criteria. There is normal flow  within the bilateral vertebral arteries which are codominant.     Head:  There is normal flow within the bilateral middle cerebral arteries.  There is a common left A1 segment supplying the left and right anterior  cerebral artery A2 segments. The distal vertebral arteries are patent.  The basilar artery is patent. Both superior cerebellar arteries are  patent. The posterior cerebral arteries are patent bilaterally. There is  no large vessel intracranial occlusion. No significant aneurysm. Brain  parenchyma better assessed on the separately performed brain MRI.       Impression:      IMPRESSION :   1. Patent major intracranial vasculature.  2. Patent carotid and vertebral arteries.  3. Variant MAIA anatomy with common left A1 segment.     This report was finalized on 8/4/2022 6:56 PM by Anjum Lan MD.       MRI Brain Without Contrast [[id number]] Collected: 08/04/22 1841     Updated: 08/04/22 1905    Narrative:      Examination: MRI BRAIN WO CONTRAST-     Date of Exam: 8/4/2022 5:50 PM     Indication: Bilateral lower extremity weakness (right new, left  chronic), dysarthria, left upper extremity weakness, and  occasional  lethargy.     Comparison: CT head without contrast 08/04/2022     Technique: Standard non-contrast MR pulse sequences of the brain were  obtained.     Findings:  There is no diffusion restriction or findings of acute ischemia.  Negative for mass effect or midline shift. The ventricles and cortical  sulci appear normally configured for patient age. The posterior fossa is  without acute abnormality. There are mild to moderate scattered areas of  subcortical and periventricular T2/FLAIR hyperintense signal which are  nonspecific but most suggestive of chronic microvascular disease in a  patient of this age.     The pituitary gland is normal in size. Disc desiccation with small  posterior disc osteophytes noted in the upper cervical spine at C3-4 and  C4-5. The cerebellar tonsils are normally positioned. The mastoid air  cells are well-aerated. Paranasal sinuses without fluid level. Globes  intact. No retro-orbital abnormality. The major T2 weighted intracranial  vascular flow voids are grossly patent.       Impression:      1. Negative for acute ischemia.  2. Mild to moderate white matter findings suggesting changes of chronic  microvascular disease.     This report was finalized on 8/4/2022 6:46 PM by Anjum Lan MD.       CT Head Without Contrast Stroke Protocol [[id number]] Collected: 08/04/22 1638     Updated: 08/04/22 1652    Narrative:      DATE OF EXAM: 8/4/2022 4:08 PM     PROCEDURE: CT HEAD WO CONTRAST STROKE PROTOCOL-     INDICATIONS: Neuro deficit, acute, stroke suspected     COMPARISON: Head CT scan 2/10/2022     TECHNIQUE: Routine transaxial and coronal reconstruction images were  obtained through the head without the administration of contrast.  Automated exposure control and iterative reconstruction methods were  used.     The radiation dose reduction device was turned on for each scan per the  ALARA (As Low as Reasonably Achievable) protocol.     FINDINGS:  Although this exam was initially  ordered as stroke protocol exam,  according to the emergency room physician, this is not a true stroke  protocol exam, and the patient is not accompanied by the stroke team.     The calvarium appears intact. The included paranasal sinuses, mastoids  and middle ear spaces appear clear. No gross abnormalities are seen in  the orbits. There is expected degree of generalized cerebral atrophy for  the patient's age. There is no evidence of mass or mass effect, acute or  old infarct, hemorrhage, hydrocephalus or abnormal extra-axial  collection. There are mild chronic appearing central white matter  changes typical of microvascular leukoencephalopathy and not  particularly unusual for the patient's age.             Impression:      Age-appropriate generalized cerebral atrophy. No evidence of acute  intracranial disease.     This report was finalized on 8/4/2022 4:49 PM by Dr. Bj Grewal MD.       XR Chest 1 View [[id number]] Collected: 08/04/22 1558     Updated: 08/04/22 1601    Narrative:         DATE OF EXAM:   8/4/2022 3:51 PM     PROCEDURE:   XR CHEST 1 VW-     INDICATIONS:   Weak/Dizzy/AMS triage protocol     COMPARISON:  02/09/2022     TECHNIQUE:   Portable chest radiograph.     FINDINGS:    Postsurgical changes of prior sternotomy and CABG. No pneumothorax. No  focal consolidation or significant pleural effusion. Mild bibasilar  scarring appears stable from the prior study. Osseous structures grossly  intact.       Impression:      No acute process.      This report was finalized on 8/4/2022 3:58 PM by Anjum Lan MD.             Assessment:  Lumbar spinal stenosis with bilateral L5 radiculopathy and neurogenic claudication.    Cervical spondylosis with radiculopathy to the C7 at least.  Rule out cervical spinal stenosis as well.    Already artery disease    Plan:  MRI C-spine.    EMG nerve conduction study both legs and right arm.    Reconsult Dr. Sanz with reconsideration given the patient's clinical  deterioration.    Comment:  Patient seems to be better than he describes himself on admission.    Being the weekend is approaching a think it be reasonable to Dr. Umu freeman and with Dr. Dillon in consultation whether this is man who can survive a major spine surgery.         I discussed the patients findings and  recommendations with patient and family    Saurav Palm MD  08/05/22  11:52 EDT

## 2022-08-05 NOTE — CONSULTS
Diabetes Education    Patient Name:  Mahendra Yates  YOB: 1955  MRN: [id number]  Admit Date:  8/4/2022        Saw pt at bedside for diabetes education consult per stroke protocol. Noted per provider/neurology documentation imaging negative for acute stroke. Pt states he has had diabetes for many years, used to take insulin and oral medications but for last couple of years has only been on trulicity with great success. Currently he is able to afford trulicity through [employer]'s pt financial assistance program which his wife renews application for annually. He takes his injection weekly without issues. He monitors glucose at home and states typical range is 96-1teens. We reviewed his most recent A1c of 6.0, within desired range per ADA guidelines. Reviewed blood glucose goal ranges per ADA guidelines, reviewed importance of ongoing follow up w/ PCP, reviewed importance of rotating injection site w/ injectable medication. Pt does not have any questions or needs at this time. Thank you for the consult.       Electronically signed by:  Savita Lopez RN, [facility]  08/05/22 13:08 EDT

## 2022-08-05 NOTE — H&P
[facility] Medicine Services  HISTORY AND PHYSICAL    Patient Name: Mahendra Yates  : 1955  MRN: [id number]  Primary Care Physician: Rodríguez Lopez DO  Date of admission: 2022      Subjective   Subjective     Chief Complaint:  Weakness     HPI:  Mahendra Yates is a 67 y.o. male with history of CAD s/p PCI and CABG, h/o liver transplant, DM, HTN, lumbar spinal stenosis causing impaired mobility who presents today for weakness. States yesterday he drove in the car 2 hours to [place] and back but he felt ok. This morning he was in the shower and after walking out he felt very weak. Called for his wife who helped lower him to the floor. Their daughters friend came over to help transport him back to bed. States he slept and when he woke up he was still weak and could barely sit up prompting ER eval. States he felt generally weak today. Denies feeling like he is going to pass out. States that he has difficulty walking due to back issues. Uses a walker or a motorized scooter.       Review of Systems   Gen- No fevers, chills  CV- No chest pain, palpitations  Resp- No cough, dyspnea  GI- No N/V/D, abd pain    All other systems reviewed and are negative.     Personal History     Past Medical History:   Diagnosis Date   • Arthritis    • Atherosclerosis    • B12 deficiency    • Cancer (HCC)    • Cancer of liver (HCC)    • Chronic headaches    • Coronary artery disease    • Diabetes mellitus (HCC)    • Gastroesophageal reflux disease    • History of transfusion    • Hyperlipidemia    • Hypertension    • Myocardial infarction (HCC)    • Sleep apnea    • Stroke (HCC)              Past Surgical History:   Procedure Laterality Date   • ANGIOPLASTY     • CARDIAC SURGERY     • CARPAL TUNNEL RELEASE Right 2017    Procedure: CARPAL TUNNEL RELEASE RIGHT ;  Surgeon: Luis Hayden MD;  Location: [facility];  Service:    • CHOLECYSTECTOMY     • CORONARY ARTERY BYPASS GRAFT     •  CORONARY STENT PLACEMENT      x2   • LIVER TRANSPLANTATION     • TUMOR REMOVAL Right     axilla       Family History:  family history includes Dementia in his brother; Diabetes in his father; Heart disease in his mother; Liver cancer in his brother; Macular degeneration in his brother; No Known Problems in his sister and sister; Stroke in his mother. Otherwise pertinent FHx was reviewed and unremarkable.     Social History:  reports that he quit smoking about 31 years ago. His smoking use included cigarettes. He has a 90.00 pack-year smoking history. He has never used smokeless tobacco. He reports that he does not drink alcohol and does not use drugs.  Social History     Social History Narrative   • Not on file       Medications:  Available home medication information reviewed.  (Not in a hospital admission)      Allergies   Allergen Reactions   • Contrast Dye Other (See Comments)     Cardiac Arrest   • Penicillins Rash   • Sulfasalazine Rash       Objective   Objective     Vital Signs:   Temp:  [98.1 °F (36.7 °C)] 98.1 °F (36.7 °C)  Heart Rate:  [45-50] 50  Resp:  [18] 18  BP: (121-152)/(59-72) 133/59  Total (NIH Stroke Scale): 8    Physical Exam   Constitutional: Awake, alert  Eyes: PERRLA, sclerae anicteric, no conjunctival injection  HENT: NCAT, mucous membranes moist  Neck: Supple, no thyromegaly, no lymphadenopathy, trachea midline  Respiratory: Clear to auscultation bilaterally, nonlabored respirations   Cardiovascular: RRR,III/VI murmurs, rubs, or gallops, palpable pedal pulses bilaterally  Gastrointestinal: Positive bowel sounds, soft, nontender, nondistended  Musculoskeletal: No bilateral ankle edema, no clubbing or cyanosis to extremities; arthritic changes in left ankle  Psychiatric: Appropriate affect, cooperative  Neurologic: Oriented x 3, weakness of bilateral lower legs left greater than right, Cranial Nerves grossly intact to confrontation, speech clear  Skin: No rashes      Result Review:  I have  personally reviewed the results from the time of this admission to 8/4/2022 20:03 EDT and agree with these findings:  []  Laboratory list / accordion  []  Microbiology  []  Radiology  []  EKG/Telemetry   []  Cardiology/Vascular   []  Pathology  []  Old records  []  Other:  Most notable findings include:         LAB RESULTS:      Lab 08/04/22  1517   WBC 10.67   HEMOGLOBIN 13.1   HEMATOCRIT 39.2   PLATELETS 301   NEUTROS ABS 8.04*   IMMATURE GRANS (ABS) 0.03   LYMPHS ABS 1.59   MONOS ABS 0.73   EOS ABS 0.23   MCV 87.1         Lab 08/04/22  1517   SODIUM 136   POTASSIUM 4.8   CHLORIDE 99   CO2 29.0   ANION GAP 8.0   BUN 30*   CREATININE 1.24   EGFR 63.3   GLUCOSE 129*   CALCIUM 9.5   MAGNESIUM 2.0   TSH 2.800         Lab 08/04/22  1517   TOTAL PROTEIN 6.4   ALBUMIN 3.90   GLOBULIN 2.5   ALT (SGPT) 13   AST (SGOT) 19   BILIRUBIN 0.4   ALK PHOS 78         Lab 08/04/22  1517   PROBNP 457.3   TROPONIN T 0.096*                 UA    Urinalysis 9/7/21 9/7/21 2/10/22 2/10/22 8/4/22    0850 0850 0403 0403    Squamous Epithelial Cells, UA  None Seen  0-2    Specific Gravity, UA 1.014  1.016  1.015   Ketones, UA Negative  Negative  Negative   Blood, UA Negative  Negative  Negative   Leukocytes, UA Trace (A)  Trace (A)  Negative   Nitrite, UA Positive (A)  Negative  Negative   RBC, UA  None Seen  0-2    WBC, UA  None Seen  0-2    Bacteria, UA  None Seen  None Seen    (A) Abnormal value              Microbiology Results (last 10 days)     Procedure Component Value - Date/Time    COVID PRE-OP / PRE-PROCEDURE SCREENING ORDER (NO ISOLATION) - Swab, Nasopharynx [[id number]]  (Normal) Collected: 08/04/22 1857    Lab Status: Final result Specimen: Swab from Nasopharynx Updated: 08/04/22 1930    Narrative:      The following orders were created for panel order COVID PRE-OP / PRE-PROCEDURE SCREENING ORDER (NO ISOLATION) - Swab, Nasopharynx.  Procedure                               Abnormality         Status                     ---------                                -----------         ------                     COVID-19 and FLU A/B PCR...[[id number]]  Normal              Final result                 Please view results for these tests on the individual orders.    COVID-19 and FLU A/B PCR - Swab, Nasopharynx [[id number]]  (Normal) Collected: 08/04/22 1857    Lab Status: Final result Specimen: Swab from Nasopharynx Updated: 08/04/22 1930     COVID19 Not Detected     Influenza A PCR Not Detected     Influenza B PCR Not Detected    Narrative:      Fact sheet for providers: https://www.fda.gov/media/433844/download    Fact sheet for patients: https://www.fda.gov/media/496813/download    Test performed by PCR.          MRI Angiogram Head Without Contrast    Result Date: 8/4/2022   DATE OF EXAM: 8/4/2022 5:50 PM  PROCEDURE: MRI ANGIOGRAM HEAD WO CONTRAST-, MRI ANGIOGRAM NECK WO CONTRAST-  INDICATIONS: BLE weakness (R new), speech difficulty  COMPARISON: MRI brain without contrast 08/04/2022  TECHNIQUE: MR angiography of the neck and head was performed without contrast using time-of-flight technique. 3-D multiplanar reformats are generated.  FINDINGS: Neck: There is normal flow within the distal right and left common carotid arteries. There is normal flow within the left internal and external carotid arteries. The left ICA is patent distally to the carotid terminus. The right internal and external carotid arteries are patent. The right carotid artery is patent to the carotid terminus. No high-grade carotid stenosis by NASCET criteria. There is normal flow within the bilateral vertebral arteries which are codominant.  Head: There is normal flow within the bilateral middle cerebral arteries. There is a common left A1 segment supplying the left and right anterior cerebral artery A2 segments. The distal vertebral arteries are patent. The basilar artery is patent. Both superior cerebellar arteries are patent. The posterior cerebral arteries are patent bilaterally.  There is no large vessel intracranial occlusion. No significant aneurysm. Brain parenchyma better assessed on the separately performed brain MRI.      Impression: IMPRESSION : 1. Patent major intracranial vasculature. 2. Patent carotid and vertebral arteries. 3. Variant MAIA anatomy with common left A1 segment.  This report was finalized on 8/4/2022 6:56 PM by Anjum Lan MD.      MRI Angiogram Neck Without Contrast    Result Date: 8/4/2022   DATE OF EXAM: 8/4/2022 5:50 PM  PROCEDURE: MRI ANGIOGRAM HEAD WO CONTRAST-, MRI ANGIOGRAM NECK WO CONTRAST-  INDICATIONS: BLE weakness (R new), speech difficulty  COMPARISON: MRI brain without contrast 08/04/2022  TECHNIQUE: MR angiography of the neck and head was performed without contrast using time-of-flight technique. 3-D multiplanar reformats are generated.  FINDINGS: Neck: There is normal flow within the distal right and left common carotid arteries. There is normal flow within the left internal and external carotid arteries. The left ICA is patent distally to the carotid terminus. The right internal and external carotid arteries are patent. The right carotid artery is patent to the carotid terminus. No high-grade carotid stenosis by NASCET criteria. There is normal flow within the bilateral vertebral arteries which are codominant.  Head: There is normal flow within the bilateral middle cerebral arteries. There is a common left A1 segment supplying the left and right anterior cerebral artery A2 segments. The distal vertebral arteries are patent. The basilar artery is patent. Both superior cerebellar arteries are patent. The posterior cerebral arteries are patent bilaterally. There is no large vessel intracranial occlusion. No significant aneurysm. Brain parenchyma better assessed on the separately performed brain MRI.      Impression: IMPRESSION : 1. Patent major intracranial vasculature. 2. Patent carotid and vertebral arteries. 3. Variant MAIA anatomy with common left A1  segment.  This report was finalized on 8/4/2022 6:56 PM by Anjum Lan MD.      MRI Brain Without Contrast    Result Date: 8/4/2022  Examination: MRI BRAIN WO CONTRAST-  Date of Exam: 8/4/2022 5:50 PM  Indication: Bilateral lower extremity weakness (right new, left chronic), dysarthria, left upper extremity weakness, and occasional lethargy.  Comparison: CT head without contrast 08/04/2022  Technique: Standard non-contrast MR pulse sequences of the brain were obtained.  Findings: There is no diffusion restriction or findings of acute ischemia. Negative for mass effect or midline shift. The ventricles and cortical sulci appear normally configured for patient age. The posterior fossa is without acute abnormality. There are mild to moderate scattered areas of subcortical and periventricular T2/FLAIR hyperintense signal which are nonspecific but most suggestive of chronic microvascular disease in a patient of this age.  The pituitary gland is normal in size. Disc desiccation with small posterior disc osteophytes noted in the upper cervical spine at C3-4 and C4-5. The cerebellar tonsils are normally positioned. The mastoid air cells are well-aerated. Paranasal sinuses without fluid level. Globes intact. No retro-orbital abnormality. The major T2 weighted intracranial vascular flow voids are grossly patent.      Impression: 1. Negative for acute ischemia. 2. Mild to moderate white matter findings suggesting changes of chronic microvascular disease.  This report was finalized on 8/4/2022 6:46 PM by Anjum Lan MD.      MRI Lumbar Spine Without Contrast    Result Date: 8/4/2022  Examination: MRI LUMBAR SPINE WO CONTRAST-  Date of Exam: 8/4/2022 5:52 PM  Indication: BLE weakness.  Comparison: MRI lumbar spine ON 07/20/2021  Technique: Standard noncontrast MR pulse sequences of the lumbar spine were obtained.  Findings: There are 5 nonrib-bearing lumbar-type vertebral bodies. Vertebral body heights are maintained. There is  no acute fracture. Modic type endplate changes noted with marrow edema at the L2-3 and L3-4 levels. There is no acute fracture. No paraspinal fluid collection. The conus medullaris terminates at the L1 level. Abdominal aorta without aneurysm. Visualized portions of the sacrum are intact.  T12-L1: There is no disc bulge. Negative for facet arthritis. Negative for canal or foraminal stenosis.  L1-2: There is mild superficial disc bulge. Mild bilateral facet arthropathy. Mild central canal stenosis. Mild bilateral foraminal stenosis.  L2-3: There is a moderate diffuse disc bulge superimposed on moderate facet arthritis and mild ligament flavum thickening. Findings results in moderate to severe central canal stenosis with the canal AP diameter narrowed to 5 mm, unchanged from prior study. Moderate bilateral foraminal stenosis.  L3-4: There is disc desiccation with moderate diffuse disc bulge. Moderate facet arthritis and ligamentum flavum thickening. There is moderate to severe central canal stenosis similar to prior study most pronounced in the left paracentral canal and left lateral recess. The canal AP diameter is narrowed to 5 mm. There is severe left foraminal stenosis with contact of the exiting left L3 nerve root, unchanged. Moderate to severe right foraminal stenosis.  L4-5: There is a mild diffuse disc bulge. Mild bilateral facet arthritis. Mild central canal stenosis. Moderate bilateral foraminal stenosis right greater than left.  L5-S1: There is mild diffuse disc bulge. Mild facet arthritis. Negative for canal stenosis. Mild to moderate bilateral foraminal stenosis.      Impression: 1. Negative for acute osseous abnormality. 2. Multilevel advanced degenerative findings above most pronounced at L2-3 and L3-4 levels with combination of disc disease and facet arthritis contributing to moderate to severe central canal stenosis similar to prior exam from 09/07/2021. 3. Additional multilevel findings above.  This  report was finalized on 8/4/2022 7:05 PM by Anjum Lan MD.      XR Chest 1 View    Result Date: 8/4/2022   DATE OF EXAM: 8/4/2022 3:51 PM  PROCEDURE: XR CHEST 1 VW-  INDICATIONS: Weak/Dizzy/AMS triage protocol  COMPARISON: 02/09/2022  TECHNIQUE: Portable chest radiograph.  FINDINGS:  Postsurgical changes of prior sternotomy and CABG. No pneumothorax. No focal consolidation or significant pleural effusion. Mild bibasilar scarring appears stable from the prior study. Osseous structures grossly intact.      Impression: No acute process.  This report was finalized on 8/4/2022 3:58 PM by Anjum Lan MD.      CT Head Without Contrast Stroke Protocol    Result Date: 8/4/2022  DATE OF EXAM: 8/4/2022 4:08 PM  PROCEDURE: CT HEAD WO CONTRAST STROKE PROTOCOL-  INDICATIONS: Neuro deficit, acute, stroke suspected  COMPARISON: Head CT scan 2/10/2022  TECHNIQUE: Routine transaxial and coronal reconstruction images were obtained through the head without the administration of contrast. Automated exposure control and iterative reconstruction methods were used.  The radiation dose reduction device was turned on for each scan per the ALARA (As Low as Reasonably Achievable) protocol.  FINDINGS: Although this exam was initially ordered as stroke protocol exam, according to the emergency room physician, this is not a true stroke protocol exam, and the patient is not accompanied by the stroke team.  The calvarium appears intact. The included paranasal sinuses, mastoids and middle ear spaces appear clear. No gross abnormalities are seen in the orbits. There is expected degree of generalized cerebral atrophy for the patient's age. There is no evidence of mass or mass effect, acute or old infarct, hemorrhage, hydrocephalus or abnormal extra-axial collection. 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        Impression:  Age-appropriate generalized cerebral atrophy. No evidence of acute intracranial disease.  This report was finalized on 8/4/2022 4:49 PM by Dr. Bj Grewal MD.        Results for orders placed during the hospital encounter of 02/09/22    Adult Transthoracic Echo Complete W/ Cont if Necessary Per Protocol    Interpretation Summary  · Estimated left ventricular EF = 55% Left ventricular systolic function is normal.  · Mild aortic valve stenosis is present.  · Aortic valve mean pressure gradient is 13 mmHg.  · Trace to mild mitral valve regurgitation is present  · Mild tricuspid valve regurgitation is present  · Calculated right ventricular systolic pressure from tricuspid regurgitation is 27 mmHg.      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 male with history of CAD s/p PCI and CABG, h/o liver transplant, DM, HTN, lumbar spinal stenosis causing impaired mobility who presents today for weakness. Initial imaging negative for acute findings.     Weakness   ? Dysarthria   - Generalized weakness. Neuro consulted in the ER   - CT head with no acute findings  - MRI brain negative for acute ischemia   - MRA with patent major intracranial vessels, carotids and vertebral   - Continue ASA, statin   - PT/OT/speech   - ECHO 2/22 with EF 55%, mild aortic valve stensosi, mild to moderate left atrial dilation   - Stroke team to follow   - orthostatics     Lumbar spinal stenosis   - Possibly contributing to the above with recent long car ride   - MRI negative for acute findings, multilevel advanced diease at L2-3, L3-4 contributing to moderate to severe  central canal stenosis similar to 2021   - PT/OT   - Continue home percocet     H/o liver transplant  - Continue home cellcept and tacrolimus; pentoxifylline     CAD s/p CABG   - ASA, statin, ranexa, imdur     HTN   Bradycardia   -  EKG with 1st degree block   - Will continue home imdur. Holding home diltiazem due to bradycardia     DM   - Holding home trulicity   - Moderate SSI     Mood   - Continue home buspar and cymbalta     DVT prophylaxis:  SCDs      CODE STATUS:  Reviewed with patient -- full code   Code Status and Medical Interventions:   Ordered at: 08/04/22 2003     Level Of Support Discussed With:    Patient     Code Status (Patient has no pulse and is not breathing):    CPR (Attempt to Resuscitate)     Medical Interventions (Patient has pulse or is breathing):    Full Support         Cammie Roy DO  08/04/22

## 2022-08-05 NOTE — CONSULTS
Consults    Referring Provider: Ganga MOSELEY    Patient Care Team:  Rodríguez Lopez DO as PCP - General (Internal Medicine)  Natanael Estrella MD as Consulting Physician (Neurology)  Oren Pena MD as Consulting Physician (Cardiology)  Everardo Tate MD as Consulting Physician (Pain Medicine)  Scout Grant MD as Consulting Physician (Otolaryngology)  Christin Handley PA-C as Physician Assistant (Physician Assistant)  Radha Briggs APRN as Nurse Practitioner (Nurse Practitioner)  April Dillon MD as Consulting Physician (Cardiology)    Chief Complaint: Left foot drop left leg pain    Subjective .     History of present illness:       Patient is a nice 67-year-old gentleman who has a very extensive heart history and has been told multiple times that he cannot have large/extensive back surgery.    Patient was seen most recently by Dr. Sanz in October and was noted to have some extensive disease at left L3-4 as well as L4-5 bilaterally.    Unfortunately patient came back in with bilateral lower extremity weakness and was worked up through the stroke team and unfortunately ended up developing some chest pain.  Dr. Dillon has followed recently but was okayed to be discharged home from her standpoint.    The most recent thing over the last month he has developed inability to lift his left foot with left foot drop.  Patient is also having quite a bit of pain in the left leg more so than the right.    I reviewed the MRI and compared it with his 3 most recent scans there was some progression from the July to the September scan in 2021 but really lacks much progression from September until now.  There is a little bit of worsening of the bilateral foraminal disease at 4 5 but the most severe disease is at left L3-4 that seems to be relatively stable.    Upon discussion with the patient he is anxious to go home and be followed up with cardiology.  He plans on getting this  worked up in outpatient setting and he is comfortable with seeing Dr. Sanz in outpatient setting as well.    Review of Systems    History  Past Medical History:   Diagnosis Date   • Arthritis    • Atherosclerosis    • B12 deficiency    • Cancer (HCC)    • Cancer of liver (HCC)    • Chronic headaches    • Coronary artery disease    • Diabetes mellitus (HCC)    • Gastroesophageal reflux disease    • History of transfusion    • Hyperlipidemia    • Hypertension    • Myocardial infarction (HCC)    • Sleep apnea    • Stroke (HCC)    ,   Past Surgical History:   Procedure Laterality Date   • ANGIOPLASTY     • CARDIAC SURGERY     • CARPAL TUNNEL RELEASE Right 2017    Procedure: CARPAL TUNNEL RELEASE RIGHT ;  Surgeon: Luis Hayden MD;  Location: [facility];  Service:    • CHOLECYSTECTOMY     • CORONARY ARTERY BYPASS GRAFT     • CORONARY STENT PLACEMENT      x2   • LIVER TRANSPLANTATION     • TUMOR REMOVAL Right     axilla   ,   Family History   Problem Relation Age of Onset   • Heart disease Mother         Stroke   • Stroke Mother    • Diabetes Father    • Liver cancer Brother    • No Known Problems Sister    • Macular degeneration Brother    • No Known Problems Sister    • Dementia Brother    ,   Social History     Socioeconomic History   • Marital status:    Tobacco Use   • Smoking status: Former Smoker     Packs/day: 3.00     Years: 30.00     Pack years: 90.00     Types: Cigarettes     Quit date: 1991     Years since quittin.5   • Smokeless tobacco: Never Used   Vaping Use   • Vaping Use: Never used   Substance and Sexual Activity   • Alcohol use: No   • Drug use: No   • Sexual activity: Not Currently     Partners: Female     E-cigarette/Vaping   • E-cigarette/Vaping Use Never User      E-cigarette/Vaping Substances     E-cigarette/Vaping Devices       ,   Medications Prior to Admission   Medication Sig Dispense Refill Last Dose   • aspirin 81 MG EC tablet Take 81 mg by mouth Daily. Continues per  doctors orders      • bumetanide (BUMEX) 1 MG tablet Take 1 tablet by mouth 2 (Two) Times a Day. 60 tablet 2    • busPIRone (BUSPAR) 10 MG tablet Take 1 tablet by mouth 2 (Two) Times a Day. 60 tablet 5    • cholecalciferol (VITAMIN D3) 25 MCG (1000 UT) tablet Take 1,000 Units by mouth Daily.      • DHEA 50 MG tablet Take 1 tablet by mouth Daily.      • dilTIAZem CD (CARDIZEM CD) 240 MG 24 hr capsule Take 1 capsule by mouth Daily. Stop amlodipine 90 capsule 3    • Dulaglutide (Trulicity) 0.75 MG/0.5ML solution pen-injector Inject 0.75 mg under the skin into the appropriate area as directed 1 (One) Time Per Week. 5 pen 2    • DULoxetine (Cymbalta) 60 MG capsule Take 1 capsule by mouth Daily. 90 capsule 3    • fenofibrate micronized (LOFIBRA) 134 MG capsule Take 134 mg by mouth Every Evening.      • gabapentin (NEURONTIN) 300 MG capsule TAKE 1 CAPSULE BY MOUTH THREE TIMES DAILY 90 capsule 2    • glucosamine sulfate 500 MG capsule capsule Take 500 mg by mouth Daily.      • isosorbide mononitrate (IMDUR) 120 MG 24 hr tablet Take 1 tablet by mouth 2 (Two) Times a Day. 180 tablet 3    • Loratadine 10 MG capsule Take 1 capsule by mouth Daily.      • magnesium oxide (MAG-OX) 400 MG tablet Take 400 mg by mouth 2 (Two) Times a Day. 3 tabs in the AM and 2 tabs in the PM per transplant team      • multivitamin with minerals tablet tablet Take 1 tablet by mouth Daily.      • mycophenolate (CELLCEPT) 500 MG tablet Take 1,000 mg by mouth 2 (Two) Times a Day.      • nitroglycerin (NITROSTAT) 0.4 MG SL tablet Place 1 tablet under the tongue Every 5 (Five) Minutes As Needed for Chest Pain. Take no more than 3 doses in 15 minutes. 30 tablet 5    • omeprazole (priLOSEC) 40 MG capsule Take 1 capsule by mouth 2 (Two) Times a Day. 180 capsule 1    • oxyCODONE-acetaminophen (PERCOCET)  MG per tablet Take 1 tablet by mouth Every 6 (Six) Hours As Needed for Severe Pain . 120 tablet 0    • pentoxifylline (TRENtal) 400 MG CR tablet Take 1  tablet by mouth Every Night. 90 tablet 3    • pravastatin (PRAVACHOL) 80 MG tablet Take 1 tablet by mouth Daily. 90 tablet 3    • ranolazine (RANEXA) 1000 MG 12 hr tablet Take 1 tablet by mouth 2 (Two) Times a Day. 180 tablet 3    • sennosides-docusate (SB Docusate Sodium/Senna) 8.6-50 MG per tablet Take 1 tablet by mouth Daily. 90 tablet 3    • tacrolimus (PROGRAF) 0.5 MG capsule       • tacrolimus (PROTOPIC) 0.1 % ointment Apply 1 application topically to the appropriate area as directed 2 (Two) Times a Day. 100 g 0    , Scheduled Meds:  aspirin, 81 mg, Oral, Daily  atorvastatin, 80 mg, Oral, Nightly  busPIRone, 10 mg, Oral, BID  DULoxetine, 60 mg, Oral, Daily  gabapentin, 300 mg, Oral, TID  insulin lispro, 0-9 Units, Subcutaneous, TID AC  isosorbide mononitrate, 120 mg, Oral, Q24H  multivitamin with minerals, 1 tablet, Oral, Daily  mycophenolate, 1,000 mg, Oral, Q12H  pantoprazole, 40 mg, Oral, QAM  pentoxifylline, 400 mg, Oral, Nightly  pimecrolimus, , Topical, Q12H  ranolazine, 1,000 mg, Oral, BID  senna-docusate sodium, 2 tablet, Oral, BID  sodium chloride, 10 mL, Intravenous, Q12H  sodium chloride, 10 mL, Intravenous, Q12H  tacrolimus, 0.5 mg, Oral, Q12H    , Continuous Infusions:   , PRN Meds:  •  acetaminophen **OR** acetaminophen **OR** acetaminophen  •  senna-docusate sodium **AND** polyethylene glycol **AND** bisacodyl **AND** bisacodyl  •  calcium carbonate  •  dextrose  •  dextrose  •  glucagon (human recombinant)  •  oxyCODONE-acetaminophen  •  sodium chloride  •  sodium chloride  •  sodium chloride and Allergies:  Contrast dye, Penicillins, and Sulfasalazine   SMOKING STATUS: Non-smoker  Objective     Vital Signs   Temp:  [97.6 °F (36.4 °C)-98.3 °F (36.8 °C)] 98.2 °F (36.8 °C)  Heart Rate:  [45-76] 76  Resp:  [16-18] 16  BP: (121-145)/(59-90) 137/74  Body mass index is 27.26 kg/m².    Physical Exam:     Body mass index is 27.26 kg/m².    GENERAL:           The patient is in no acute distress, and is  able to answer all questions appropriately.    Neck:          Supple without lymphadenopathy    Cardiovascular:       Peripheral pulses 2+ at dorsalis pedis and posterior tibialis    Lungs:         Breathing unlabored    Musculoskeletal:            strength is 5 out of 5 bilaterally.        Shoulder abduction is 5 out of 5.         Dorsiflexion is 3 out of 5 on left 4 out of 5 on right       Plantarflexion is 5/5 bilaterally       Hip Flexion 5/5 bilaterally.         The patient´s gait not tested    Neurologic:          The patient is alert and oriented by 3.          Pupils are equal and reactive to light.         Visual fields are full.         Extraocular movements are intact without nystagmus.         There is no evidence of central motor drift. No facial droop.  No difficulty with rapid alternating movements.         Sensation is equal bilaterally with no deficit.           Reflexes:  1+ through out  No Poe's or clonus long track sign    CRANIAL NERVES:        Deferred  Results Review:   I reviewed the patient's new imaging results and agree with the interpretation.  Patient does have quite extensive disease at the left L3-4 area but seems relatively stable and comparing to the September 2021 scan.  Patient does have a little bit of worsening of the bilateral foraminal stenosis at L4-5 but onset is less severe than the left L3-4 area.    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 Dillon prior to us considering this.    Patient follow-up in outpatient setting with Dr. Sanz.    I  discussed the patients findings and my recommendations with patient and family  Kee Ardon PA-C  08/05/22  15:39 EDT    Time: 60 minutes

## 2022-08-05 NOTE — SIGNIFICANT NOTE
Stroke Neurology:    PT is failed on bedside dysphagia screening for visible facial droop. Discussion with bedside RN as well as family. PT and wife report that facial droop is chronic since previous TIAs. Mr. Yates is a liver transplant patient who requires daily antirejection medications which he takes at HS. Bedside dysphagia completed by bedside RN without issue despite facial droop. No cough or voice change noted with water. Benefit outweighs risk in this situation. Dysphagia precautions. PT to receive PM medications and then will remain NPO until cleared through SLP evaluation in AM.     Indiana Gallego, APRN  08/4/2022   3046 EDT

## 2022-08-05 NOTE — CASE MANAGEMENT/SOCIAL WORK
Discharge Planning Assessment  [facility]     Patient Name: Mahendra Yates  MRN: [id number]  Today's Date: 8/5/2022    Admit Date: 8/4/2022     Discharge Needs Assessment     Row Name 08/05/22 1407       Living Environment    People in Home spouse    Current Living Arrangements apartment    Primary Care Provided by self    Provides Primary Care For no one    Family Caregiver if Needed spouse    Quality of Family Relationships helpful;involved;supportive    Able to Return to Prior Arrangements yes       Transition Planning    Patient/Family Anticipates Transition to home    Transportation Anticipated family or friend will provide       Discharge Needs Assessment    Equipment Currently Used at Home shower chair;rollator;other (see comments)  Motorized scooter               Discharge Plan     Row Name 08/05/22 1408       Plan    Plan IDP    Patient/Family in Agreement with Plan yes    Plan Comments I spoke with pt and his wife at bedside. Pt lives with wife in [facility] Looking for handicap assessable apartment to move to. Pt uses a Rolling Walker and motorized scooter at baseline. No Home O2/Cpap/HH/OP therapy. Pt states he has used [facility] in the past. Pt states his PCP-Dr. Rodríguez Lopez is working on getting him a motorized wheelchair. Neurology and Cardiology consulted. [facility] Medicare and prescriptions filled at [facility]. Plan is home at D/C. Wife will transport. No needs voiced or identified. CM will continue to follow.    Final Discharge Disposition Code 01 - home or self-care              Continued Care and Services - Admitted Since 8/4/2022    Coordination has not been started for this encounter.          Demographic Summary     Row Name 08/05/22 1407       General Information    Reason for Consult discharge planning       Contact Information    Permission Granted to Share Info With                Functional Status     Row Name 08/05/22 1407       Functional  Status    Usual Activity Tolerance fair    Current Activity Tolerance fair       Functional Status, IADL    Medications independent    Meal Preparation independent    Housekeeping independent    Laundry independent    Shopping independent               Psychosocial    No documentation.                Abuse/Neglect    No documentation.                Legal    No documentation.                Substance Abuse    No documentation.                Patient Forms    No documentation.                   Maricruz Bradley RN

## 2022-08-05 NOTE — PROGRESS NOTES
[facility] Medicine Services  PROGRESS NOTE    Patient Name: Mahendra Yates  : 1955  MRN: [id number]    Date of Admission: 2022  Primary Care Physician: Rodríguez Lopez DO    Subjective   Subjective     CC:  Weakness    HPI:  Notes B LE weakness. Notes pain in knees now. Notes intermittent chest pain, SOA, with worsening LE edema intermittently at home.    Review of Systems   Constitutional: Positive for activity change and fatigue.   HENT: Negative.    Respiratory: Positive for chest tightness and shortness of breath.    Cardiovascular: Positive for leg swelling. Negative for chest pain and palpitations.   Gastrointestinal: Negative.    Genitourinary: Negative.    Musculoskeletal: Positive for arthralgias, gait problem and myalgias.       Objective   Objective     Vital Signs:   Temp:  [97.9 °F (36.6 °C)-98.3 °F (36.8 °C)] 98 °F (36.7 °C)  Heart Rate:  [45-51] 51  Resp:  [16-18] 16  BP: (121-152)/(59-90) 135/90  Flow (L/min):  [1.5] 1.5     Physical Exam:  NAD, alert and oriented  OP clear, MMM  Neck supple  No LAD  RRR, murmur noted  CTAB  +BS, ND, NT, soft  B knees, non-tender without effusions, normal ROM  No LE edema noted  Normal affect    Results Reviewed:  LAB RESULTS:      Lab 22  0242 22  1517   WBC 8.93 10.67   HEMOGLOBIN 12.8* 13.1   HEMATOCRIT 36.9* 39.2   PLATELETS 266 301   NEUTROS ABS 6.13 8.04*   IMMATURE GRANS (ABS) 0.03 0.03   LYMPHS ABS 1.76 1.59   MONOS ABS 0.83 0.73   EOS ABS 0.15 0.23   MCV 86.0 87.1         Lab 22  0242 22  1517   SODIUM 138 136   POTASSIUM 4.2 4.8   CHLORIDE 100 99   CO2 28.0 29.0   ANION GAP 10.0 8.0   BUN 32* 30*   CREATININE 1.39* 1.24   EGFR 55.2* 63.3   GLUCOSE 112* 129*   CALCIUM 9.5 9.5   MAGNESIUM 2.0 2.0   HEMOGLOBIN A1C 6.00*  --    TSH  --  2.800         Lab 22  1517   TOTAL PROTEIN 6.4   ALBUMIN 3.90   GLOBULIN 2.5   ALT (SGPT) 13   AST (SGOT) 19   BILIRUBIN 0.4   ALK PHOS 78          Lab 08/05/22  0242 08/04/22  1517   PROBNP  --  457.3   TROPONIN T 0.095* 0.096*         Lab 08/05/22 0242   CHOLESTEROL 258*   LDL CHOL 150*   HDL CHOL 36*   TRIGLYCERIDES 385*             Brief Urine Lab Results  (Last result in the past 365 days)      Color   Clarity   Blood   Leuk Est   Nitrite   Protein   CREAT   Urine HCG        08/04/22 1856 Yellow   Clear   Negative   Negative   Negative   Negative                 Microbiology Results Abnormal     Procedure Component Value - Date/Time    COVID PRE-OP / PRE-PROCEDURE SCREENING ORDER (NO ISOLATION) - Swab, Nasopharynx [[id number]]  (Normal) Collected: 08/04/22 1857    Lab Status: Final result Specimen: Swab from Nasopharynx Updated: 08/04/22 1930    Narrative:      The following orders were created for panel order COVID PRE-OP / PRE-PROCEDURE SCREENING ORDER (NO ISOLATION) - Swab, Nasopharynx.  Procedure                               Abnormality         Status                     ---------                               -----------         ------                     COVID-19 and FLU A/B PCR...[[id number]]  Normal              Final result                 Please view results for these tests on the individual orders.    COVID-19 and FLU A/B PCR - Swab, Nasopharynx [[id number]]  (Normal) Collected: 08/04/22 1857    Lab Status: Final result Specimen: Swab from Nasopharynx Updated: 08/04/22 1930     COVID19 Not Detected     Influenza A PCR Not Detected     Influenza B PCR Not Detected    Narrative:      Fact sheet for providers: https://www.fda.gov/media/799762/download    Fact sheet for patients: https://www.fda.gov/media/258773/download    Test performed by PCR.          MRI Angiogram Head Without Contrast    Result Date: 8/4/2022   DATE OF EXAM: 8/4/2022 5:50 PM  PROCEDURE: MRI ANGIOGRAM HEAD WO CONTRAST-, MRI ANGIOGRAM NECK WO CONTRAST-  INDICATIONS: BLE weakness (R new), speech difficulty  COMPARISON: MRI brain without contrast 08/04/2022  TECHNIQUE: MR  angiography of the neck and head was performed without contrast using time-of-flight technique. 3-D multiplanar reformats are generated.  FINDINGS: Neck: There is normal flow within the distal right and left common carotid arteries. There is normal flow within the left internal and external carotid arteries. The left ICA is patent distally to the carotid terminus. The right internal and external carotid arteries are patent. The right carotid artery is patent to the carotid terminus. No high-grade carotid stenosis by NASCET criteria. There is normal flow within the bilateral vertebral arteries which are codominant.  Head: There is normal flow within the bilateral middle cerebral arteries. There is a common left A1 segment supplying the left and right anterior cerebral artery A2 segments. The distal vertebral arteries are patent. The basilar artery is patent. Both superior cerebellar arteries are patent. The posterior cerebral arteries are patent bilaterally. There is no large vessel intracranial occlusion. No significant aneurysm. Brain parenchyma better assessed on the separately performed brain MRI.      Impression: IMPRESSION : 1. Patent major intracranial vasculature. 2. Patent carotid and vertebral arteries. 3. Variant MAIA anatomy with common left A1 segment.  This report was finalized on 8/4/2022 6:56 PM by Anjum Lan MD.      MRI Angiogram Neck Without Contrast    Result Date: 8/4/2022   DATE OF EXAM: 8/4/2022 5:50 PM  PROCEDURE: MRI ANGIOGRAM HEAD WO CONTRAST-, MRI ANGIOGRAM NECK WO CONTRAST-  INDICATIONS: BLE weakness (R new), speech difficulty  COMPARISON: MRI brain without contrast 08/04/2022  TECHNIQUE: MR angiography of the neck and head was performed without contrast using time-of-flight technique. 3-D multiplanar reformats are generated.  FINDINGS: Neck: There is normal flow within the distal right and left common carotid arteries. There is normal flow within the left internal and external carotid  arteries. The left ICA is patent distally to the carotid terminus. The right internal and external carotid arteries are patent. The right carotid artery is patent to the carotid terminus. No high-grade carotid stenosis by NASCET criteria. There is normal flow within the bilateral vertebral arteries which are codominant.  Head: There is normal flow within the bilateral middle cerebral arteries. There is a common left A1 segment supplying the left and right anterior cerebral artery A2 segments. The distal vertebral arteries are patent. The basilar artery is patent. Both superior cerebellar arteries are patent. The posterior cerebral arteries are patent bilaterally. There is no large vessel intracranial occlusion. No significant aneurysm. Brain parenchyma better assessed on the separately performed brain MRI.      Impression: IMPRESSION : 1. Patent major intracranial vasculature. 2. Patent carotid and vertebral arteries. 3. Variant MAIA anatomy with common left A1 segment.  This report was finalized on 8/4/2022 6:56 PM by Anjum Lan MD.      MRI Brain Without Contrast    Result Date: 8/4/2022  Examination: MRI BRAIN WO CONTRAST-  Date of Exam: 8/4/2022 5:50 PM  Indication: Bilateral lower extremity weakness (right new, left chronic), dysarthria, left upper extremity weakness, and occasional lethargy.  Comparison: CT head without contrast 08/04/2022  Technique: Standard non-contrast MR pulse sequences of the brain were obtained.  Findings: There is no diffusion restriction or findings of acute ischemia. Negative for mass effect or midline shift. The ventricles and cortical sulci appear normally configured for patient age. The posterior fossa is without acute abnormality. There are mild to moderate scattered areas of subcortical and periventricular T2/FLAIR hyperintense signal which are nonspecific but most suggestive of chronic microvascular disease in a patient of this age.  The pituitary gland is normal in size. Disc  desiccation with small posterior disc osteophytes noted in the upper cervical spine at C3-4 and C4-5. The cerebellar tonsils are normally positioned. The mastoid air cells are well-aerated. Paranasal sinuses without fluid level. Globes intact. No retro-orbital abnormality. The major T2 weighted intracranial vascular flow voids are grossly patent.      Impression: 1. Negative for acute ischemia. 2. Mild to moderate white matter findings suggesting changes of chronic microvascular disease.  This report was finalized on 8/4/2022 6:46 PM by Anjum Lan MD.      MRI Lumbar Spine Without Contrast    Result Date: 8/4/2022  Examination: MRI LUMBAR SPINE WO CONTRAST-  Date of Exam: 8/4/2022 5:52 PM  Indication: BLE weakness.  Comparison: MRI lumbar spine ON 07/20/2021  Technique: Standard noncontrast MR pulse sequences of the lumbar spine were obtained.  Findings: There are 5 nonrib-bearing lumbar-type vertebral bodies. Vertebral body heights are maintained. There is no acute fracture. Modic type endplate changes noted with marrow edema at the L2-3 and L3-4 levels. There is no acute fracture. No paraspinal fluid collection. The conus medullaris terminates at the L1 level. Abdominal aorta without aneurysm. Visualized portions of the sacrum are intact.  T12-L1: There is no disc bulge. Negative for facet arthritis. Negative for canal or foraminal stenosis.  L1-2: There is mild superficial disc bulge. Mild bilateral facet arthropathy. Mild central canal stenosis. Mild bilateral foraminal stenosis.  L2-3: There is a moderate diffuse disc bulge superimposed on moderate facet arthritis and mild ligament flavum thickening. Findings results in moderate to severe central canal stenosis with the canal AP diameter narrowed to 5 mm, unchanged from prior study. Moderate bilateral foraminal stenosis.  L3-4: There is disc desiccation with moderate diffuse disc bulge. Moderate facet arthritis and ligamentum flavum thickening. There is  moderate to severe central canal stenosis similar to prior study most pronounced in the left paracentral canal and left lateral recess. The canal AP diameter is narrowed to 5 mm. There is severe left foraminal stenosis with contact of the exiting left L3 nerve root, unchanged. Moderate to severe right foraminal stenosis.  L4-5: There is a mild diffuse disc bulge. Mild bilateral facet arthritis. Mild central canal stenosis. Moderate bilateral foraminal stenosis right greater than left.  L5-S1: There is mild diffuse disc bulge. Mild facet arthritis. Negative for canal stenosis. Mild to moderate bilateral foraminal stenosis.      Impression: 1. Negative for acute osseous abnormality. 2. Multilevel advanced degenerative findings above most pronounced at L2-3 and L3-4 levels with combination of disc disease and facet arthritis contributing to moderate to severe central canal stenosis similar to prior exam from 09/07/2021. 3. Additional multilevel findings above.  This report was finalized on 8/4/2022 7:05 PM by Anjum Lna MD.      XR Chest 1 View    Result Date: 8/4/2022   DATE OF EXAM: 8/4/2022 3:51 PM  PROCEDURE: XR CHEST 1 VW-  INDICATIONS: Weak/Dizzy/AMS triage protocol  COMPARISON: 02/09/2022  TECHNIQUE: Portable chest radiograph.  FINDINGS:  Postsurgical changes of prior sternotomy and CABG. No pneumothorax. No focal consolidation or significant pleural effusion. Mild bibasilar scarring appears stable from the prior study. Osseous structures grossly intact.      Impression: No acute process.  This report was finalized on 8/4/2022 3:58 PM by Anjum Lan MD.      CT Head Without Contrast Stroke Protocol    Result Date: 8/4/2022  DATE OF EXAM: 8/4/2022 4:08 PM  PROCEDURE: CT HEAD WO CONTRAST STROKE PROTOCOL-  INDICATIONS: Neuro deficit, acute, stroke suspected  COMPARISON: Head CT scan 2/10/2022  TECHNIQUE: Routine transaxial and coronal reconstruction images were obtained through the head without the  administration of contrast. Automated exposure control and iterative reconstruction methods were used.  The radiation dose reduction device was turned on for each scan per the ALARA (As Low as Reasonably Achievable) protocol.  FINDINGS: Although this exam was initially ordered as stroke protocol exam, according to the emergency room physician, this is not a true stroke protocol exam, and the patient is not accompanied by the stroke team.  The calvarium appears intact. The included paranasal sinuses, mastoids and middle ear spaces appear clear. No gross abnormalities are seen in the orbits. There is expected degree of generalized cerebral atrophy for the patient's age. There is no evidence of mass or mass effect, acute or old infarct, hemorrhage, hydrocephalus or abnormal extra-axial collection. 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        Impression: Age-appropriate generalized cerebral atrophy. No evidence of acute intracranial disease.  This report was finalized on 8/4/2022 4:49 PM by Dr. Bj Grewal MD.        Results for orders placed during the hospital encounter of 02/09/22    Adult Transthoracic Echo Complete W/ Cont if Necessary Per Protocol    Interpretation Summary  · Estimated left ventricular EF = 55% Left ventricular systolic function is normal.  · Mild aortic valve stenosis is present.  · Aortic valve mean pressure gradient is 13 mmHg.  · Trace to mild mitral valve regurgitation is present  · Mild tricuspid valve regurgitation is present  · Calculated right ventricular systolic pressure from tricuspid regurgitation is 27 mmHg.      I have reviewed the medications:  Scheduled Meds:aspirin, 81 mg, Oral, Daily  aspirin, 325 mg, Oral, Daily   Or  aspirin, 300 mg, Rectal, Daily  atorvastatin, 80 mg, Oral, Nightly  busPIRone, 10 mg, Oral, BID  DULoxetine, 60 mg, Oral, Daily  gabapentin, 300  mg, Oral, TID  insulin lispro, 0-9 Units, Subcutaneous, TID AC  isosorbide mononitrate, 120 mg, Oral, Q24H  multivitamin with minerals, 1 tablet, Oral, Daily  mycophenolate, 1,000 mg, Oral, Q12H  pantoprazole, 40 mg, Oral, QAM  pentoxifylline, 400 mg, Oral, Nightly  pimecrolimus, , Topical, Q12H  ranolazine, 1,000 mg, Oral, BID  senna-docusate sodium, 2 tablet, Oral, BID  sodium chloride, 10 mL, Intravenous, Q12H  sodium chloride, 10 mL, Intravenous, Q12H  tacrolimus, 0.5 mg, Oral, Q12H      Continuous Infusions:   PRN Meds:.•  acetaminophen **OR** acetaminophen **OR** acetaminophen  •  senna-docusate sodium **AND** polyethylene glycol **AND** bisacodyl **AND** bisacodyl  •  calcium carbonate  •  dextrose  •  dextrose  •  glucagon (human recombinant)  •  oxyCODONE-acetaminophen  •  sodium chloride  •  sodium chloride  •  sodium chloride    Assessment & Plan   Assessment & Plan     Active Hospital Problems    Diagnosis  POA   • Transient ischemic attack (TIA) [G45.9]  No   • Bradycardia [R00.1]  Unknown   • Weakness [R53.1]  Unknown   • Dysarthria [R47.1]  Unknown   • Degenerative lumbar spinal stenosis [M48.061]  Yes   • 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        Brief Hospital Course to date:  Mahendra Yates is a 67 y.o. male with B LE weakness, knee pain    B LE weakness/knee pain  -MRI without ischemia  -stroke/neurology evaluation  -B Knee films    Hx of lumbar stenosis    Hx of liver transplant    Hx of CAD s/p CABG  -elevated troponin, but EKG stable, chronically elevated but higher and intermittently symptomatic at home  -states he was offered repeat LHC in past  -consult cardiology    Bradycardia, 1st degree AV block  -BB stopped in past, known issue    DM  -SSI    Mood  -buspar/cymbalta    Expected Discharge  Location and Transportation: Home   Expected Discharge Date: 8/5-6    DVT prophylaxis:  Mechanical DVT prophylaxis orders are present.          CODE STATUS:   Code Status and Medical Interventions:   Ordered at: 08/04/22 2003     Level Of Support Discussed With:    Patient     Code Status (Patient has no pulse and is not breathing):    CPR (Attempt to Resuscitate)     Medical Interventions (Patient has pulse or is breathing):    Full Support       Bj Schuler MD  08/05/22

## 2022-08-06 ENCOUNTER — READMISSION MANAGEMENT (OUTPATIENT)
Dept: CALL CENTER | Facility: HOSPITAL | Age: 67
End: 2022-08-06

## 2022-08-06 VITALS
WEIGHT: 201 LBS | RESPIRATION RATE: 18 BRPM | HEART RATE: 65 BPM | SYSTOLIC BLOOD PRESSURE: 151 MMHG | BODY MASS INDEX: 27.22 KG/M2 | TEMPERATURE: 98.1 F | HEIGHT: 72 IN | OXYGEN SATURATION: 97 % | DIASTOLIC BLOOD PRESSURE: 91 MMHG

## 2022-08-06 LAB — GLUCOSE BLDC GLUCOMTR-MCNC: 108 MG/DL (ref 70–130)

## 2022-08-06 PROCEDURE — 99217 PR OBSERVATION CARE DISCHARGE MANAGEMENT: CPT | Performed by: HOSPITALIST

## 2022-08-06 PROCEDURE — 63710000001 MYCOPHENOLATE MOFETIL PER 250 MG: Performed by: INTERNAL MEDICINE

## 2022-08-06 PROCEDURE — 97166 OT EVAL MOD COMPLEX 45 MIN: CPT

## 2022-08-06 PROCEDURE — 97162 PT EVAL MOD COMPLEX 30 MIN: CPT

## 2022-08-06 PROCEDURE — 99213 OFFICE O/P EST LOW 20 MIN: CPT | Performed by: PSYCHIATRY & NEUROLOGY

## 2022-08-06 PROCEDURE — 82962 GLUCOSE BLOOD TEST: CPT

## 2022-08-06 PROCEDURE — 63710000001 TACROLIMUS PER 1 MG: Performed by: INTERNAL MEDICINE

## 2022-08-06 PROCEDURE — G0378 HOSPITAL OBSERVATION PER HR: HCPCS

## 2022-08-06 RX ORDER — DILTIAZEM HYDROCHLORIDE 240 MG/1
240 CAPSULE, COATED, EXTENDED RELEASE ORAL DAILY
Qty: 90 CAPSULE | Refills: 3 | Status: SHIPPED | OUTPATIENT
Start: 2022-08-06 | End: 2022-11-21

## 2022-08-06 RX ADMIN — SENNOSIDES AND DOCUSATE SODIUM 2 TABLET: 50; 8.6 TABLET ORAL at 08:27

## 2022-08-06 RX ADMIN — PANTOPRAZOLE SODIUM 40 MG: 40 TABLET, DELAYED RELEASE ORAL at 06:16

## 2022-08-06 RX ADMIN — ASPIRIN 81 MG: 81 TABLET, COATED ORAL at 08:27

## 2022-08-06 RX ADMIN — Medication 10 ML: at 08:29

## 2022-08-06 RX ADMIN — RANOLAZINE 1000 MG: 500 TABLET, EXTENDED RELEASE ORAL at 08:28

## 2022-08-06 RX ADMIN — GABAPENTIN 300 MG: 300 CAPSULE ORAL at 08:27

## 2022-08-06 RX ADMIN — ISOSORBIDE MONONITRATE 120 MG: 60 TABLET, EXTENDED RELEASE ORAL at 08:27

## 2022-08-06 RX ADMIN — BUSPIRONE HYDROCHLORIDE 10 MG: 10 TABLET ORAL at 08:27

## 2022-08-06 RX ADMIN — TACROLIMUS 0.5 MG: 0.5 CAPSULE ORAL at 08:28

## 2022-08-06 RX ADMIN — MULTIPLE VITAMINS W/ MINERALS TAB 1 TABLET: TAB at 08:27

## 2022-08-06 RX ADMIN — OXYCODONE HYDROCHLORIDE AND ACETAMINOPHEN 1 TABLET: 10; 325 TABLET ORAL at 06:16

## 2022-08-06 RX ADMIN — MYCOPHENOLATE MOFETIL 1000 MG: 250 CAPSULE ORAL at 08:29

## 2022-08-06 RX ADMIN — PIMECROLIMUS: 10 CREAM TOPICAL at 08:35

## 2022-08-06 RX ADMIN — DULOXETINE HYDROCHLORIDE 60 MG: 60 CAPSULE, DELAYED RELEASE ORAL at 08:27

## 2022-08-06 NOTE — PLAN OF CARE
Problem: Adult Inpatient Plan of Care  Goal: Plan of Care Review  Recent Flowsheet Documentation  Taken 8/6/2022 0750 by Mani Kaiser, OT  Progress: improving  Plan of Care Reviewed With:   patient   spouse  Outcome Evaluation:   VSS   PT presents at/near fxl baseline of Mod Ind/Min A with ADLs, supervision with fxl mobility for ADLs. Pt uses rollator at home and states height is too low - may benefit from FWW to maintain full upright posture with in home ambulation. No AE needs identified. No further acute skilled OT services indicated at this time, Recommend home with spouse at discharge.   Goal Outcome Evaluation:  Plan of Care Reviewed With: patient, spouse        Progress: improving  Outcome Evaluation: VSS; PT presents at/near fxl baseline of Mod Ind/Min A with ADLs, supervision with fxl mobility for ADLs. Pt uses rollator at home and states height is too low - may benefit from FWW to maintain full upright posture with in home ambulation. No AE needs identified. No further acute skilled OT services indicated at this time, Recommend home with spouse at discharge.

## 2022-08-06 NOTE — OUTREACH NOTE
Prep Survey    Flowsheet Row Responses   [facility]ond[facility]ational facility patient discharged from? [facility]   Is LACE score < 7 ? No   Emergency Room discharge w/ pulse ox? No   Eligibility TCM   Date of Admission 08/04/22   Date of Discharge 08/06/22   Discharge Disposition Home or Self Care   Discharge diagnosis Transient ischemic attack    Does the patient have one of the following disease processes/diagnoses(primary or secondary)? Stroke (TIA)   Does the patient have Home health ordered? No   Is there a DME ordered? No   Prep survey completed? Yes          ADDI GUTIERREZ - Registered Nurse

## 2022-08-06 NOTE — PROGRESS NOTES
Neurology       Patient Care Team:  Rodríguez Lopez DO as PCP - General (Internal Medicine)  Natanael Estrella MD as Consulting Physician (Neurology)  Oren Pena MD as Consulting Physician (Cardiology)  Everardo Tate MD as Consulting Physician (Pain Medicine)  Scout Grant MD as Consulting Physician (Otolaryngology)  Christin Handley PA-C as Physician Assistant (Physician Assistant)  Radha Briggs APRN as Nurse Practitioner (Nurse Practitioner)  April Dillon MD as Consulting Physician (Cardiology)    Chief complaint: Trouble walk    History: Patient did well with physical therapy able to walk 200 feet with his walker.    He has trouble with a foot drop on the left.      Past Medical History:   Diagnosis Date   • Arthritis    • Atherosclerosis    • B12 deficiency    • Cancer (HCC)    • Cancer of liver (HCC)    • Chronic headaches    • Coronary artery disease    • Diabetes mellitus (HCC)    • Gastroesophageal reflux disease    • History of transfusion    • Hyperlipidemia    • Hypertension    • Myocardial infarction (HCC)    • Sleep apnea    • Stroke (HCC)        Vital Signs   Vitals:    08/06/22 0246 08/06/22 0400 08/06/22 0600 08/06/22 0730   BP: 161/72   151/91   BP Location: Right arm   Right arm   Patient Position: Lying   Lying   Pulse: 71 68 76 65   Resp: 16   18   Temp: 97.9 °F (36.6 °C)   98.1 °F (36.7 °C)   TempSrc: Oral   Oral   SpO2: 97% 97% 100% 97%   Weight:       Height:           Physical Exam:   General: Pleasant and alert              Neuro: Oriented to person place and time.    Sitting up in bed feeling well.        Results Review:  PT results reviewed  Results from last 7 days   Lab Units 08/05/22  0242   WBC 10*3/mm3 8.93   HEMOGLOBIN g/dL 12.8*   HEMATOCRIT % 36.9*   PLATELETS 10*3/mm3 266     Results from last 7 days   Lab Units 08/05/22  0242 08/04/22  1517   SODIUM mmol/L 138 136   POTASSIUM mmol/L 4.2 4.8   CHLORIDE mmol/L 100 99   CO2 mmol/L 28.0  29.0   BUN mg/dL 32* 30*   CREATININE mg/dL 1.39* 1.24   CALCIUM mg/dL 9.5 9.5   BILIRUBIN mg/dL  --  0.4   ALK PHOS U/L  --  78   ALT (SGPT) U/L  --  13   AST (SGOT) U/L  --  19   GLUCOSE mg/dL 112* 129*       Imaging Results (Last 24 Hours)     Procedure Component Value Units Date/Time    MRI Cervical Spine Without Contrast [[id number]] Collected: 08/05/22 1402     Updated: 08/05/22 1410    Narrative:      DATE OF EXAM: 8/5/2022 1:01 PM     PROCEDURE: MRI CERVICAL SPINE WO CONTRAST-     INDICATIONS: C7 radiculopathy; R41.82-Altered mental status,  unspecified; R47.9-Unspecified speech disturbances; R29.898-Other  symptoms and signs involving the musculoskeletal system; R77.8-Other  specified abnormalities of plasma proteins; R41.841-Cognitive  communication deficit     COMPARISON: 8/23/2016     TECHNIQUE: Routine magnetic resonance imaging of the cervical spine was  performed without administration of contrast.     FINDINGS:   Minimally edematous marrow endplate changes are present at C4-5 and  C7-T1. Marrow signal is otherwise preserved, without evidence of  fracture or suspicious marrow replacing lesion. Some mild straightening  is present, otherwise without significant listhesis or subluxation. The  axial T2 sequences are significantly degraded by patient motion. There  is no evidence of gross focal cord signal abnormality. The paraspinal  soft tissues are unremarkable. The craniocervical junction appears  satisfactory. Multilevel spondylosis change is present, with areas of  involvement noted including     C2-3, bilateral facet arthropathy with mild-to-moderate bilateral  neuroforaminal narrowing.     C3-4, disc osteophyte complex and bilateral facet arthropathy. There is  severe spinal canal narrowing and moderate to severe bilateral  neuroforaminal stenosis.     C4-5, disc osteophyte complex and bilateral facet arthropathy. There is  severe spinal canal and bilateral neuroforaminal narrowing present.      C5-6, disc osteophyte complex, eccentric to the right, with likely  contacting of the right C5 nerve root in addition to moderate to severe  spinal canal narrowing. There is severe right and moderate to severe  left neuroforaminal stenosis.     C6-7, disc osteophyte complex and bilateral facet arthropathy. There is  moderate to severe spinal canal narrowing and severe bilateral  neuroforaminal stenosis.     C7-T1, disc osteophyte complex and bilateral facet arthropathy with  moderate to severe spinal canal and bilateral neuroforaminal narrowing       Impression:      Multilevel advanced cervical spondylosis is present. There is severe  spinal canal and bilateral neuroforaminal narrowing present at multiple  levels. There is no definite focal cervical cord signal abnormality.  Findings haven't significantly progressed since 2016 comparison.     This report was finalized on 8/5/2022 2:07 PM by Serafin Strange.             Assessment:  Bilateral L5 radiculopathy left greater than right with foot drop.    Neurogenic claudication.    C7 radiculopathy.        Plan:  Okay to discharge.    I suggested hightop tennis shoe and if that does not hold his ankle support for sufficiently to keep it from tripping, and ankle-foot orthosis would be recommended.        Comment:  The patient is improved dramatically from admission with minimal intervention.    He is to see Dr. Dillon with Dr. Sanz in the next several weeks to make some decisions about what to do about his back.    Clear that he has risks of worsening spine disease versus the risk of surgery with severe heart condition.         I discussed the patients findings and my recommendations with patient and family    Saurav Palm MD  08/06/22  11:54 EDT

## 2022-08-06 NOTE — THERAPY DISCHARGE NOTE
Acute Care - Occupational Therapy Discharge  [facility]    Patient Name: Mahendra Yates  : 1955    MRN: [id number]                              Today's Date: 2022       Admit Date: 2022    Visit Dx:     ICD-10-CM ICD-9-CM   1. Altered mental status, unspecified altered mental status type  R41.82 780.97   2. Difficulty with speech  R47.9 784.59   3. Weakness of both lower extremities  R29.898 729.89   4. Elevated troponin  R77.8 790.6   5. 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)   • Hx of CABG   • History of liver transplant (HCC)   • Physical deconditioning   • Lumbar stenosis with neurogenic claudication   • Herniated lumbar intervertebral disc   • Radicular syndrome of right leg   • Degenerative lumbar spinal stenosis   • Bowel and bladder incontinence   • Closed fracture of multiple ribs of left side   • Acute congestive heart failure (HCC)   • Bradycardia   • Anemia   • Hypoxia   • Unstable angina (HCC)   • Altered mental status, unspecified altered mental status type   • Transient ischemic attack (TIA)   • Bradycardia   • Weakness   • Dysarthria     Past Medical History:   Diagnosis Date   • Arthritis    • Atherosclerosis    • B12 deficiency    • Cancer (HCC)    • Cancer of liver (HCC)    • Chronic headaches    • Coronary artery disease    • Diabetes mellitus (HCC)    • Gastroesophageal reflux disease    • History of transfusion    • Hyperlipidemia    • Hypertension    • Myocardial infarction (HCC)    • Sleep apnea    • Stroke (HCC)      Past Surgical  History:   Procedure Laterality Date   • ANGIOPLASTY     • CARDIAC SURGERY     • CARPAL TUNNEL RELEASE Right 2/8/2017    Procedure: CARPAL TUNNEL RELEASE RIGHT ;  Surgeon: Luis Hayden MD;  Location: [facility];  Service:    • CHOLECYSTECTOMY     • CORONARY ARTERY BYPASS GRAFT     • CORONARY STENT PLACEMENT      x2   • LIVER TRANSPLANTATION     • TUMOR REMOVAL Right     axilla      General Information     Row Name 08/06/22 0750          OT Time and Intention    Document Type discharge evaluation/summary  -TA     Mode of Treatment occupational therapy  -TA     Row Name 08/06/22 0750          General Information    Patient Profile Reviewed yes  -TA     Prior Level of Function independent:;all household mobility;gait;transfer;bed mobility;min assist:;ADL's;dependent:;home management;cooking  -TA     Existing Precautions/Restrictions fall  -TA     Barriers to Rehab previous functional deficit  -TA     Row Name 08/06/22 0750          Occupational Profile    Reason for Services/Referral (Occupational Profile) fxl decline from PLOF  -TA     Patient Goals (Occupational Profile) Return home  -TA     Row Name 08/06/22 0750          Living Environment    People in Home spouse  -TA     Row Name 08/06/22 [facility]0          Home Main Entrance    Number of Stairs, Main Entrance none  -TA     Row Name 08/06/22 0750          Cognition    Orientation Status (Cognition) oriented x 4  -TA     Row Name 08/06/22 0750          Safety Issues, Functional Mobility    Safety Issues Affecting Function (Mobility) safety precautions follow-through/compliance  -TA     Impairments Affecting Function (Mobility) endurance/activity tolerance  -TA           User Key  (r) = Recorded By, (t) = Taken By, (c) = Cosigned By    Initials Name Provider Type    TA Mani Kaiser OT Occupational Therapist               Mobility/ADL's     Row Name 08/06/22 0750          Bed Mobility    Bed Mobility supine-sit;sit-supine;scooting/bridging  -TA      Scooting/Bridging [place] (Bed Mobility) independent  -TA     Supine-Sit [place] (Bed Mobility) independent  -TA     Sit-Supine [place] (Bed Mobility) independent  -TA     Row Name 08/06/22 0750          Transfers    Transfers sit-stand transfer;toilet transfer  -TA     Sit-Stand [place] (Transfers) supervision  -TA     [place] Level (Toilet Transfer) supervision  -TA     Assistive Device (Toilet Transfer) grab bars/safety frame;walker, front-wheeled  Ind with safe hand placement  -TA     Row Name 08/06/22 0750          Sit-Stand Transfer    Assistive Device (Sit-Stand Transfers) walker, front-wheeled  -TA     Comment, (Sit-Stand Transfer) Pt uses rollator at home, reports it is too low; may benefit from front wheeled wx for improved posture  -TA     Row Name 08/06/22 0750          Toilet Transfer    Type (Toilet Transfer) sit-stand;stand-sit  -TA     Row Name 08/06/22 0750          Functional Mobility    Functional Mobility- Ind. Level supervision required  -TA     Functional Mobility- Device walker, front-wheeled  -TA     Functional Mobility-Distance (Feet) 35  -TA     Functional Mobility- Safety Issues weight-shifting ability decreased  -TA     Functional Mobility- Comment Garrett foot drop, orthotics not in room; pt able to adapt step pattern  -TA     Row Name 08/06/22 0750          Activities of Daily Living    BADL Assessment/Intervention lower body dressing;toileting  -TA     Row Name 08/06/22 0750          Lower Body Dressing Assessment/Training    [place] Level (Lower Body Dressing) don;doff;shoes/slippers;set up;modified independence  -TA     Position (Lower Body Dressing) edge of bed sitting  -TA     Row Name 08/06/22 0750          Toileting Assessment/Training    [place] Level (Toileting) adjust/manage clothing;perform perineal hygiene;supervision  -TA     Assistive Devices (Toileting) grab bar/safety frame  -TA     Position (Toileting) unsupported sitting  -TA     Comment,  (Toileting) simulation  -TA           User Key  (r) = Recorded By, (t) = Taken By, (c) = Cosigned By    Initials Name Provider Type    Mani Patel OT Occupational Therapist               Obj/Interventions     Row Name 08/06/22 [facility]0          Sensory Assessment (Somatosensory)    Sensory Assessment (Somatosensory) bilateral UE;sensation intact  -TA     [facility] Name 08/06/22 0750          Vision Assessment/Intervention    Visual Impairment/Limitations WFL;corrective lenses for reading  -TA     Row Name 08/06/22 [facility]0          Range of Motion Comprehensive    General Range of Motion bilateral upper extremity ROM WFL  -TA     [facility] Name 08/06/22 [facility]0          Strength Comprehensive (MMT)    General Manual Muscle Testing (MMT) Assessment no strength deficits identified  -TA     Comment, General Manual Muscle Testing (MMT) Assessment BUE 5/5  -TA     [facility] Name 08/06/22 [facility]0          Balance    Balance Assessment sitting dynamic balance;standing dynamic balance  -TA     Dynamic Sitting Balance independent  -TA     Position, Sitting Balance sitting edge of bed  -TA     Dynamic Standing Balance supervision  -TA     Position/Device Used, Standing Balance walker, front-wheeled  -TA     Balance Interventions sit to stand;occupation based/functional task;weight shifting activity;UE activity with balance activity  -TA           User Key  (r) = Recorded By, (t) = Taken By, (c) = Cosigned By    Initials Name Provider Type    Mani Patel OT Occupational Therapist               Goals/Plan     Row Name 08/06/22 [facility]0          Therapy Assessment/Plan (OT)    Planned Therapy Interventions (OT) patient/caregiver education/training  -TA           User Key  (r) = Recorded By, (t) = Taken By, (c) = Cosigned By    Initials Name Provider Type    Mani Patel OT Occupational Therapist               Clinical Impression     Row Name 08/06/22 [facility]0          Pain Assessment    Pretreatment Pain Rating 0/10 - no pain  -TA      Posttreatment Pain Rating 0/10 - no pain  -TA     Pre/Posttreatment Pain Comment Pt denied pain, tolerated  -TA     Pain Intervention(s) Ambulation/increased activity;Repositioned  -TA     Row Name 08/06/22 0750          Plan of Care Review    Plan of Care Reviewed With patient;spouse  -TA     Progress improving  -TA     Outcome Evaluation VSS; PT presents at/near fxl baseline of Mod Ind/Min A with ADLs, supervision with fxl mobility for ADLs. Pt uses rollator at home and states height is too low - may benefit from FWW to maintain full upright posture with in home ambulation. No AE needs identified. No further acute skilled OT services indicated at this time, Recommend home with spouse at discharge.  -TA     Row Name 08/06/22 0750          Therapy Assessment/Plan (OT)    Patient/Family Therapy Goal Statement (OT) Return home  -TA     Criteria for Skilled Therapeutic Interventions Met (OT) no;no problems identified which require skilled intervention  -TA     Therapy Frequency (OT) evaluation only  -TA     Predicted Duration of Therapy Intervention (OT) 1 day  -TA     Row Name 08/06/22 0750          Therapy Plan Review/Discharge Plan (OT)    Equipment Needs Upon Discharge (OT) walker, rolling  -TA     Anticipated Discharge Disposition (OT) home with assist  -TA     Row Name 08/06/22 0750          Vital Signs    Pre Systolic BP Rehab --  VSS; RN cleared pt for tx  -TA     O2 Delivery Pre Treatment room air  -TA     O2 Delivery Intra Treatment room air  -TA     O2 Delivery Post Treatment room air  -TA     Pre Patient Position Supine  -TA     Intra Patient Position Standing  -TA     Post Patient Position Supine  -TA     Row Name 08/06/22 0750          Positioning and Restraints    Pre-Treatment Position in bed  -TA     Post Treatment Position bed  -TA     In Bed notified nsg;fowlers;call light within reach;encouraged to call for assist;exit alarm on;with family/caregiver;with nsg;side rails up x2;legs elevated  -TA            User Key  (r) = Recorded By, (t) = Taken By, (c) = Cosigned By    Initials Name Provider Type    Mani Patel OT Occupational Therapist               Outcome Measures     Row Name 08/06/22 0750          How much help from another is currently needed...    Putting on and taking off regular lower body clothing? 3  -TA     Bathing (including washing, rinsing, and drying) 3  -TA     Toileting (which includes using toilet bed pan or urinal) 4  -TA     Putting on and taking off regular upper body clothing 4  -TA     Taking care of personal grooming (such as brushing teeth) 4  -TA     Eating meals 4  -TA     AM-PAC 6 Clicks Score (OT) 22  -TA     Row Name 08/06/22 0750          Functional Assessment    Outcome Measure Options AM-PAC 6 Clicks Daily Activity (OT)  -TA           User Key  (r) = Recorded By, (t) = Taken By, (c) = Cosigned By    Initials Name Provider Type    Mani Patel OT Occupational Therapist              Occupational Therapy Education                 Title: PT OT SLP Therapies (In Progress)     Topic: Occupational Therapy (In Progress)     Point: ADL training (Done)     Description:   Instruct learner(s) on proper safety adaptation and remediation techniques during self care or transfers.   Instruct in proper use of assistive devices.              Learning Progress Summary           Patient Acceptance, E, VU by TA at 8/6/2022 0844   Significant Other Acceptance, E, VU by TA at 8/6/2022 0844                   Point: Home exercise program (Not Started)     Description:   Instruct learner(s) on appropriate technique for monitoring, assisting and/or progressing therapeutic exercises/activities.              Learner Progress:  Not documented in this visit.          Point: Precautions (Done)     Description:   Instruct learner(s) on prescribed precautions during self-care and functional transfers.              Learning Progress Summary           Patient Acceptance, E, VU by TA at  8/6/2022 0844   Significant Other Acceptance, E, VU by TA at 8/6/2022 0844                   Point: Body mechanics (Done)     Description:   Instruct learner(s) on proper positioning and spine alignment during self-care, functional mobility activities and/or exercises.              Learning Progress Summary           Patient Acceptance, E, VU by TA at 8/6/2022 0844   Significant Other Acceptance, E, VU by TA at 8/6/2022 0844                               User Key     Initials Effective Dates Name Provider Type Discipline     06/16/21 -  Mani Kaiser OT Occupational Therapist OT              OT Recommendation and Plan  Planned Therapy Interventions (OT): patient/caregiver education/training  Therapy Frequency (OT): evaluation only  Plan of Care Review  Plan of Care Reviewed With: patient, spouse  Progress: improving  Outcome Evaluation: VSS; PT presents at/near fxl baseline of Mod Ind/Min A with ADLs, supervision with fxl mobility for ADLs. Pt uses rollator at home and states height is too low - may benefit from FWW to maintain full upright posture with in home ambulation. No AE needs identified. No further acute skilled OT services indicated at this time, Recommend home with spouse at discharge.  Plan of Care Reviewed With: patient, spouse  Outcome Evaluation: VSS; PT presents at/near fxl baseline of Mod Ind/Min A with ADLs, supervision with fxl mobility for ADLs. Pt uses rollator at home and states height is too low - may benefit from FWW to maintain full upright posture with in home ambulation. No AE needs identified. No further acute skilled OT services indicated at this time, Recommend home with spouse at discharge.     Time Calculation:    Time Calculation- OT     Row Name 08/06/22 0750             Time Calculation- OT    OT Start Time 0750  ttc 0 minutes  -TA      Total Timed Code Minutes- OT 0 minute(s)  -TA      OT Received On 08/06/22  -TA              Untimed Charges    OT Eval/Re-eval Minutes 50   -TA              Total Minutes    Untimed Charges Total Minutes 50  -TA       Total Minutes 50  -TA            User Key  (r) = Recorded By, (t) = Taken By, (c) = Cosigned By    Initials Name Provider Type    Mani Patel OT Occupational Therapist              Therapy Charges for Today     Code Description Service Date Service Provider Modifiers Qty    [id number]  OT EVAL MOD COMPLEXITY 4 8/6/2022 Mani Kaiser OT GO 1             OT Discharge Summary  Anticipated Discharge Disposition (OT): home with assist  Reason for Discharge: At baseline function  Discharge Destination: Home with assist    Mani aKiser OT  8/6/2022

## 2022-08-06 NOTE — PLAN OF CARE
Goal Outcome Evaluation:  Plan of Care Reviewed With: patient, spouse        Progress: no change  Outcome Evaluation: PT evaluation completed. Pt presents with decreased functional mobility, decreased independence, and decreased strength. Pt ambulated 200ft with CGA and RW for support, pt demo B foot drop (L>R) and decreased endurance with activity. Pt demo good safety awareness and understanding of problems. Recommend continumed skilled IP PT interventions. Recommend D/C home with assist and HHPT. Recommend DME of RW for pt.

## 2022-08-06 NOTE — PLAN OF CARE
Goal Outcome Evaluation: Pt has remained free from falls, infection and further skin injury and precautions remain in place to prevent the above listed. The patient's pain has been much better controlled with pain regimen and has improved mobility in patient's BLE's, per pt report. Pt complains of constipation and was given miralax and sennosides yesterday to improve reported constipation. Neuro status has remained consistently unchanged. Pt is adamant about leaving after physical therapy assesses the patient. Sujata Negro RN

## 2022-08-06 NOTE — THERAPY EVALUATION
Patient Name: Mahendra Yates  : 1955    MRN: [id number]                              Today's Date: 2022       Admit Date: 2022    Visit Dx:     ICD-10-CM ICD-9-CM   1. Altered mental status, unspecified altered mental status type  R41.82 780.97   2. Difficulty with speech  R47.9 784.59   3. Weakness of both lower extremities  R29.898 729.89   4. Elevated troponin  R77.8 790.6   5. 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)   • Hx of CABG   • History of liver transplant (HCC)   • Physical deconditioning   • Lumbar stenosis with neurogenic claudication   • Herniated lumbar intervertebral disc   • Radicular syndrome of right leg   • Degenerative lumbar spinal stenosis   • Bowel and bladder incontinence   • Closed fracture of multiple ribs of left side   • Acute congestive heart failure (HCC)   • Bradycardia   • Anemia   • Hypoxia   • Unstable angina (HCC)   • Altered mental status, unspecified altered mental status type   • Transient ischemic attack (TIA)   • Bradycardia   • Weakness   • Dysarthria     Past Medical History:   Diagnosis Date   • Arthritis    • Atherosclerosis    • B12 deficiency    • Cancer (HCC)    • Cancer of liver (HCC)    • Chronic headaches    • Coronary artery disease    • Diabetes mellitus (HCC)    • Gastroesophageal reflux disease    • History of transfusion    • Hyperlipidemia    • Hypertension    • Myocardial infarction (HCC)    • Sleep apnea    • Stroke (HCC)      Past Surgical History:   Procedure Laterality Date   • ANGIOPLASTY     •  CARDIAC SURGERY     • CARPAL TUNNEL RELEASE Right 2/8/2017    Procedure: CARPAL TUNNEL RELEASE RIGHT ;  Surgeon: Luis Hayden MD;  Location: [facility];  Service:    • CHOLECYSTECTOMY     • CORONARY ARTERY BYPASS GRAFT     • CORONARY STENT PLACEMENT      x2   • LIVER TRANSPLANTATION     • TUMOR REMOVAL Right     axilla      General Information     Row Name 08/06/22 0907          Physical Therapy Time and Intention    Document Type evaluation  -AE     Mode of Treatment physical therapy  -AE     Row Name 08/06/22 0907          General Information    Patient Profile Reviewed yes  -AE     Prior Level of Function independent:;all household mobility;gait;transfer;bed mobility;min assist:;ADL's;dependent:;home management;cooking  -AE     Existing Precautions/Restrictions fall  -AE     Barriers to Rehab previous functional deficit;medically complex  -AE     Row Name 08/06/22 0907          Living Environment    People in Home spouse  -AE     Row Name 08/06/22 0907          Home Main Entrance    Number of Stairs, Main Entrance none  -AE     Stair Railings, Main Entrance none  -AE     Row Name 08/06/22 0907          Stairs Within Home, Primary    Number of Stairs, Within Home, Primary none  -AE     Stair Railings, Within Home, Primary none  -AE     Row Name 08/06/22 0907          Cognition    Orientation Status (Cognition) oriented x 4  -AE     Row Name 08/06/22 0907          Safety Issues, Functional Mobility    Safety Issues Affecting Function (Mobility) awareness of need for assistance;insight into deficits/self-awareness;safety precaution awareness  -AE     Impairments Affecting Function (Mobility) balance;endurance/activity tolerance;pain;postural/trunk control;strength  -AE           User Key  (r) = Recorded By, (t) = Taken By, (c) = Cosigned By    Initials Name Provider Type    AE Jay Jay Blanco PT Physical Therapist               Mobility     Row Name 08/06/22 0908          Bed Mobility    Bed Mobility  scooting/bridging;supine-sit;sit-supine  -AE     Scooting/Bridging [place] (Bed Mobility) independent  -AE     Supine-Sit [place] (Bed Mobility) independent  -AE     Sit-Supine [place] (Bed Mobility) independent  -AE     Assistive Device (Bed Mobility) bed rails;head of bed elevated  -AE     Comment, (Bed Mobility) Pt required no VCs and demo good sequencing of bed mobility.  -AE     Row Name 08/06/22 0908          Transfers    Comment, (Transfers) VCs for hand placement and sequencing, pt required 2 attempts to complete STS from EOB.  -AE     Row Name 08/06/22 0908          Sit-Stand Transfer    Sit-Stand [place] (Transfers) contact guard  -AE     Assistive Device (Sit-Stand Transfers) walker, front-wheeled  -AE     Row Name 08/06/22 0908          Gait/Stairs (Locomotion)    [place] Level (Gait) contact guard  -AE     Assistive Device (Gait) walker, front-wheeled  -AE     Distance in Feet (Gait) 200  -AE     Deviations/Abnormal Patterns (Gait) bilateral deviations;base of support, narrow;sukhdev decreased;gait speed decreased;stride length decreased  -AE     Bilateral Gait Deviations forward flexed posture;heel strike decreased  -AE     Left Sided Gait Deviations foot drop/toe drag  -AE     Right Sided Gait Deviations foot drop/toe drag  -AE     Comment, (Gait/Stairs) Pt demo step through gait pattern with slowed sukhdev, foot drop bilaterally (L>R), and forward flexed posture. Pt noted ot have slow, deliberate pace but pt reports this is baseline mobility. Pt reports owning a rollator at home but it is too low for him, pt would benefit from RW.  -AE           User Key  (r) = Recorded By, (t) = Taken By, (c) = Cosigned By    Initials Name Provider Type    AE Jay Jay Blanco, PT Physical Therapist               Obj/Interventions     Row Name 08/06/22 0916          Range of Motion Comprehensive    Comment, General Range of Motion PROM WFL, pt unable to AROM DF L ankle d/t foot drop.  -AE      Row Name 08/06/22 0916          Strength Comprehensive (MMT)    General Manual Muscle Testing (MMT) Assessment lower extremity strength deficits identified  -AE     Comment, General Manual Muscle Testing (MMT) Assessment L ankle DF 0/5, R ankle DF 3/5, otherwise BLE 4/5  -AE     Row Name 08/06/22 0916          Balance    Balance Assessment sitting static balance;sitting dynamic balance;sit to stand dynamic balance;standing static balance;standing dynamic balance  -AE     Static Sitting Balance independent  -AE     Dynamic Sitting Balance independent  -AE     Position, Sitting Balance unsupported;sitting edge of bed  -AE     Sit to Stand Dynamic Balance contact guard  -AE     Static Standing Balance contact guard  -AE     Dynamic Standing Balance contact guard  -AE     Position/Device Used, Standing Balance supported;walker, rolling  -AE     Row Name 08/06/22 0916          Sensory Assessment (Somatosensory)    Sensory Assessment (Somatosensory) left LE;right LE  -AE     Left LE Sensory Assessment impaired  painful to touch of foot and knee  -AE     Right LE Sensory Assessment impaired;light touch awareness  foot  -AE           User Key  (r) = Recorded By, (t) = Taken By, (c) = Cosigned By    Initials Name Provider Type    AE Jay Jay Blanco, PT Physical Therapist               Goals/Plan     Row Name 08/06/22 0924          Bed Mobility Goal 1 (PT)    Activity/Assistive Device (Bed Mobility Goal 1, PT) sit to supine/supine to sit  -AE     [place] Level/Cues Needed (Bed Mobility Goal 1, PT) independent  -AE     Time Frame (Bed Mobility Goal 1, PT) long term goal (LTG);10 days  -AE     Progress/Outcomes (Bed Mobility Goal 1, PT) goal ongoing  -AE     Row Name 08/06/22 0924          Transfer Goal 1 (PT)    Activity/Assistive Device (Transfer Goal 1, PT) sit-to-stand/stand-to-sit;bed-to-chair/chair-to-bed  -AE     [place] Level/Cues Needed (Transfer Goal 1, PT) modified independence  -AE     Time Frame  (Transfer Goal 1, PT) long term goal (LTG);10 days  -AE     Progress/Outcome (Transfer Goal 1, PT) goal ongoing  -AE     Row Name 08/06/22 0924          Gait Training Goal 1 (PT)    Activity/Assistive Device (Gait Training Goal 1, PT) gait (walking locomotion);assistive device use  -AE     [place] Level (Gait Training Goal 1, PT) modified independence  -AE     Distance (Gait Training Goal 1, PT) 400ft  -AE     Time Frame (Gait Training Goal 1, PT) long term goal (LTG);10 days  -AE     Progress/Outcome (Gait Training Goal 1, PT) goal ongoing  -AE     Row Name 08/06/22 0924          Therapy Assessment/Plan (PT)    Planned Therapy Interventions (PT) balance training;bed mobility training;gait training;home exercise program;patient/family education;postural re-education;ROM (range of motion);strengthening;transfer training  -AE           User Key  (r) = Recorded By, (t) = Taken By, (c) = Cosigned By    Initials Name Provider Type    AE Jay Jay Blanco, PT Physical Therapist               Clinical Impression     Row Name 08/06/22 0920          Pain    Pain Location - Side/Orientation Left  -AE     Pain Location - foot  -AE     Pre/Posttreatment Pain Comment tolerated  -AE     Pain Intervention(s) Repositioned;Ambulation/increased activity  -AE     Row Name 08/06/22 0920          Pain Scale: FACES Pre/Post-Treatment    Pain: FACES Scale, Pretreatment 2-->hurts little bit  -AE     Posttreatment Pain Rating 2-->hurts little bit  -AE     Row Name 08/06/22 0920          Plan of Care Review    Plan of Care Reviewed With patient;spouse  -AE     Progress no change  -AE     Outcome Evaluation PT evaluation completed. Pt presents with decreased functional mobility, decreased independence, and decreased strength. Pt ambulated 200ft with CGA and RW for support, pt demo B foot drop (L>R) and decreased endurance with activity. Pt demo good safety awareness and understanding of problems. Recommend continumed skilled IP PT  interventions. Recommend D/C home with assist and HHPT. Recommend DME of RW for pt.  -AE     Row Name 08/06/22 0920          Therapy Assessment/Plan (PT)    Patient/Family Therapy Goals Statement (PT) Return home  -AE     Rehab Potential (PT) good, to achieve stated therapy goals  -AE     Criteria for Skilled Interventions Met (PT) yes  -AE     Therapy Frequency (PT) daily  -AE     Row Name 08/06/22 0920          Vital Signs    Pre Systolic BP Rehab 151  -AE     Pre Treatment Diastolic BP 91  -AE     Pretreatment Heart Rate (beats/min) 71  -AE     Posttreatment Heart Rate (beats/min) 73  -AE     O2 Delivery Pre Treatment room air  -AE     O2 Delivery Intra Treatment room air  -AE     O2 Delivery Post Treatment room air  -AE     Pre Patient Position Supine  -AE     Intra Patient Position Standing  -AE     Post Patient Position Supine  -AE     Row Name 08/06/22 0920          Positioning and Restraints    Pre-Treatment Position in bed  -AE     Post Treatment Position bed  -AE     In Bed notified nsg;supine;call light within reach;encouraged to call for assist;exit alarm on;with family/caregiver;side rails up x2;legs elevated  -AE           User Key  (r) = Recorded By, (t) = Taken By, (c) = Cosigned By    Initials Name Provider Type    AE Jay Jay Blanco, PT Physical Therapist               Outcome Measures     Row Name 08/06/22 0925          How much help from another person do you currently need...    Turning from your back to your side while in flat bed without using bedrails? 4  -AE     Moving from lying on back to sitting on the side of a flat bed without bedrails? 3  -AE     Moving to and from a bed to a chair (including a wheelchair)? 3  -AE     Standing up from a chair using your arms (e.g., wheelchair, bedside chair)? 3  -AE     Climbing 3-5 steps with a railing? 3  -AE     To walk in hospital room? 3  -AE     AM-PAC 6 Clicks Score (PT) 19  -AE     Highest level of mobility 6 --> Walked 10 steps or more  -AE      Row Name 08/06/22 0925 08/06/22 0750       Functional Assessment    Outcome Measure Options AM-PAC 6 Clicks Basic Mobility (PT)  -AE AM-PAC 6 Clicks Daily Activity (OT)  -TA          User Key  (r) = Recorded By, (t) = Taken By, (c) = Cosigned By    Initials Name Provider Type    TA Mani Kaiser, OT Occupational Therapist    Jay Jay Rojas, SHEMAR Physical Therapist                             Physical Therapy Education                 Title: PT OT SLP Therapies (In Progress)     Topic: Physical Therapy (In Progress)     Point: Mobility training (Done)     Learning Progress Summary           Patient Acceptance, E, VU by AE at 8/6/2022 0830                   Point: Home exercise program (Not Started)     Learner Progress:  Not documented in this visit.          Point: Body mechanics (Done)     Learning Progress Summary           Patient Acceptance, E, VU by AE at 8/6/2022 0830                   Point: Precautions (Done)     Learning Progress Summary           Patient Acceptance, E, VU by AE at 8/6/2022 0830                               User Key     Initials Effective Dates Name Provider Type Discipline    AE 09/21/21 -  Jay Jay Blanco, SHEMAR Physical Therapist PT              PT Recommendation and Plan  Planned Therapy Interventions (PT): balance training, bed mobility training, gait training, home exercise program, patient/family education, postural re-education, ROM (range of motion), strengthening, transfer training  Plan of Care Reviewed With: patient, spouse  Progress: no change  Outcome Evaluation: PT evaluation completed. Pt presents with decreased functional mobility, decreased independence, and decreased strength. Pt ambulated 200ft with CGA and RW for support, pt demo B foot drop (L>R) and decreased endurance with activity. Pt demo good safety awareness and understanding of problems. Recommend continumed skilled IP PT interventions. Recommend D/C home with assist and HHPT. Recommend DME of RW for  pt.     Time Calculation:    PT Charges     Row Name 08/06/22 0926             Time Calculation    Start Time 0830  -AE      PT Received On 08/06/22  -AE      PT Goal Re-Cert Due Date 08/16/22  -AE              Untimed Charges    PT Eval/Re-eval Minutes 52  -AE              Total Minutes    Untimed Charges Total Minutes 52  -AE       Total Minutes 52  -AE            User Key  (r) = Recorded By, (t) = Taken By, (c) = Cosigned By    Initials Name Provider Type    AE Jay Jay Blanco, SHEMAR Physical Therapist              Therapy Charges for Today     Code Description Service Date Service Provider Modifiers Qty    [id number] HC PT EVAL MOD COMPLEXITY 4 8/6/2022 Jay Jay Blanco, SHEMAR GP 1          PT G-Codes  Outcome Measure Options: AM-PAC 6 Clicks Basic Mobility (PT)  AM-PAC 6 Clicks Score (PT): 19  AM-PAC 6 Clicks Score (OT): 22    Jay Jay Blanco PT  8/6/2022

## 2022-08-06 NOTE — DISCHARGE SUMMARY
[facility] Medicine Services  DISCHARGE SUMMARY    Patient Name: Mahendra Yates  : 1955  MRN: [id number]    Date of Admission: 2022  3:09 PM  Date of Discharge:  2022  Primary Care Physician: Rodríguez Lopez,     Consults     Date and Time Order Name Status Description    2022 11:47 AM Inpatient Neurosurgery Consult Completed     2022  8:12 AM Inpatient Cardiology Consult Completed     2022  3:37 PM Inpatient Neurology Consult Stroke Completed           Hospital Course     Presenting Problem:   Altered mental status, unspecified altered mental status type [R41.82]    Active Hospital Problems    Diagnosis  POA   • Transient ischemic attack (TIA) [G45.9]  No   • Bradycardia [R00.1]  Unknown   • Weakness [R53.1]  Unknown   • Dysarthria [R47.1]  Unknown   • Degenerative lumbar spinal stenosis [M48.061]  Yes   • 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          Hospital Course:  Mahendra Yates is a 67 y.o. male that presented with progressive LE weakness, worse on the L. Initial concerns for stroke were warranted but the patient has no evidence of CVA. He was evaluated by Neurosurgery, Dr. Cherry and felt that his symptoms were due to progressive lumbar stenosis and will evaluate him for surgery follow cardiology evaluation in a few weeks. He has CAD with CABG and has intermittent angina. He will proceed with follow up with Dr. Dillon next week.      Discharge Follow Up Recommendations for outpatient labs/diagnostics:  As written    Day of Discharge     HPI:   No issues. Walked with PT/OT and plans to return home with cardiology and neurosurgery.    Review of Systems  As above    Vital Signs:   Temp:  [97.8 °F (36.6 °C)-98.4 °F (36.9 °C)] 98.1 °F (36.7  °C)  Heart Rate:  [65-76] 65  Resp:  [16-18] 18  BP: (133-161)/(67-91) 151/91      Physical Exam:  NAD, alert and oriented  OP clear, MMM  Neck supple  No LAD  RRR  CTAB  +BS, ND, NT, soft  BOSTON  Normal affect    Pertinent  and/or Most Recent Results     LAB RESULTS:      Lab 08/05/22  0242 08/04/22  1517   WBC 8.93 10.67   HEMOGLOBIN 12.8* 13.1   HEMATOCRIT 36.9* 39.2   PLATELETS 266 301   NEUTROS ABS 6.13 8.04*   IMMATURE GRANS (ABS) 0.03 0.03   LYMPHS ABS 1.76 1.59   MONOS ABS 0.83 0.73   EOS ABS 0.15 0.23   MCV 86.0 87.1         Lab 08/05/22  0242 08/04/22  1517   SODIUM 138 136   POTASSIUM 4.2 4.8   CHLORIDE 100 99   CO2 28.0 29.0   ANION GAP 10.0 8.0   BUN 32* 30*   CREATININE 1.39* 1.24   EGFR 55.2* 63.3   GLUCOSE 112* 129*   CALCIUM 9.5 9.5   MAGNESIUM 2.0 2.0   HEMOGLOBIN A1C 6.00*  --    TSH  --  2.800         Lab 08/04/22  1517   TOTAL PROTEIN 6.4   ALBUMIN 3.90   GLOBULIN 2.5   ALT (SGPT) 13   AST (SGOT) 19   BILIRUBIN 0.4   ALK PHOS 78         Lab 08/05/22  0242 08/04/22  1517   PROBNP  --  457.3   TROPONIN T 0.095* 0.096*         Lab 08/05/22  0242   CHOLESTEROL 258*   LDL CHOL 150*   HDL CHOL 36*   TRIGLYCERIDES 385*             Brief Urine Lab Results  (Last result in the past 365 days)      Color   Clarity   Blood   Leuk Est   Nitrite   Protein   CREAT   Urine HCG        08/04/22 1856 Yellow   Clear   Negative   Negative   Negative   Negative               Microbiology Results (last 10 days)     Procedure Component Value - Date/Time    COVID PRE-OP / PRE-PROCEDURE SCREENING ORDER (NO ISOLATION) - Swab, Nasopharynx [[id number]]  (Normal) Collected: 08/04/22 1857    Lab Status: Final result Specimen: Swab from Nasopharynx Updated: 08/04/22 1930    Narrative:      The following orders were created for panel order COVID PRE-OP / PRE-PROCEDURE SCREENING ORDER (NO ISOLATION) - Swab, Nasopharynx.  Procedure                               Abnormality         Status                     ---------                                -----------         ------                     COVID-19 and FLU A/B PCR...[[id number]]  Normal              Final result                 Please view results for these tests on the individual orders.    COVID-19 and FLU A/B PCR - Swab, Nasopharynx [[id number]]  (Normal) Collected: 08/04/22 1857    Lab Status: Final result Specimen: Swab from Nasopharynx Updated: 08/04/22 1930     COVID19 Not Detected     Influenza A PCR Not Detected     Influenza B PCR Not Detected    Narrative:      Fact sheet for providers: https://www.fda.gov/media/015581/download    Fact sheet for patients: https://www.fda.gov/media/095891/download    Test performed by PCR.          EEG    Result Date: 8/5/2022  Reason for referral: 67 y.o.male with altered mental status speech changes Technical Summary:  A 19 channel digital EEG was performed using the international 10-20 placement system, including eye leads and EKG leads. Duration: 20 minutes Findings: The awake tracing shows diffuse medium amplitude 5 to 7 Hz theta which is present symmetrically over both hemispheres.  A clear posterior rhythm is not seen.  Drowsiness and light sleep are seen with mild slowing of the tracing.  Photic stimulation and hyperventilation are not performed.  No focal features or epileptiform activity are seen. Video: On Technical quality: Superior EKG: Regular, 70 bpm SUMMARY: Mild generalized slow No focal features or epileptiform activity are seen     Diffuse cerebral dysfunction of mild degree, nonspecific This report is transcribed using the Dragon dictation system.      MRI Angiogram Head Without Contrast    Result Date: 8/4/2022   DATE OF EXAM: 8/4/2022 5:50 PM  PROCEDURE: MRI ANGIOGRAM HEAD WO CONTRAST-, MRI ANGIOGRAM NECK WO CONTRAST-  INDICATIONS: BLE weakness (R new), speech difficulty  COMPARISON: MRI brain without contrast 08/04/2022  TECHNIQUE: MR angiography of the neck and head was performed without contrast using time-of-flight  technique. 3-D multiplanar reformats are generated.  FINDINGS: Neck: There is normal flow within the distal right and left common carotid arteries. There is normal flow within the left internal and external carotid arteries. The left ICA is patent distally to the carotid terminus. The right internal and external carotid arteries are patent. The right carotid artery is patent to the carotid terminus. No high-grade carotid stenosis by NASCET criteria. There is normal flow within the bilateral vertebral arteries which are codominant.  Head: There is normal flow within the bilateral middle cerebral arteries. There is a common left A1 segment supplying the left and right anterior cerebral artery A2 segments. The distal vertebral arteries are patent. The basilar artery is patent. Both superior cerebellar arteries are patent. The posterior cerebral arteries are patent bilaterally. There is no large vessel intracranial occlusion. No significant aneurysm. Brain parenchyma better assessed on the separately performed brain MRI.      IMPRESSION : 1. Patent major intracranial vasculature. 2. Patent carotid and vertebral arteries. 3. Variant MAIA anatomy with common left A1 segment.  This report was finalized on 8/4/2022 6:56 PM by Anjum Lan MD.      MRI Angiogram Neck Without Contrast    Result Date: 8/4/2022   DATE OF EXAM: 8/4/2022 5:50 PM  PROCEDURE: MRI ANGIOGRAM HEAD WO CONTRAST-, MRI ANGIOGRAM NECK WO CONTRAST-  INDICATIONS: BLE weakness (R new), speech difficulty  COMPARISON: MRI brain without contrast 08/04/2022  TECHNIQUE: MR angiography of the neck and head was performed without contrast using time-of-flight technique. 3-D multiplanar reformats are generated.  FINDINGS: Neck: There is normal flow within the distal right and left common carotid arteries. There is normal flow within the left internal and external carotid arteries. The left ICA is patent distally to the carotid terminus. The right internal and external  carotid arteries are patent. The right carotid artery is patent to the carotid terminus. No high-grade carotid stenosis by NASCET criteria. There is normal flow within the bilateral vertebral arteries which are codominant.  Head: There is normal flow within the bilateral middle cerebral arteries. There is a common left A1 segment supplying the left and right anterior cerebral artery A2 segments. The distal vertebral arteries are patent. The basilar artery is patent. Both superior cerebellar arteries are patent. The posterior cerebral arteries are patent bilaterally. There is no large vessel intracranial occlusion. No significant aneurysm. Brain parenchyma better assessed on the separately performed brain MRI.      IMPRESSION : 1. Patent major intracranial vasculature. 2. Patent carotid and vertebral arteries. 3. Variant MAIA anatomy with common left A1 segment.  This report was finalized on 8/4/2022 6:56 PM by Anjum Lan MD.      MRI Brain Without Contrast    Result Date: 8/4/2022  Examination: MRI BRAIN WO CONTRAST-  Date of Exam: 8/4/2022 5:50 PM  Indication: Bilateral lower extremity weakness (right new, left chronic), dysarthria, left upper extremity weakness, and occasional lethargy.  Comparison: CT head without contrast 08/04/2022  Technique: Standard non-contrast MR pulse sequences of the brain were obtained.  Findings: There is no diffusion restriction or findings of acute ischemia. Negative for mass effect or midline shift. The ventricles and cortical sulci appear normally configured for patient age. The posterior fossa is without acute abnormality. There are mild to moderate scattered areas of subcortical and periventricular T2/FLAIR hyperintense signal which are nonspecific but most suggestive of chronic microvascular disease in a patient of this age.  The pituitary gland is normal in size. Disc desiccation with small posterior disc osteophytes noted in the upper cervical spine at C3-4 and C4-5. The  cerebellar tonsils are normally positioned. The mastoid air cells are well-aerated. Paranasal sinuses without fluid level. Globes intact. No retro-orbital abnormality. The major T2 weighted intracranial vascular flow voids are grossly patent.      1. Negative for acute ischemia. 2. Mild to moderate white matter findings suggesting changes of chronic microvascular disease.  This report was finalized on 8/4/2022 6:46 PM by Anjum Lan MD.      MRI Cervical Spine Without Contrast    Result Date: 8/5/2022  DATE OF EXAM: 8/5/2022 1:01 PM  PROCEDURE: MRI CERVICAL SPINE WO CONTRAST-  INDICATIONS: C7 radiculopathy; R41.82-Altered mental status, unspecified; R47.9-Unspecified speech disturbances; R29.898-Other symptoms and signs involving the musculoskeletal system; R77.8-Other specified abnormalities of plasma proteins; R41.841-Cognitive communication deficit  COMPARISON: 8/23/2016  TECHNIQUE: Routine magnetic resonance imaging of the cervical spine was performed without administration of contrast.  FINDINGS: Minimally edematous marrow endplate changes are present at C4-5 and C7-T1. Marrow signal is otherwise preserved, without evidence of fracture or suspicious marrow replacing lesion. Some mild straightening is present, otherwise without significant listhesis or subluxation. The axial T2 sequences are significantly degraded by patient motion. There is no evidence of gross focal cord signal abnormality. The paraspinal soft tissues are unremarkable. The craniocervical junction appears satisfactory. Multilevel spondylosis change is present, with areas of involvement noted including  C2-3, bilateral facet arthropathy with mild-to-moderate bilateral neuroforaminal narrowing.  C3-4, disc osteophyte complex and bilateral facet arthropathy. There is severe spinal canal narrowing and moderate to severe bilateral neuroforaminal stenosis.  C4-5, disc osteophyte complex and bilateral facet arthropathy. There is severe spinal canal  and bilateral neuroforaminal narrowing present.  C5-6, disc osteophyte complex, eccentric to the right, with likely contacting of the right C5 nerve root in addition to moderate to severe spinal canal narrowing. There is severe right and moderate to severe left neuroforaminal stenosis.  C6-7, disc osteophyte complex and bilateral facet arthropathy. There is moderate to severe spinal canal narrowing and severe bilateral neuroforaminal stenosis.  C7-T1, disc osteophyte complex and bilateral facet arthropathy with moderate to severe spinal canal and bilateral neuroforaminal narrowing      Multilevel advanced cervical spondylosis is present. There is severe spinal canal and bilateral neuroforaminal narrowing present at multiple levels. There is no definite focal cervical cord signal abnormality. Findings haven't significantly progressed since 2016 comparison.  This report was finalized on 8/5/2022 2:07 PM by Serafin Strange.      MRI Lumbar Spine Without Contrast    Result Date: 8/4/2022  Examination: MRI LUMBAR SPINE WO CONTRAST-  Date of Exam: 8/4/2022 5:52 PM  Indication: BLE weakness.  Comparison: MRI lumbar spine ON 07/20/2021  Technique: Standard noncontrast MR pulse sequences of the lumbar spine were obtained.  Findings: There are 5 nonrib-bearing lumbar-type vertebral bodies. Vertebral body heights are maintained. There is no acute fracture. Modic type endplate changes noted with marrow edema at the L2-3 and L3-4 levels. There is no acute fracture. No paraspinal fluid collection. The conus medullaris terminates at the L1 level. Abdominal aorta without aneurysm. Visualized portions of the sacrum are intact.  T12-L1: There is no disc bulge. Negative for facet arthritis. Negative for canal or foraminal stenosis.  L1-2: There is mild superficial disc bulge. Mild bilateral facet arthropathy. Mild central canal stenosis. Mild bilateral foraminal stenosis.  L2-3: There is a moderate diffuse disc bulge superimposed on  moderate facet arthritis and mild ligament flavum thickening. Findings results in moderate to severe central canal stenosis with the canal AP diameter narrowed to 5 mm, unchanged from prior study. Moderate bilateral foraminal stenosis.  L3-4: There is disc desiccation with moderate diffuse disc bulge. Moderate facet arthritis and ligamentum flavum thickening. There is moderate to severe central canal stenosis similar to prior study most pronounced in the left paracentral canal and left lateral recess. The canal AP diameter is narrowed to 5 mm. There is severe left foraminal stenosis with contact of the exiting left L3 nerve root, unchanged. Moderate to severe right foraminal stenosis.  L4-5: There is a mild diffuse disc bulge. Mild bilateral facet arthritis. Mild central canal stenosis. Moderate bilateral foraminal stenosis right greater than left.  L5-S1: There is mild diffuse disc bulge. Mild facet arthritis. Negative for canal stenosis. Mild to moderate bilateral foraminal stenosis.      1. Negative for acute osseous abnormality. 2. Multilevel advanced degenerative findings above most pronounced at L2-3 and L3-4 levels with combination of disc disease and facet arthritis contributing to moderate to severe central canal stenosis similar to prior exam from 09/07/2021. 3. Additional multilevel findings above.  This report was finalized on 8/4/2022 7:05 PM by Anjum Lan MD.      XR Chest 1 View    Result Date: 8/4/2022   DATE OF EXAM: 8/4/2022 3:51 PM  PROCEDURE: XR CHEST 1 VW-  INDICATIONS: Weak/Dizzy/AMS triage protocol  COMPARISON: 02/09/2022  TECHNIQUE: Portable chest radiograph.  FINDINGS:  Postsurgical changes of prior sternotomy and CABG. No pneumothorax. No focal consolidation or significant pleural effusion. Mild bibasilar scarring appears stable from the prior study. Osseous structures grossly intact.      No acute process.  This report was finalized on 8/4/2022 3:58 PM by Anjum Lan MD.      XR Knee 1  or 2 View Bilateral    Result Date: 8/5/2022  DATE OF EXAM: 8/5/2022 8:47 AM  PROCEDURE: XR KNEE 1 OR 2 VW BILATERAL-  INDICATIONS: B knee pain, pain with weight bearing; R41.82-Altered mental status, unspecified; R47.9-Unspecified speech disturbances; R29.898-Other symptoms and signs involving the musculoskeletal system; R77.8-Other specified abnormalities of plasma proteins  COMPARISON: No comparisons available.  TECHNIQUE: AP view of the bilateral knees, lateral view the right knee, lateral view of the left knee  FINDINGS: The bones are demineralized limiting assessment for occult nondisplaced fracture.  Right knee: No soft tissue swelling. There are scattered vascular calcifications. There is a trace knee joint effusion. No acute fracture or malalignment. There is mild to moderate tricompartmental osteoarthritic change worse in the patellofemoral compartment and medial compartment.  Left knee: No soft tissue swelling. There are scattered vascular calcifications. No significant knee joint effusion. No acute fracture or malalignment. There is mild to moderate tricompartmental osteoarthritic change worse in the patellofemoral compartment and medial compartment.      No acute fracture or malalignment.  This report was finalized on 8/5/2022 9:10 AM by Rodri Whyte MD.      CT Head Without Contrast Stroke Protocol    Result Date: 8/4/2022  DATE OF EXAM: 8/4/2022 4:08 PM  PROCEDURE: CT HEAD WO CONTRAST STROKE PROTOCOL-  INDICATIONS: Neuro deficit, acute, stroke suspected  COMPARISON: Head CT scan 2/10/2022  TECHNIQUE: Routine transaxial and coronal reconstruction images were obtained through the head without the administration of contrast. Automated exposure control and iterative reconstruction methods were used.  The radiation dose reduction device was turned on for each scan per the ALARA (As Low as Reasonably Achievable) protocol.  FINDINGS: Although this exam was initially ordered as stroke protocol exam,  according to the emergency room physician, this is not a true stroke protocol exam, and the patient is not accompanied by the stroke team.  The calvarium appears intact. The included paranasal sinuses, mastoids and middle ear spaces appear clear. No gross abnormalities are seen in the orbits. There is expected degree of generalized cerebral atrophy for the patient's age. There is no evidence of mass or mass effect, acute or old infarct, hemorrhage, hydrocephalus or abnormal extra-axial collection. 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        Age-appropriate generalized cerebral atrophy. No evidence of acute intracranial disease.  This report was finalized on 8/4/2022 4:49 PM by Dr. Bj Grewal MD.      EMG & Nerve Conduction Test    Result Date: 8/5/2022  Indication: Back pain, bilateral leg weakness, foot drop, lumbar radiculopathy, peripheral neuropathy, neck pain, right arm pain, cervical radiculopathy Clinical: 67 y.o.male with a complex history.  He has diabetes.  He has known lumbar spinal stenosis.  He has had back pain which radiates into both legs, more so on the left.  He experienced a fairly abrupt decline in his ability to walk with increased pain in his knees, more so on the left as well as developing a left foot drop.  He is found to have mild proximal weakness as well as profound extensor weakness of his ankles, more so on the left.  In addition, he has pain which radiates down his right arm and into his hand.  Cervical and lumbar radiculopathies are a consideration.      NCS/EMG TECHNICAL DATA: All studies are performed at a skin temperature of 34°C or greater. Distal sensory latencies are calculated to waveform peak.  Sensory amplitudes are measured peak to peak Distal motor latencies are calculated to waveform onset.  Motor amplitudes are calculated baseline to peak Nerve  Conduction Studies Anti Sensory Summary Table  Stim Site NR Peak (ms) Norm Peak (ms) P-T Amp (µV) Norm P-T Amp Site1 Site2 Delta-P (ms) Dist (cm) Luis (m/s) Norm Luis (m/s) Left Sural Anti Sensory (Lat Mall) Calf NR  <4.0  >5.0 Calf Lat Mall  14.0   Right Sural Anti Sensory (Lat Mall) Calf NR  <4.0  >5.0 Calf Lat Mall  14.0   Ortho Sensory Summary Table  Stim Site NR Peak (ms) Norm Peak (ms) P-T Amp (µV) Norm P-T Amp Site1 Site2 Delta-P (ms) Dist (cm) Luis (m/s) Norm Luis (m/s) Right Median Ortho Sensory (Wrist) 2nd Digit    2.6  9.9  2nd Digit Wrist 2.6 8.0 31  Right Ulnar Ortho Sensory (Wrist) 5th Digit    2.2  7.9  5th Digit Wrist 2.2 8.0 36  Motor Summary Table  Stim Site NR Onset (ms) Norm Onset (ms) O-P Amp (mV) Norm O-P Amp Site1 Site2 Delta-0 (ms) Dist (cm) Luis (m/s) Norm Luis (m/s) Left Fibular Motor (Ext Dig Brev) Ankle NR  <6.1  >2.5 B Fib Ankle  0.0  >40 B Fib NR     Poplt B Fib  0.0  >40 Poplt NR           Left Fibular TA Motor (Tib Ant) Fib Head NR  <4.2   Poplit Fib Head  0.0  >40.5 Poplit NR  <5.7         Right Fibular TA Motor (Tib Ant) Fib Head NR  <4.2   Poplit Fib Head  0.0  >40.5 Poplit NR  <5.7         Right Median Motor (Abd Poll Brev) Wrist    5.4 <4.2 2.9 >5 Elbow Wrist 5.3 26.0 49 >50 Elbow    10.7  2.6        Left Tibial Motor (Abd Garvin Brev) Ankle NR  <6.1  >3.0 Knee Ankle  0.0  >40 Knee NR           Right Tibial Motor (Abd Garvin Brev) Ankle NR  <6.1  >3.0 Knee Ankle  0.0  >40 Knee NR           Right Ulnar Motor (Abd Dig Minimi) Wrist    3.3 <4.2 6.2 >3 B Elbow Wrist 5.0 26.0 52 >53 B Elbow    8.3  5.9  A Elbow B Elbow 1.9 10.0 53 >53 A Elbow    10.2  2.8        F Wave Studies  NR F-Lat (ms) Lat Norm (ms) L-R F-Lat (ms) L-R Lat Norm M-Lat (ms) FLat-MLat (ms) Left Fibular (Mrkrs) (EDB) NR  <60  <5.1   Right Median (Mrkrs) (Abd Poll Brev)    35.99 <33  <2.2 5.67 30.32 Left Tibial (Mrkrs) (Abd Hallucis) NR  <61  <5.7   Right Tibial (Mrkrs) (Abd Hallucis) NR  <61  <5.7   Right Ulnar (Mrkrs) (Abd  Dig Min)    38.38 <36  <2.5 3.67 34.71 H Reflex Studies  NR H-Lat (ms) L-R H-Lat (ms) L-R Lat Norm M-Lat (ms) HLat-MLat (ms) Left Tibial (Mrkrs) (Gastroc)    52.46 0.58 <2.0 5.33 47.13 Right Tibial (Mrkrs) (Gastroc)    51.88 0.58 <2.0 5.33 46.55 EMG  Side Muscle Nerve Root Ins Act Fibs Psw Amp Dur Poly Recrt Int Pat Comment Right Abd Poll Brev Median C8-T1 Nml Nml Nml Nml >12ms 2+ Nml Nml  Right 1stDorInt Ulnar C8-T1 Nml Nml Nml Nml Nml 2+ Nml Nml  Right Ext Digitorum Radial (Post Int) C7-8 Nml Nml Nml Incr Nml 2+ Nml Nml  Right PronatorTeres Median C6-7 Nml Nml Nml Incr >12ms 2+ Nml Nml  Right Biceps Musculocut C5-6 Nml Nml Nml Nml Nml 0 Nml Nml  Right Triceps Radial C6-7-8 Nml Nml Nml Incr >12ms 2+ Nml Nml  Right Deltoid Axillary C5-6 Nml Nml Nml Nml Nml 0 Nml Nml  Right Supraspinatus SupraScap C5-6 Nml Nml Nml Nml Nml 0 Nml Nml  Right C5 Parasp Rami C5 Nml Nml Nml Nml Nml 0 Nml Nml  Right C6 Parasp Rami C6 Nml Nml Nml Nml Nml 0 Nml Nml  Right C7 Parasp Rami C7 Nml Nml Nml Nml Nml 0 Nml Nml  Left AntTibialis Dp Br Fibular L4-5 Nml 2+ 2+ Nml Nml 0 Nml Nml No vol. mvmt. Left Fibularis Long Sup Br Fibular L5-S1 Nml 3+ 3+ Nml Nml 0 Nml Nml  Left PostTibialis Tibial L5, S1 Nml 2+ 2+ Nml >12ms 2+ Reduced Nml  Left Soleus Tibial L5-S2 Nml Nml Nml Nml Nml 0 Nml Nml  Left Gastroc Tibial S1-2 Nml Nml Nml Nml >12ms 2+ Nml Nml  Left VastusLat Femoral L2-4 Nml Nml Nml Incr >12ms 2+ Reduced Nml  Left Add Tim Obturator, Sciat L2-4 Nml Nml Nml Incr >12ms 2+ Nml Nml  Left BicepsFemS Sciatic L5-S1 Nml Nml Nml Incr >12ms 2+ Nml Nml  Left L4 Parasp Rami L4 Nml Nml Nml Nml Nml 0 Nml Nml  Left L5 Parasp Rami L5 Nml Nml Nml Nml Nml 0 Nml Nml  Left S1 Parasp Rami S1 Nml Nml Nml Nml Nml 0 Nml Nml  Right AntTibialis Dp Br Fibular L4-5 Nml 2+ 3+ Nml Nml 0 Reduced Nml  Right Fibularis Long Sup Br Fibular L5-S1 Nml 2+ 2+ Nml >12ms 3+ Reduced Nml  Right PostTibialis Tibial L5, S1 Nml Nml Nml Incr >12ms 3+ Reduced Nml  Right Soleus Tibial  L5-S2 Nml Nml Nml Nml Nml 0 Nml Nml  Right Gastroc Tibial S1-2 Nml 2+ 2+ Nml >12ms 3+ Reduced Nml  Right VastusLat Femoral L2-4 Nml Nml Nml Incr >12ms 2+ Reduced Nml  Right Add Tim Obturator, Sciat L2-4 Nml Nml Nml Nml Nml 2+ Nml Nml  Right BicepsFemS Sciatic L5-S1 Nml Nml Nml Incr >12ms 2+ Nml Nml  Right L4 Parasp Rami L4 Nml Nml Nml Nml Nml 0 Nml Nml  Right L5 Parasp Rami L5 Nml Nml Nml Nml Nml 0 Nml Nml  Right S1 Parasp Rami S1 Nml Nml Nml Nml Nml 0 Nml Nml  Waveforms:                               FINDINGS: Nerve Conduction Studies: Sural sensory responses absent bilaterally Following stimulation of the left peroneal motor nerve, no responses seen from either the EDB or the tibialis anterior Following stimulation of the right peroneal motor nerve, no responses seen from the tibialis anterior No responses seen from either posterior tibial motor nerve Motor F waves are absent in the left peroneal and right and left posterior tibial motor nerves H-reflexes are low amplitude and prolonged bilaterally Median sensory latency on the right is prolonged at 2.6 ms Ulnar sensory latency on the right is at the upper limits of normal Median motor latency on the right is prolonged at 5.4 ms with reduced amplitude Right ulnar motor study shows a mild reduction in velocity between elbow and wrist Right median and ulnar motor F wave latencies are prolonged Electromyogram: Needle examination of the right arm shows changes of chronic denervation in C6/C7 innervated muscles (primarily C7) Needle examination of the right leg shows changes of chronic denervation and reduced recruitment in L4/L5-innervated muscles in the right leg.  Ongoing denervation is seen in distal muscles in the leg Needle examination of the left leg shows changes of chronic denervation primarily in L4/L5 innervated muscles, with ongoing denervation seen in distal muscles     Complex study Sensorimotor neuropathy, axonal, severe EMG of both legs is suggestive  for an underlying L4/L5 radiculopathy bilaterally-however, the presence of the severe neuropathy renders it impossible to say how much of this denervation is due to peripheral neuropathic versus radicular etiology NCS/EMG of the right arm is more clear, with fewer neuropathic changes, and a clear overlay of C6/C7 radiculopathy (primarily C7) This report is transcribed using the Dragon dictation system.       Results for orders placed during the hospital encounter of 09/07/21    Duplex Venous Lower Extremity - Bilateral CAR    Interpretation Summary  · No evidence of deep or superficial venous thrombosis in the right or left lower extremities.      Results for orders placed during the hospital encounter of 09/07/21    Duplex Venous Lower Extremity - Bilateral CAR    Interpretation Summary  · No evidence of deep or superficial venous thrombosis in the right or left lower extremities.      Results for orders placed during the hospital encounter of 02/09/22    Adult Transthoracic Echo Complete W/ Cont if Necessary Per Protocol    Interpretation Summary  · Estimated left ventricular EF = 55% Left ventricular systolic function is normal.  · Mild aortic valve stenosis is present.  · Aortic valve mean pressure gradient is 13 mmHg.  · Trace to mild mitral valve regurgitation is present  · Mild tricuspid valve regurgitation is present  · Calculated right ventricular systolic pressure from tricuspid regurgitation is 27 mmHg.      Plan for Follow-up of Pending Labs/Results: Reviewed    Discharge Details        Discharge Medications      Changes to Medications      Instructions Start Date   dilTIAZem  MG 24 hr capsule  Commonly known as: CARDIZEM CD  What changed: additional instructions   240 mg, Oral, Daily, Hold until cardiology follow up         Continue These Medications      Instructions Start Date   aspirin 81 MG EC tablet   81 mg, Oral, Daily, Continues per doctors orders       bumetanide 1 MG tablet  Commonly  known as: BUMEX   1 mg, Oral, 2 Times Daily      busPIRone 10 MG tablet  Commonly known as: BUSPAR   10 mg, Oral, 2 Times Daily      cholecalciferol 25 MCG (1000 UT) tablet  Commonly known as: VITAMIN D3   1,000 Units, Oral, Daily      DHEA 50 MG tablet   1 tablet, Oral, Daily      DULoxetine 60 MG capsule  Commonly known as: Cymbalta   60 mg, Oral, Daily      fenofibrate micronized 134 MG capsule  Commonly known as: LOFIBRA   134 mg, Oral, Every Evening      gabapentin 300 MG capsule  Commonly known as: NEURONTIN   TAKE 1 CAPSULE BY MOUTH THREE TIMES DAILY      glucosamine sulfate 500 MG capsule capsule   500 mg, Oral, Daily      isosorbide mononitrate 120 MG 24 hr tablet  Commonly known as: IMDUR   120 mg, Oral, 2 Times Daily      Loratadine 10 MG capsule   1 capsule, Oral, Daily      magnesium oxide 400 MG tablet  Commonly known as: MAG-OX   400 mg, Oral, 2 Times Daily, 3 tabs in the AM and 2 tabs in the PM per transplant team      multivitamin with minerals tablet tablet   1 tablet, Oral, Daily      mycophenolate 500 MG tablet  Commonly known as: CELLCEPT   1,000 mg, Oral, 2 Times Daily      nitroglycerin 0.4 MG SL tablet  Commonly known as: NITROSTAT   0.4 mg, Sublingual, Every 5 Minutes PRN, Take no more than 3 doses in 15 minutes.       omeprazole 40 MG capsule  Commonly known as: priLOSEC   40 mg, Oral, 2 Times Daily      oxyCODONE-acetaminophen  MG per tablet  Commonly known as: PERCOCET   1 tablet, Oral, Every 6 Hours PRN      pentoxifylline 400 MG CR tablet  Commonly known as: TRENtal   400 mg, Oral, Nightly      pravastatin 80 MG tablet  Commonly known as: PRAVACHOL   80 mg, Oral, Daily      ranolazine 1000 MG 12 hr tablet  Commonly known as: RANEXA   1,000 mg, Oral, 2 Times Daily      sennosides-docusate 8.6-50 MG per tablet  Commonly known as: SB Docusate Sodium/Senna   1 tablet, Oral, Daily      tacrolimus 0.1 % ointment  Commonly known as: PROTOPIC   1 application, Topical, 2 Times Daily       tacrolimus 0.5 MG capsule  Commonly known as: PROGRAF   No dose, route, or frequency recorded.      Trulicity 0.75 MG/0.5ML solution pen-injector  Generic drug: Dulaglutide   0.75 mg, Subcutaneous, Weekly             Allergies   Allergen Reactions   • Contrast Dye Other (See Comments)     Cardiac Arrest   • Penicillins Rash   • Sulfasalazine Rash         Discharge Disposition:  Home or Self Care    Diet:  Hospital:  Diet Order   Procedures   • Diet Regular; Thin; Cardiac, Consistent Carbohydrate       Activity:  Activity Instructions     Activity as Tolerated            Restrictions or Other Recommendations:         CODE STATUS:    Code Status and Medical Interventions:   Ordered at: 08/04/22 2003     Level Of Support Discussed With:    Patient     Code Status (Patient has no pulse and is not breathing):    CPR (Attempt to Resuscitate)     Medical Interventions (Patient has pulse or is breathing):    Full Support       Future Appointments   Date Time Provider Department Center   8/11/2022  1:00 PM April Dillon MD [facility]   9/21/2022 11:00 AM Rodríguez Lopez DO [facility]   12/22/2022  9:00 AM Renetta Salinas APRN [facility]       Additional Instructions for the Follow-ups that You Need to Schedule     Discharge Follow-up with Specified Provider: Cardiology and Neurosurgery as scheduled   As directed      To: Cardiology and Neurosurgery as scheduled                     Bj Schuler MD  08/06/22      Time Spent on Discharge:  I spent  40  minutes on this discharge activity which included: face-to-face encounter with the patient, reviewing the data in the system, coordination of the care with the nursing staff as well as consultants, documentation, and entering orders.

## 2022-08-08 ENCOUNTER — TELEPHONE (OUTPATIENT)
Dept: NEUROSURGERY | Facility: CLINIC | Age: 67
End: 2022-08-08

## 2022-08-08 ENCOUNTER — TRANSITIONAL CARE MANAGEMENT TELEPHONE ENCOUNTER (OUTPATIENT)
Dept: CALL CENTER | Facility: HOSPITAL | Age: 67
End: 2022-08-08

## 2022-08-08 NOTE — OUTREACH NOTE
Call Center TCM Note    Flowsheet Row Responses   [facility] patient discharged from? [place]   Does the patient have one of the following disease processes/diagnoses(primary or secondary)? Stroke (TIA)   TCM attempt successful? No   Unsuccessful attempts Attempt 2          Ashley Driver RN    8/8/2022, 16:04 EDT

## 2022-08-08 NOTE — OUTREACH NOTE
Call Center TCM Note    Flowsheet Row Responses   [facility] patient discharged from? [place]   Does the patient have one of the following disease processes/diagnoses(primary or secondary)? Stroke (TIA)   TCM attempt successful? No   Unsuccessful attempts Attempt 1          Aslhey Driver RN    8/8/2022, 15:23 EDT       Unable to assess due to medical condition

## 2022-08-09 ENCOUNTER — TRANSITIONAL CARE MANAGEMENT TELEPHONE ENCOUNTER (OUTPATIENT)
Dept: CALL CENTER | Facility: HOSPITAL | Age: 67
End: 2022-08-09

## 2022-08-09 NOTE — OUTREACH NOTE
Call Center TCM Note    Flowsheet Row Responses   [facility] patient discharged from? [place]   Does the patient have one of the following disease processes/diagnoses(primary or secondary)? Stroke (TIA)   TCM attempt successful? Yes   Call start time 1335   Call end time 1342   Discharge diagnosis Transient ischemic attack    Meds reviewed with patient/caregiver? Yes   Does the patient have all medications ordered at discharge? N/A   Is the patient taking all medications as directed (includes completed medication regime)? Yes   Does the patient have a primary care provider?  Yes   Does the patient have an appointment with their PCP within 7 days of discharge? Greater than 7 days   Comments regarding PCP PCP APPOINTMENT IS CURRENTLY SCHEDULED FOR 9/21/22. PATIENT DECLINED SCHEDULING A SOONER APPOINTMENT DURING THIS CALL. HE STATES HE WANTS TO SEE HIS SPECIALISTS FIRST.    Has the patient kept scheduled appointments due by today? N/A   Has home health visited the patient within 72 hours of discharge? N/A   Psychosocial issues? No   Does the patient require any assistance with activities of daily living such as eating, bathing, dressing, walking, etc.? Yes   Does the patient have any residual symptoms from stroke/TIA? No   Does the patient understand the diet ordered at discharge? Yes   Did the patient receive a copy of their discharge instructions? Yes   Nursing interventions Reviewed instructions with patient   What is the patient's perception of their health status since discharge? Improving   Nursing interventions Nurse provided patient education   Is the patient able to teach back FAST for Stroke? Yes   Is the patient/caregiver able to teach back the risk factors for a stroke? High Cholesterol, History of TIAs, High blood pressure-goal below 120/80, Carotid or other artery disease   Is the patient/caregiver able to teach back signs and symptoms related to disease process for when to call PCP? Yes   Is the  patient/caregiver able to teach back signs and symptoms related to disease process for when to call 911? Yes   If the patient is a current smoker, are they able to teach back resources for cessation? Not a smoker   Is the patient/caregiver able to teach back the hierarchy of who to call/visit for symptoms/problems? PCP, Specialist, Home health nurse, Urgent Care, ED, 911 Yes   TCM call completed? Yes          Brenda Jimenez LPN    8/9/2022, 13:45 EDT

## 2022-08-10 NOTE — PROGRESS NOTES
[facility] Cardiology    Patient ID: Mahendra Yates is a 67 y.o. male.  : 1955   Contact: [phone]    Encounter date: 2022    PCP: Rodríguez Lopez DO      Chief complaint:   Chief Complaint   Patient presents with   • Presence of stent in coronary artery in patient with corona   • Coronary artery disease involving coronary bypass graft of        Problem List:  1. Coronary artery disease  a. CABG 2009  b. History of stents, ICDB  c. NSTEMI/LHC, 2019 (Dr. Pena): Patent LIMA to LAD with diffuse distal LAD disease. VG to OM occluded, unable to be intervened upon. VG to RCA patent with diffuse target vessel disease.  d. Echo 02/10/2022: EF 55%, mild AS, Ao mean PG 13 mmHg, trace to mild MR. RVSP 27 mmHg.   2. Hypertension  3. Dyslipidemia  4. Lower extremity edema  a. Venous duplex, 2021: No DVT or superficial venous thrombosis bilaterally.  5. Diabetes  6. History of CVA 2018    7. History of liver cancer  a. Liver transplant   b. [place], [place]  8. Arthritis  9. GERD       Allergies   Allergen Reactions   • Contrast Dye Other (See Comments)     Cardiac Arrest   • Penicillins Rash   • Sulfasalazine Rash       Current Medications:    Current Outpatient Medications:   •  aspirin 81 MG EC tablet, Take 81 mg by mouth Daily. Continues per doctors orders, Disp: , Rfl:   •  bumetanide (BUMEX) 1 MG tablet, Take 1 tablet by mouth 2 (Two) Times a Day., Disp: 60 tablet, Rfl: 2  •  busPIRone (BUSPAR) 10 MG tablet, Take 1 tablet by mouth 2 (Two) Times a Day., Disp: 60 tablet, Rfl: 5  •  cholecalciferol (VITAMIN D3) 25 MCG (1000 UT) tablet, Take 1,000 Units by mouth Daily., Disp: , Rfl:   •  DHEA 50 MG tablet, Take 1 tablet by mouth Daily., Disp: , Rfl:   •  dilTIAZem CD (CARDIZEM CD) 240 MG 24 hr capsule, Take 1 capsule by mouth Daily. Hold until cardiology follow up, Disp: 90 capsule, Rfl: 3  •  Dulaglutide (Trulicity) 0.75 MG/0.5ML  solution pen-injector, Inject 0.75 mg under the skin into the appropriate area as directed 1 (One) Time Per Week., Disp: 5 pen, Rfl: 2  •  DULoxetine (Cymbalta) 60 MG capsule, Take 1 capsule by mouth Daily., Disp: 90 capsule, Rfl: 3  •  fenofibrate micronized (LOFIBRA) 134 MG capsule, Take 134 mg by mouth Every Evening., Disp: , Rfl:   •  gabapentin (NEURONTIN) 300 MG capsule, TAKE 1 CAPSULE BY MOUTH THREE TIMES DAILY, Disp: 90 capsule, Rfl: 2  •  glucosamine sulfate 500 MG capsule capsule, Take 500 mg by mouth Daily., Disp: , Rfl:   •  isosorbide mononitrate (IMDUR) 120 MG 24 hr tablet, Take 1 tablet by mouth 2 (Two) Times a Day., Disp: 180 tablet, Rfl: 3  •  Loratadine 10 MG capsule, Take 1 capsule by mouth Daily., Disp: , Rfl:   •  magnesium oxide (MAG-OX) 400 MG tablet, Take 400 mg by mouth 2 (Two) Times a Day. 3 tabs in the AM and 2 tabs in the PM per transplant team, Disp: , Rfl:   •  multivitamin with minerals tablet tablet, Take 1 tablet by mouth Daily., Disp: , Rfl:   •  nitroglycerin (NITROSTAT) 0.4 MG SL tablet, Place 1 tablet under the tongue Every 5 (Five) Minutes As Needed for Chest Pain. Take no more than 3 doses in 15 minutes., Disp: 30 tablet, Rfl: 5  •  omeprazole (priLOSEC) 40 MG capsule, Take 1 capsule by mouth 2 (Two) Times a Day., Disp: 180 capsule, Rfl: 1  •  oxyCODONE-acetaminophen (PERCOCET)  MG per tablet, Take 1 tablet by mouth Every 6 (Six) Hours As Needed for Severe Pain ., Disp: 120 tablet, Rfl: 0  •  pentoxifylline (TRENtal) 400 MG CR tablet, Take 1 tablet by mouth Every Night., Disp: 90 tablet, Rfl: 3  •  pravastatin (PRAVACHOL) 80 MG tablet, Take 1 tablet by mouth Daily., Disp: 90 tablet, Rfl: 3  •  ranolazine (RANEXA) 1000 MG 12 hr tablet, Take 1 tablet by mouth 2 (Two) Times a Day., Disp: 180 tablet, Rfl: 3  •  sennosides-docusate (SB Docusate Sodium/Senna) 8.6-50 MG per tablet, Take 1 tablet by mouth Daily., Disp: 90 tablet, Rfl: 3  •  tacrolimus (PROGRAF) 0.5 MG capsule, ,  "Disp: , Rfl:   •  tacrolimus (PROTOPIC) 0.1 % ointment, Apply 1 application topically to the appropriate area as directed 2 (Two) Times a Day., Disp: 100 g, Rfl: 0  •  mycophenolate (CELLCEPT) 500 MG tablet, Take 1,000 mg by mouth 2 (Two) Times a Day., Disp: , Rfl:     HPI    Mahendra Yates is a 67 y.o. male who presents today for a follow up of coronary artery disease and cardiac risk factors. Since last visit, patient was experiencing pain that was from his neck radiating into his chest and left arm. Neurology told him that his pain was coming from his C7. He is needing cardiac clearance which requires an left heart catheterization. Patient states that he did cancel his last heart catheterization due to not seeing Dr. Dillon at his last visit and concerned about his contrast allergy.. His chest and arm discomfort is intermittent. He states that it bothers him when he gets excited, upset, or with any exertion. He cannot ambulate far without assistance from a walker. Patient denies shortness of breath, orthopnea, palpitations, edema, dizziness, and syncope.      The following portions of the patient's history were reviewed and updated as appropriate: allergies, current medications and problem list.    Pertinent positives as listed in the HPI.  All other systems reviewed are negative.         Vitals:    08/11/22 1315   BP: 130/80   BP Location: Left arm   Patient Position: Sitting   Pulse: 76   SpO2: 98%   Weight: 89.8 kg (198 lb)   Height: 182.9 cm (72\")       Physical Exam:  General: Alert and oriented.  Neck: Jugular venous pressure is within normal limits. Carotids have normal upstrokes without bruits.   Cardiovascular: Heart has a nondisplaced focal PMI. Regular rate and rhythm. No murmur, gallop or rub.  Lungs: Clear, no rales or wheezes. Equal expansion is noted.   Extremities: Show no edema.  Skin: Warm and dry.  Neurologic: Nonfocal.     Diagnostic Data (reviewed with patient):  Lab Results "   Component Value Date    GLUCOSE 112 (H) 08/05/2022    BUN 32 (H) 08/05/2022    CREATININE 1.39 (H) 08/05/2022    EGFRIFNONA 48 (L) 02/11/2022    BCR 23.0 08/05/2022     08/05/2022    K 4.2 08/05/2022     08/05/2022    CO2 28.0 08/05/2022    CALCIUM 9.5 08/05/2022    ALBUMIN 3.90 08/04/2022    ALKPHOS 78 08/04/2022    AST 19 08/04/2022    ALT 13 08/04/2022     Lab Results   Component Value Date    CHOL 258 (H) 08/05/2022    TRIG 385 (H) 08/05/2022    HDL 36 (L) 08/05/2022     (H) 08/05/2022      Lab Results   Component Value Date    WBC 8.93 08/05/2022    RBC 4.29 08/05/2022    HGB 12.8 (L) 08/05/2022    HCT 36.9 (L) 08/05/2022    MCV 86.0 08/05/2022     08/05/2022      Lab Results   Component Value Date    TSH 2.800 08/04/2022        Procedures        Assessment:    ICD-10-CM ICD-9-CM   1. Coronary artery disease involving coronary bypass graft of native heart with other forms of angina pectoris (HCC)  I25.708 414.05     413.9   2. Essential hypertension  I10 401.9   3. Dyslipidemia  E78.5 272.4       Discussed that if patient is having ongoing symptoms related to his heart, we should proceed with a heart catheterization. If patient's symptoms are related to his back, we can do a nuclear stress test. Patient described his symptoms and would like to proceed with a left heart catheterization.       Plan:  1. [facility] to be scheduled to evaluate chest pain and for cardiac clearance for back surgery. The risks were explained to the patient and he was agreeable.  Right femoral artery approach  2. Continue on Bumex 1 mg BID for fluid retention.   3. Continue aspirin 81 mg and Plavix 75 mg daily for DAPT.   4. Continue on diltiazem 240 mg daily for rate control and hypertension.    5. Continue on nitroglycerin 0.4 mg as needed for angina.   6. Continue on pravastatin 80 mg and fenofibrate 134 mg daily for hyperlipidemia.   7. Continue on Ranexa 1000 mg and Imdur 120 mg BID for chronic chest pain.    8. Continue all other current medications.  9. F/up after procedure, sooner if needed.      Scribed for April Dillon MD by Roxy Bradshaw. 8/11/2022 13:28 EDT       I April Dillon MD personally performed the services described in this documentation as scribed by the above individual in my presence, and it is both accurate and complete.    April Dillon MD, FACC

## 2022-08-10 NOTE — H&P (VIEW-ONLY)
[facility]    Patient ID: Mahendra Yates is a 67 y.o. male.  : 1955   Contact: [phone]    Encounter date: 2022    PCP: Rodríguez Lopez DO      Chief complaint:   Chief Complaint   Patient presents with   • Presence of stent in coronary artery in patient with corona   • Coronary artery disease involving coronary bypass graft of        Problem List:  1. Coronary artery disease  a. CABG 2009  b. History of stents, ICDB  c. NSTEMI/LHC, 2019 (Dr. Pena): Patent LIMA to LAD with diffuse distal LAD disease. VG to OM occluded, unable to be intervened upon. VG to RCA patent with diffuse target vessel disease.  d. Echo 02/10/2022: EF 55%, mild AS, Ao mean PG 13 mmHg, trace to mild MR. RVSP 27 mmHg.   2. Hypertension  3. Dyslipidemia  4. Lower extremity edema  a. Venous duplex, 2021: No DVT or superficial venous thrombosis bilaterally.  5. Diabetes  6. History of CVA 2018    7. History of liver cancer  a. Liver transplant   b. [place], [place]  8. Arthritis  9. GERD       Allergies   Allergen Reactions   • Contrast Dye Other (See Comments)     Cardiac Arrest   • Penicillins Rash   • Sulfasalazine Rash       Current Medications:    Current Outpatient Medications:   •  aspirin 81 MG EC tablet, Take 81 mg by mouth Daily. Continues per doctors orders, Disp: , Rfl:   •  bumetanide (BUMEX) 1 MG tablet, Take 1 tablet by mouth 2 (Two) Times a Day., Disp: 60 tablet, Rfl: 2  •  busPIRone (BUSPAR) 10 MG tablet, Take 1 tablet by mouth 2 (Two) Times a Day., Disp: 60 tablet, Rfl: 5  •  cholecalciferol (VITAMIN D3) 25 MCG (1000 UT) tablet, Take 1,000 Units by mouth Daily., Disp: , Rfl:   •  DHEA 50 MG tablet, Take 1 tablet by mouth Daily., Disp: , Rfl:   •  dilTIAZem CD (CARDIZEM CD) 240 MG 24 hr capsule, Take 1 capsule by mouth Daily. Hold until cardiology follow up, Disp: 90 capsule, Rfl: 3  •  Dulaglutide (Trulicity) 0.75 MG/0.5ML  solution pen-injector, Inject 0.75 mg under the skin into the appropriate area as directed 1 (One) Time Per Week., Disp: 5 pen, Rfl: 2  •  DULoxetine (Cymbalta) 60 MG capsule, Take 1 capsule by mouth Daily., Disp: 90 capsule, Rfl: 3  •  fenofibrate micronized (LOFIBRA) 134 MG capsule, Take 134 mg by mouth Every Evening., Disp: , Rfl:   •  gabapentin (NEURONTIN) 300 MG capsule, TAKE 1 CAPSULE BY MOUTH THREE TIMES DAILY, Disp: 90 capsule, Rfl: 2  •  glucosamine sulfate 500 MG capsule capsule, Take 500 mg by mouth Daily., Disp: , Rfl:   •  isosorbide mononitrate (IMDUR) 120 MG 24 hr tablet, Take 1 tablet by mouth 2 (Two) Times a Day., Disp: 180 tablet, Rfl: 3  •  Loratadine 10 MG capsule, Take 1 capsule by mouth Daily., Disp: , Rfl:   •  magnesium oxide (MAG-OX) 400 MG tablet, Take 400 mg by mouth 2 (Two) Times a Day. 3 tabs in the AM and 2 tabs in the PM per transplant team, Disp: , Rfl:   •  multivitamin with minerals tablet tablet, Take 1 tablet by mouth Daily., Disp: , Rfl:   •  nitroglycerin (NITROSTAT) 0.4 MG SL tablet, Place 1 tablet under the tongue Every 5 (Five) Minutes As Needed for Chest Pain. Take no more than 3 doses in 15 minutes., Disp: 30 tablet, Rfl: 5  •  omeprazole (priLOSEC) 40 MG capsule, Take 1 capsule by mouth 2 (Two) Times a Day., Disp: 180 capsule, Rfl: 1  •  oxyCODONE-acetaminophen (PERCOCET)  MG per tablet, Take 1 tablet by mouth Every 6 (Six) Hours As Needed for Severe Pain ., Disp: 120 tablet, Rfl: 0  •  pentoxifylline (TRENtal) 400 MG CR tablet, Take 1 tablet by mouth Every Night., Disp: 90 tablet, Rfl: 3  •  pravastatin (PRAVACHOL) 80 MG tablet, Take 1 tablet by mouth Daily., Disp: 90 tablet, Rfl: 3  •  ranolazine (RANEXA) 1000 MG 12 hr tablet, Take 1 tablet by mouth 2 (Two) Times a Day., Disp: 180 tablet, Rfl: 3  •  sennosides-docusate (SB Docusate Sodium/Senna) 8.6-50 MG per tablet, Take 1 tablet by mouth Daily., Disp: 90 tablet, Rfl: 3  •  tacrolimus (PROGRAF) 0.5 MG capsule, ,  "Disp: , Rfl:   •  tacrolimus (PROTOPIC) 0.1 % ointment, Apply 1 application topically to the appropriate area as directed 2 (Two) Times a Day., Disp: 100 g, Rfl: 0  •  mycophenolate (CELLCEPT) 500 MG tablet, Take 1,000 mg by mouth 2 (Two) Times a Day., Disp: , Rfl:     HPI    Mahendra Yates is a 67 y.o. male who presents today for a follow up of coronary artery disease and cardiac risk factors. Since last visit, patient was experiencing pain that was from his neck radiating into his chest and left arm. Neurology told him that his pain was coming from his C7. He is needing cardiac clearance which requires an left heart catheterization. Patient states that he did cancel his last heart catheterization due to not seeing Dr. Dillon at his last visit and concerned about his contrast allergy.. His chest and arm discomfort is intermittent. He states that it bothers him when he gets excited, upset, or with any exertion. He cannot ambulate far without assistance from a walker. Patient denies shortness of breath, orthopnea, palpitations, edema, dizziness, and syncope.      The following portions of the patient's history were reviewed and updated as appropriate: allergies, current medications and problem list.    Pertinent positives as listed in the HPI.  All other systems reviewed are negative.         Vitals:    08/11/22 1315   BP: 130/80   BP Location: Left arm   Patient Position: Sitting   Pulse: 76   SpO2: 98%   Weight: 89.8 kg (198 lb)   Height: 182.9 cm (72\")       Physical Exam:  General: Alert and oriented.  Neck: Jugular venous pressure is within normal limits. Carotids have normal upstrokes without bruits.   Cardiovascular: Heart has a nondisplaced focal PMI. Regular rate and rhythm. No murmur, gallop or rub.  Lungs: Clear, no rales or wheezes. Equal expansion is noted.   Extremities: Show no edema.  Skin: Warm and dry.  Neurologic: Nonfocal.     Diagnostic Data (reviewed with patient):  Lab Results "   Component Value Date    GLUCOSE 112 (H) 08/05/2022    BUN 32 (H) 08/05/2022    CREATININE 1.39 (H) 08/05/2022    EGFRIFNONA 48 (L) 02/11/2022    BCR 23.0 08/05/2022     08/05/2022    K 4.2 08/05/2022     08/05/2022    CO2 28.0 08/05/2022    CALCIUM 9.5 08/05/2022    ALBUMIN 3.90 08/04/2022    ALKPHOS 78 08/04/2022    AST 19 08/04/2022    ALT 13 08/04/2022     Lab Results   Component Value Date    CHOL 258 (H) 08/05/2022    TRIG 385 (H) 08/05/2022    HDL 36 (L) 08/05/2022     (H) 08/05/2022      Lab Results   Component Value Date    WBC 8.93 08/05/2022    RBC 4.29 08/05/2022    HGB 12.8 (L) 08/05/2022    HCT 36.9 (L) 08/05/2022    MCV 86.0 08/05/2022     08/05/2022      Lab Results   Component Value Date    TSH 2.800 08/04/2022        Procedures        Assessment:    ICD-10-CM ICD-9-CM   1. Coronary artery disease involving coronary bypass graft of native heart with other forms of angina pectoris (HCC)  I25.708 414.05     413.9   2. Essential hypertension  I10 401.9   3. Dyslipidemia  E78.5 272.4       Discussed that if patient is having ongoing symptoms related to his heart, we should proceed with a heart catheterization. If patient's symptoms are related to his back, we can do a nuclear stress test. Patient described his symptoms and would like to proceed with a left heart catheterization.       Plan:  1. [facility] to be scheduled to evaluate chest pain and for cardiac clearance for back surgery. The risks were explained to the patient and he was agreeable.  Right femoral artery approach  2. Continue on Bumex 1 mg BID for fluid retention.   3. Continue aspirin 81 mg and Plavix 75 mg daily for DAPT.   4. Continue on diltiazem 240 mg daily for rate control and hypertension.    5. Continue on nitroglycerin 0.4 mg as needed for angina.   6. Continue on pravastatin 80 mg and fenofibrate 134 mg daily for hyperlipidemia.   7. Continue on Ranexa 1000 mg and Imdur 120 mg BID for chronic chest pain.    8. Continue all other current medications.  9. F/up after procedure, sooner if needed.      Scribed for April Dillon MD by Roxy Bradshaw. 8/11/2022 13:28 EDT       I April Dillno MD personally performed the services described in this documentation as scribed by the above individual in my presence, and it is both accurate and complete.    April Dillon MD, FACC

## 2022-08-11 ENCOUNTER — OFFICE VISIT (OUTPATIENT)
Dept: CARDIOLOGY | Facility: CLINIC | Age: 67
End: 2022-08-11

## 2022-08-11 VITALS
WEIGHT: 198 LBS | HEIGHT: 72 IN | OXYGEN SATURATION: 98 % | BODY MASS INDEX: 26.82 KG/M2 | SYSTOLIC BLOOD PRESSURE: 130 MMHG | DIASTOLIC BLOOD PRESSURE: 80 MMHG | HEART RATE: 76 BPM

## 2022-08-11 DIAGNOSIS — I10 ESSENTIAL HYPERTENSION: ICD-10-CM

## 2022-08-11 DIAGNOSIS — I25.708 CORONARY ARTERY DISEASE INVOLVING CORONARY BYPASS GRAFT OF NATIVE HEART WITH OTHER FORMS OF ANGINA PECTORIS: Primary | ICD-10-CM

## 2022-08-11 DIAGNOSIS — E78.5 DYSLIPIDEMIA: ICD-10-CM

## 2022-08-11 PROCEDURE — 99214 OFFICE O/P EST MOD 30 MIN: CPT | Performed by: INTERNAL MEDICINE

## 2022-08-15 ENCOUNTER — READMISSION MANAGEMENT (OUTPATIENT)
Dept: CALL CENTER | Facility: HOSPITAL | Age: 67
End: 2022-08-15

## 2022-08-15 ENCOUNTER — PREP FOR SURGERY (OUTPATIENT)
Dept: OTHER | Facility: HOSPITAL | Age: 67
End: 2022-08-15

## 2022-08-15 DIAGNOSIS — I25.708 CORONARY ARTERY DISEASE INVOLVING CORONARY BYPASS GRAFT OF NATIVE HEART WITH OTHER FORMS OF ANGINA PECTORIS: ICD-10-CM

## 2022-08-15 DIAGNOSIS — R79.9 ABNORMAL FINDING OF BLOOD CHEMISTRY, UNSPECIFIED: ICD-10-CM

## 2022-08-15 DIAGNOSIS — R41.82 ALTERED MENTAL STATUS, UNSPECIFIED ALTERED MENTAL STATUS TYPE: Primary | ICD-10-CM

## 2022-08-15 RX ORDER — DIPHENHYDRAMINE HYDROCHLORIDE 50 MG/ML
50 INJECTION INTRAMUSCULAR; INTRAVENOUS ONCE
Status: CANCELLED | OUTPATIENT
Start: 2022-08-15 | End: 2022-08-15

## 2022-08-15 RX ORDER — SODIUM CHLORIDE 9 MG/ML
3 INJECTION, SOLUTION INTRAVENOUS CONTINUOUS
Status: CANCELLED | OUTPATIENT
Start: 2022-08-15 | End: 2022-08-15

## 2022-08-15 RX ORDER — SODIUM CHLORIDE 0.9 % (FLUSH) 0.9 %
3 SYRINGE (ML) INJECTION EVERY 12 HOURS SCHEDULED
Status: CANCELLED | OUTPATIENT
Start: 2022-08-15

## 2022-08-15 RX ORDER — SODIUM CHLORIDE 0.9 % (FLUSH) 0.9 %
3-10 SYRINGE (ML) INJECTION AS NEEDED
Status: CANCELLED | OUTPATIENT
Start: 2022-08-15

## 2022-08-15 NOTE — OUTREACH NOTE
Stroke Week 2 Survey    Flowsheet Row Responses   [facility] patient discharged from? [facility]   Does the patient have one of the following disease processes/diagnoses(primary or secondary)? Stroke (TIA)   Week 2 attempt successful? Yes   Call start time 1113   Rescheduled Rescheduled-pt requested   Discharge diagnosis Transient ischemic attack    Is patient permission given to speak with other caregiver? Yes   Person spoke with today (if not patient) and relationship Niru CAMEJO - Registered Nurse

## 2022-08-16 ENCOUNTER — HOSPITAL ENCOUNTER (OUTPATIENT)
Facility: HOSPITAL | Age: 67
Setting detail: HOSPITAL OUTPATIENT SURGERY
Discharge: HOME | End: 2022-08-16
Attending: INTERNAL MEDICINE | Admitting: INTERNAL MEDICINE

## 2022-08-16 ENCOUNTER — READMISSION MANAGEMENT (OUTPATIENT)
Dept: CALL CENTER | Facility: HOSPITAL | Age: 67
End: 2022-08-16

## 2022-08-16 ENCOUNTER — PREP FOR SURGERY (OUTPATIENT)
Dept: OTHER | Facility: HOSPITAL | Age: 67
End: 2022-08-16

## 2022-08-16 VITALS
HEART RATE: 79 BPM | DIASTOLIC BLOOD PRESSURE: 94 MMHG | SYSTOLIC BLOOD PRESSURE: 158 MMHG | RESPIRATION RATE: 16 BRPM | OXYGEN SATURATION: 98 %

## 2022-08-16 DIAGNOSIS — I25.10 CAD IN NATIVE ARTERY: ICD-10-CM

## 2022-08-16 DIAGNOSIS — I25.708 CORONARY ARTERY DISEASE INVOLVING CORONARY BYPASS GRAFT OF NATIVE HEART WITH OTHER FORMS OF ANGINA PECTORIS: ICD-10-CM

## 2022-08-16 DIAGNOSIS — I20.0 UNSTABLE ANGINA: ICD-10-CM

## 2022-08-16 LAB
ANION GAP SERPL CALCULATED.3IONS-SCNC: 12 MMOL/L (ref 5–15)
BASOPHILS # BLD AUTO: 0.06 10*3/MM3 (ref 0–0.2)
BASOPHILS NFR BLD AUTO: 1.1 % (ref 0–1.5)
BUN SERPL-MCNC: 27 MG/DL (ref 8–23)
BUN/CREAT SERPL: 19.6 (ref 7–25)
CALCIUM SPEC-SCNC: 9.6 MG/DL (ref 8.6–10.5)
CHLORIDE SERPL-SCNC: 96 MMOL/L (ref 98–107)
CHOLEST SERPL-MCNC: 236 MG/DL (ref 0–200)
CO2 SERPL-SCNC: 29 MMOL/L (ref 22–29)
CREAT SERPL-MCNC: 1.38 MG/DL (ref 0.76–1.27)
DEPRECATED RDW RBC AUTO: 47 FL (ref 37–54)
EGFRCR SERPLBLD CKD-EPI 2021: 55.7 ML/MIN/1.73
EOSINOPHIL # BLD AUTO: 0.26 10*3/MM3 (ref 0–0.4)
EOSINOPHIL NFR BLD AUTO: 5 % (ref 0.3–6.2)
ERYTHROCYTE [DISTWIDTH] IN BLOOD BY AUTOMATED COUNT: 14.4 % (ref 12.3–15.4)
GLUCOSE SERPL-MCNC: 129 MG/DL (ref 65–99)
HBA1C MFR BLD: 6.1 % (ref 4.8–5.6)
HCT VFR BLD AUTO: 39.7 % (ref 37.5–51)
HDLC SERPL-MCNC: 31 MG/DL (ref 40–60)
HGB BLD-MCNC: 12.7 G/DL (ref 13–17.7)
IMM GRANULOCYTES # BLD AUTO: 0.03 10*3/MM3 (ref 0–0.05)
IMM GRANULOCYTES NFR BLD AUTO: 0.6 % (ref 0–0.5)
INR PPP: 0.98 (ref 0.84–1.13)
LDLC SERPL CALC-MCNC: 117 MG/DL (ref 0–100)
LDLC/HDLC SERPL: 3.39 {RATIO}
LYMPHOCYTES # BLD AUTO: 1.94 10*3/MM3 (ref 0.7–3.1)
LYMPHOCYTES NFR BLD AUTO: 37.2 % (ref 19.6–45.3)
MAGNESIUM SERPL-MCNC: 1.9 MG/DL (ref 1.6–2.4)
MCH RBC QN AUTO: 28.8 PG (ref 26.6–33)
MCHC RBC AUTO-ENTMCNC: 32 G/DL (ref 31.5–35.7)
MCV RBC AUTO: 90 FL (ref 79–97)
MONOCYTES # BLD AUTO: 0.62 10*3/MM3 (ref 0.1–0.9)
MONOCYTES NFR BLD AUTO: 11.9 % (ref 5–12)
NEUTROPHILS NFR BLD AUTO: 2.31 10*3/MM3 (ref 1.7–7)
NEUTROPHILS NFR BLD AUTO: 44.2 % (ref 42.7–76)
NRBC BLD AUTO-RTO: 0 /100 WBC (ref 0–0.2)
PLATELET # BLD AUTO: 326 10*3/MM3 (ref 140–450)
PMV BLD AUTO: 10 FL (ref 6–12)
POTASSIUM SERPL-SCNC: 4 MMOL/L (ref 3.5–5.2)
PROTHROMBIN TIME: 12.9 SECONDS (ref 11.4–14.4)
RBC # BLD AUTO: 4.41 10*6/MM3 (ref 4.14–5.8)
SODIUM SERPL-SCNC: 137 MMOL/L (ref 136–145)
TRIGL SERPL-MCNC: 500 MG/DL (ref 0–150)
VLDLC SERPL-MCNC: 88 MG/DL (ref 5–40)
WBC NRBC COR # BLD: 5.22 10*3/MM3 (ref 3.4–10.8)

## 2022-08-16 PROCEDURE — 83036 HEMOGLOBIN GLYCOSYLATED A1C: CPT | Performed by: NURSE PRACTITIONER

## 2022-08-16 PROCEDURE — C1894 INTRO/SHEATH, NON-LASER: HCPCS | Performed by: INTERNAL MEDICINE

## 2022-08-16 PROCEDURE — 93455 CORONARY ART/GRFT ANGIO S&I: CPT | Performed by: INTERNAL MEDICINE

## 2022-08-16 PROCEDURE — 85025 COMPLETE CBC W/AUTO DIFF WBC: CPT | Performed by: NURSE PRACTITIONER

## 2022-08-16 PROCEDURE — 80048 BASIC METABOLIC PNL TOTAL CA: CPT | Performed by: NURSE PRACTITIONER

## 2022-08-16 PROCEDURE — C1769 GUIDE WIRE: HCPCS | Performed by: INTERNAL MEDICINE

## 2022-08-16 PROCEDURE — 83735 ASSAY OF MAGNESIUM: CPT | Performed by: NURSE PRACTITIONER

## 2022-08-16 PROCEDURE — 85610 PROTHROMBIN TIME: CPT | Performed by: NURSE PRACTITIONER

## 2022-08-16 PROCEDURE — 0 IOPAMIDOL PER 1 ML: Performed by: INTERNAL MEDICINE

## 2022-08-16 PROCEDURE — 25010000002 HYDROCORTISONE SODIUM SUCCINATE 100 MG RECONSTITUTED SOLUTION: Performed by: INTERNAL MEDICINE

## 2022-08-16 PROCEDURE — 25010000002 DIPHENHYDRAMINE PER 50 MG: Performed by: INTERNAL MEDICINE

## 2022-08-16 PROCEDURE — 25010000002 HYDROCORTISONE SODIUM SUCCINATE 100 MG RECONSTITUTED SOLUTION: Performed by: NURSE PRACTITIONER

## 2022-08-16 PROCEDURE — 25010000002 MIDAZOLAM PER 1 MG: Performed by: INTERNAL MEDICINE

## 2022-08-16 PROCEDURE — 25010000002 FENTANYL CITRATE (PF) 50 MCG/ML SOLUTION: Performed by: INTERNAL MEDICINE

## 2022-08-16 PROCEDURE — 25010000002 DIPHENHYDRAMINE PER 50 MG: Performed by: NURSE PRACTITIONER

## 2022-08-16 PROCEDURE — 80061 LIPID PANEL: CPT | Performed by: NURSE PRACTITIONER

## 2022-08-16 RX ORDER — SODIUM CHLORIDE 0.9 % (FLUSH) 0.9 %
3-10 SYRINGE (ML) INJECTION AS NEEDED
Status: DISCONTINUED | OUTPATIENT
Start: 2022-08-16 | End: 2022-08-16 | Stop reason: HOSPADM

## 2022-08-16 RX ORDER — ASPIRIN 81 MG/1
81 TABLET ORAL DAILY
Status: DISCONTINUED | OUTPATIENT
Start: 2022-08-16 | End: 2022-08-16

## 2022-08-16 RX ORDER — NALOXONE HCL 0.4 MG/ML
0.4 VIAL (ML) INJECTION
Status: DISCONTINUED | OUTPATIENT
Start: 2022-08-16 | End: 2022-08-16 | Stop reason: HOSPADM

## 2022-08-16 RX ORDER — LIDOCAINE HYDROCHLORIDE 10 MG/ML
INJECTION, SOLUTION EPIDURAL; INFILTRATION; INTRACAUDAL; PERINEURAL AS NEEDED
Status: DISCONTINUED | OUTPATIENT
Start: 2022-08-16 | End: 2022-08-16 | Stop reason: HOSPADM

## 2022-08-16 RX ORDER — DIPHENHYDRAMINE HYDROCHLORIDE 50 MG/ML
50 INJECTION INTRAMUSCULAR; INTRAVENOUS ONCE
Status: COMPLETED | OUTPATIENT
Start: 2022-08-16 | End: 2022-08-16

## 2022-08-16 RX ORDER — FAMOTIDINE 10 MG/ML
20 INJECTION, SOLUTION INTRAVENOUS ONCE
Status: COMPLETED | OUTPATIENT
Start: 2022-08-16 | End: 2022-08-16

## 2022-08-16 RX ORDER — ACETAMINOPHEN 325 MG/1
650 TABLET ORAL EVERY 4 HOURS PRN
Status: DISCONTINUED | OUTPATIENT
Start: 2022-08-16 | End: 2022-08-16 | Stop reason: HOSPADM

## 2022-08-16 RX ORDER — ASPIRIN 81 MG/1
81 TABLET ORAL DAILY
Status: DISCONTINUED | OUTPATIENT
Start: 2022-08-16 | End: 2022-08-16 | Stop reason: HOSPADM

## 2022-08-16 RX ORDER — SODIUM CHLORIDE 9 MG/ML
100 INJECTION, SOLUTION INTRAVENOUS CONTINUOUS
Status: ACTIVE | OUTPATIENT
Start: 2022-08-16 | End: 2022-08-16

## 2022-08-16 RX ORDER — FENTANYL CITRATE 50 UG/ML
INJECTION, SOLUTION INTRAMUSCULAR; INTRAVENOUS AS NEEDED
Status: DISCONTINUED | OUTPATIENT
Start: 2022-08-16 | End: 2022-08-16 | Stop reason: HOSPADM

## 2022-08-16 RX ORDER — SODIUM CHLORIDE 9 MG/ML
250 INJECTION, SOLUTION INTRAVENOUS ONCE AS NEEDED
Status: DISCONTINUED | OUTPATIENT
Start: 2022-08-16 | End: 2022-08-16 | Stop reason: HOSPADM

## 2022-08-16 RX ORDER — MORPHINE SULFATE 2 MG/ML
1 INJECTION, SOLUTION INTRAMUSCULAR; INTRAVENOUS EVERY 4 HOURS PRN
Status: DISCONTINUED | OUTPATIENT
Start: 2022-08-16 | End: 2022-08-16 | Stop reason: HOSPADM

## 2022-08-16 RX ORDER — HYDROCODONE BITARTRATE AND ACETAMINOPHEN 7.5; 325 MG/1; MG/1
1 TABLET ORAL EVERY 4 HOURS PRN
Status: DISCONTINUED | OUTPATIENT
Start: 2022-08-16 | End: 2022-08-16 | Stop reason: HOSPADM

## 2022-08-16 RX ORDER — SODIUM CHLORIDE 9 MG/ML
3 INJECTION, SOLUTION INTRAVENOUS CONTINUOUS
Status: ACTIVE | OUTPATIENT
Start: 2022-08-16 | End: 2022-08-16

## 2022-08-16 RX ORDER — SODIUM CHLORIDE 0.9 % (FLUSH) 0.9 %
3 SYRINGE (ML) INJECTION EVERY 12 HOURS SCHEDULED
Status: DISCONTINUED | OUTPATIENT
Start: 2022-08-16 | End: 2022-08-16 | Stop reason: HOSPADM

## 2022-08-16 RX ORDER — MIDAZOLAM HYDROCHLORIDE 1 MG/ML
INJECTION INTRAMUSCULAR; INTRAVENOUS AS NEEDED
Status: DISCONTINUED | OUTPATIENT
Start: 2022-08-16 | End: 2022-08-16 | Stop reason: HOSPADM

## 2022-08-16 RX ADMIN — FAMOTIDINE 20 MG: 10 INJECTION, SOLUTION INTRAVENOUS at 13:34

## 2022-08-16 RX ADMIN — FAMOTIDINE 20 MG: 10 INJECTION, SOLUTION INTRAVENOUS at 08:21

## 2022-08-16 RX ADMIN — DIPHENHYDRAMINE HYDROCHLORIDE 50 MG: 50 INJECTION INTRAMUSCULAR; INTRAVENOUS at 08:21

## 2022-08-16 RX ADMIN — HYDROCORTISONE SODIUM SUCCINATE 100 MG: 100 INJECTION, POWDER, FOR SOLUTION INTRAMUSCULAR; INTRAVENOUS at 08:21

## 2022-08-16 RX ADMIN — SODIUM CHLORIDE 3 ML/KG/HR: 9 INJECTION, SOLUTION INTRAVENOUS at 08:22

## 2022-08-16 RX ADMIN — DIPHENHYDRAMINE HYDROCHLORIDE 50 MG: 50 INJECTION INTRAMUSCULAR; INTRAVENOUS at 13:19

## 2022-08-16 RX ADMIN — HYDROCORTISONE SODIUM SUCCINATE 100 MG: 100 INJECTION, POWDER, FOR SOLUTION INTRAMUSCULAR; INTRAVENOUS at 13:19

## 2022-08-16 NOTE — INTERVAL H&P NOTE
H&P reviewed. The patient was examined and there are no changes to the H&P.    Patient presents today for [facility] plus/minus CBI. Using RFA The risks, benefits, and alternatives of the procedure have been reviewed and the patient wishes to proceed.     Proceed as planned.     Electronically signed by SERJIO Castrejon, 08/16/22, 8:28 AM EDT.    April Dillon MD, FACC

## 2022-08-25 DIAGNOSIS — M54.42 CHRONIC LOW BACK PAIN WITH BILATERAL SCIATICA, UNSPECIFIED BACK PAIN LATERALITY: ICD-10-CM

## 2022-08-25 DIAGNOSIS — G89.29 CHRONIC LOW BACK PAIN WITH BILATERAL SCIATICA, UNSPECIFIED BACK PAIN LATERALITY: ICD-10-CM

## 2022-08-25 DIAGNOSIS — M54.41 CHRONIC LOW BACK PAIN WITH BILATERAL SCIATICA, UNSPECIFIED BACK PAIN LATERALITY: ICD-10-CM

## 2022-08-25 NOTE — TELEPHONE ENCOUNTER
Rx Refill Note  Requested Prescriptions     Pending Prescriptions Disp Refills   • oxyCODONE-acetaminophen (PERCOCET)  MG per tablet 120 tablet 0     Sig: Take 1 tablet by mouth Every 6 (Six) Hours As Needed for Severe Pain .      Last office visit with prescribing clinician: 6/20/2022      Next office visit with prescribing clinician: 9/21/2022            Jenifer Aleman MA  08/25/22, 13:41 EDT

## 2022-08-26 RX ORDER — OXYCODONE AND ACETAMINOPHEN 10; 325 MG/1; MG/1
1 TABLET ORAL EVERY 6 HOURS PRN
Qty: 120 TABLET | Refills: 0 | Status: SHIPPED | OUTPATIENT
Start: 2022-08-26 | End: 2022-10-03 | Stop reason: SDUPTHER

## 2022-08-29 ENCOUNTER — OFFICE VISIT (OUTPATIENT)
Dept: NEUROSURGERY | Facility: CLINIC | Age: 67
End: 2022-08-29

## 2022-08-29 VITALS
TEMPERATURE: 97.1 F | DIASTOLIC BLOOD PRESSURE: 68 MMHG | HEIGHT: 72 IN | BODY MASS INDEX: 27.22 KG/M2 | SYSTOLIC BLOOD PRESSURE: 138 MMHG | WEIGHT: 201 LBS

## 2022-08-29 DIAGNOSIS — M48.062 LUMBAR STENOSIS WITH NEUROGENIC CLAUDICATION: ICD-10-CM

## 2022-08-29 PROCEDURE — 99213 OFFICE O/P EST LOW 20 MIN: CPT | Performed by: STUDENT IN AN ORGANIZED HEALTH CARE EDUCATION/TRAINING PROGRAM

## 2022-08-29 RX ORDER — GABAPENTIN 600 MG/1
600 TABLET ORAL 3 TIMES DAILY
Qty: 90 TABLET | Refills: 1 | Status: SHIPPED | OUTPATIENT
Start: 2022-08-29 | End: 2022-11-16 | Stop reason: SDUPTHER

## 2022-08-29 NOTE — PROGRESS NOTES
"NEUROSURGERY PROGRESS NOTE    Chief Complaint: Lumbar stenosis    Subjective: This is a 59-year-old male known to me as I previous evaluated him for his lumbar stenosis.  The patient has severe cardiac disease and is currently being evaluated by a cardiothoracic surgeon at  to determine if there is a surgery that can be done.  Currently, he is unsafe to proceed with any type of spinal surgery.  He continues to have bilateral lower extremity pain, left greater than right as well as weakness of his bilateral dorsiflexion.    Objective    Vital Signs: Blood pressure 138/68, temperature 97.1 °F (36.2 °C), temperature source Infrared, height 182.9 cm (72\"), weight 91.2 kg (201 lb).    Physical Exam  Awake, alert and oriented x 3  Opens eyes spont  Pupils 3 mm rx bilat  Extraocular muscles intact bilaterally  Face symmetric bilaterally  Tongue midline  5/5 in his LE's bilaterally with the exception of DF bilatrerally which is 1-2,  And PF which is 4+ bilaterally     Current Medications:   Current Outpatient Medications:   •  aspirin 81 MG EC tablet, Take 81 mg by mouth Daily. Continues per doctors orders, Disp: , Rfl:   •  bumetanide (BUMEX) 1 MG tablet, Take 1 tablet by mouth 2 (Two) Times a Day., Disp: 60 tablet, Rfl: 2  •  busPIRone (BUSPAR) 10 MG tablet, Take 1 tablet by mouth 2 (Two) Times a Day., Disp: 60 tablet, Rfl: 5  •  cholecalciferol (VITAMIN D3) 25 MCG (1000 UT) tablet, Take 1,000 Units by mouth Daily., Disp: , Rfl:   •  DHEA 50 MG tablet, Take 1 tablet by mouth Daily., Disp: , Rfl:   •  dilTIAZem CD (CARDIZEM CD) 240 MG 24 hr capsule, Take 1 capsule by mouth Daily. Hold until cardiology follow up, Disp: 90 capsule, Rfl: 3  •  Dulaglutide (Trulicity) 0.75 MG/0.5ML solution pen-injector, Inject 0.75 mg under the skin into the appropriate area as directed 1 (One) Time Per Week., Disp: 5 pen, Rfl: 2  •  DULoxetine (Cymbalta) 60 MG capsule, Take 1 capsule by mouth Daily., Disp: 90 capsule, Rfl: 3  •  " fenofibrate micronized (LOFIBRA) 134 MG capsule, Take 134 mg by mouth Every Evening., Disp: , Rfl:   •  isosorbide mononitrate (IMDUR) 120 MG 24 hr tablet, Take 1 tablet by mouth 2 (Two) Times a Day., Disp: 180 tablet, Rfl: 3  •  Loratadine 10 MG capsule, Take 1 capsule by mouth Daily., Disp: , Rfl:   •  magnesium oxide (MAG-OX) 400 MG tablet, Take 400 mg by mouth 2 (Two) Times a Day. 3 tabs in the AM and 2 tabs in the PM per transplant team, Disp: , Rfl:   •  multivitamin with minerals tablet tablet, Take 1 tablet by mouth Daily., Disp: , Rfl:   •  mycophenolate (CELLCEPT) 500 MG tablet, Take 1,000 mg by mouth 2 (Two) Times a Day., Disp: , Rfl:   •  nitroglycerin (NITROSTAT) 0.4 MG SL tablet, Place 1 tablet under the tongue Every 5 (Five) Minutes As Needed for Chest Pain. Take no more than 3 doses in 15 minutes., Disp: 30 tablet, Rfl: 5  •  omeprazole (priLOSEC) 40 MG capsule, Take 1 capsule by mouth 2 (Two) Times a Day., Disp: 180 capsule, Rfl: 1  •  oxyCODONE-acetaminophen (PERCOCET)  MG per tablet, Take 1 tablet by mouth Every 6 (Six) Hours As Needed for Severe Pain ., Disp: 120 tablet, Rfl: 0  •  pravastatin (PRAVACHOL) 80 MG tablet, Take 1 tablet by mouth Daily., Disp: 90 tablet, Rfl: 3  •  ranolazine (RANEXA) 1000 MG 12 hr tablet, Take 1 tablet by mouth 2 (Two) Times a Day., Disp: 180 tablet, Rfl: 3  •  sennosides-docusate (SB Docusate Sodium/Senna) 8.6-50 MG per tablet, Take 1 tablet by mouth Daily., Disp: 90 tablet, Rfl: 3  •  tacrolimus (PROGRAF) 0.5 MG capsule, , Disp: , Rfl:   •  tacrolimus (PROTOPIC) 0.1 % ointment, Apply 1 application topically to the appropriate area as directed 2 (Two) Times a Day., Disp: 100 g, Rfl: 0     Laboratory Results:                              Brief Urine Lab Results  (Last result in the past 365 days)      Color   Clarity   Blood   Leuk Est   Nitrite   Protein   CREAT   Urine HCG        08/04/22 1856 Yellow   Clear   Negative   Negative   Negative   Negative                Microbiology Results (last 10 days)     ** No results found for the last 240 hours. **          Diagnostic Imaging: I reviewed and independently interpreted the new imaging.     Assessment/Plan:  This is a 67-year-old male known to me for lumbar stenosis who continues to have bilateral leg pain and weakness.  Unfortunately, the patient's cardiac risk factors are far too high to proceed with any surgical intervention.  He is going to be evaluated by cardiothoracic surgeon at an outside institution to see if there is any surgical correction that could be performed.  I told the patient if he is ever to the point where he has been cleared for surgery from a cardiac standpoint, to call our office and we will get him back in for reevaluation.  In the meantime, I am going to increase his gabapentin to 600 3 times daily with the hope that the this can give his leg pain relief.    Diagnoses and all orders for this visit:    1. Lumbar stenosis with neurogenic claudication        Rd Sanz MD  08/29/22  14:09 EDT

## 2022-09-01 ENCOUNTER — TELEPHONE (OUTPATIENT)
Dept: FAMILY MEDICINE CLINIC | Facility: CLINIC | Age: 67
End: 2022-09-01

## 2022-09-01 DIAGNOSIS — I25.10 CAD IN NATIVE ARTERY: Primary | ICD-10-CM

## 2022-09-01 DIAGNOSIS — I20.0 UNSTABLE ANGINA: ICD-10-CM

## 2022-09-01 NOTE — PROGRESS NOTES
Surgery scheduled with Dr. Hill 9/19. Needs echo asap - report and images (powershare) to be sent to

## 2022-09-02 ENCOUNTER — TELEPHONE (OUTPATIENT)
Dept: FAMILY MEDICINE CLINIC | Facility: CLINIC | Age: 67
End: 2022-09-02

## 2022-09-02 NOTE — TELEPHONE ENCOUNTER
Pt and pt wife called in stating that patients wife tested positive for covid but the patient has tested negative.   He is to have open heart surgery on the 19th. I advised patients wife to practice quarantine, wear a mask around the patient and monitor sx. They have a few at home test that they can do in the meantime.   The pt is also due to have a scheduled covid test on the 15th.   I advised them to also contact the patients cardiologist who will be doing the surgery as they may want to push it back especially if the patient is positive for covid. The patient and wife verbalized understanding. They further had a question about if there pet can get covid and pass it back an forth to them. I advised them after speaking with Dr. Taveras that this would be more of a vet question that we have not had factual information regarding pets having positive covid.   No further questions or concerns from patient or wife.

## 2022-09-03 ENCOUNTER — TELEPHONE (OUTPATIENT)
Dept: FAMILY MEDICINE CLINIC | Facility: CLINIC | Age: 67
End: 2022-09-03

## 2022-09-03 DIAGNOSIS — U07.1 COVID-19 VIRUS INFECTION: Primary | ICD-10-CM

## 2022-09-03 NOTE — TELEPHONE ENCOUNTER
Pt tested positive for covid19 today. Symptoms for less than 24 hours.     He is a good candidate for paxlovid but will need to hold ranolazine and tacrolimus when on medication and hold for 2 days after. I would not recommend starting paxlovid until 12 hours after last ranolazine. Pt will discuss with cards if okay to hold ranolazine.     Discussed with pt and wife.

## 2022-09-06 ENCOUNTER — APPOINTMENT (OUTPATIENT)
Dept: CARDIOLOGY | Facility: HOSPITAL | Age: 67
End: 2022-09-06

## 2022-09-12 NOTE — TELEPHONE ENCOUNTER
Rx Refill Note  Requested Prescriptions     Pending Prescriptions Disp Refills   • bumetanide (BUMEX) 1 MG tablet [Pharmacy Med Name: BUMETANIDE 1MG TABLETS] 60 tablet 2     Sig: TAKE 1 TABLET BY MOUTH TWICE DAILY      Last office visit with prescribing clinician: 6/20/2022      Next office visit with prescribing clinician: Visit date not found            Susie Perera MA  09/12/22, 14:17 EDT     Lft. Vm. Patient was to return in about 3 months around 9/20/2022    HUB MAY RELAY MESSAGE & SCHEDULE PATIENT

## 2022-09-13 NOTE — TELEPHONE ENCOUNTER
Rx Refill Note  Requested Prescriptions     Pending Prescriptions Disp Refills   • bumetanide (BUMEX) 1 MG tablet [Pharmacy Med Name: BUMETANIDE 1MG TABLETS] 60 tablet 2     Sig: TAKE 1 TABLET BY MOUTH TWICE DAILY      Last office visit with prescribing clinician: 6/20/2022      Next office visit with prescribing clinician: Visit date not found            Kunal Dior MA  09/13/22, 09:20 EDT     Patient was to return in 3 months called and left vm for patient to return call    OK FOR HUB TO RELAY MESSAGE AND SCHEDULE APPOINTMENT     2nd attempt

## 2022-09-14 RX ORDER — BUMETANIDE 1 MG/1
TABLET ORAL
Qty: 60 TABLET | Refills: 2 | OUTPATIENT
Start: 2022-09-14

## 2022-09-14 NOTE — TELEPHONE ENCOUNTER
Patient was to return in 3 months called and left vm for patient to return call     OK FOR HUB TO RELAY MESSAGE AND SCHEDULE APPOINTMENT      3rd attempt

## 2022-09-30 ENCOUNTER — HOSPITAL ENCOUNTER (OUTPATIENT)
Dept: CARDIOLOGY | Facility: HOSPITAL | Age: 67
Discharge: HOME | End: 2022-09-30
Admitting: INTERNAL MEDICINE

## 2022-09-30 VITALS — WEIGHT: 201 LBS | BODY MASS INDEX: 27.22 KG/M2 | HEIGHT: 72 IN

## 2022-09-30 PROCEDURE — 93306 TTE W/DOPPLER COMPLETE: CPT

## 2022-09-30 PROCEDURE — 93306 TTE W/DOPPLER COMPLETE: CPT | Performed by: INTERNAL MEDICINE

## 2022-10-03 ENCOUNTER — TELEMEDICINE (OUTPATIENT)
Dept: FAMILY MEDICINE CLINIC | Facility: CLINIC | Age: 67
End: 2022-10-03

## 2022-10-03 DIAGNOSIS — G89.29 CHRONIC LOW BACK PAIN WITH BILATERAL SCIATICA, UNSPECIFIED BACK PAIN LATERALITY: Primary | ICD-10-CM

## 2022-10-03 DIAGNOSIS — M79.673 PAIN OF FOOT, UNSPECIFIED LATERALITY: ICD-10-CM

## 2022-10-03 DIAGNOSIS — G89.29 CHRONIC PAIN OF BOTH SHOULDERS: ICD-10-CM

## 2022-10-03 DIAGNOSIS — M25.512 CHRONIC PAIN OF BOTH SHOULDERS: ICD-10-CM

## 2022-10-03 DIAGNOSIS — F32.A ANXIETY AND DEPRESSION: ICD-10-CM

## 2022-10-03 DIAGNOSIS — M25.511 CHRONIC PAIN OF BOTH SHOULDERS: ICD-10-CM

## 2022-10-03 DIAGNOSIS — M54.42 CHRONIC LOW BACK PAIN WITH BILATERAL SCIATICA, UNSPECIFIED BACK PAIN LATERALITY: Primary | ICD-10-CM

## 2022-10-03 DIAGNOSIS — F41.9 ANXIETY AND DEPRESSION: ICD-10-CM

## 2022-10-03 DIAGNOSIS — M54.41 CHRONIC LOW BACK PAIN WITH BILATERAL SCIATICA, UNSPECIFIED BACK PAIN LATERALITY: Primary | ICD-10-CM

## 2022-10-03 PROCEDURE — 99214 OFFICE O/P EST MOD 30 MIN: CPT | Performed by: INTERNAL MEDICINE

## 2022-10-03 RX ORDER — PREDNISONE 20 MG/1
40 TABLET ORAL DAILY
Qty: 10 TABLET | Refills: 0 | Status: SHIPPED | OUTPATIENT
Start: 2022-10-03 | End: 2022-10-08

## 2022-10-03 RX ORDER — OXYCODONE AND ACETAMINOPHEN 10; 325 MG/1; MG/1
1 TABLET ORAL EVERY 6 HOURS PRN
Qty: 120 TABLET | Refills: 0 | Status: SHIPPED | OUTPATIENT
Start: 2022-10-03 | End: 2022-11-01 | Stop reason: SDUPTHER

## 2022-10-03 NOTE — PROGRESS NOTES
Cc: chronic joint pain  You have chosen to receive care through a telehealth visit.  Do you consent to use a video/audio connection for your medical care today? Yes  Pt located at home  Provider located at office    HPI:  Mahendra Yates is a 67 y.o. male who presents today for f/u chronic pain of back, shoulders, and feet.    ROS:  Constitutional: no fevers, night sweats or unexplained weight loss  Eyes: no vision changes  ENT: no runny nose, ear pain, sore throat  Cardio: no chest pain, palpitations  Pulm: no shortness of breath, wheezing, or cough  GI: no abdominal pain or changes in bowel movements  : no difficulty urinating  MSK: no difficulty ambulating, no joint pain  Neuro: no weakness, dizziness or headache  Psych: no trouble sleeping  Endo: no change in appetite      Past Medical History:   Diagnosis Date   • Arthritis    • Atherosclerosis    • B12 deficiency    • Cancer (HCC)    • Cancer of liver (HCC)    • Chronic headaches    • Coronary artery disease    • Diabetes mellitus (HCC)    • Gastroesophageal reflux disease    • History of transfusion    • Hyperlipidemia    • Hypertension    • Myocardial infarction (HCC)    • Sleep apnea    • Stroke (HCC)       Family History   Problem Relation Age of Onset   • Heart disease Mother         Stroke   • Stroke Mother    • Diabetes Father    • Liver cancer Brother    • No Known Problems Sister    • Macular degeneration Brother    • No Known Problems Sister    • Dementia Brother       Social History     Socioeconomic History   • Marital status:    Tobacco Use   • Smoking status: Former Smoker     Packs/day: 3.00     Years: 30.00     Pack years: 90.00     Types: Cigarettes     Quit date: 1991     Years since quittin.6   • Smokeless tobacco: Never Used   Vaping Use   • Vaping Use: Never used   Substance and Sexual Activity   • Alcohol use: Yes     Comment: RARELY   • Drug use: No   • Sexual activity: Not Currently     Partners: Female       Allergies   Allergen Reactions   • Contrast Dye Other (See Comments)     Cardiac Arrest   • Penicillins Rash   • Sulfasalazine Rash      Immunization History   Administered Date(s) Administered   • COVID-19 (PFIZER) PURPLE CAP 01/15/2021, 02/05/2021, 09/17/2021   • Flu Vaccine Quad PF >36MO 10/12/2017, 12/28/2018   • Fluzone High Dose =>65 Years ([facility] ONLY) 09/24/2019   • Fluzone High-Dose 65+yrs 09/14/2020, 01/03/2022   • Influenza Quad Vaccine (Inpatient) 02/12/2017        PE:  There were no vitals filed for this visit.   There is no height or weight on file to calculate BMI.    Gen Appearance: NAD      Current Outpatient Medications   Medication Sig Dispense Refill   • aspirin 81 MG EC tablet Take 81 mg by mouth Daily. Continues per doctors orders     • bumetanide (BUMEX) 1 MG tablet Take 1 tablet by mouth 2 (Two) Times a Day. 60 tablet 2   • busPIRone (BUSPAR) 10 MG tablet Take 1 tablet by mouth 2 (Two) Times a Day. 60 tablet 5   • cholecalciferol (VITAMIN D3) 25 MCG (1000 UT) tablet Take 1,000 Units by mouth Daily.     • DHEA 50 MG tablet Take 1 tablet by mouth Daily.     • dilTIAZem CD (CARDIZEM CD) 240 MG 24 hr capsule Take 1 capsule by mouth Daily. Hold until cardiology follow up 90 capsule 3   • Dulaglutide (Trulicity) 0.75 MG/0.5ML solution pen-injector Inject 0.75 mg under the skin into the appropriate area as directed 1 (One) Time Per Week. 5 pen 2   • DULoxetine (Cymbalta) 60 MG capsule Take 1 capsule by mouth Daily. 90 capsule 3   • fenofibrate micronized (LOFIBRA) 134 MG capsule Take 134 mg by mouth Every Evening.     • gabapentin (NEURONTIN) 600 MG tablet Take 1 tablet by mouth 3 (Three) Times a Day. 90 tablet 1   • isosorbide mononitrate (IMDUR) 120 MG 24 hr tablet Take 1 tablet by mouth 2 (Two) Times a Day. 180 tablet 3   • Loratadine 10 MG capsule Take 1 capsule by mouth Daily.     • magnesium oxide (MAG-OX) 400 MG tablet Take 400 mg by mouth 2 (Two) Times a Day. 3 tabs in the AM and 2 tabs  in the PM per transplant team     • multivitamin with minerals tablet tablet Take 1 tablet by mouth Daily.     • mycophenolate (CELLCEPT) 500 MG tablet Take 1,000 mg by mouth 2 (Two) Times a Day.     • nitroglycerin (NITROSTAT) 0.4 MG SL tablet Place 1 tablet under the tongue Every 5 (Five) Minutes As Needed for Chest Pain. Take no more than 3 doses in 15 minutes. 30 tablet 5   • omeprazole (priLOSEC) 40 MG capsule Take 1 capsule by mouth 2 (Two) Times a Day. 180 capsule 1   • oxyCODONE-acetaminophen (PERCOCET)  MG per tablet Take 1 tablet by mouth Every 6 (Six) Hours As Needed for Severe Pain . 120 tablet 0   • pravastatin (PRAVACHOL) 80 MG tablet Take 1 tablet by mouth Daily. 90 tablet 3   • ranolazine (RANEXA) 1000 MG 12 hr tablet Take 1 tablet by mouth 2 (Two) Times a Day. 180 tablet 3   • sennosides-docusate (SB Docusate Sodium/Senna) 8.6-50 MG per tablet Take 1 tablet by mouth Daily. 90 tablet 3   • tacrolimus (PROGRAF) 0.5 MG capsule      • tacrolimus (PROTOPIC) 0.1 % ointment Apply 1 application topically to the appropriate area as directed 2 (Two) Times a Day. 100 g 0     No current facility-administered medications for this visit.     Steroid burst for increased pain.  Refill oxycodone.  Anxiety and depression symptoms stable.  Continue BuSpar twice daily.    Diagnoses and all orders for this visit:    1. Chronic low back pain with bilateral sciatica, unspecified back pain laterality (Primary)    2. Chronic pain of both shoulders    3. Anxiety and depression    4. Pain of foot, unspecified laterality         No follow-ups on file.     Dictated Utilizing Dragon Dictation    Please note that portions of this note were completed with a voice recognition program.    Part of this note may be an electronic transcription/translation of spoken language to printed text using the Dragon Dictation System.

## 2022-10-04 RX ORDER — NITROGLYCERIN 0.4 MG/1
TABLET SUBLINGUAL
Qty: 25 TABLET | Refills: 1 | Status: SHIPPED | OUTPATIENT
Start: 2022-10-04 | End: 2023-01-04

## 2022-10-04 NOTE — TELEPHONE ENCOUNTER
Caller: Shane Yatesll    Relationship: Emergency Contact    Best call back number: [phone]    Requested Prescriptions:   Requested Prescriptions     Pending Prescriptions Disp Refills   • nitroglycerin (NITROSTAT) 0.4 MG SL tablet [Pharmacy Med Name: NITROGLYCERIN 0.4MG SUB TAB 25S] 25 tablet      Sig: PLACE 1 TABLET UNDER THE TONGUE EVERY 5 MINUTES AS NEEDED FOR CHEST PAIN. DO NOT EXCEED 3 TABLETS IN 15 MINUTES        Pharmacy where request should be sent: [id number] [place] [address] [address] - [phone]  - [phone] FX     Additional details provided by patient: THE PATIENT'S WIFE REPORTS THE PATIENT OUT OF MEDICATION AND REPORTS THAT THE MEDICATION WAS PUT IN THE WASHING MACHINE BY [facility] - REQUESTING A 90 DAY SUPPLY     Does the patient have less than a 3 day supply:  [x] Yes  [] No    Kristina Richmond Rep   10/04/22 10:47 EDT

## 2022-10-20 ENCOUNTER — TELEPHONE (OUTPATIENT)
Dept: FAMILY MEDICINE CLINIC | Facility: CLINIC | Age: 67
End: 2022-10-20

## 2022-10-20 NOTE — TELEPHONE ENCOUNTER
Received a fax stating that the [facility] account is almost . The application needed to be sent back in before 2022. I called to verify with patient If he was wanting to continue this and he verbalized that he would. I stated that I would print an application from the website and we would fill out the provider portion. I asked for the patients portion if he'd like it mailed to him or would he like to pick it up. He opted to have it mailed. I explained once we finish it we will mail it to his address. Confirmed address as well.     Placed application in Dr. Lopez inbox to be signed. Will mail once done.

## 2022-10-23 ENCOUNTER — APPOINTMENT (OUTPATIENT)
Dept: GENERAL RADIOLOGY | Facility: HOSPITAL | Age: 67
End: 2022-10-23

## 2022-10-23 ENCOUNTER — HOSPITAL ENCOUNTER (EMERGENCY)
Facility: HOSPITAL | Age: 67
Discharge: OTHER INSTITUTION | End: 2022-10-23
Attending: EMERGENCY MEDICINE | Admitting: INTERNAL MEDICINE

## 2022-10-23 ENCOUNTER — ANESTHESIA EVENT (OUTPATIENT)
Dept: PERIOP | Facility: HOSPITAL | Age: 67
End: 2022-10-23

## 2022-10-23 ENCOUNTER — HOSPITAL ENCOUNTER (OUTPATIENT)
Dept: GENERAL RADIOLOGY | Facility: HOSPITAL | Age: 67
Discharge: HOME | End: 2022-10-23

## 2022-10-23 ENCOUNTER — ANESTHESIA (OUTPATIENT)
Dept: PERIOP | Facility: HOSPITAL | Age: 67
End: 2022-10-23

## 2022-10-23 ENCOUNTER — APPOINTMENT (OUTPATIENT)
Dept: CT IMAGING | Facility: HOSPITAL | Age: 67
End: 2022-10-23

## 2022-10-23 VITALS
WEIGHT: 201 LBS | BODY MASS INDEX: 28.14 KG/M2 | DIASTOLIC BLOOD PRESSURE: 77 MMHG | TEMPERATURE: 98.2 F | RESPIRATION RATE: 15 BRPM | HEIGHT: 71 IN | OXYGEN SATURATION: 100 % | SYSTOLIC BLOOD PRESSURE: 125 MMHG | HEART RATE: 65 BPM

## 2022-10-23 DIAGNOSIS — S72.001A HIP FX, RIGHT, CLOSED, INITIAL ENCOUNTER: ICD-10-CM

## 2022-10-23 DIAGNOSIS — S06.0X0A CONCUSSION WITHOUT LOSS OF CONSCIOUSNESS, INITIAL ENCOUNTER: ICD-10-CM

## 2022-10-23 DIAGNOSIS — S72.144A CLOSED NONDISPLACED INTERTROCHANTERIC FRACTURE OF RIGHT FEMUR, INITIAL ENCOUNTER: Primary | ICD-10-CM

## 2022-10-23 DIAGNOSIS — Z86.79 HISTORY OF CORONARY ARTERY DISEASE: ICD-10-CM

## 2022-10-23 PROBLEM — M48.062 SPINAL STENOSIS OF LUMBAR REGION WITH NEUROGENIC CLAUDICATION: Status: ACTIVE | Noted: 2022-10-23

## 2022-10-23 LAB
ABO GROUP BLD: NORMAL
ALBUMIN SERPL-MCNC: 3.9 G/DL (ref 3.5–5.2)
ALBUMIN/GLOB SERPL: 1.9 G/DL
ALP SERPL-CCNC: 86 U/L (ref 39–117)
ALT SERPL W P-5'-P-CCNC: 13 U/L (ref 1–41)
ANION GAP SERPL CALCULATED.3IONS-SCNC: 10 MMOL/L (ref 5–15)
AST SERPL-CCNC: 16 U/L (ref 1–40)
BASOPHILS # BLD AUTO: 0.03 10*3/MM3 (ref 0–0.2)
BASOPHILS NFR BLD AUTO: 0.5 % (ref 0–1.5)
BILIRUB SERPL-MCNC: 0.3 MG/DL (ref 0–1.2)
BLD GP AB SCN SERPL QL: NEGATIVE
BUN SERPL-MCNC: 24 MG/DL (ref 8–23)
BUN/CREAT SERPL: 18.8 (ref 7–25)
CALCIUM SPEC-SCNC: 9.4 MG/DL (ref 8.6–10.5)
CHLORIDE SERPL-SCNC: 98 MMOL/L (ref 98–107)
CO2 SERPL-SCNC: 25 MMOL/L (ref 22–29)
CREAT SERPL-MCNC: 1.28 MG/DL (ref 0.76–1.27)
DEPRECATED RDW RBC AUTO: 48.2 FL (ref 37–54)
EGFRCR SERPLBLD CKD-EPI 2021: 61 ML/MIN/1.73
EOSINOPHIL # BLD AUTO: 0.19 10*3/MM3 (ref 0–0.4)
EOSINOPHIL NFR BLD AUTO: 3.3 % (ref 0.3–6.2)
ERYTHROCYTE [DISTWIDTH] IN BLOOD BY AUTOMATED COUNT: 14.4 % (ref 12.3–15.4)
GLOBULIN UR ELPH-MCNC: 2.1 GM/DL
GLUCOSE SERPL-MCNC: 116 MG/DL (ref 65–99)
HCT VFR BLD AUTO: 35.8 % (ref 37.5–51)
HGB BLD-MCNC: 11.5 G/DL (ref 13–17.7)
HOLD SPECIMEN: NORMAL
HOLD SPECIMEN: NORMAL
IMM GRANULOCYTES # BLD AUTO: 0.01 10*3/MM3 (ref 0–0.05)
IMM GRANULOCYTES NFR BLD AUTO: 0.2 % (ref 0–0.5)
LYMPHOCYTES # BLD AUTO: 1.66 10*3/MM3 (ref 0.7–3.1)
LYMPHOCYTES NFR BLD AUTO: 28.8 % (ref 19.6–45.3)
MCH RBC QN AUTO: 29.2 PG (ref 26.6–33)
MCHC RBC AUTO-ENTMCNC: 32.1 G/DL (ref 31.5–35.7)
MCV RBC AUTO: 90.9 FL (ref 79–97)
MONOCYTES # BLD AUTO: 0.45 10*3/MM3 (ref 0.1–0.9)
MONOCYTES NFR BLD AUTO: 7.8 % (ref 5–12)
NEUTROPHILS NFR BLD AUTO: 3.43 10*3/MM3 (ref 1.7–7)
NEUTROPHILS NFR BLD AUTO: 59.4 % (ref 42.7–76)
NRBC BLD AUTO-RTO: 0 /100 WBC (ref 0–0.2)
PLATELET # BLD AUTO: 268 10*3/MM3 (ref 140–450)
PMV BLD AUTO: 10.5 FL (ref 6–12)
POTASSIUM SERPL-SCNC: 5.2 MMOL/L (ref 3.5–5.2)
PROT SERPL-MCNC: 6 G/DL (ref 6–8.5)
RBC # BLD AUTO: 3.94 10*6/MM3 (ref 4.14–5.8)
RH BLD: POSITIVE
SODIUM SERPL-SCNC: 133 MMOL/L (ref 136–145)
T&S EXPIRATION DATE: NORMAL
WBC NRBC COR # BLD: 5.77 10*3/MM3 (ref 3.4–10.8)
WHOLE BLOOD HOLD COAG: NORMAL
WHOLE BLOOD HOLD SPECIMEN: NORMAL

## 2022-10-23 PROCEDURE — 96374 THER/PROPH/DIAG INJ IV PUSH: CPT

## 2022-10-23 PROCEDURE — 36415 COLL VENOUS BLD VENIPUNCTURE: CPT

## 2022-10-23 PROCEDURE — 72170 X-RAY EXAM OF PELVIS: CPT

## 2022-10-23 PROCEDURE — 86900 BLOOD TYPING SEROLOGIC ABO: CPT | Performed by: EMERGENCY MEDICINE

## 2022-10-23 PROCEDURE — 25010000002 HYDROMORPHONE 1 MG/ML SOLUTION: Performed by: EMERGENCY MEDICINE

## 2022-10-23 PROCEDURE — 86901 BLOOD TYPING SEROLOGIC RH(D): CPT | Performed by: EMERGENCY MEDICINE

## 2022-10-23 PROCEDURE — 99285 EMERGENCY DEPT VISIT HI MDM: CPT

## 2022-10-23 PROCEDURE — 96375 TX/PRO/DX INJ NEW DRUG ADDON: CPT

## 2022-10-23 PROCEDURE — 86850 RBC ANTIBODY SCREEN: CPT | Performed by: EMERGENCY MEDICINE

## 2022-10-23 PROCEDURE — 80053 COMPREHEN METABOLIC PANEL: CPT

## 2022-10-23 PROCEDURE — 72125 CT NECK SPINE W/O DYE: CPT

## 2022-10-23 PROCEDURE — 70450 CT HEAD/BRAIN W/O DYE: CPT

## 2022-10-23 PROCEDURE — 73552 X-RAY EXAM OF FEMUR 2/>: CPT

## 2022-10-23 PROCEDURE — 25010000002 MORPHINE PER 10 MG: Performed by: EMERGENCY MEDICINE

## 2022-10-23 PROCEDURE — 25010000002 ONDANSETRON PER 1 MG: Performed by: EMERGENCY MEDICINE

## 2022-10-23 PROCEDURE — 85025 COMPLETE CBC W/AUTO DIFF WBC: CPT

## 2022-10-23 RX ORDER — CHOLECALCIFEROL (VITAMIN D3) 125 MCG
5 CAPSULE ORAL NIGHTLY PRN
Status: CANCELLED | OUTPATIENT
Start: 2022-10-23

## 2022-10-23 RX ORDER — PIMECROLIMUS 10 MG/G
CREAM TOPICAL EVERY 12 HOURS SCHEDULED
Status: CANCELLED | OUTPATIENT
Start: 2022-10-23

## 2022-10-23 RX ORDER — MIDAZOLAM HYDROCHLORIDE 1 MG/ML
0.5 INJECTION INTRAMUSCULAR; INTRAVENOUS
Status: CANCELLED | OUTPATIENT
Start: 2022-10-23

## 2022-10-23 RX ORDER — GABAPENTIN 300 MG/1
600 CAPSULE ORAL EVERY 8 HOURS SCHEDULED
Status: CANCELLED | OUTPATIENT
Start: 2022-10-23

## 2022-10-23 RX ORDER — ISOSORBIDE MONONITRATE 120 MG/1
120 TABLET, EXTENDED RELEASE ORAL 2 TIMES DAILY
Status: CANCELLED | OUTPATIENT
Start: 2022-10-23

## 2022-10-23 RX ORDER — FAMOTIDINE 10 MG/ML
20 INJECTION, SOLUTION INTRAVENOUS ONCE
Status: CANCELLED | OUTPATIENT
Start: 2022-10-23 | End: 2022-10-23

## 2022-10-23 RX ORDER — SODIUM CHLORIDE, SODIUM LACTATE, POTASSIUM CHLORIDE, CALCIUM CHLORIDE 600; 310; 30; 20 MG/100ML; MG/100ML; MG/100ML; MG/100ML
9 INJECTION, SOLUTION INTRAVENOUS CONTINUOUS
Status: CANCELLED | OUTPATIENT
Start: 2022-10-23

## 2022-10-23 RX ORDER — FAMOTIDINE 20 MG/1
20 TABLET, FILM COATED ORAL ONCE
Status: CANCELLED | OUTPATIENT
Start: 2022-10-23 | End: 2022-10-23

## 2022-10-23 RX ORDER — ACETAMINOPHEN 650 MG/1
650 SUPPOSITORY RECTAL EVERY 4 HOURS PRN
Status: CANCELLED | OUTPATIENT
Start: 2022-10-23

## 2022-10-23 RX ORDER — FENOFIBRATE 145 MG/1
145 TABLET, COATED ORAL DAILY
Status: CANCELLED | OUTPATIENT
Start: 2022-10-23

## 2022-10-23 RX ORDER — OXYCODONE AND ACETAMINOPHEN 10; 325 MG/1; MG/1
1 TABLET ORAL EVERY 6 HOURS PRN
Status: DISCONTINUED | OUTPATIENT
Start: 2022-10-23 | End: 2022-10-23 | Stop reason: HOSPADM

## 2022-10-23 RX ORDER — NICOTINE POLACRILEX 4 MG
15 LOZENGE BUCCAL
Status: CANCELLED | OUTPATIENT
Start: 2022-10-23

## 2022-10-23 RX ORDER — DULOXETIN HYDROCHLORIDE 60 MG/1
60 CAPSULE, DELAYED RELEASE ORAL DAILY
Status: CANCELLED | OUTPATIENT
Start: 2022-10-23

## 2022-10-23 RX ORDER — LIDOCAINE HYDROCHLORIDE 10 MG/ML
0.5 INJECTION, SOLUTION EPIDURAL; INFILTRATION; INTRACAUDAL; PERINEURAL ONCE AS NEEDED
Status: CANCELLED | OUTPATIENT
Start: 2022-10-23

## 2022-10-23 RX ORDER — AMOXICILLIN 250 MG
1 CAPSULE ORAL DAILY
Status: CANCELLED | OUTPATIENT
Start: 2022-10-23

## 2022-10-23 RX ORDER — SODIUM CHLORIDE 0.9 % (FLUSH) 0.9 %
10 SYRINGE (ML) INJECTION EVERY 12 HOURS SCHEDULED
Status: CANCELLED | OUTPATIENT
Start: 2022-10-23

## 2022-10-23 RX ORDER — DEXTROSE MONOHYDRATE 25 G/50ML
25 INJECTION, SOLUTION INTRAVENOUS
Status: CANCELLED | OUTPATIENT
Start: 2022-10-23

## 2022-10-23 RX ORDER — ACETAMINOPHEN 160 MG/5ML
650 SOLUTION ORAL EVERY 4 HOURS PRN
Status: CANCELLED | OUTPATIENT
Start: 2022-10-23

## 2022-10-23 RX ORDER — INSULIN LISPRO 100 [IU]/ML
0-7 INJECTION, SOLUTION INTRAVENOUS; SUBCUTANEOUS
Status: CANCELLED | OUTPATIENT
Start: 2022-10-23

## 2022-10-23 RX ORDER — ASPIRIN 81 MG/1
81 TABLET ORAL DAILY
Status: CANCELLED | OUTPATIENT
Start: 2022-10-23

## 2022-10-23 RX ORDER — SODIUM CHLORIDE 0.9 % (FLUSH) 0.9 %
10 SYRINGE (ML) INJECTION AS NEEDED
Status: CANCELLED | OUTPATIENT
Start: 2022-10-23

## 2022-10-23 RX ORDER — PRAVASTATIN SODIUM 40 MG
80 TABLET ORAL DAILY
Status: CANCELLED | OUTPATIENT
Start: 2022-10-23

## 2022-10-23 RX ORDER — PANTOPRAZOLE SODIUM 40 MG/1
40 TABLET, DELAYED RELEASE ORAL EVERY MORNING
Status: CANCELLED | OUTPATIENT
Start: 2022-10-23

## 2022-10-23 RX ORDER — ACETAMINOPHEN 325 MG/1
650 TABLET ORAL EVERY 4 HOURS PRN
Status: CANCELLED | OUTPATIENT
Start: 2022-10-23

## 2022-10-23 RX ORDER — TACROLIMUS 0.5 MG/1
0.5 CAPSULE ORAL EVERY 12 HOURS
Status: CANCELLED | OUTPATIENT
Start: 2022-10-23

## 2022-10-23 RX ORDER — BUSPIRONE HYDROCHLORIDE 10 MG/1
10 TABLET ORAL 2 TIMES DAILY
Status: CANCELLED | OUTPATIENT
Start: 2022-10-23

## 2022-10-23 RX ORDER — MYCOPHENOLATE MOFETIL 250 MG/1
500 CAPSULE ORAL EVERY 12 HOURS SCHEDULED
Status: CANCELLED | OUTPATIENT
Start: 2022-10-23

## 2022-10-23 RX ORDER — ONDANSETRON 2 MG/ML
4 INJECTION INTRAMUSCULAR; INTRAVENOUS ONCE
Status: COMPLETED | OUTPATIENT
Start: 2022-10-23 | End: 2022-10-23

## 2022-10-23 RX ORDER — RANOLAZINE 500 MG/1
1000 TABLET, EXTENDED RELEASE ORAL 2 TIMES DAILY
Status: CANCELLED | OUTPATIENT
Start: 2022-10-23

## 2022-10-23 RX ORDER — MORPHINE SULFATE 4 MG/ML
4 INJECTION, SOLUTION INTRAMUSCULAR; INTRAVENOUS ONCE
Status: COMPLETED | OUTPATIENT
Start: 2022-10-23 | End: 2022-10-23

## 2022-10-23 RX ADMIN — ONDANSETRON 4 MG: 2 INJECTION INTRAMUSCULAR; INTRAVENOUS at 03:45

## 2022-10-23 RX ADMIN — HYDROMORPHONE HYDROCHLORIDE 1 MG: 1 INJECTION, SOLUTION INTRAMUSCULAR; INTRAVENOUS; SUBCUTANEOUS at 07:42

## 2022-10-23 RX ADMIN — MORPHINE SULFATE 4 MG: 4 INJECTION, SOLUTION INTRAMUSCULAR; INTRAVENOUS at 03:45

## 2022-10-23 NOTE — ED PROVIDER NOTES
[place]    EMERGENCY DEPARTMENT ENCOUNTER      Pt Name: Mahendra Yates  MRN: [id number]  YOB: 1955  Date of evaluation: 10/23/2022  Provider: Abilio Peng MD    CHIEF COMPLAINT       Chief Complaint   Patient presents with   • Fall     Right hip pain          HISTORY OF PRESENT ILLNESS  (Location/Symptom, Timing/Onset, Context/Setting, Quality, Duration, Modifying Factors, Severity.)   Mahendra Yates is a 67 y.o. male who presents to the emergency department after fall that occurred just prior to arrival.  Patient states that he tripped and fell going to the bathroom, striking his head on the vanity and landing on his right hip.  He describes moderate aching pain at the right hip is worse with movement without any distal paresthesias, weakness, or numbness.  He denies any additional injuries.      Nursing notes were reviewed.    REVIEW OF SYSTEMS    (2-9 systems for level 4, 10 or more for level 5)   ROS:  General:  No fevers, no chills, no weakness  Cardiovascular:  No chest pain, no palpitations  Respiratory:  No shortness of breath, no cough, no wheezing  Gastrointestinal:  No pain, no nausea, no vomiting, no diarrhea  Musculoskeletal: Hip pain  Skin:  No rash  Neurologic:  No speech problems, no headache, no extremity numbness, no extremity tingling, no extremity weakness  Psychiatric:  No anxiety  Genitourinary:  No dysuria, no hematuria    Except as noted above the remainder of the review of systems was reviewed and negative.       PAST MEDICAL HISTORY     Past Medical History:   Diagnosis Date   • Arthritis    • Atherosclerosis    • B12 deficiency    • Cancer (HCC)    • Cancer of liver (HCC)    • Chronic headaches    • Coronary artery disease    • Diabetes mellitus (HCC)    • Gastroesophageal reflux disease    • History of transfusion    • Hyperlipidemia    • Hypertension    • Myocardial infarction (HCC)    • Sleep apnea    • Stroke (HCC)          SURGICAL HISTORY       Past  Surgical History:   Procedure Laterality Date   • ANGIOPLASTY     • CARDIAC CATHETERIZATION Left 8/16/2022    Procedure: Left Heart Cath;  Surgeon: April Dillon MD;  Location:  [facility];  Service: Cardiology;  Laterality: Left;   • CARDIAC SURGERY     • CARPAL TUNNEL RELEASE Right 2/8/2017    Procedure: CARPAL TUNNEL RELEASE RIGHT ;  Surgeon: Luis Hayden MD;  Location:  [facility] OR;  Service:    • CHOLECYSTECTOMY     • CORONARY ARTERY BYPASS GRAFT     • CORONARY STENT PLACEMENT      x2   • LIVER TRANSPLANTATION     • TUMOR REMOVAL Right     axilla         CURRENT MEDICATIONS     No current facility-administered medications for this encounter.    Current Outpatient Medications:   •  aspirin 81 MG EC tablet, Take 81 mg by mouth Daily. Continues per doctors orders, Disp: , Rfl:   •  bumetanide (BUMEX) 1 MG tablet, Take 1 tablet by mouth 2 (Two) Times a Day., Disp: 60 tablet, Rfl: 2  •  busPIRone (BUSPAR) 10 MG tablet, Take 1 tablet by mouth 2 (Two) Times a Day., Disp: 60 tablet, Rfl: 5  •  cholecalciferol (VITAMIN D3) 25 MCG (1000 UT) tablet, Take 1,000 Units by mouth Daily., Disp: , Rfl:   •  DHEA 50 MG tablet, Take 1 tablet by mouth Daily., Disp: , Rfl:   •  dilTIAZem CD (CARDIZEM CD) 240 MG 24 hr capsule, Take 1 capsule by mouth Daily. Hold until cardiology follow up, Disp: 90 capsule, Rfl: 3  •  Dulaglutide (Trulicity) 0.75 MG/0.5ML solution pen-injector, Inject 0.75 mg under the skin into the appropriate area as directed 1 (One) Time Per Week., Disp: 5 pen, Rfl: 2  •  DULoxetine (Cymbalta) 60 MG capsule, Take 1 capsule by mouth Daily., Disp: 90 capsule, Rfl: 3  •  fenofibrate micronized (LOFIBRA) 134 MG capsule, Take 134 mg by mouth Every Evening., Disp: , Rfl:   •  gabapentin (NEURONTIN) 600 MG tablet, Take 1 tablet by mouth 3 (Three) Times a Day., Disp: 90 tablet, Rfl: 1  •  isosorbide mononitrate (IMDUR) 120 MG 24 hr tablet, Take 1 tablet by mouth 2 (Two) Times a Day., Disp: 180  tablet, Rfl: 3  •  Loratadine 10 MG capsule, Take 1 capsule by mouth Daily., Disp: , Rfl:   •  magnesium oxide (MAG-OX) 400 MG tablet, Take 400 mg by mouth 2 (Two) Times a Day. 3 tabs in the AM and 2 tabs in the PM per transplant team, Disp: , Rfl:   •  multivitamin with minerals tablet tablet, Take 1 tablet by mouth Daily., Disp: , Rfl:   •  mycophenolate (CELLCEPT) 500 MG tablet, Take 1,000 mg by mouth 2 (Two) Times a Day., Disp: , Rfl:   •  nitroglycerin (NITROSTAT) 0.4 MG SL tablet, PLACE 1 TABLET UNDER THE TONGUE EVERY 5 MINUTES AS NEEDED FOR CHEST PAIN. DO NOT EXCEED 3 TABLETS IN 15 MINUTES, Disp: 25 tablet, Rfl: 1  •  omeprazole (priLOSEC) 40 MG capsule, Take 1 capsule by mouth 2 (Two) Times a Day., Disp: 180 capsule, Rfl: 1  •  oxyCODONE-acetaminophen (PERCOCET)  MG per tablet, Take 1 tablet by mouth Every 6 (Six) Hours As Needed for Severe Pain., Disp: 120 tablet, Rfl: 0  •  pravastatin (PRAVACHOL) 80 MG tablet, Take 1 tablet by mouth Daily., Disp: 90 tablet, Rfl: 3  •  ranolazine (RANEXA) 1000 MG 12 hr tablet, Take 1 tablet by mouth 2 (Two) Times a Day., Disp: 180 tablet, Rfl: 3  •  sennosides-docusate (SB Docusate Sodium/Senna) 8.6-50 MG per tablet, Take 1 tablet by mouth Daily., Disp: 90 tablet, Rfl: 3  •  tacrolimus (PROGRAF) 0.5 MG capsule, , Disp: , Rfl:   •  tacrolimus (PROTOPIC) 0.1 % ointment, Apply 1 application topically to the appropriate area as directed 2 (Two) Times a Day., Disp: 100 g, Rfl: 0    ALLERGIES     Contrast dye, Penicillins, and Sulfasalazine    FAMILY HISTORY       Family History   Problem Relation Age of Onset   • Heart disease Mother         Stroke   • Stroke Mother    • Diabetes Father    • Liver cancer Brother    • No Known Problems Sister    • Macular degeneration Brother    • No Known Problems Sister    • Dementia Brother           SOCIAL HISTORY       Social History     Socioeconomic History   • Marital status:    Tobacco Use   • Smoking status: Former      Packs/day: 3.00     Years: 30.00     Pack years: 90.00     Types: Cigarettes     Quit date: 1991     Years since quittin.7   • Smokeless tobacco: Never   Vaping Use   • Vaping Use: Never used   Substance and Sexual Activity   • Alcohol use: Yes     Comment: RARELY   • Drug use: No   • Sexual activity: Not Currently     Partners: Female         PHYSICAL EXAM    (up to 7 for level 4, 8 or more for level 5)     Vitals:    10/23/22 0732 10/23/22 0816 10/23/22 0821 10/23/22 0831   BP: 154/79   125/77   BP Location:       Patient Position:       Pulse: 66   65   Resp:       Temp:       SpO2:  (!) 88% 100% 100%   Weight:       Height:           Physical Exam  General: Awake, alert, no acute distress.  HEENT: Conjunctivae normal.  Neck: Trachea midline.  No tenderness.  Cardiac: Heart regular rate, rhythm, no murmurs, rubs, or gallops  Lungs: Lungs are clear to auscultation, there is no wheezing, rhonchi, or rales. There is no use of accessory muscles.  Chest wall: There is no tenderness to palpation over the chest wall or over ribs  Abdomen: Abdomen is soft, nontender, nondistended. There are no firm or pulsatile masses, no rebound rigidity or guarding.   Musculoskeletal: The right lower extremity is shortened and externally rotated.  DP and PT pulse 2+ in the affected extremity with motor and sensory function intact.  Neuro: Alert and oriented x 4.  Dermatology: Skin is warm and dry  Psych: Mentation is grossly normal, cognition is grossly normal. Affect is appropriate.        DIAGNOSTIC RESULTS     EKG: All EKGs are interpreted by the Emergency Department Physician who either signs or Co-signs this chart in the absence of a cardiologist.    No orders to display       RADIOLOGY:   Non-plain film images such as CT, Ultrasound and MRI are read by the radiologist. Plain radiographic images are visualized and preliminarily interpreted by the emergency physician with the below findings:      [x] Radiologist's Report  Reviewed:  XR Femur 2 View Right   Final Result   Right-sided intertrochanteric femoral fracture.       Electronically signed by:  Dk Vigil M.D.     10/23/2022 4:41 AM Mountain Time      XR Pelvis 1 or 2 View   Final Result   Right-sided intertrochanteric femoral fracture.       Electronically signed by:  Dk Vigil M.D.     10/23/2022 4:41 AM Mountain Time      CT Head Without Contrast   Final Result      1. No acute intracranial abnormality.   2. Moderate chronic age-related intracranial findings as above.   3. Mild-moderate inflammation in the paranasal sinuses.             This examination was interpreted by Bobo Browning M.D.      Electronically signed by:  Bobo Browning M.D.     10/23/2022 3:05 AM Mountain Time      CT Cervical Spine Without Contrast   Final Result      1.  No acute fracture or malalignment in the cervical spine.    2.  Degenerative changes.                  Electronically signed by:  Isaac Barney DO     10/23/2022 3:19 AM Mountain Time            ED BEDSIDE ULTRASOUND:   Performed by ED Physician - none    LABS:    I have reviewed and interpreted all of the currently available lab results from this visit (if applicable):  Results for orders placed or performed during the hospital encounter of 10/23/22   Comprehensive Metabolic Panel    Specimen: Blood   Result Value Ref Range    Glucose 116 (H) 65 - 99 mg/dL    BUN 24 (H) 8 - 23 mg/dL    Creatinine 1.28 (H) 0.76 - 1.27 mg/dL    Sodium 133 (L) 136 - 145 mmol/L    Potassium 5.2 3.5 - 5.2 mmol/L    Chloride 98 98 - 107 mmol/L    CO2 25.0 22.0 - 29.0 mmol/L    Calcium 9.4 8.6 - 10.5 mg/dL    Total Protein 6.0 6.0 - 8.5 g/dL    Albumin 3.90 3.50 - 5.20 g/dL    ALT (SGPT) 13 1 - 41 U/L    AST (SGOT) 16 1 - 40 U/L    Alkaline Phosphatase 86 39 - 117 U/L    Total Bilirubin 0.3 0.0 - 1.2 mg/dL    Globulin 2.1 gm/dL    A/G Ratio 1.9 g/dL    BUN/Creatinine Ratio 18.8 7.0 - 25.0    Anion Gap 10.0 5.0 - 15.0 mmol/L    eGFR 61.0  >60.0 mL/min/1.73   CBC Auto Differential    Specimen: Blood   Result Value Ref Range    WBC 5.77 3.40 - 10.80 10*3/mm3    RBC 3.94 (L) 4.14 - 5.80 10*6/mm3    Hemoglobin 11.5 (L) 13.0 - 17.7 g/dL    Hematocrit 35.8 (L) 37.5 - 51.0 %    MCV 90.9 79.0 - 97.0 fL    MCH 29.2 26.6 - 33.0 pg    MCHC 32.1 31.5 - 35.7 g/dL    RDW 14.4 12.3 - 15.4 %    RDW-SD 48.2 37.0 - 54.0 fl    MPV 10.5 6.0 - 12.0 fL    Platelets 268 140 - 450 10*3/mm3    Neutrophil % 59.4 42.7 - 76.0 %    Lymphocyte % 28.8 19.6 - 45.3 %    Monocyte % 7.8 5.0 - 12.0 %    Eosinophil % 3.3 0.3 - 6.2 %    Basophil % 0.5 0.0 - 1.5 %    Immature Grans % 0.2 0.0 - 0.5 %    Neutrophils, Absolute 3.43 1.70 - 7.00 10*3/mm3    Lymphocytes, Absolute 1.66 0.70 - 3.10 10*3/mm3    Monocytes, Absolute 0.45 0.10 - 0.90 10*3/mm3    Eosinophils, Absolute 0.19 0.00 - 0.40 10*3/mm3    Basophils, Absolute 0.03 0.00 - 0.20 10*3/mm3    Immature Grans, Absolute 0.01 0.00 - 0.05 10*3/mm3    nRBC 0.0 0.0 - 0.2 /100 WBC   Type & Screen    Specimen: Blood   Result Value Ref Range    ABO Type O     RH type Positive     Antibody Screen Negative     T&S Expiration Date 10/26/2022 11:59:59 PM    Lavender Top   Result Value Ref Range    Extra Tube hold for add-on    Gold Top - SST   Result Value Ref Range    Extra Tube Hold for add-ons.    Gray Top   Result Value Ref Range    Extra Tube Hold for add-ons.    Light Blue Top   Result Value Ref Range    Extra Tube Hold for add-ons.         All other labs were within normal range or not returned as of this dictation.      EMERGENCY DEPARTMENT COURSE and DIFFERENTIAL DIAGNOSIS/MDM:   Vitals:    Vitals:    10/23/22 0732 10/23/22 0816 10/23/22 0821 10/23/22 0831   BP: 154/79   125/77   BP Location:       Patient Position:       Pulse: 66   65   Resp:       Temp:       SpO2:  (!) 88% 100% 100%   Weight:       Height:           ED Course as of 10/30/22 1949   Sun Oct 23, 2022   0503 Spoke w/ Dr. Atkinson who agrees to consult on the patient [NS]    0584 I spoke with Dr. López who accepts the patient for admission   [NS]   0612 This patient presents with right-sided hip fracture after mechanical fall.  Also hit his head, however CT head and C-spine demonstrate no evidence of skull fracture, intracranial hemorrhage, or C-spine fracture.  Patient is neurovascularly intact on physical examination.  Orthopedic surgery was consulted and patient will be admitted to the hospitalist service. [NS]   0735 Dr. Espinoza of the hospitalist service evaluated the patient here in the ER.  The patient states that he cannot receive surgical intervention on his right hip until his cardiac issues have been appropriately corrected.  He desires to be transferred to .  I spoke with the   physician, Dr. Barney, who is excepted the patient.  He will be transferred to  ER for further evaluation [NS-2]      ED Course User Index  [NS] Abilio Peng MD  [NS-2] Kylah Acuna MD         MEDICATIONS ADMINISTERED IN ED:  Medications   Morphine sulfate (PF) injection 4 mg (4 mg Intravenous Given 10/23/22 0345)   ondansetron (ZOFRAN) injection 4 mg (4 mg Intravenous Given 10/23/22 0345)   HYDROmorphone (DILAUDID) injection 1 mg (1 mg Intravenous Given 10/23/22 0742)       PROCEDURES:  Procedures    CRITICAL CARE TIME    Total Critical Care time was 0 minutes, excluding separately reportable procedures.   There was a high probability of clinically significant/life threatening deterioration in the patient's condition which required my urgent intervention.      FINAL IMPRESSION      1. Closed nondisplaced intertrochanteric fracture of right femur, initial encounter ([facility])    2. Concussion without loss of consciousness, initial encounter    3. History of coronary artery disease          DISPOSITION/PLAN     ED Disposition     ED Disposition   Transfer to Another Facility     Condition   --    Comment   Level of Care: Telemetry [5]  Diagnosis: Closed  nondisplaced intertrochanteric fracture of right femur, initial encounter ([facility]) [[id number]]  Admitting Physician: CHIRAG CHAVEZ [[id number]]  Attending Physician: CHIRAG CHAVEZ [[id number]]  Bed Request Commen ts: tele                   Abilio Peng MD  Attending Emergency Physician               Abilio Peng MD  10/23/22 0614       Abilio Peng MD  10/30/22 1949

## 2022-10-23 NOTE — H&P
"    [facility] Medicine Services  HISTORY AND PHYSICAL    Patient Name: Mahendra Yates  : 1955  MRN: [id number]  Primary Care Physician: Rodríguez Lopez DO  Date of admission: 10/23/2022    Subjective   Subjective     Chief Complaint:  Fall    HPI:  Mahendra Yates is a 67 y.o. male with medical history significant for CABG (), liver cancer s/p transplant (), lumbar stenosis w/ neurogenic claudication, remote tobacco use (quit ), HTN, HLD, T2DM, CVA, GERD and osteoarthritis who presents to the [facility] ED after a fall at home. Patient up to use the bathroom, uses walker for ambulation; stood up and lost his footing and fell, hit his head on the vanity and landed on the right hip.    22 coronary angiography showing occluded mid- LAD, occluded RCA, patent LIMA-LAD bypass and occludded SVG-RCA and SVG - Lcx bypassess; ECHO 2/10/22 w/ LVEF 55%, mild aortic stenosis, trace to mild mitral regurgitation and mild tricuspid regurgitation. Has plans for open heart surgery at [facility] w/ Dr. Hill in 2 weeks (was previously postponed d/t him getting COVID ~ 1 month ago, postponed 6 weeks so he could be strong enough for surgery). Patient is adamant he was told not to \"go under the knife\" for any surgery or he would die without fixing his heart first.      Review of Systems   Respiratory: Positive for shortness of breath.    Gastrointestinal: Negative for constipation and diarrhea.   Musculoskeletal: Positive for arthralgias, gait problem and myalgias.   All other systems reviewed and are negative.       All other systems reviewed and are negative.     Personal History     Past Medical History:   Diagnosis Date   • Arthritis    • Atherosclerosis    • B12 deficiency    • Cancer (HCC)    • Cancer of liver (HCC)    • Chronic headaches    • Coronary artery disease    • Diabetes mellitus (HCC)    • Gastroesophageal reflux disease    • History of transfusion    • " Hyperlipidemia    • Hypertension    • Myocardial infarction (HCC)    • Sleep apnea    • Stroke (HCC)              Past Surgical History:   Procedure Laterality Date   • ANGIOPLASTY     • CARDIAC CATHETERIZATION Left 8/16/2022    Procedure: Left Heart Cath;  Surgeon: April Dillon MD;  Location: [facility] CATH INVASIVE LOCATION;  Service: Cardiology;  Laterality: Left;   • CARDIAC SURGERY     • CARPAL TUNNEL RELEASE Right 2/8/2017    Procedure: CARPAL TUNNEL RELEASE RIGHT ;  Surgeon: Luis Hayden MD;  Location: [facility] OR;  Service:    • CHOLECYSTECTOMY     • CORONARY ARTERY BYPASS GRAFT     • CORONARY STENT PLACEMENT      x2   • LIVER TRANSPLANTATION     • TUMOR REMOVAL Right     axilla       Family History:  family history includes Dementia in his brother; Diabetes in his father; Heart disease in his mother; Liver cancer in his brother; Macular degeneration in his brother; No Known Problems in his sister and sister; Stroke in his mother. Otherwise pertinent FHx was reviewed and unremarkable.     Social History:  reports that he quit smoking about 31 years ago. His smoking use included cigarettes. He has a 90.00 pack-year smoking history. He has never used smokeless tobacco. He reports current alcohol use. He reports that he does not use drugs.  Social History     Social History Narrative   • Not on file       Medications:  DHEA, DULoxetine, Dulaglutide, Loratadine, aspirin, bumetanide, busPIRone, cholecalciferol, dilTIAZem CD, fenofibrate micronized, gabapentin, isosorbide mononitrate, magnesium oxide, multivitamin with minerals, mycophenolate, nitroglycerin, omeprazole, oxyCODONE-acetaminophen, pravastatin, ranolazine, sennosides-docusate, and tacrolimus    Allergies   Allergen Reactions   • Contrast Dye Other (See Comments)     Cardiac Arrest   • Penicillins Rash   • Sulfasalazine Rash       Objective   Objective     Vital Signs:   Temp:  [98.2 °F (36.8 °C)] 98.2 °F (36.8 °C)  Heart Rate:  [46-48]  46  Resp:  [15] 15  BP: (138-145)/() 145/76    Physical Exam  Constitutional:       General: He is not in acute distress.     Appearance: He is well-developed.   HENT:      Head: Normocephalic and atraumatic.      Nose: Nose normal.      Mouth/Throat:      Pharynx: Oropharynx is clear.   Eyes:      Extraocular Movements: Extraocular movements intact.      Conjunctiva/sclera: Conjunctivae normal.      Pupils: Pupils are equal, round, and reactive to light.   Cardiovascular:      Rate and Rhythm: Normal rate and regular rhythm.      Pulses: Normal pulses.      Heart sounds: Murmur heard.   Pulmonary:      Effort: Pulmonary effort is normal.      Breath sounds: Normal breath sounds.   Abdominal:      General: Bowel sounds are normal. There is no distension.      Palpations: Abdomen is soft.      Tenderness: There is no abdominal tenderness.   Musculoskeletal:         General: Tenderness and deformity (right foot rolled inward; mild shortening) present. Normal range of motion.      Cervical back: Normal range of motion and neck supple.      Right lower leg: Edema (trace) present.      Left lower leg: Edema (trace) present.   Skin:     General: Skin is warm and dry.      Capillary Refill: Capillary refill takes less than 2 seconds.      Findings: No rash.   Neurological:      Mental Status: He is alert and oriented to person, place, and time.      Cranial Nerves: No cranial nerve deficit.   Psychiatric:         Mood and Affect: Mood normal.         Behavior: Behavior normal.            Results Reviewed:  I have personally reviewed most recent indicated data and agree with findings including:  [x]  Laboratory  [x]  Radiology  []  EKG/Telemetry  []  Pathology  []  Cardiac/Vascular Studies  [x]  Old records  []  Other:  Most pertinent findings include:      LAB RESULTS:      Lab 10/23/22  0339   WBC 5.77   HEMOGLOBIN 11.5*   HEMATOCRIT 35.8*   PLATELETS 268   NEUTROS ABS 3.43   IMMATURE GRANS (ABS) 0.01   LYMPHS ABS  1.66   MONOS ABS 0.45   EOS ABS 0.19   MCV 90.9         Lab 10/23/22  0339   SODIUM 133*   POTASSIUM 5.2   CHLORIDE 98   CO2 25.0   ANION GAP 10.0   BUN 24*   CREATININE 1.28*   EGFR 61.0   GLUCOSE 116*   CALCIUM 9.4         Lab 10/23/22  0339   TOTAL PROTEIN 6.0   ALBUMIN 3.90   GLOBULIN 2.1   ALT (SGPT) 13   AST (SGOT) 16   BILIRUBIN 0.3   ALK PHOS 86                 Lab 10/23/22  0404   ABO TYPING O   RH TYPING Positive   ANTIBODY SCREEN Negative         Brief Urine Lab Results  (Last result in the past 365 days)      Color   Clarity   Blood   Leuk Est   Nitrite   Protein   CREAT   Urine HCG        08/04/22 1856 Yellow   Clear   Negative   Negative   Negative   Negative               Microbiology Results (last 10 days)     ** No results found for the last 240 hours. **          CT Head Without Contrast    Result Date: 10/23/2022  EXAMINATION: CT HEAD WO CONTRAST DATE: 10/23/2022 2:19 AM  INDICATION: Fall, head injury  COMPARISON: Head CT and brain MRI 8/4/2022  TECHNIQUE: Thin section noncontrast axial images were obtained through the head. Coronal reformats were created.  CT dose lowering techniques were used, to include: automated exposure control, adjustment for patient size, and or use of iterative reconstruction.  FINDINGS:  Intracranial contents: No acute intracranial hemorrhage, evidence of acute territorial infarct, mass, mass effect or hydrocephalus. Moderate intracranial atherosclerosis and moderate chronic small vessel ischemic changes in the white matter. Moderate global cerebral volume loss.  Bones and extracranial soft tissues:  No fracture or focal osseous lesion in the calvarium or skull base. Moderate mucosal thickening in the right ethmoid air cells. Mild mucosal thickening in the bilateral maxillary sinuses and left ethmoid air cells. Mastoid air cells and middle ear cavities are clear bilaterally. No acute findings in the orbits.      Impression: 1. No acute intracranial abnormality. 2.  Moderate chronic age-related intracranial findings as above. 3. Mild-moderate inflammation in the paranasal sinuses.  This examination was interpreted by Bobo Browning M.D. Electronically signed by:  Bobo Browning M.D.  10/23/2022 3:05 AM Mountain Time    CT Cervical Spine Without Contrast    Result Date: 10/23/2022  EXAMINATION: CT CERVICAL SPINE WO CONTRAST DATE: 10/23/2022 2:19 AM  INDICATION: Fall.  COMPARISON: None available.  TECHNIQUE: Noncontrast CT imaging through the cervical spine was performed in the axial plane. Coronal and sagittal reformats were generated.  CT dose lowering techniques were used, to include: automated exposure control, adjustment for patient size, and  or use of iterative reconstruction FINDINGS: Vertebral column: Straightening of usual lordosis. Mild scoliosis. Craniocervical junction is normal. No acute fracture. Vertebral body heights are maintained. Normal bone mineralization. C2-C3: Uncovertebral hypertrophy. Facet arthropathy. Mild right foraminal narrowing. C3-C4: Uncovertebral hypertrophy. Facet arthropathy. Disc bulge. Mild left foraminal narrowing. Mild spinal canal narrowing. C4-C5: Disc osteophyte complex. Uncovertebral hypertrophy. Facet arthropathy. Moderate bilateral foraminal narrowing. Mild spinal canal narrowing. C5-C6: Uncovertebral hypertrophy. Facet arthropathy. Severe right foraminal narrowing. C6-C7: Disc osteophyte complex. Uncovertebral hypertrophy. Facet arthropathy. Moderate bilateral foraminal narrowing. Mild spinal canal narrowing. C7-T1: Disc osteophyte complex. Uncovertebral hypertrophy. Facet arthropathy. Mild bilateral foraminal narrowing. Mild spinal canal narrowing. Soft tissues: Lung apices are clear. Cervical soft tissues are unremarkable.      Impression: 1.  No acute fracture or malalignment in the cervical spine. 2.  Degenerative changes. Electronically signed by:  Isaac Barney DO  10/23/2022 3:19 AM Mountain Time      Results for orders  placed during the hospital encounter of 09/14/22    Adult Transthoracic Echo Complete W/ Cont if Necessary Per Protocol    Interpretation Summary  · Calculated left ventricular EF = 67% Normal left ventricular cavity size noted. Left ventricular wall thickness is consistent with mild concentric hypertrophy. All left ventricular wall segments contract normally. No evidence of a ventricular septal defect present.  · The aortic valve is abnormal in structure. There is mild calcification of the aortic valve. The aortic valve appears trileaflet. No significant aortic valve regurgitation is present. Mild aortic valve stenosis is present. Aortic valve area is 1.37 cm2. Peak velocity of the flow distal to the aortic valve is 251 cm/s. Aortic valve mean pressure gradient is 13.2 mmHg.  · Mild aortic valve stenosis is present.  · Peak velocity of the flow distal to the aortic valve is 271 cm/s. Mean PG 14mmHg. ERIC (VTI) 1.25cm2      Assessment & Plan   Assessment & Plan       Closed nondisplaced intertrochanteric fracture of right femur, initial encounter ([facility])    CAD (coronary artery disease)    Hx of CABG    Bradycardia    Right femur fx  - consult ortho, Dr. Atkinson  - NPO  - bedrest  - neurovascular checks  - needs cardiac clearance, ED provider to notify ortho      CAD  H/o CABG  - serial troponin  - consult cardiology, Dr. Dillon - needs surgical clearance as he is scheduled for open heart surgery with Dr. Hill at  this week and he is adamant he is not to have any surgery until he has had his heart fixed - see HPI    Bradycardia  - hold diltiazem    Liver transplant  - continue cellcept, prograf    T2DM  - FSBG ACHS    DVT prophylaxis:  SCDS    CODE STATUS:   Code Status (Patient has no pulse and is not breathing): CPR (Attempt to Resuscitate)  Medical Interventions (Patient has pulse or is breathing): Full Support      This note has been completed as part of a split-shared workflow.     Signature:  Electronically signed by Radha Strange, SERJIO, 10/23/22, 6:49 AM EDT  I saw and talked to the patient and his wife at the bedside in details.  Patient told me that he does not want to be admitted to the hospital as he has already established care relationship with Dr. Hill at .  Both patient and his wife were adamant that they want to be transferred to .  I told him that this is mainly dependent on  Hospital accepting them.  I had a conversation with emergency room physician and he told me that he will try his best and will contact [facility] and try to transfer the patient.  A little later he sent me a message that fortunately [facility] has accepted the patient and he will be transferred.

## 2022-10-23 NOTE — ANESTHESIA PREPROCEDURE EVALUATION
Anesthesia Evaluation                  Airway   Mallampati: I  TM distance: >3 FB  Neck ROM: full  No difficulty expected  Dental      Pulmonary    Cardiovascular     ECG reviewed    (+) hypertension, past MI , CAD, CABG, angina, hyperlipidemia,     ROS comment: Mild as    Neuro/Psych  GI/Hepatic/Renal/Endo    (+) obesity,  GERD,  liver disease, diabetes mellitus,     Musculoskeletal     Abdominal    Substance History      OB/GYN          Other   arthritis,    history of cancer                    Anesthesia Plan    ASA 4     general     (asa3 if redo cabg was done no info on that yet)  intravenous induction     Anesthetic plan, risks, benefits, and alternatives have been provided, discussed and informed consent has been obtained with: patient.        CODE STATUS:    Code Status (Patient has no pulse and is not breathing): CPR (Attempt to Resuscitate)  Medical Interventions (Patient has pulse or is breathing): Full Support

## 2022-11-01 ENCOUNTER — PATIENT MESSAGE (OUTPATIENT)
Dept: FAMILY MEDICINE CLINIC | Facility: CLINIC | Age: 67
End: 2022-11-01

## 2022-11-01 DIAGNOSIS — G89.29 CHRONIC LOW BACK PAIN WITH BILATERAL SCIATICA, UNSPECIFIED BACK PAIN LATERALITY: ICD-10-CM

## 2022-11-01 DIAGNOSIS — M54.42 CHRONIC LOW BACK PAIN WITH BILATERAL SCIATICA, UNSPECIFIED BACK PAIN LATERALITY: ICD-10-CM

## 2022-11-01 DIAGNOSIS — M54.41 CHRONIC LOW BACK PAIN WITH BILATERAL SCIATICA, UNSPECIFIED BACK PAIN LATERALITY: ICD-10-CM

## 2022-11-01 RX ORDER — OXYCODONE AND ACETAMINOPHEN 10; 325 MG/1; MG/1
1 TABLET ORAL EVERY 6 HOURS PRN
Qty: 120 TABLET | Refills: 0 | Status: SHIPPED | OUTPATIENT
Start: 2022-11-01 | End: 2022-11-21

## 2022-11-02 RX ORDER — RANOLAZINE 1000 MG/1
1000 TABLET, EXTENDED RELEASE ORAL 2 TIMES DAILY
Qty: 180 TABLET | Refills: 3 | Status: SHIPPED | OUTPATIENT
Start: 2022-11-02 | End: 2023-03-01 | Stop reason: SDUPTHER

## 2022-11-16 DIAGNOSIS — M48.062 LUMBAR STENOSIS WITH NEUROGENIC CLAUDICATION: ICD-10-CM

## 2022-11-17 ENCOUNTER — DOCUMENTATION (OUTPATIENT)
Dept: NEUROSURGERY | Facility: CLINIC | Age: 67
End: 2022-11-17

## 2022-11-17 RX ORDER — GABAPENTIN 600 MG/1
600 TABLET ORAL 3 TIMES DAILY
Qty: 90 TABLET | Refills: 1 | Status: SHIPPED | OUTPATIENT
Start: 2022-11-17 | End: 2023-03-06 | Stop reason: SDUPTHER

## 2022-11-17 NOTE — PROGRESS NOTES
I called the patient with regards to his questions about potential spinal surgery.  Unfortunately, the patient recently had a hip fracture.  In addition to this, he had a major heart attack and had significant cardiac work and hip fixation performed at .  I had a very sunita discussion with the patient explained given his extensive cardiac issues, I do not foresee that he would ever be a good surgical candidate for spine surgery.  He is going to continue to work with his cardiologist as well as orthopedics to recover from his hip.  If he is ever to the point that he is cleared from a cardiac standpoint, we will try to get the patient back into the office to see how he is doing.

## 2022-11-17 NOTE — TELEPHONE ENCOUNTER
Provider:  Dr. Sanz  Surgery/Procedure:  CARPAL TUNNEL RELEASE RIGHT  Surgery/Procedure Date:  Dr. Hayden on 2/8/17  Last visit:   8/29/22   Next visit:      Reason for call:    Requested Prescriptions     Pending Prescriptions Disp Refills   • gabapentin (NEURONTIN) 600 MG tablet 90 tablet 1     Sig: Take 1 tablet by mouth 3 (Three) Times a Day.       10/14/2022 Gabapentin 600MG 1955 [address] [facility] 3  10/31/2022 Oxycodone Hydrochloride 5MG 1955 [address]  [facility]  RETAIL P  [facility], located within [facility] 60 1  11/05/2022 Oxycodone/Acetaminophen  325MG/10MG  1955 [address] [facility] 60 2

## 2022-11-21 ENCOUNTER — OFFICE VISIT (OUTPATIENT)
Dept: FAMILY MEDICINE CLINIC | Facility: CLINIC | Age: 67
End: 2022-11-21

## 2022-11-21 VITALS
HEART RATE: 77 BPM | HEIGHT: 71 IN | SYSTOLIC BLOOD PRESSURE: 128 MMHG | BODY MASS INDEX: 28.03 KG/M2 | DIASTOLIC BLOOD PRESSURE: 84 MMHG

## 2022-11-21 DIAGNOSIS — F41.9 ANXIETY AND DEPRESSION: ICD-10-CM

## 2022-11-21 DIAGNOSIS — G89.29 CHRONIC LOW BACK PAIN WITH BILATERAL SCIATICA, UNSPECIFIED BACK PAIN LATERALITY: Primary | ICD-10-CM

## 2022-11-21 DIAGNOSIS — F32.A ANXIETY AND DEPRESSION: ICD-10-CM

## 2022-11-21 DIAGNOSIS — M25.511 CHRONIC PAIN OF BOTH SHOULDERS: ICD-10-CM

## 2022-11-21 DIAGNOSIS — M54.42 CHRONIC LOW BACK PAIN WITH BILATERAL SCIATICA, UNSPECIFIED BACK PAIN LATERALITY: Primary | ICD-10-CM

## 2022-11-21 DIAGNOSIS — M54.41 CHRONIC LOW BACK PAIN WITH BILATERAL SCIATICA, UNSPECIFIED BACK PAIN LATERALITY: Primary | ICD-10-CM

## 2022-11-21 DIAGNOSIS — M25.512 CHRONIC PAIN OF BOTH SHOULDERS: ICD-10-CM

## 2022-11-21 DIAGNOSIS — G89.29 CHRONIC PAIN OF BOTH SHOULDERS: ICD-10-CM

## 2022-11-21 DIAGNOSIS — S72.144A CLOSED NONDISPLACED INTERTROCHANTERIC FRACTURE OF RIGHT FEMUR, INITIAL ENCOUNTER: ICD-10-CM

## 2022-11-21 DIAGNOSIS — L89.159 PRESSURE INJURY OF SKIN OF SACRAL REGION, UNSPECIFIED INJURY STAGE: ICD-10-CM

## 2022-11-21 DIAGNOSIS — I25.10 CORONARY ARTERY DISEASE INVOLVING NATIVE HEART, UNSPECIFIED VESSEL OR LESION TYPE, UNSPECIFIED WHETHER ANGINA PRESENT: ICD-10-CM

## 2022-11-21 PROCEDURE — 99215 OFFICE O/P EST HI 40 MIN: CPT | Performed by: INTERNAL MEDICINE

## 2022-11-21 RX ORDER — LISINOPRIL 5 MG/1
TABLET ORAL
COMMUNITY
Start: 2022-10-31 | End: 2022-11-22 | Stop reason: SDUPTHER

## 2022-11-21 RX ORDER — DAPAGLIFLOZIN 10 MG/1
TABLET, FILM COATED ORAL
COMMUNITY
Start: 2022-10-31 | End: 2023-03-01 | Stop reason: SDUPTHER

## 2022-11-21 RX ORDER — ATORVASTATIN CALCIUM 80 MG/1
TABLET, FILM COATED ORAL
COMMUNITY
Start: 2022-10-31 | End: 2022-11-22 | Stop reason: SDUPTHER

## 2022-11-21 RX ORDER — TACROLIMUS 0.5 MG/1
0.5 CAPSULE ORAL
COMMUNITY
Start: 2022-07-28

## 2022-11-21 RX ORDER — OXYCODONE HYDROCHLORIDE 15 MG/1
15 TABLET ORAL EVERY 4 HOURS PRN
Qty: 120 TABLET | Refills: 0 | Status: SHIPPED | OUTPATIENT
Start: 2022-11-21 | End: 2023-01-04 | Stop reason: SDUPTHER

## 2022-11-21 RX ORDER — PIMECROLIMUS 10 MG/G
1 CREAM TOPICAL
COMMUNITY
End: 2023-01-04 | Stop reason: SDUPTHER

## 2022-11-21 RX ORDER — ENOXAPARIN SODIUM 100 MG/ML
INJECTION SUBCUTANEOUS
COMMUNITY
Start: 2022-10-31

## 2022-11-21 RX ORDER — ACETAMINOPHEN 325 MG/1
650 TABLET ORAL 4 TIMES DAILY
COMMUNITY
Start: 2022-10-31

## 2022-11-21 RX ORDER — METHOCARBAMOL 500 MG/1
TABLET, FILM COATED ORAL
COMMUNITY
Start: 2022-10-31

## 2022-11-21 RX ORDER — OMEPRAZOLE 40 MG/1
40 CAPSULE, DELAYED RELEASE ORAL
COMMUNITY
Start: 2022-05-29 | End: 2022-11-22 | Stop reason: SDUPTHER

## 2022-11-21 RX ORDER — OXYCODONE HYDROCHLORIDE 5 MG/1
TABLET ORAL
COMMUNITY
Start: 2022-10-31 | End: 2022-11-21

## 2022-11-21 RX ORDER — SPIRONOLACTONE 25 MG/1
TABLET ORAL
COMMUNITY
Start: 2022-10-31 | End: 2022-11-22 | Stop reason: SDUPTHER

## 2022-11-21 RX ORDER — SENNA PLUS 8.6 MG/1
8.6 TABLET ORAL
COMMUNITY
Start: 2022-10-31 | End: 2023-03-01 | Stop reason: SDUPTHER

## 2022-11-21 RX ORDER — ESCITALOPRAM OXALATE 10 MG/1
TABLET ORAL
COMMUNITY
Start: 2022-10-31 | End: 2022-11-22 | Stop reason: SDUPTHER

## 2022-11-21 RX ORDER — NITROGLYCERIN 0.4 MG/1
0.4 TABLET SUBLINGUAL
COMMUNITY
Start: 2022-10-31 | End: 2023-01-04 | Stop reason: SDUPTHER

## 2022-11-21 RX ORDER — METOPROLOL SUCCINATE 25 MG/1
TABLET, EXTENDED RELEASE ORAL
COMMUNITY
Start: 2022-10-31 | End: 2022-11-22 | Stop reason: SDUPTHER

## 2022-11-21 RX ORDER — NALOXONE HYDROCHLORIDE 4 MG/.1ML
4 SPRAY NASAL
COMMUNITY
Start: 2022-10-31 | End: 2023-10-31

## 2022-11-21 NOTE — PROGRESS NOTES
Chief Complaint   Patient presents with   • Heart Problem     Hospital follow up between [facility] and .    • Back Pain   • Hip Pain     Fell and broke hip ; discuss increase dose of pain meds        HPI:  Mahendra Yates is a 67 y.o. male who presents today for follow-up chronic low back and hip pain.  He would like to discuss pain medication dosing.  Recently hospitalized after hip fracture.  He had cardiac surgery with stent placement at .    ROS:  Constitutional: no fevers, night sweats or unexplained weight loss  Eyes: no vision changes  ENT: no runny nose, ear pain, sore throat  Cardio: no chest pain, palpitations  Pulm: no shortness of breath, wheezing, or cough  GI: no abdominal pain or changes in bowel movements  : no difficulty urinating  MSK: no difficulty ambulating, no joint pain  Neuro: no weakness, dizziness or headache  Psych: no trouble sleeping  Endo: no change in appetite      Past Medical History:   Diagnosis Date   • Arthritis    • Atherosclerosis    • B12 deficiency    • Cancer (HCC)    • Cancer of liver (HCC)    • Chronic headaches    • Coronary artery disease    • Diabetes mellitus (HCC)    • Gastroesophageal reflux disease    • History of transfusion    • Hyperlipidemia    • Hypertension    • Myocardial infarction (HCC)    • Sleep apnea    • Stroke (HCC)       Family History   Problem Relation Age of Onset   • Heart disease Mother         Stroke   • Stroke Mother    • Diabetes Father    • Liver cancer Brother    • No Known Problems Sister    • Macular degeneration Brother    • No Known Problems Sister    • Dementia Brother       Social History     Socioeconomic History   • Marital status:    Tobacco Use   • Smoking status: Former     Packs/day: 3.00     Years: 30.00     Pack years: 90.00     Types: Cigarettes     Quit date: 1991     Years since quittin.8   • Smokeless tobacco: Never   Vaping Use   • Vaping Use: Never used   Substance and Sexual Activity   • Alcohol use:  Yes     Comment: RARELY   • Drug use: No   • Sexual activity: Not Currently     Partners: Female      Allergies   Allergen Reactions   • Contrast Dye Other (See Comments)     Cardiac Arrest   • Penicillins Rash   • Sulfasalazine Rash      Immunization History   Administered Date(s) Administered   • COVID-19 (PFIZER) BIVALENT BOOSTER 12+YRS 10/19/2022   • COVID-19 (PFIZER) PURPLE CAP 01/15/2021, 02/05/2021, 09/17/2021   • Covid-19 (Pfizer) Gray Cap 03/12/2022   • Flu Vaccine Quad PF >36MO 10/12/2017, 12/28/2018   • Fluzone High Dose =>65 Years ([employer] ONLY) 09/24/2019   • Fluzone High-Dose 65+yrs 09/24/2019, 09/14/2020, 01/03/2022, 10/17/2022   • Influenza Quad Vaccine (Inpatient) 02/12/2017        PE:  Vitals:    11/21/22 1053   BP: 128/84   Pulse: 77      Body mass index is 28.03 kg/m².    Gen Appearance: NAD  HEENT: Normocephalic, PERRLA, no thyromegaly, trache midline  Heart: RRR, normal S1 and S2, no murmur  Lungs: CTA b/l, no wheezing, no crackles  Abdomen: Soft, non-tender, non-distended, no guarding and BSx4  MSK: Moves all extremities well, normal gait, no peripheral edema  Pulses: Palpable and equal b/l  Lymph nodes: No palpable lymphadenopathy   Neuro: No focal deficits      Current Outpatient Medications   Medication Sig Dispense Refill   • acetaminophen (TYLENOL) 325 MG tablet Take 650 mg by mouth 4 (Four) Times a Day.     • aspirin 81 MG EC tablet Take 81 mg by mouth Daily. Continues per doctors orders     • atorvastatin (LIPITOR) 80 MG tablet      • busPIRone (BUSPAR) 10 MG tablet Take 1 tablet by mouth 2 (Two) Times a Day. 60 tablet 5   • Dulaglutide (Trulicity) 0.75 MG/0.5ML solution pen-injector Inject 0.75 mg under the skin into the appropriate area as directed 1 (One) Time Per Week. 5 pen 2   • Enoxaparin Sodium (LOVENOX) 30 MG/0.3ML solution prefilled syringe syringe      • escitalopram (LEXAPRO) 10 MG tablet      • Farxiga 10 MG tablet      • gabapentin (NEURONTIN) 600 MG tablet Take 1 tablet  by mouth 3 (Three) Times a Day. 90 tablet 1   • isosorbide mononitrate (IMDUR) 120 MG 24 hr tablet Take 1 tablet by mouth 2 (Two) Times a Day. 180 tablet 3   • lisinopril (PRINIVIL,ZESTRIL) 5 MG tablet      • Loratadine 10 MG capsule Take 1 capsule by mouth Daily.     • methocarbamol (ROBAXIN) 500 MG tablet      • metoprolol succinate XL (TOPROL-XL) 25 MG 24 hr tablet      • miconazole nitrate (ALOE VESTA) 2 % ointment ointment Apply in feet     • multivitamin with minerals tablet tablet Take 1 tablet by mouth Daily.     • naloxone (NARCAN) 4 MG/0.1ML nasal spray 4 mg into the nostril(s) as directed by provider.     • nitroglycerin (NITROSTAT) 0.4 MG SL tablet PLACE 1 TABLET UNDER THE TONGUE EVERY 5 MINUTES AS NEEDED FOR CHEST PAIN. DO NOT EXCEED 3 TABLETS IN 15 MINUTES 25 tablet 1   • nitroglycerin (NITROSTAT) 0.4 MG SL tablet Place 0.4 mg under the tongue.     • Nutritional Supplements (DHEA PO) Take  by mouth.     • omeprazole (priLOSEC) 40 MG capsule Take 40 mg by mouth.     • pimecrolimus (ELIDEL) 1 % cream Apply 1 application topically to the appropriate area as directed.     • ranolazine (RANEXA) 1000 MG 12 hr tablet Take 1 tablet by mouth 2 (Two) Times a Day. 180 tablet 3   • senna (SENOKOT) 8.6 MG tablet Take 8.6 mg by mouth.     • sennosides-docusate (SB Docusate Sodium/Senna) 8.6-50 MG per tablet Take 1 tablet by mouth Daily. 90 tablet 3   • spironolactone (ALDACTONE) 25 MG tablet      • tacrolimus (PROGRAF) 0.5 MG capsule      • tacrolimus (PROGRAF) 0.5 MG capsule 0.5 mg.     • ticagrelor (BRILINTA) 90 MG tablet tablet Take 90 mg by mouth.     • VITAMIN D PO Take  by mouth.     • bumetanide (BUMEX) 1 MG tablet Take 1 tablet by mouth 2 (Two) Times a Day. 60 tablet 2   • oxyCODONE (ROXICODONE) 15 MG immediate release tablet Take 1 tablet by mouth Every 4 (Four) Hours As Needed for Severe Pain. 120 tablet 0     No current facility-administered medications for this visit.      DC Percocet.  Switch to  oxycodone 15 mg 4 times daily.  Now has worsening pain after hip fracture.  May use Tylenol 1000 mg 3 times daily if needed as well.  Refer to wound clinic for pressure ulcer.    Blood pressure well controlled.  No change in prescription medication.    Counseling was given to patient and family for the following topics: diagnostic results, instructions for management, impressions and risks and benefits of treatment options . Total time of the encounter was 40 minutes and 20 minutes was spent face to face counseling.      Diagnoses and all orders for this visit:    1. Chronic low back pain with bilateral sciatica, unspecified back pain laterality (Primary)  -     oxyCODONE (ROXICODONE) 15 MG immediate release tablet; Take 1 tablet by mouth Every 4 (Four) Hours As Needed for Severe Pain.  Dispense: 120 tablet; Refill: 0    2. Chronic pain of both shoulders  -     oxyCODONE (ROXICODONE) 15 MG immediate release tablet; Take 1 tablet by mouth Every 4 (Four) Hours As Needed for Severe Pain.  Dispense: 120 tablet; Refill: 0    3. Closed nondisplaced intertrochanteric fracture of right femur, initial encounter ([facility])  -     oxyCODONE (ROXICODONE) 15 MG immediate release tablet; Take 1 tablet by mouth Every 4 (Four) Hours As Needed for Severe Pain.  Dispense: 120 tablet; Refill: 0    4. Coronary artery disease involving native heart, unspecified vessel or lesion type, unspecified whether angina present    5. Anxiety and depression    6. Pressure injury of skin of sacral region, unspecified injury stage  -     Ambulatory Referral to Wound Clinic         No follow-ups on file.     Dictated Utilizing Dragon Dictation    Please note that portions of this note were completed with a voice recognition program.    Part of this note may be an electronic transcription/translation of spoken language to printed text using the Dragon Dictation System.

## 2022-11-22 RX ORDER — LISINOPRIL 5 MG/1
5 TABLET ORAL DAILY
Qty: 90 TABLET | Refills: 3 | Status: SHIPPED | OUTPATIENT
Start: 2022-11-22

## 2022-11-22 RX ORDER — ESCITALOPRAM OXALATE 10 MG/1
10 TABLET ORAL DAILY
Qty: 90 TABLET | Refills: 3 | Status: SHIPPED | OUTPATIENT
Start: 2022-11-22 | End: 2023-03-01 | Stop reason: SDUPTHER

## 2022-11-22 RX ORDER — METOPROLOL SUCCINATE 25 MG/1
25 TABLET, EXTENDED RELEASE ORAL DAILY
Qty: 90 TABLET | Refills: 3 | Status: SHIPPED | OUTPATIENT
Start: 2022-11-22

## 2022-11-22 RX ORDER — ATORVASTATIN CALCIUM 80 MG/1
80 TABLET, FILM COATED ORAL NIGHTLY
Qty: 90 TABLET | Refills: 3 | Status: SHIPPED | OUTPATIENT
Start: 2022-11-22 | End: 2023-01-04 | Stop reason: SDUPTHER

## 2022-11-22 RX ORDER — BUSPIRONE HYDROCHLORIDE 10 MG/1
10 TABLET ORAL 2 TIMES DAILY
Qty: 180 TABLET | Refills: 3 | Status: SHIPPED | OUTPATIENT
Start: 2022-11-22

## 2022-11-22 RX ORDER — OMEPRAZOLE 40 MG/1
40 CAPSULE, DELAYED RELEASE ORAL DAILY
Qty: 90 CAPSULE | Refills: 3 | Status: SHIPPED | OUTPATIENT
Start: 2022-11-22 | End: 2023-03-01 | Stop reason: SDUPTHER

## 2022-11-22 RX ORDER — SPIRONOLACTONE 25 MG/1
25 TABLET ORAL DAILY
Qty: 90 TABLET | Refills: 3 | Status: SHIPPED | OUTPATIENT
Start: 2022-11-22

## 2022-11-23 ENCOUNTER — TELEPHONE (OUTPATIENT)
Dept: NEUROSURGERY | Facility: CLINIC | Age: 67
End: 2022-11-23

## 2022-11-30 ENCOUNTER — PATIENT MESSAGE (OUTPATIENT)
Dept: FAMILY MEDICINE CLINIC | Facility: CLINIC | Age: 67
End: 2022-11-30

## 2022-12-12 DIAGNOSIS — K64.9 HEMORRHOIDS, UNSPECIFIED HEMORRHOID TYPE: Primary | ICD-10-CM

## 2022-12-26 NOTE — TELEPHONE ENCOUNTER
Received blood thinner clearance for Mahendra Yates from Dr. Oren Pena, he is cleared to hold his Plavix for 7 days prior to injection, I called and spoke with the patient and informed him and gave him directions on when to stop his Plavix.  No further needs were expressed.   no

## 2023-01-02 ENCOUNTER — HOSPITAL ENCOUNTER (OUTPATIENT)
Dept: PHYSICAL THERAPY | Facility: HOSPITAL | Age: 68
Setting detail: THERAPIES SERIES
Discharge: HOME | End: 2023-01-02
Payer: MEDICARE

## 2023-01-02 DIAGNOSIS — L89.159 PRESSURE INJURY OF SKIN OF SACRAL REGION, UNSPECIFIED INJURY STAGE: Primary | ICD-10-CM

## 2023-01-02 PROCEDURE — 97161 PT EVAL LOW COMPLEX 20 MIN: CPT

## 2023-01-02 NOTE — THERAPY EVALUATION
Outpatient Rehabilitation - Wound/Debridement Initial Eval  RUBIN Delaney     Patient Name: Mahendra Yates  : 1955  MRN: [id number]  Today's Date: 2023                  Admit Date: 2023    Visit Dx:    ICD-10-CM ICD-9-CM   1. 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)   • Hx of CABG   • History of liver transplant (HCC)   • Physical deconditioning   • Lumbar stenosis with neurogenic claudication   • Herniated lumbar intervertebral disc   • Radicular syndrome of right leg   • Degenerative lumbar spinal stenosis   • Bowel and bladder incontinence   • Closed fracture of multiple ribs of left side   • Acute congestive heart failure (HCC)   • Bradycardia   • Anemia   • Hypoxia   • Unstable angina (HCC)   • Altered mental status, unspecified altered mental status type   • Transient ischemic attack (TIA)   • Bradycardia   • Weakness   • Dysarthria   • Closed nondisplaced intertrochanteric fracture of right femur, initial encounter ([facility])   • Spinal stenosis of lumbar region with neurogenic claudication        Past Medical History:   Diagnosis Date   • Arthritis    • Atherosclerosis    • B12 deficiency    • Cancer (HCC)    • Cancer of liver (HCC)    • Chronic headaches    • Coronary artery disease    • Diabetes mellitus (HCC)    • Gastroesophageal reflux disease    • History of transfusion    • Hyperlipidemia    • Hypertension    • Myocardial infarction (HCC)    • Sleep apnea    • Stroke (HCC)         Past Surgical  History:   Procedure Laterality Date   • ANGIOPLASTY     • CARDIAC CATHETERIZATION Left 8/16/2022    Procedure: Left Heart Cath;  Surgeon: April Dillon MD;  Location:  [facility] CATH INVASIVE LOCATION;  Service: Cardiology;  Laterality: Left;   • CARDIAC SURGERY     • CARPAL TUNNEL RELEASE Right 2/8/2017    Procedure: CARPAL TUNNEL RELEASE RIGHT ;  Surgeon: Luis Hayden MD;  Location:  [facility] OR;  Service:    • CHOLECYSTECTOMY     • CORONARY ARTERY BYPASS GRAFT     • CORONARY STENT PLACEMENT      x2   • LIVER TRANSPLANTATION     • TUMOR REMOVAL Right     axilla        Patient History     Row Name 01/02/23 1400             History    Chief Complaint Ulcer, wound or other skin conditions;Pain  -JM      Type of Pain Other pain  -JM      Other Type of Pain sacral/ischial  -JM      Brief Description of Current Complaint Pt is s/p L hip fx after a fall, with ICU stay and MI x2, presenting with pressure ulcer of sacrum and ischial tuberosities (improving since hospital admission).  -JM      Patient/Caregiver Goals Know what to do to help the symptoms;Heal wound  -JM      Occupation/sports/leisure activities Retired, has supportive spouse who can assist as needed  -JM      How has patient tried to help current problem? using cushion when sitting, has tried using bandages but states they don't stay in place well  -JM         Pain     [place] FACES Pain Rating  2  -JM         Services    Are you currently receiving Home Health services No  -JM      Do you plan to receive Home Health services in the near future Yes  wants to resume therapy either as OP or HH  -         Daily Activities    Barriers to learning None  -JM      Pt Participated in POC and Goals Yes  -JM            User Key  (r) = Recorded By, (t) = Taken By, (c) = Cosigned By    Initials Name Provider Type    Tequila Benjamin PT Physical Therapist                EVALUATION   PT Ortho     Row Name 01/02/23 1400       Subjective Comments     Subjective Comments Pt states wounds seem to be closed, but were open at one time.  States area is still very sore, so wanted to keep appt for recommendations.  -       Subjective Pain    Able to rate subjective pain? yes  -    Pre-Treatment Pain Level 2  -    Post-Treatment Pain Level 2  -    Subjective Pain Comment facial scale  -JM       Transfers    Sit-Stand [place] (Transfers) set up  -JM    Stand-Sit [place] (Transfers) set up  -    Comment, (Transfers) standing with UE support on stretcher, to/from dept in electric scooter  -          User Key  (r) = Recorded By, (t) = Taken By, (c) = Cosigned By    Initials Name Provider Type    Tequila Benjamin PT Physical Therapist               [facility] Wound     Row Name 01/02/23 1400             Wound 01/02/23 1400 sacral spine Pressure Injury    Wound - Properties Group Placement Date: 01/02/23  - Placement Time: 1400 -JM Location: sacral spine  -JM Primary Wound Type: Pressure inj  -JM    Wound Image Images linked: 3  -JM      Dressing Appearance open to air;no drainage  -      Base clean;closed/resurfaced;epithelialization;pink  fragile but intact epithelium with some hypertrophic crusts  -      Periwound intact;blanchable;dry;pink  -      Periwound Temperature warm  -      Periwound Skin Turgor soft  -      Drainage Amount none  -      Care, Wound cleansed with;wound cleanser  -      Dressing Care dressing applied;foam;border dressing  4\" optifoam gentle  -      Periwound Care barrier ointment applied;cleansed with pH balanced cleanser  zguard  -      Retired Wound - Properties Group Placement Date: 01/02/23  - Placement Time: 1400 -JM Location: sacral spine  -JM Primary Wound Type: Pressure inj  -JM    Retired Wound - Properties Group Date first assessed: 01/02/23  - Time first assessed: 1400 -JM Location: sacral spine  -JM Primary Wound Type: Pressure inj  -JM          User Key  (r) = Recorded By, (t) = Taken By,  (c) = Cosigned By    Initials Name Provider Type    Tequila Benjamin, PT Physical Therapist                   Therapy Education     Row Name 01/02/23 1400             Therapy Education    Education Details Educated on pressure relief, use of zguard and/or optifoam gentle dressings to protect fragile skin, may leave dressings in place for up to 7 days if not wet or disrupted.  Issued waffle cushion for pressure relief.  Encouraged frequent repositioning and increased nutrition for wound healing.  Plan for follow-up PRN until end of current certification.  -JM      Given Bandaging/dressing change;Symptoms/condition management  -      Program New  -      How Provided Verbal;Demonstration  -      Provided to Patient;Other (comment)  spouse  -      Level of Understanding Verbalized  -            User Key  (r) = Recorded By, (t) = Taken By, (c) = Cosigned By    Initials Name Provider Type    Tequila Benjamin, PT Physical Therapist                Recommendation and Plan   PT Assessment/Plan     Row Name 01/02/23 1400          PT Assessment    Functional Limitations Other (comment)  wound management limitation  -     Impairments Gait;Integumentary integrity;Pain  -     Assessment Comments Pt presenting with healing pressure ulcers of sacrum and bilateral ischial tuberosities s/p recent hopsital admissions for L hip fx and MIs.  Wounds currently closed with fragile epithelium, but high risk for breakdown or re-opening due to decreased mobility and muscle wasting.  PT instructed pt/spouse in home management of condition and pt to return PRN if wounds do not fully resolve or if they re-open/worsen.  -     Please refer to paper survey for additional self-reported information No  -JM     Rehab Potential Good  -     Patient/caregiver participated in establishment of treatment plan and goals Yes  -     Patient would benefit from skilled therapy intervention Yes  -JM        PT Plan    PT Frequency One  time visit;Other (comment)  PRN next 90 days  -     Predicted Duration of Therapy Intervention (PT) 1-2 visits  -     Planned CPT's? PT EVAL LOW COMPLEXITY: 15314;PT CELESTINO DEBRIDE OPEN WOUND UP TO 20 CM: 51807;PT NONSELECT DEBRIDE 15 MIN: 29769;PT SELF CARE/MGMT/TRAIN 15 MIN: 05594  -     Physical Therapy Interventions (Optional Details) patient/family education;wound care  -     PT Plan Comments follow-up PRN until end of current certification  -           User Key  (r) = Recorded By, (t) = Taken By, (c) = Cosigned By    Initials Name Provider Type    Tequila Benjamin, PT Physical Therapist                  Goals   PT OP Goals     Row Name 01/02/23 1400          Long Term Goals    LTG Date to Achieve 04/02/23  -     LTG 1 Pt/spouse independent with home dressing changes and pressure relief to reduce recurrence of pressure ulcers.  -     LTG 1 Progress New  -        Time Calculation    PT Goal Re-Cert Due Date 04/02/23  -           User Key  (r) = Recorded By, (t) = Taken By, (c) = Cosigned By    Initials Name Provider Type    Tequila Benjamin, PT Physical Therapist                Time Calculation: Start Time: 1400  Untimed Charges  PT Eval/Re-eval Minutes: 40  Total Minutes  Untimed Charges Total Minutes: 40   Total Minutes: 40  Therapy Charges for Today     Code Description Service Date Service Provider Modifiers Qty    21301545662 HC PT EVAL LOW COMPLEXITY 3 1/2/2023 Tequila Carbajal, PT GP 1                Tequila Carbajal, PT  1/2/2023

## 2023-01-04 DIAGNOSIS — M25.511 CHRONIC PAIN OF BOTH SHOULDERS: ICD-10-CM

## 2023-01-04 DIAGNOSIS — M54.42 CHRONIC LOW BACK PAIN WITH BILATERAL SCIATICA, UNSPECIFIED BACK PAIN LATERALITY: ICD-10-CM

## 2023-01-04 DIAGNOSIS — M54.41 CHRONIC LOW BACK PAIN WITH BILATERAL SCIATICA, UNSPECIFIED BACK PAIN LATERALITY: ICD-10-CM

## 2023-01-04 DIAGNOSIS — S72.144A CLOSED NONDISPLACED INTERTROCHANTERIC FRACTURE OF RIGHT FEMUR, INITIAL ENCOUNTER: ICD-10-CM

## 2023-01-04 DIAGNOSIS — G89.29 CHRONIC PAIN OF BOTH SHOULDERS: ICD-10-CM

## 2023-01-04 DIAGNOSIS — G89.29 CHRONIC LOW BACK PAIN WITH BILATERAL SCIATICA, UNSPECIFIED BACK PAIN LATERALITY: ICD-10-CM

## 2023-01-04 DIAGNOSIS — M25.512 CHRONIC PAIN OF BOTH SHOULDERS: ICD-10-CM

## 2023-01-04 RX ORDER — PIMECROLIMUS 10 MG/G
1 CREAM TOPICAL 2 TIMES DAILY
Qty: 60 G | Refills: 1 | Status: SHIPPED | OUTPATIENT
Start: 2023-01-04

## 2023-01-04 RX ORDER — NITROGLYCERIN 0.4 MG/1
0.4 TABLET SUBLINGUAL
Qty: 25 TABLET | Refills: 1 | Status: CANCELLED | OUTPATIENT
Start: 2023-01-04

## 2023-01-04 RX ORDER — AMOXICILLIN 250 MG
1 CAPSULE ORAL DAILY
Qty: 90 TABLET | Refills: 3 | Status: SHIPPED | OUTPATIENT
Start: 2023-01-04 | End: 2023-03-01 | Stop reason: SDUPTHER

## 2023-01-04 RX ORDER — OXYCODONE HYDROCHLORIDE 15 MG/1
15 TABLET ORAL EVERY 4 HOURS PRN
Qty: 120 TABLET | Refills: 0 | Status: SHIPPED | OUTPATIENT
Start: 2023-01-04 | End: 2023-03-06 | Stop reason: SDUPTHER

## 2023-01-04 RX ORDER — ATORVASTATIN CALCIUM 80 MG/1
80 TABLET, FILM COATED ORAL NIGHTLY
Qty: 90 TABLET | Refills: 3 | Status: SHIPPED | OUTPATIENT
Start: 2023-01-04

## 2023-01-04 RX ORDER — SENNA PLUS 8.6 MG/1
TABLET ORAL
Status: CANCELLED | OUTPATIENT
Start: 2023-01-04 | End: 2024-01-04

## 2023-01-04 RX ORDER — NITROGLYCERIN 0.4 MG/1
0.4 TABLET SUBLINGUAL
Qty: 12 TABLET | Refills: 5 | Status: SHIPPED | OUTPATIENT
Start: 2023-01-04 | End: 2023-03-23 | Stop reason: SDUPTHER

## 2023-01-31 ENCOUNTER — PRIOR AUTHORIZATION (OUTPATIENT)
Dept: FAMILY MEDICINE CLINIC | Facility: CLINIC | Age: 68
End: 2023-01-31
Payer: MEDICARE

## 2023-02-16 ENCOUNTER — TELEPHONE (OUTPATIENT)
Dept: FAMILY MEDICINE CLINIC | Facility: CLINIC | Age: 68
End: 2023-02-16
Payer: MEDICARE

## 2023-02-16 NOTE — TELEPHONE ENCOUNTER
Informed patient and his wife, ROJAS reviewed that he needs an updated appt for insurance to cover the shoes. They understood and will get all of this updated on the next appointment 2/21/2023

## 2023-02-16 NOTE — TELEPHONE ENCOUNTER
----- Message from Rodríguez Lopez DO sent at 2/16/2023  9:17 AM EST -----  His paperwork for diabetic shoes in my inbox.  This requires patient to be evaluated for diabetes within the last 6 months.  He will need an appointment for diabetes management before I can complete this paperwork.  Otherwise I can complete it at his next follow-up visit.

## 2023-02-21 ENCOUNTER — OFFICE VISIT (OUTPATIENT)
Dept: FAMILY MEDICINE CLINIC | Facility: CLINIC | Age: 68
End: 2023-02-21
Payer: MEDICARE

## 2023-02-21 VITALS
HEIGHT: 71 IN | BODY MASS INDEX: 28.03 KG/M2 | DIASTOLIC BLOOD PRESSURE: 70 MMHG | OXYGEN SATURATION: 99 % | SYSTOLIC BLOOD PRESSURE: 128 MMHG | HEART RATE: 74 BPM

## 2023-02-21 DIAGNOSIS — G89.29 CHRONIC LOW BACK PAIN WITH BILATERAL SCIATICA, UNSPECIFIED BACK PAIN LATERALITY: ICD-10-CM

## 2023-02-21 DIAGNOSIS — I25.10 CORONARY ARTERY DISEASE INVOLVING NATIVE HEART, UNSPECIFIED VESSEL OR LESION TYPE, UNSPECIFIED WHETHER ANGINA PRESENT: ICD-10-CM

## 2023-02-21 DIAGNOSIS — F41.9 ANXIETY AND DEPRESSION: ICD-10-CM

## 2023-02-21 DIAGNOSIS — I10 PRIMARY HYPERTENSION: ICD-10-CM

## 2023-02-21 DIAGNOSIS — E11.59 TYPE 2 DIABETES MELLITUS WITH OTHER CIRCULATORY COMPLICATION, WITHOUT LONG-TERM CURRENT USE OF INSULIN: Primary | ICD-10-CM

## 2023-02-21 DIAGNOSIS — M54.42 CHRONIC LOW BACK PAIN WITH BILATERAL SCIATICA, UNSPECIFIED BACK PAIN LATERALITY: ICD-10-CM

## 2023-02-21 DIAGNOSIS — F32.A ANXIETY AND DEPRESSION: ICD-10-CM

## 2023-02-21 DIAGNOSIS — M54.41 CHRONIC LOW BACK PAIN WITH BILATERAL SCIATICA, UNSPECIFIED BACK PAIN LATERALITY: ICD-10-CM

## 2023-02-21 PROCEDURE — 99214 OFFICE O/P EST MOD 30 MIN: CPT | Performed by: INTERNAL MEDICINE

## 2023-02-21 NOTE — PROGRESS NOTES
Chief Complaint   Patient presents with   • Back Pain     3 month fu    • Diabetes     Diabetic shoe forms  Last a1c checked 2023 = 5.4%       HPI:  Mahendra Yates is a 68 y.o. male who presents today for follow-up chronic medical conditions including diabetes and chronic pain.    ROS:  Constitutional: no fevers, night sweats or unexplained weight loss  Eyes: no vision changes  ENT: no runny nose, ear pain, sore throat  Cardio: no chest pain, palpitations  Pulm: no shortness of breath, wheezing, or cough  GI: no abdominal pain or changes in bowel movements  : no difficulty urinating  MSK: no difficulty ambulating, no joint pain  Neuro: no weakness, dizziness or headache  Psych: no trouble sleeping  Endo: no change in appetite      Past Medical History:   Diagnosis Date   • Arthritis    • Atherosclerosis    • B12 deficiency    • Cancer (HCC)    • Cancer of liver (HCC)    • Chronic headaches    • Coronary artery disease    • Diabetes mellitus (HCC)    • Gastroesophageal reflux disease    • History of transfusion    • Hyperlipidemia    • Hypertension    • Myocardial infarction (HCC)    • Sleep apnea    • Stroke (HCC)       Family History   Problem Relation Age of Onset   • Heart disease Mother         Stroke   • Stroke Mother    • Diabetes Father    • Liver cancer Brother    • No Known Problems Sister    • Macular degeneration Brother    • No Known Problems Sister    • Dementia Brother       Social History     Socioeconomic History   • Marital status:    Tobacco Use   • Smoking status: Former     Packs/day: 3.00     Years: 30.00     Pack years: 90.00     Types: Cigarettes     Quit date: 1991     Years since quittin.0   • Smokeless tobacco: Never   Vaping Use   • Vaping Use: Never used   Substance and Sexual Activity   • Alcohol use: Yes     Comment: RARELY   • Drug use: No   • Sexual activity: Not Currently     Partners: Female      Allergies   Allergen Reactions   • Contrast Dye (Echo Or  Unknown Ct/Mr) Other (See Comments)     Cardiac Arrest   • Penicillins Rash   • Sulfasalazine Rash      Immunization History   Administered Date(s) Administered   • COVID-19 (PFIZER) BIVALENT BOOSTER 12+YRS 10/19/2022   • COVID-19 (PFIZER) PURPLE CAP 01/15/2021, 02/05/2021, 09/17/2021   • Covid-19 (Pfizer) Gray Cap 03/12/2022   • Flu Vaccine Quad PF >36MO 10/12/2017, 12/28/2018   • Fluzone High Dose =>65 Years ([employer] ONLY) 09/24/2019   • Fluzone High-Dose 65+yrs 09/24/2019, 09/14/2020, 01/03/2022, 10/17/2022   • Influenza Quad Vaccine (Inpatient) 02/12/2017   • Pneumococcal Conjugate 20-Valent (PCV20) 02/03/2023   • Shingrix 02/03/2023        PE:  Vitals:    02/21/23 1059   BP: 128/70   Pulse: 74   SpO2: 99%      Body mass index is 28.03 kg/m².    Gen Appearance: NAD  HEENT: Normocephalic, PERRLA, no thyromegaly, trache midline  Heart: RRR, normal S1 and S2, no murmur  Lungs: CTA b/l, no wheezing, no crackles  Abdomen: Soft, non-tender, non-distended, no guarding and BSx4  MSK: Moves all extremities well, normal gait, no peripheral edema  Pulses: Palpable and equal b/l  Lymph nodes: No palpable lymphadenopathy   Neuro: No focal deficits      Current Outpatient Medications   Medication Sig Dispense Refill   • acetaminophen (TYLENOL) 325 MG tablet Take 650 mg by mouth 4 (Four) Times a Day.     • aspirin 81 MG EC tablet Take 81 mg by mouth Daily. Continues per doctors orders     • atorvastatin (LIPITOR) 80 MG tablet Take 1 tablet by mouth Every Night. 90 tablet 3   • bumetanide (BUMEX) 1 MG tablet Take 1 tablet by mouth 2 (Two) Times a Day. 60 tablet 2   • busPIRone (BUSPAR) 10 MG tablet Take 1 tablet by mouth 2 (Two) Times a Day. 180 tablet 3   • Dulaglutide (Trulicity) 0.75 MG/0.5ML solution pen-injector Inject 0.75 mg under the skin into the appropriate area as directed 1 (One) Time Per Week. 5 pen 2   • Enoxaparin Sodium (LOVENOX) 30 MG/0.3ML solution prefilled syringe syringe      • escitalopram (LEXAPRO) 10 MG  tablet Take 1 tablet by mouth Daily. 90 tablet 3   • Farxiga 10 MG tablet      • gabapentin (NEURONTIN) 600 MG tablet Take 1 tablet by mouth 3 (Three) Times a Day. 90 tablet 1   • isosorbide mononitrate (IMDUR) 120 MG 24 hr tablet Take 1 tablet by mouth 2 (Two) Times a Day. 180 tablet 3   • lisinopril (PRINIVIL,ZESTRIL) 5 MG tablet Take 1 tablet by mouth Daily. 90 tablet 3   • Loratadine 10 MG capsule Take 1 capsule by mouth Daily.     • methocarbamol (ROBAXIN) 500 MG tablet      • metoprolol succinate XL (TOPROL-XL) 25 MG 24 hr tablet Take 1 tablet by mouth Daily. 90 tablet 3   • miconazole nitrate (ALOE VESTA) 2 % ointment ointment Apply in feet     • multivitamin with minerals tablet tablet Take 1 tablet by mouth Daily.     • naloxone (NARCAN) 4 MG/0.1ML nasal spray 4 mg into the nostril(s) as directed by provider.     • nitroglycerin (NITROSTAT) 0.4 MG SL tablet Place 1 tablet under the tongue Every 5 (Five) Minutes As Needed for Chest Pain. 12 tablet 5   • Nutritional Supplements (DHEA PO) Take  by mouth.     • omeprazole (priLOSEC) 40 MG capsule Take 1 capsule by mouth Daily. 90 capsule 3   • oxyCODONE (ROXICODONE) 15 MG immediate release tablet Take 1 tablet by mouth Every 4 (Four) Hours As Needed for Severe Pain. 120 tablet 0   • pimecrolimus (ELIDEL) 1 % cream Apply 1 application topically to the appropriate area as directed 2 (Two) Times a Day. 60 g 1   • ranolazine (RANEXA) 1000 MG 12 hr tablet Take 1 tablet by mouth 2 (Two) Times a Day. 180 tablet 3   • senna (SENOKOT) 8.6 MG tablet Take 8.6 mg by mouth.     • sennosides-docusate (SB Docusate Sodium/Senna) 8.6-50 MG per tablet Take 1 tablet by mouth Daily. 90 tablet 3   • spironolactone (ALDACTONE) 25 MG tablet Take 1 tablet by mouth Daily. 90 tablet 3   • tacrolimus (PROGRAF) 0.5 MG capsule      • tacrolimus (PROGRAF) 0.5 MG capsule 0.5 mg.     • ticagrelor (BRILINTA) 90 MG tablet tablet Take 1 tablet by mouth 2 (Two) Times a Day. 180 tablet 3   •  VITAMIN D PO Take  by mouth.       No current facility-administered medications for this visit.        Diagnoses and all orders for this visit:    1. Type 2 diabetes mellitus with other circulatory complication, without long-term current use of insulin (HCC) (Primary)  Most recent A1c was 5.4% on January 4, 2023.  No change to Trulicity dosing.  Recommend repeat A1c in 3 months.  It is my medical opinion that he would benefit from diabetic shoes due to poor circulation.  2. Chronic low back pain with bilateral sciatica, unspecified back pain laterality  Stable on oxycodone as needed.  He will be establishing with pain management soon.  3. Coronary artery disease involving native heart, unspecified vessel or lesion type, unspecified whether angina present  Continue current medication, blood pressure well controlled.  Follow-up with cardiology as scheduled  4. Anxiety and depression    5. Primary hypertension  See above.       Return in about 3 months (around 5/21/2023) for controlled substance.     Dictated Utilizing Dragon Dictation    Please note that portions of this note were completed with a voice recognition program.    Part of this note may be an electronic transcription/translation of spoken language to printed text using the Dragon Dictation System.

## 2023-02-27 ENCOUNTER — TELEPHONE (OUTPATIENT)
Dept: FAMILY MEDICINE CLINIC | Facility: CLINIC | Age: 68
End: 2023-02-27

## 2023-02-27 DIAGNOSIS — E10.8 DIABETES MELLITUS TYPE 1 WITH COMPLICATIONS: ICD-10-CM

## 2023-02-27 RX ORDER — DULAGLUTIDE 0.75 MG/.5ML
0.75 INJECTION, SOLUTION SUBCUTANEOUS WEEKLY
Qty: 5 ML | Refills: 1 | Status: SHIPPED | OUTPATIENT
Start: 2023-02-27

## 2023-02-27 NOTE — TELEPHONE ENCOUNTER
Rx Refill Note  Requested Prescriptions     Pending Prescriptions Disp Refills   • Dulaglutide (Trulicity) 0.75 MG/0.5ML solution pen-injector       Sig: Inject 0.75 mg under the skin into the appropriate area as directed 1 (One) Time Per Week.      Last office visit with prescribing clinician: 2/21/2023   Next office visit with prescribing clinician: 5/22/2023     Latia Headley MA  02/27/23, 13:00 EST

## 2023-02-27 NOTE — TELEPHONE ENCOUNTER
Patient's wife dropped off paperwork for Dr. Lopez to complete for Trulicity medication. I put this form in Dr. Lopez's folder.  Fax completed form to [employer]

## 2023-03-01 ENCOUNTER — PATIENT MESSAGE (OUTPATIENT)
Dept: FAMILY MEDICINE CLINIC | Facility: CLINIC | Age: 68
End: 2023-03-01
Payer: MEDICARE

## 2023-03-01 DIAGNOSIS — M48.062 LUMBAR STENOSIS WITH NEUROGENIC CLAUDICATION: ICD-10-CM

## 2023-03-01 RX ORDER — RANOLAZINE 1000 MG/1
1000 TABLET, EXTENDED RELEASE ORAL 2 TIMES DAILY
Qty: 60 TABLET | Refills: 0 | Status: SHIPPED | OUTPATIENT
Start: 2023-03-01 | End: 2023-03-28 | Stop reason: SDUPTHER

## 2023-03-01 NOTE — TELEPHONE ENCOUNTER
Provider:  [facility]  Surgery/Procedure:  ALF  Surgery/Procedure Date:    Last visit:   8/29/22  Next visit: NA     Reason for call: Refill request for Gabapentin pended. Patient need a follow up? Had discussed surgical options at last visit however needed cardiac clearance - in the mean time he had two MI's and ended up at  back in November.    Mendez is unavailable.

## 2023-03-02 RX ORDER — OMEPRAZOLE 40 MG/1
40 CAPSULE, DELAYED RELEASE ORAL DAILY
Qty: 90 CAPSULE | Refills: 3 | Status: SHIPPED | OUTPATIENT
Start: 2023-03-02

## 2023-03-02 RX ORDER — SENNA PLUS 8.6 MG/1
1 TABLET ORAL DAILY
Qty: 90 TABLET | Refills: 3 | Status: SHIPPED | OUTPATIENT
Start: 2023-03-02 | End: 2024-03-01

## 2023-03-02 RX ORDER — ESCITALOPRAM OXALATE 10 MG/1
10 TABLET ORAL DAILY
Qty: 90 TABLET | Refills: 3 | Status: SHIPPED | OUTPATIENT
Start: 2023-03-02

## 2023-03-02 RX ORDER — AMOXICILLIN 250 MG
1 CAPSULE ORAL DAILY
Qty: 90 TABLET | Refills: 3 | Status: SHIPPED | OUTPATIENT
Start: 2023-03-02

## 2023-03-02 RX ORDER — DAPAGLIFLOZIN 10 MG/1
10 TABLET, FILM COATED ORAL DAILY
Qty: 90 TABLET | Refills: 3 | Status: SHIPPED | OUTPATIENT
Start: 2023-03-02

## 2023-03-03 RX ORDER — GABAPENTIN 600 MG/1
600 TABLET ORAL 3 TIMES DAILY
Qty: 90 TABLET | Refills: 1 | OUTPATIENT
Start: 2023-03-03

## 2023-03-06 DIAGNOSIS — M54.41 CHRONIC LOW BACK PAIN WITH BILATERAL SCIATICA, UNSPECIFIED BACK PAIN LATERALITY: ICD-10-CM

## 2023-03-06 DIAGNOSIS — G89.29 CHRONIC PAIN OF BOTH SHOULDERS: ICD-10-CM

## 2023-03-06 DIAGNOSIS — M54.42 CHRONIC LOW BACK PAIN WITH BILATERAL SCIATICA, UNSPECIFIED BACK PAIN LATERALITY: ICD-10-CM

## 2023-03-06 DIAGNOSIS — M48.062 LUMBAR STENOSIS WITH NEUROGENIC CLAUDICATION: ICD-10-CM

## 2023-03-06 DIAGNOSIS — G89.29 CHRONIC LOW BACK PAIN WITH BILATERAL SCIATICA, UNSPECIFIED BACK PAIN LATERALITY: ICD-10-CM

## 2023-03-06 DIAGNOSIS — M25.512 CHRONIC PAIN OF BOTH SHOULDERS: ICD-10-CM

## 2023-03-06 DIAGNOSIS — M25.511 CHRONIC PAIN OF BOTH SHOULDERS: ICD-10-CM

## 2023-03-06 DIAGNOSIS — S72.144A CLOSED NONDISPLACED INTERTROCHANTERIC FRACTURE OF RIGHT FEMUR, INITIAL ENCOUNTER: ICD-10-CM

## 2023-03-06 RX ORDER — OXYCODONE HYDROCHLORIDE 15 MG/1
15 TABLET ORAL EVERY 4 HOURS PRN
Qty: 120 TABLET | Refills: 0 | Status: SHIPPED | OUTPATIENT
Start: 2023-03-06 | End: 2023-04-03 | Stop reason: SDUPTHER

## 2023-03-06 RX ORDER — GABAPENTIN 600 MG/1
600 TABLET ORAL 3 TIMES DAILY
Qty: 90 TABLET | Refills: 2 | Status: SHIPPED | OUTPATIENT
Start: 2023-03-06

## 2023-03-15 ENCOUNTER — DOCUMENTATION (OUTPATIENT)
Dept: PHYSICAL THERAPY | Facility: HOSPITAL | Age: 68
End: 2023-03-15
Payer: MEDICARE

## 2023-03-15 ENCOUNTER — TELEPHONE (OUTPATIENT)
Dept: FAMILY MEDICINE CLINIC | Facility: CLINIC | Age: 68
End: 2023-03-15
Payer: MEDICARE

## 2023-03-15 DIAGNOSIS — L89.159 PRESSURE INJURY OF SKIN OF SACRAL REGION, UNSPECIFIED INJURY STAGE: Primary | ICD-10-CM

## 2023-03-15 NOTE — THERAPY DISCHARGE NOTE
Outpatient Rehabilitation - Wound/Debridement Discharge Summary       Patient Name: Mahendra Yates  : 1955  MRN: [id number]  Today's Date: 3/15/2023                  Admit Date: (Not on file)    Visit Dx:    ICD-10-CM ICD-9-CM   1. Pressure injury of skin of sacral region, unspecified injury stage  L89.159 707.03     707.20       Patient Active Problem List   Diagnosis   • Pain in both upper extremities   • Carpal tunnel syndrome   • DDD (degenerative disc disease), cervical   • Cervical stenosis of spine   • Cervical spondylosis without myelopathy   • Anxiety and depression   • Morbid obesity due to excess calories ([facility])   • CAD (coronary artery disease)   • Diabetes mellitus type 1 with complications ([facility])   • Hx of CABG   • History of liver transplant ([facility])   • Physical deconditioning   • Lumbar stenosis with neurogenic claudication   • Herniated lumbar intervertebral disc   • Radicular syndrome of right leg   • Degenerative lumbar spinal stenosis   • Bowel and bladder incontinence   • Closed fracture of multiple ribs of left side   • Acute congestive heart failure (HCC)   • Bradycardia   • Anemia   • Hypoxia   • Unstable angina (HCC)   • Altered mental status, unspecified altered mental status type   • Transient ischemic attack (TIA)   • Bradycardia   • Weakness   • Dysarthria   • Closed nondisplaced intertrochanteric fracture of right femur, initial encounter ([facility])   • Spinal stenosis of lumbar region with neurogenic claudication        Past Medical History:   Diagnosis Date   • Arthritis    • Atherosclerosis    • B12 deficiency    • Cancer (HCC)    • Cancer of liver (HCC)    • Chronic headaches    • Coronary artery disease    • Diabetes mellitus (HCC)    • Gastroesophageal reflux disease    • History of transfusion    • Hyperlipidemia    • Hypertension    • Myocardial infarction (HCC)    • Sleep apnea    • Stroke (HCC)         Past Surgical History:   Procedure Laterality Date   •  ANGIOPLASTY     • CARDIAC CATHETERIZATION Left 8/16/2022    Procedure: Left Heart Cath;  Surgeon: April Dillon MD;  Location:  [facility] CATH [facility]VASIVE LOC[facility];  Service: Cardiology;  Laterality: Left;   • CARDIAC SURGERY     • CARPAL TUNNEL RELEASE Right 2/8/2017    Procedure: CARPAL TUNNEL RELEASE RIGHT ;  Surgeon: Luis Hayden MD;  Location:  [facility] OR;  Service:    • CHOLECYSTECTOMY     • CORONARY ARTERY BYPASS GRAFT     • CORONARY STENT PLACEMENT      x2   • LIVER TRANSPLANTATION     • TUMOR REMOVAL Right     axilla           Goals   PT OP Goals     Row Name 03/15/23 0900          Long Term Goals    LTG 1 Pt/spouse independent with home dressing changes and pressure relief to reduce recurrence of pressure ulcers.  -     LTG 1 Progress Met  -           User Key  (r) = Recorded By, (t) = Taken By, (c) = Cosigned By    Initials Name Provider Type    Tequila Benjamin, PT Physical Therapist                 OP Discharge Summary     Row Name 03/15/23 0919             OP PT Discharge Summary    Date of Discharge 03/15/23  -      Reason for Discharge Independent  -      Outcomes Achieved Able to achieve all goals within established timeline  -      Discharge Destination Home without follow-up  -      Discharge Instructions/Additional Comments Pt's wounds closed but fragile at last tx, was instructed in continued pressure relief and skin care with follow-up PRN.  Pt has not returned for further tx.  Anticipate no skilled PT needs.  -            User Key  (r) = Recorded By, (t) = Taken By, (c) = Cosigned By    Initials Name Provider Type    Tequila Benjamin, PT Physical Therapist                Tequila Carbajal, PT  3/15/2023

## 2023-03-15 NOTE — TELEPHONE ENCOUNTER
Patients Lon assistance enrollment is now being handled by new pharmacy - [facility], they require enrollment and script info to continue to ship Trulicity    Experiencing long wait times. Spoke with pharmacist to verify prescription info, they will process Truclity and ship to patient asap

## 2023-03-21 ENCOUNTER — TELEPHONE (OUTPATIENT)
Dept: FAMILY MEDICINE CLINIC | Facility: CLINIC | Age: 68
End: 2023-03-21
Payer: MEDICARE

## 2023-03-21 NOTE — TELEPHONE ENCOUNTER
The patient's spouse called and said there was a mix up with the pharmacy delivery and the patient is out of Trulicity. The delivery will not come in for at least a few days. The patient's spouse was wanting to know if we had any samples to hold him over until it gets there. Please advise.

## 2023-03-23 RX ORDER — NITROGLYCERIN 0.4 MG/1
0.4 TABLET SUBLINGUAL
Qty: 12 TABLET | Refills: 1 | Status: SHIPPED | OUTPATIENT
Start: 2023-03-23 | End: 2024-03-22

## 2023-03-23 NOTE — TELEPHONE ENCOUNTER
Rx Refill Note  Requested Prescriptions     Pending Prescriptions Disp Refills   • nitroglycerin (NITROSTAT) 0.4 MG SL tablet 12 tablet 5     Sig: Place 1 tablet under the tongue Every 5 (Five) Minutes As Needed for Chest Pain.      Last office visit with prescribing clinician: 2/21/2023     Next office visit with prescribing clinician: 5/22/2023       Latia Headley MA  03/23/23, 16:23 EDT

## 2023-03-29 RX ORDER — RANOLAZINE 1000 MG/1
1000 TABLET, EXTENDED RELEASE ORAL 2 TIMES DAILY
Qty: 60 TABLET | Refills: 6 | Status: SHIPPED | OUTPATIENT
Start: 2023-03-29

## 2023-04-03 ENCOUNTER — PATIENT MESSAGE (OUTPATIENT)
Dept: FAMILY MEDICINE CLINIC | Facility: CLINIC | Age: 68
End: 2023-04-03
Payer: MEDICARE

## 2023-04-03 DIAGNOSIS — G89.29 CHRONIC LOW BACK PAIN WITH BILATERAL SCIATICA, UNSPECIFIED BACK PAIN LATERALITY: ICD-10-CM

## 2023-04-03 DIAGNOSIS — M25.511 CHRONIC PAIN OF BOTH SHOULDERS: ICD-10-CM

## 2023-04-03 DIAGNOSIS — S72.144A CLOSED NONDISPLACED INTERTROCHANTERIC FRACTURE OF RIGHT FEMUR, INITIAL ENCOUNTER: ICD-10-CM

## 2023-04-03 DIAGNOSIS — M54.42 CHRONIC LOW BACK PAIN WITH BILATERAL SCIATICA, UNSPECIFIED BACK PAIN LATERALITY: ICD-10-CM

## 2023-04-03 DIAGNOSIS — M25.512 CHRONIC PAIN OF BOTH SHOULDERS: ICD-10-CM

## 2023-04-03 DIAGNOSIS — G89.29 CHRONIC PAIN OF BOTH SHOULDERS: ICD-10-CM

## 2023-04-03 DIAGNOSIS — M54.41 CHRONIC LOW BACK PAIN WITH BILATERAL SCIATICA, UNSPECIFIED BACK PAIN LATERALITY: ICD-10-CM

## 2023-04-03 NOTE — TELEPHONE ENCOUNTER
Rx Refill Note  Requested Prescriptions     Pending Prescriptions Disp Refills   • oxyCODONE (ROXICODONE) 15 MG immediate release tablet 120 tablet 0     Sig: Take 1 tablet by mouth Every 4 (Four) Hours As Needed for Severe Pain.      Last office visit with prescribing clinician: 2/21/2023   Next office visit with prescribing clinician: 5/22/2023     Lizzy Wood MA  04/03/23, 11:47 EDT     Last fill: 03/06/2023

## 2023-04-04 RX ORDER — OXYCODONE HYDROCHLORIDE 15 MG/1
15 TABLET ORAL EVERY 4 HOURS PRN
Qty: 120 TABLET | Refills: 0 | Status: SHIPPED | OUTPATIENT
Start: 2023-04-04

## 2023-05-22 ENCOUNTER — OFFICE VISIT (OUTPATIENT)
Dept: FAMILY MEDICINE CLINIC | Facility: CLINIC | Age: 68
End: 2023-05-22
Payer: MEDICARE

## 2023-05-22 VITALS
HEIGHT: 71 IN | OXYGEN SATURATION: 98 % | SYSTOLIC BLOOD PRESSURE: 126 MMHG | DIASTOLIC BLOOD PRESSURE: 78 MMHG | HEART RATE: 61 BPM | BODY MASS INDEX: 28.03 KG/M2

## 2023-05-22 DIAGNOSIS — G89.29 CHRONIC LOW BACK PAIN WITH BILATERAL SCIATICA, UNSPECIFIED BACK PAIN LATERALITY: Primary | ICD-10-CM

## 2023-05-22 DIAGNOSIS — M54.41 CHRONIC LOW BACK PAIN WITH BILATERAL SCIATICA, UNSPECIFIED BACK PAIN LATERALITY: Primary | ICD-10-CM

## 2023-05-22 DIAGNOSIS — M54.42 CHRONIC LOW BACK PAIN WITH BILATERAL SCIATICA, UNSPECIFIED BACK PAIN LATERALITY: Primary | ICD-10-CM

## 2023-05-22 DIAGNOSIS — E11.59 TYPE 2 DIABETES MELLITUS WITH OTHER CIRCULATORY COMPLICATION, WITHOUT LONG-TERM CURRENT USE OF INSULIN: ICD-10-CM

## 2023-05-22 DIAGNOSIS — S72.144A CLOSED NONDISPLACED INTERTROCHANTERIC FRACTURE OF RIGHT FEMUR, INITIAL ENCOUNTER: ICD-10-CM

## 2023-05-22 DIAGNOSIS — M25.512 CHRONIC PAIN OF BOTH SHOULDERS: ICD-10-CM

## 2023-05-22 DIAGNOSIS — M25.511 CHRONIC PAIN OF BOTH SHOULDERS: ICD-10-CM

## 2023-05-22 DIAGNOSIS — G89.29 CHRONIC PAIN OF BOTH SHOULDERS: ICD-10-CM

## 2023-05-22 LAB
EXPIRATION DATE: NORMAL
HBA1C MFR BLD: 5.5 %
Lab: NORMAL

## 2023-05-22 RX ORDER — OXYCODONE HYDROCHLORIDE 15 MG/1
15 TABLET ORAL EVERY 4 HOURS PRN
Qty: 120 TABLET | Refills: 0 | Status: SHIPPED | OUTPATIENT
Start: 2023-05-22

## 2023-05-22 NOTE — PROGRESS NOTES
Chief Complaint   Patient presents with   • Diabetes   • Back Pain       HPI:  Mahendra Yates is a 68 y.o. male who presents today for follow-up diabetes and chronic low back pain.    ROS:  Constitutional: no fevers, night sweats or unexplained weight loss  Eyes: no vision changes  ENT: no runny nose, ear pain, sore throat  Cardio: no chest pain, palpitations  Pulm: + shortness of breath, wheezing, or cough  GI: no abdominal pain or changes in bowel movements  : no difficulty urinating  MSK: + difficulty ambulating, + joint pain  Neuro: no weakness, dizziness or headache  Psych: no trouble sleeping  Endo: no change in appetite      Past Medical History:   Diagnosis Date   • Arthritis    • Atherosclerosis    • B12 deficiency    • Cancer    • Cancer of liver    • Chronic headaches    • Coronary artery disease    • Depression     From always being sick   • Diabetes mellitus    • Gastroesophageal reflux disease    • History of transfusion    • Hyperlipidemia    • Hypertension    • Low back pain    • Myocardial infarction    • Sleep apnea    • Stroke       Family History   Problem Relation Age of Onset   • Heart disease Mother         Stroke & Heart attacks   • Stroke Mother    • Cancer Mother    • Diabetes Father    • Liver cancer Brother    • No Known Problems Sister    • Macular degeneration Brother    • No Known Problems Sister    • Dementia Brother    • Heart disease Maternal Grandmother    • Heart disease Maternal Uncle       Social History     Socioeconomic History   • Marital status:    Tobacco Use   • Smoking status: Former     Packs/day: 3.00     Years: 30.00     Pack years: 90.00     Types: Cigarettes     Quit date: 1991     Years since quittin.3   • Smokeless tobacco: Never   Vaping Use   • Vaping Use: Never used   Substance and Sexual Activity   • Alcohol use: Yes     Comment: RARELY   • Drug use: No   • Sexual activity: Not Currently     Partners: Female      Allergies   Allergen  Reactions   • Contrast Dye (Echo Or Unknown Ct/Mr) Other (See Comments)     Cardiac Arrest   • Penicillins Rash   • Sulfasalazine Rash      Immunization History   Administered Date(s) Administered   • COVID-19 (PFIZER) BIVALENT 12+YRS 10/19/2022   • COVID-19 (PFIZER) Purple Cap Monovalent 01/15/2021, 02/05/2021, 09/17/2021   • Covid-19 (Pfizer) Gray Cap Monovalent 03/12/2022   • Flu Vaccine Quad PF >36MO 10/12/2017, 12/28/2018   • FluLaval/Fluzone >6mos 10/12/2017, 12/28/2018   • Fluzone High Dose =>65 Years ([facility] ONLY) 09/24/2019   • Fluzone High-Dose 65+yrs 09/24/2019, 09/14/2020, 01/03/2022, 10/17/2022   • Influenza Quad Vaccine (Inpatient) 02/12/2017   • Pneumococcal Conjugate 20-Valent (PCV20) 02/03/2023   • Shingrix 02/03/2023        PE:  Vitals:    05/22/23 1305   BP: 126/78   Pulse: 61   SpO2: 98%      Body mass index is 28.03 kg/m².    Gen Appearance: NAD  HEENT: Normocephalic, PERRLA, no thyromegaly, trache midline  Heart: RRR, normal S1 and S2, no murmur  Lungs: CTA b/l, no wheezing, no crackles  Abdomen: Soft, non-tender, non-distended, no guarding and BSx4  MSK: Moves all extremities well, normal gait, no peripheral edema  Pulses: Palpable and equal b/l  Lymph nodes: No palpable lymphadenopathy   Neuro: No focal deficits      Current Outpatient Medications   Medication Sig Dispense Refill   • oxyCODONE (ROXICODONE) 15 MG immediate release tablet Take 1 tablet by mouth Every 4 (Four) Hours As Needed for Severe Pain. 120 tablet 0   • acetaminophen (TYLENOL) 325 MG tablet Take 650 mg by mouth 4 (Four) Times a Day.     • aspirin 81 MG EC tablet Take 81 mg by mouth Daily. Continues per doctors orders     • atorvastatin (LIPITOR) 80 MG tablet Take 1 tablet by mouth Every Night. 90 tablet 3   • bumetanide (BUMEX) 1 MG tablet Take 1 tablet by mouth 2 (Two) Times a Day. 60 tablet 2   • busPIRone (BUSPAR) 10 MG tablet Take 1 tablet by mouth 2 (Two) Times a Day. 180 tablet 3   • Dulaglutide (Trulicity) 0.75  MG/0.5ML solution pen-injector Inject 0.75 mg under the skin into the appropriate area as directed 1 (One) Time Per Week. 5 mL 1   • Enoxaparin Sodium (LOVENOX) 30 MG/0.3ML solution prefilled syringe syringe      • escitalopram (LEXAPRO) 10 MG tablet Take 1 tablet by mouth Daily. 90 tablet 3   • Farxiga 10 MG tablet Take 10 mg by mouth Daily. 90 tablet 3   • gabapentin (NEURONTIN) 600 MG tablet Take 1 tablet by mouth 3 (Three) Times a Day. 90 tablet 2   • isosorbide mononitrate (IMDUR) 120 MG 24 hr tablet Take 1 tablet by mouth 2 (Two) Times a Day. 180 tablet 3   • lisinopril (PRINIVIL,ZESTRIL) 5 MG tablet Take 1 tablet by mouth Daily. 90 tablet 3   • Loratadine 10 MG capsule Take 1 capsule by mouth Daily.     • methocarbamol (ROBAXIN) 500 MG tablet      • metoprolol succinate XL (TOPROL-XL) 25 MG 24 hr tablet Take 1 tablet by mouth Daily. 90 tablet 3   • miconazole nitrate (ALOE VESTA) 2 % ointment ointment Apply in feet     • multivitamin with minerals tablet tablet Take 1 tablet by mouth Daily.     • naloxone (NARCAN) 4 MG/0.1ML nasal spray 4 mg into the nostril(s) as directed by provider.     • nitroglycerin (NITROSTAT) 0.4 MG SL tablet Place 1 tablet under the tongue Every 5 (Five) Minutes As Needed for Chest Pain. 12 tablet 1   • Nutritional Supplements (DHEA PO) Take  by mouth.     • omeprazole (priLOSEC) 40 MG capsule Take 1 capsule by mouth Daily. 90 capsule 3   • pimecrolimus (ELIDEL) 1 % cream Apply 1 application topically to the appropriate area as directed 2 (Two) Times a Day. 60 g 1   • ranolazine (RANEXA) 1000 MG 12 hr tablet Take 1 tablet by mouth 2 (Two) Times a Day. 60 tablet 6   • senna (SENOKOT) 8.6 MG tablet Take 1 tablet by mouth Daily. 90 tablet 3   • sennosides-docusate (SB Docusate Sodium/Senna) 8.6-50 MG per tablet Take 1 tablet by mouth Daily. 90 tablet 3   • spironolactone (ALDACTONE) 25 MG tablet Take 1 tablet by mouth Daily. 90 tablet 3   • tacrolimus (PROGRAF) 0.5 MG capsule      •  tacrolimus (PROGRAF) 0.5 MG capsule 0.5 mg.     • ticagrelor (BRILINTA) 90 MG tablet tablet Take 1 tablet by mouth 2 (Two) Times a Day. 180 tablet 3   • VITAMIN D PO Take  by mouth.       No current facility-administered medications for this visit.      A1c well controlled today.  No change in Trulicity.  Continue oxycodone for chronic pain.  Recommend discussing long-acting pain medicine with pain management physician.    Counseling was given to patient for the following topics: instructions for management, impressions, risks and benefits of treatment options and importance of treatment compliance . Total time of the encounter was 30 minutes and 15 minutes was spent face to face counseling.    Diagnoses and all orders for this visit:    1. Chronic low back pain with bilateral sciatica, unspecified back pain laterality (Primary)  -     Compliance Drug Analysis, Ur - Urine, Clean Catch; Future  -     oxyCODONE (ROXICODONE) 15 MG immediate release tablet; Take 1 tablet by mouth Every 4 (Four) Hours As Needed for Severe Pain.  Dispense: 120 tablet; Refill: 0  -     Compliance Drug Analysis, Ur - Urine, Clean Catch    2. Type 2 diabetes mellitus with other circulatory complication, without long-term current use of insulin  -     POC Glycosylated Hemoglobin (Hb A1C)    3. Chronic pain of both shoulders  -     oxyCODONE (ROXICODONE) 15 MG immediate release tablet; Take 1 tablet by mouth Every 4 (Four) Hours As Needed for Severe Pain.  Dispense: 120 tablet; Refill: 0    4. Closed nondisplaced intertrochanteric fracture of right femur, initial encounter  -     oxyCODONE (ROXICODONE) 15 MG immediate release tablet; Take 1 tablet by mouth Every 4 (Four) Hours As Needed for Severe Pain.  Dispense: 120 tablet; Refill: 0         No follow-ups on file.     Dictated Utilizing Dragon Dictation    Please note that portions of this note were completed with a voice recognition program.    Part of this note may be an electronic  transcription/translation of spoken language to printed text using the Dragon Dictation System.

## 2023-05-27 LAB — DRUGS UR: NORMAL

## 2023-06-06 ENCOUNTER — LAB (OUTPATIENT)
Dept: LAB | Facility: HOSPITAL | Age: 68
End: 2023-06-06
Payer: MEDICARE

## 2023-06-06 ENCOUNTER — TRANSCRIBE ORDERS (OUTPATIENT)
Dept: LAB | Facility: HOSPITAL | Age: 68
End: 2023-06-06
Payer: MEDICARE

## 2023-06-06 DIAGNOSIS — Z94.4 LIVER REPLACED BY TRANSPLANT: Primary | ICD-10-CM

## 2023-06-06 DIAGNOSIS — Z79.891 ENCOUNTER FOR LONG-TERM METHADONE USE: ICD-10-CM

## 2023-06-06 DIAGNOSIS — Z00.00 ROUTINE GENERAL MEDICAL EXAMINATION AT A HEALTH CARE FACILITY: ICD-10-CM

## 2023-06-06 DIAGNOSIS — Z94.4 LIVER REPLACED BY TRANSPLANT: ICD-10-CM

## 2023-06-06 LAB
BASOPHILS # BLD AUTO: 0.04 10*3/MM3 (ref 0–0.2)
BASOPHILS NFR BLD AUTO: 0.7 % (ref 0–1.5)
DEPRECATED RDW RBC AUTO: 44.7 FL (ref 37–54)
EOSINOPHIL # BLD AUTO: 0.36 10*3/MM3 (ref 0–0.4)
EOSINOPHIL NFR BLD AUTO: 6.6 % (ref 0.3–6.2)
ERYTHROCYTE [DISTWIDTH] IN BLOOD BY AUTOMATED COUNT: 13.4 % (ref 12.3–15.4)
HBA1C MFR BLD: 5.7 % (ref 4.8–5.6)
HCT VFR BLD AUTO: 39 % (ref 37.5–51)
HGB BLD-MCNC: 12.6 G/DL (ref 13–17.7)
IMM GRANULOCYTES # BLD AUTO: 0.02 10*3/MM3 (ref 0–0.05)
IMM GRANULOCYTES NFR BLD AUTO: 0.4 % (ref 0–0.5)
LYMPHOCYTES # BLD AUTO: 1.46 10*3/MM3 (ref 0.7–3.1)
LYMPHOCYTES NFR BLD AUTO: 26.9 % (ref 19.6–45.3)
MCH RBC QN AUTO: 29.6 PG (ref 26.6–33)
MCHC RBC AUTO-ENTMCNC: 32.3 G/DL (ref 31.5–35.7)
MCV RBC AUTO: 91.5 FL (ref 79–97)
MONOCYTES # BLD AUTO: 0.47 10*3/MM3 (ref 0.1–0.9)
MONOCYTES NFR BLD AUTO: 8.7 % (ref 5–12)
NEUTROPHILS NFR BLD AUTO: 3.07 10*3/MM3 (ref 1.7–7)
NEUTROPHILS NFR BLD AUTO: 56.7 % (ref 42.7–76)
NRBC BLD AUTO-RTO: 0 /100 WBC (ref 0–0.2)
PLATELET # BLD AUTO: 317 10*3/MM3 (ref 140–450)
PMV BLD AUTO: 11.1 FL (ref 6–12)
RBC # BLD AUTO: 4.26 10*6/MM3 (ref 4.14–5.8)
WBC NRBC COR # BLD: 5.42 10*3/MM3 (ref 3.4–10.8)

## 2023-06-06 PROCEDURE — 80053 COMPREHEN METABOLIC PANEL: CPT

## 2023-06-06 PROCEDURE — 85025 COMPLETE CBC W/AUTO DIFF WBC: CPT

## 2023-06-06 PROCEDURE — 36415 COLL VENOUS BLD VENIPUNCTURE: CPT

## 2023-06-06 PROCEDURE — 80197 ASSAY OF TACROLIMUS: CPT

## 2023-06-06 PROCEDURE — 82105 ALPHA-FETOPROTEIN SERUM: CPT

## 2023-06-06 PROCEDURE — 82465 ASSAY BLD/SERUM CHOLESTEROL: CPT

## 2023-06-06 PROCEDURE — 84478 ASSAY OF TRIGLYCERIDES: CPT

## 2023-06-06 PROCEDURE — 83036 HEMOGLOBIN GLYCOSYLATED A1C: CPT

## 2023-06-06 PROCEDURE — 83735 ASSAY OF MAGNESIUM: CPT

## 2023-06-07 LAB
ALBUMIN SERPL-MCNC: 4 G/DL (ref 3.5–5.2)
ALBUMIN/GLOB SERPL: 1.7 G/DL
ALP SERPL-CCNC: 89 U/L (ref 39–117)
ALPHA-FETOPROTEIN: 4.52 NG/ML (ref 0–8.3)
ALT SERPL W P-5'-P-CCNC: 24 U/L (ref 1–41)
ANION GAP SERPL CALCULATED.3IONS-SCNC: 10.6 MMOL/L (ref 5–15)
AST SERPL-CCNC: 18 U/L (ref 1–40)
BILIRUB SERPL-MCNC: 0.3 MG/DL (ref 0–1.2)
BUN SERPL-MCNC: 15 MG/DL (ref 8–23)
BUN/CREAT SERPL: 16.1 (ref 7–25)
CALCIUM SPEC-SCNC: 10 MG/DL (ref 8.6–10.5)
CHLORIDE SERPL-SCNC: 104 MMOL/L (ref 98–107)
CHOLEST SERPL-MCNC: 153 MG/DL (ref 0–200)
CO2 SERPL-SCNC: 26.4 MMOL/L (ref 22–29)
CREAT SERPL-MCNC: 0.93 MG/DL (ref 0.76–1.27)
EGFRCR SERPLBLD CKD-EPI 2021: 88.9 ML/MIN/1.73
GLOBULIN UR ELPH-MCNC: 2.4 GM/DL
GLUCOSE SERPL-MCNC: 83 MG/DL (ref 65–99)
MAGNESIUM SERPL-MCNC: 2 MG/DL (ref 1.6–2.4)
POTASSIUM SERPL-SCNC: 4.3 MMOL/L (ref 3.5–5.2)
PROT SERPL-MCNC: 6.4 G/DL (ref 6–8.5)
SODIUM SERPL-SCNC: 141 MMOL/L (ref 136–145)
TRIGL SERPL-MCNC: 201 MG/DL (ref 0–150)

## 2023-06-08 ENCOUNTER — PATIENT MESSAGE (OUTPATIENT)
Dept: FAMILY MEDICINE CLINIC | Facility: CLINIC | Age: 68
End: 2023-06-08
Payer: MEDICARE

## 2023-06-08 DIAGNOSIS — M48.062 LUMBAR STENOSIS WITH NEUROGENIC CLAUDICATION: ICD-10-CM

## 2023-06-09 DIAGNOSIS — G89.29 CHRONIC LOW BACK PAIN WITH BILATERAL SCIATICA, UNSPECIFIED BACK PAIN LATERALITY: ICD-10-CM

## 2023-06-09 DIAGNOSIS — M54.41 CHRONIC LOW BACK PAIN WITH BILATERAL SCIATICA, UNSPECIFIED BACK PAIN LATERALITY: ICD-10-CM

## 2023-06-09 DIAGNOSIS — M54.42 CHRONIC LOW BACK PAIN WITH BILATERAL SCIATICA, UNSPECIFIED BACK PAIN LATERALITY: ICD-10-CM

## 2023-06-09 DIAGNOSIS — M48.062 LUMBAR STENOSIS WITH NEUROGENIC CLAUDICATION: Primary | ICD-10-CM

## 2023-06-09 DIAGNOSIS — R26.89 BALANCE PROBLEM: ICD-10-CM

## 2023-06-09 DIAGNOSIS — R29.898 WEAKNESS OF BOTH LOWER EXTREMITIES: ICD-10-CM

## 2023-06-09 DIAGNOSIS — I50.32 CHRONIC DIASTOLIC (CONGESTIVE) HEART FAILURE: ICD-10-CM

## 2023-06-09 RX ORDER — DAPAGLIFLOZIN 10 MG/1
10 TABLET, FILM COATED ORAL DAILY
Qty: 90 TABLET | Refills: 3 | Status: SHIPPED | OUTPATIENT
Start: 2023-06-09

## 2023-06-09 RX ORDER — GABAPENTIN 600 MG/1
600 TABLET ORAL 3 TIMES DAILY
Qty: 90 TABLET | Refills: 2 | Status: SHIPPED | OUTPATIENT
Start: 2023-06-09

## 2023-06-09 RX ORDER — RANOLAZINE 1000 MG/1
1000 TABLET, EXTENDED RELEASE ORAL 2 TIMES DAILY
Qty: 60 TABLET | Refills: 6 | Status: SHIPPED | OUTPATIENT
Start: 2023-06-09

## 2023-06-09 RX ORDER — NITROGLYCERIN 0.4 MG/1
0.4 TABLET SUBLINGUAL
Qty: 12 TABLET | Refills: 1 | Status: SHIPPED | OUTPATIENT
Start: 2023-06-09 | End: 2024-06-08

## 2023-06-10 LAB — TACROLIMUS BLD LC/MS/MS-MCNC: 2.3 NG/ML (ref 2–20)

## 2023-07-27 RX ORDER — NITROGLYCERIN 0.4 MG/1
TABLET SUBLINGUAL
Qty: 25 TABLET | Refills: 0 | Status: SHIPPED | OUTPATIENT
Start: 2023-07-27

## 2023-08-01 ENCOUNTER — TELEPHONE (OUTPATIENT)
Dept: FAMILY MEDICINE CLINIC | Facility: CLINIC | Age: 68
End: 2023-08-01

## 2023-08-01 NOTE — TELEPHONE ENCOUNTER
Caller: Kisha Yates    Relationship: Emergency Contact    Best call back number: [phone]     Caller requesting test results: KISHA    What test was performed: LUNG BIOPSY    When was the test performed: 7/21/23    Where was the test performed:     Additional notes: PATIENT HAS NOT BEEN ABLE TO GET THE RESULTS AND WANTED TO SEE IF DR MOHR HAD THEM OR COULD GET THEM.

## 2023-09-11 ENCOUNTER — OFFICE VISIT (OUTPATIENT)
Dept: FAMILY MEDICINE CLINIC | Facility: CLINIC | Age: 68
End: 2023-09-11
Payer: MEDICARE

## 2023-09-11 VITALS
BODY MASS INDEX: 24.36 KG/M2 | DIASTOLIC BLOOD PRESSURE: 62 MMHG | WEIGHT: 174 LBS | HEIGHT: 71 IN | OXYGEN SATURATION: 98 % | HEART RATE: 53 BPM | SYSTOLIC BLOOD PRESSURE: 110 MMHG

## 2023-09-11 DIAGNOSIS — M54.42 CHRONIC LOW BACK PAIN WITH BILATERAL SCIATICA, UNSPECIFIED BACK PAIN LATERALITY: ICD-10-CM

## 2023-09-11 DIAGNOSIS — G89.29 CHRONIC LOW BACK PAIN WITH BILATERAL SCIATICA, UNSPECIFIED BACK PAIN LATERALITY: ICD-10-CM

## 2023-09-11 DIAGNOSIS — M54.41 CHRONIC LOW BACK PAIN WITH BILATERAL SCIATICA, UNSPECIFIED BACK PAIN LATERALITY: ICD-10-CM

## 2023-09-11 DIAGNOSIS — M25.511 CHRONIC PAIN OF BOTH SHOULDERS: ICD-10-CM

## 2023-09-11 DIAGNOSIS — E11.51 TYPE 2 DIABETES MELLITUS WITH DIABETIC PERIPHERAL ANGIOPATHY WITHOUT GANGRENE, WITHOUT LONG-TERM CURRENT USE OF INSULIN: Primary | ICD-10-CM

## 2023-09-11 DIAGNOSIS — G89.29 CHRONIC PAIN OF BOTH SHOULDERS: ICD-10-CM

## 2023-09-11 DIAGNOSIS — M25.512 CHRONIC PAIN OF BOTH SHOULDERS: ICD-10-CM

## 2023-09-11 LAB
EXPIRATION DATE: NORMAL
HBA1C MFR BLD: 5.6 %
Lab: NORMAL

## 2023-09-11 PROCEDURE — 1160F RVW MEDS BY RX/DR IN RCRD: CPT | Performed by: INTERNAL MEDICINE

## 2023-09-11 PROCEDURE — 1159F MED LIST DOCD IN RCRD: CPT | Performed by: INTERNAL MEDICINE

## 2023-09-11 PROCEDURE — 99214 OFFICE O/P EST MOD 30 MIN: CPT | Performed by: INTERNAL MEDICINE

## 2023-09-11 PROCEDURE — 3044F HG A1C LEVEL LT 7.0%: CPT | Performed by: INTERNAL MEDICINE

## 2023-09-11 PROCEDURE — 83036 HEMOGLOBIN GLYCOSYLATED A1C: CPT | Performed by: INTERNAL MEDICINE

## 2023-09-11 RX ORDER — ISOSORBIDE MONONITRATE 60 MG/1
60 TABLET, EXTENDED RELEASE ORAL
COMMUNITY
Start: 2023-07-04 | End: 2023-09-11 | Stop reason: SDUPTHER

## 2023-09-11 RX ORDER — OXYCODONE HYDROCHLORIDE 15 MG/1
15 TABLET ORAL EVERY 4 HOURS PRN
Qty: 120 TABLET | Refills: 0 | Status: SHIPPED | OUTPATIENT
Start: 2023-09-11

## 2023-09-11 RX ORDER — SENNOSIDES A AND B 8.6 MG/1
1 TABLET, FILM COATED ORAL DAILY
Qty: 90 TABLET | Refills: 3 | Status: SHIPPED | OUTPATIENT
Start: 2023-09-11 | End: 2024-09-10

## 2023-09-11 RX ORDER — OMEPRAZOLE 40 MG/1
40 CAPSULE, DELAYED RELEASE ORAL DAILY
Qty: 90 CAPSULE | Refills: 3 | Status: SHIPPED | OUTPATIENT
Start: 2023-09-11

## 2023-09-11 RX ORDER — ISOSORBIDE MONONITRATE 60 MG/1
60 TABLET, EXTENDED RELEASE ORAL DAILY
Qty: 90 TABLET | Refills: 3 | Status: SHIPPED | OUTPATIENT
Start: 2023-09-11

## 2023-09-11 NOTE — PROGRESS NOTES
Chief Complaint   Patient presents with    Diabetes   Chronic back pain    HPI:  Mahendra Yates is a 68 y.o. male who presents today for follow-up chronic back pain and diabetes.    ROS:  Constitutional: no fevers, night sweats or unexplained weight loss  Eyes: no vision changes  ENT: no runny nose, ear pain, sore throat  Cardio: no chest pain, palpitations  Pulm: no shortness of breath, wheezing, or cough  GI: no abdominal pain or changes in bowel movements  : no difficulty urinating  MSK: no difficulty ambulating, no joint pain  Neuro: no weakness, dizziness or headache  Psych: no trouble sleeping  Endo: no change in appetite      Past Medical History:   Diagnosis Date    Arthritis     Atherosclerosis     B12 deficiency     Cancer     Cancer of liver     Chronic headaches     Coronary artery disease     Depression     From always being sick    Diabetes mellitus     Gastroesophageal reflux disease     History of transfusion     Hyperlipidemia     Hypertension     Low back pain     Myocardial infarction     Sleep apnea     Stroke       Family History   Problem Relation Age of Onset    Heart disease Mother         Stroke & Heart attacks    Stroke Mother     Cancer Mother     Diabetes Father     Liver cancer Brother     No Known Problems Sister     Macular degeneration Brother     No Known Problems Sister     Dementia Brother     Heart disease Maternal Grandmother     Heart disease Maternal Uncle       Social History     Socioeconomic History    Marital status:    Tobacco Use    Smoking status: Former     Packs/day: 3.00     Years: 30.00     Pack years: 90.00     Types: Cigarettes     Quit date: 1991     Years since quittin.6    Smokeless tobacco: Never   Vaping Use    Vaping Use: Never used   Substance and Sexual Activity    Alcohol use: Yes     Comment: RARELY    Drug use: No    Sexual activity: Not Currently     Partners: Female      Allergies   Allergen Reactions    Contrast Dye (Echo Or  Unknown Ct/Mr) Other (See Comments)     Cardiac Arrest    Penicillins Rash    Sulfasalazine Rash      Immunization History   Administered Date(s) Administered    COVID-19 (PFIZER) BIVALENT 12+YRS 10/19/2022    COVID-19 (PFIZER) Purple Cap Monovalent 01/15/2021, 02/05/2021, 09/17/2021    Covid-19 (Pfizer) Gray Cap Monovalent 03/12/2022    Flu Vaccine Quad PF >36MO 10/12/2017, 12/28/2018    Fluzone >6mos 10/12/2017, 12/28/2018    Fluzone High Dose =>65 Years ([facility] ONLY) 09/24/2019    Fluzone High-Dose 65+yrs 09/24/2019, 09/14/2020, 01/03/2022, 10/17/2022    Influenza Quad Vaccine (Inpatient) 02/12/2017    Pneumococcal Conjugate 20-Valent (PCV20) 02/03/2023    Shingrix 02/03/2023        PE:  Vitals:    09/11/23 1059   BP: 110/62   Pulse: 53   SpO2: 98%      Body mass index is 24.27 kg/m².    Gen Appearance: NAD  HEENT: Normocephalic, PERRLA, no thyromegaly, trache midline  Heart: RRR, normal S1 and S2, no murmur  Lungs: CTA b/l, no wheezing, no crackles  Abdomen: Soft, non-tender, non-distended, no guarding and BSx4  MSK: Moves all extremities well, normal gait, no peripheral edema  Pulses: Palpable and equal b/l  Lymph nodes: No palpable lymphadenopathy   Neuro: No focal deficits      Current Outpatient Medications   Medication Sig Dispense Refill    acetaminophen (TYLENOL) 325 MG tablet Take 2 tablets by mouth 4 (Four) Times a Day.      aspirin 81 MG EC tablet Take 1 tablet by mouth Daily. Continues per doctors orders      atorvastatin (LIPITOR) 80 MG tablet Take 1 tablet by mouth Every Night. 90 tablet 3    busPIRone (BUSPAR) 10 MG tablet Take 1 tablet by mouth 2 (Two) Times a Day. 180 tablet 3    Dulaglutide (Trulicity) 0.75 MG/0.5ML solution pen-injector Inject 0.75 mg under the skin into the appropriate area as directed 1 (One) Time Per Week. 5 mL 1    Enoxaparin Sodium (LOVENOX) 30 MG/0.3ML solution prefilled syringe syringe       escitalopram (LEXAPRO) 10 MG tablet Take 1 tablet by mouth Daily. 90 tablet 3     Farxiga 10 MG tablet Take 10 mg by mouth Daily. 90 tablet 3    gabapentin (NEURONTIN) 600 MG tablet Take 1 tablet by mouth 3 (Three) Times a Day. 90 tablet 2    isosorbide mononitrate (IMDUR) 60 MG 24 hr tablet Take 1 tablet by mouth Daily. 90 tablet 3    lisinopril (PRINIVIL,ZESTRIL) 5 MG tablet Take 1 tablet by mouth Daily. 90 tablet 3    Loratadine 10 MG capsule Take 1 capsule by mouth Daily.      methocarbamol (ROBAXIN) 500 MG tablet       metoprolol succinate XL (TOPROL-XL) 25 MG 24 hr tablet Take 1 tablet by mouth Daily. 90 tablet 3    miconazole nitrate (ALOE VESTA) 2 % ointment ointment Apply in feet      multivitamin with minerals tablet tablet Take 1 tablet by mouth Daily.      naloxone (NARCAN) 4 MG/0.1ML nasal spray 1 spray into the nostril(s) as directed by provider.      nitroglycerin (NITROSTAT) 0.4 MG SL tablet DISSOLVE 1 TABLET UNDER TONGUE AS NEEDED FOR CHEST PAIN. MAY REPEAT EVERY 5 MINUTES IF NEEDED, UP TO A MAXIMUM OF 3 DOSES. IF PAIN PERSISTS, DIAL 911. 25 tablet 0    Nutritional Supplements (DHEA PO) Take  by mouth.      omeprazole (priLOSEC) 40 MG capsule Take 1 capsule by mouth Daily. 90 capsule 3    oxyCODONE (ROXICODONE) 15 MG immediate release tablet Take 1 tablet by mouth Every 4 (Four) Hours As Needed for Severe Pain. 120 tablet 0    pimecrolimus (ELIDEL) 1 % cream Apply 1 application topically to the appropriate area as directed 2 (Two) Times a Day. 60 g 1    ranolazine (RANEXA) 1000 MG 12 hr tablet Take 1 tablet by mouth 2 (Two) Times a Day. 60 tablet 6    senna (SENOKOT) 8.6 MG tablet Take 1 tablet by mouth Daily. 90 tablet 3    sennosides-docusate (SB Docusate Sodium/Senna) 8.6-50 MG per tablet Take 1 tablet by mouth Daily. 90 tablet 3    spironolactone (ALDACTONE) 25 MG tablet Take 1 tablet by mouth Daily. 90 tablet 3    tacrolimus (PROGRAF) 0.5 MG capsule       tacrolimus (PROGRAF) 0.5 MG capsule 1 capsule.      ticagrelor (BRILINTA) 90 MG tablet tablet Take 1 tablet by mouth 2  (Two) Times a Day. 180 tablet 3    VITAMIN D PO Take  by mouth.      bumetanide (BUMEX) 1 MG tablet Take 1 tablet by mouth 2 (Two) Times a Day. (Patient not taking: Reported on 9/11/2023) 60 tablet 2     No current facility-administered medications for this visit.      Continue gabapentin and oxycodone as needed for pain.  Plans on establishing care with pain management in the near future.  A1c well controlled at 5.6%.  No change in prescription medication today.    Counseling was given to patient for the following topics: diagnostic results, instructions for management, impressions, and importance of treatment compliance . Total time of the encounter was 30 minutes and 15 minutes was spent face to face counseling.    Diagnoses and all orders for this visit:    1. Type 2 diabetes mellitus with diabetic peripheral angiopathy without gangrene, without long-term current use of insulin (Primary)  -     POC Glycosylated Hemoglobin (Hb A1C)    2. Chronic pain of both shoulders  -     oxyCODONE (ROXICODONE) 15 MG immediate release tablet; Take 1 tablet by mouth Every 4 (Four) Hours As Needed for Severe Pain.  Dispense: 120 tablet; Refill: 0    3. Chronic low back pain with bilateral sciatica, unspecified back pain laterality  -     oxyCODONE (ROXICODONE) 15 MG immediate release tablet; Take 1 tablet by mouth Every 4 (Four) Hours As Needed for Severe Pain.  Dispense: 120 tablet; Refill: 0    Other orders  -     isosorbide mononitrate (IMDUR) 60 MG 24 hr tablet; Take 1 tablet by mouth Daily.  Dispense: 90 tablet; Refill: 3  -     senna (SENOKOT) 8.6 MG tablet; Take 1 tablet by mouth Daily.  Dispense: 90 tablet; Refill: 3  -     omeprazole (priLOSEC) 40 MG capsule; Take 1 capsule by mouth Daily.  Dispense: 90 capsule; Refill: 3         Return in about 3 months (around 12/11/2023).     Dictated Utilizing Dragon Dictation    Please note that portions of this note were completed with a voice recognition program.    Part of this  note may be an electronic transcription/translation of spoken language to printed text using the Dragon Dictation System.

## 2023-12-17 DIAGNOSIS — E10.8 DIABETES MELLITUS TYPE 1 WITH COMPLICATIONS: ICD-10-CM

## 2023-12-18 RX ORDER — DULAGLUTIDE 0.75 MG/.5ML
INJECTION, SOLUTION SUBCUTANEOUS
Qty: 6 ML | Refills: 0 | Status: SHIPPED | OUTPATIENT
Start: 2023-12-18

## 2023-12-18 NOTE — TELEPHONE ENCOUNTER
Rx Refill Note  Requested Prescriptions     Pending Prescriptions Disp Refills    Trulicity 0.75 MG/0.5ML solution pen-injector [Pharmacy Med Name: Trulicity Subcutaneous Solution Pen-injector 0.75 MG/0.5ML] 6 mL 0     Sig: INJECT 0.75MG (0.5ML) UNDER THE SKIN ONCE A WEEK      Last office visit with prescribing clinician: 9/11/2023     Next office visit with prescribing clinician: Return in about 3 months (around 12/11/2023).     Latia Headley MA  12/18/23, 15:42 EST

## 2025-01-14 ENCOUNTER — READMISSION MANAGEMENT (OUTPATIENT)
Dept: CALL CENTER | Facility: HOSPITAL | Age: 70
End: 2025-01-14
Payer: MEDICARE

## 2025-01-14 NOTE — OUTREACH NOTE
Prep Survey      Flowsheet Row Responses   [facility] patient discharged from? Non-[facility]   Is LACE score < 7 ? Non-BH Discharge   Eligibility [facility]   Date of Admission 01/08/25   Date of Discharge 01/14/25   Discharge Disposition Home or Self Care   Discharge diagnosis Hematoma of left hip, initial encounter (Primary Dx),  Closed fracture of neck of left femur, initial encounter   Does the patient have one of the following disease processes/diagnoses(primary or secondary)? General Surgery   Prep survey completed? Yes            Hiral SAMANIEGO - Registered Nurse

## 2025-01-15 ENCOUNTER — TRANSITIONAL CARE MANAGEMENT TELEPHONE ENCOUNTER (OUTPATIENT)
Dept: CALL CENTER | Facility: HOSPITAL | Age: 70
End: 2025-01-15
Payer: MEDICARE

## 2025-01-15 NOTE — OUTREACH NOTE
Call Center TCM Note      Flowsheet Row Responses   [facility] patient discharged from? Non-BH   Does the patient have one of the following disease processes/diagnoses(primary or secondary)? General Surgery   TCM attempt successful? No   Unsuccessful attempts Attempt 1   Revoked Reason Other  [Pt no longer has a  PCP]            Marry Pierre RN    1/15/2025, 11:26 EST

## (undated) DEVICE — CATH DIAG EXPO M/ PK 6FR FL4/FR4 PIG 3PK

## (undated) DEVICE — ENCORE® LATEX MICRO SIZE 7.5, STERILE LATEX POWDER-FREE SURGICAL GLOVE: Brand: ENCORE

## (undated) DEVICE — DISPOSABLE BIPOLAR FORCEPS 5 1/2" (14CM) SEMKIN, 0.7MM TIP AND 12 FT. (3.6M) CABLE: Brand: KIRWAN

## (undated) DEVICE — ENCORE® LATEX MICRO SIZE 6.5, STERILE LATEX POWDER-FREE SURGICAL GLOVE: Brand: ENCORE

## (undated) DEVICE — BANDAGE,GAUZE,CONFORMING,4"X75",STRL,LF: Brand: MEDLINE

## (undated) DEVICE — DRAPE,REIN 53X77,STERILE: Brand: MEDLINE

## (undated) DEVICE — ST INF PRI SMRTSTE 20DRP 2VLV 24ML 117

## (undated) DEVICE — PK EXTREM UPPR 10

## (undated) DEVICE — DRSNG TELFA PAD NONADH STR 1S 3X4IN

## (undated) DEVICE — KT MANIFOLD CATHLAB CUST

## (undated) DEVICE — BANDAGE,GAUZE,BULKEE II,4.5"X4.1YD,STRL: Brand: MEDLINE

## (undated) DEVICE — AIRWY SZ11

## (undated) DEVICE — GW PERIPH VASC ADX J/TP SS .035 150CM 3MM

## (undated) DEVICE — SUT ETHLN 3/0 PC5 18IN 1893G

## (undated) DEVICE — GAMMEX® NON-LATEX SIZE 6.5, STERILE NEOPRENE POWDER-FREE SURGICAL GLOVE: Brand: GAMMEX

## (undated) DEVICE — DRSNG TELFA PAD NONADH STR 1S 3X8IN

## (undated) DEVICE — COVER,LIGHT HANDLE,FLX,1/PK: Brand: MEDLINE INDUSTRIES, INC.

## (undated) DEVICE — INTRO SHEATH ART/FEM ENGAGE .038 6F12CM

## (undated) DEVICE — NDL PERC 1PRT THNWALL W/BASEPLT 18G 7CM

## (undated) DEVICE — SPNG GZ WOVN 4X4IN 12PLY 10/BX STRL

## (undated) DEVICE — STCKNT STRL 4X48 IN

## (undated) DEVICE — CATH TEMPO 5F BER 100CM: Brand: TEMPO

## (undated) DEVICE — ST EXT IV SMARTSITE 2VLV SP M LL 5ML IV1

## (undated) DEVICE — 2963 MEDIPORE SOFT CLOTH TAPE 3 IN X 10 YD 12 RLS/CS: Brand: 3M™ MEDIPORE™

## (undated) DEVICE — STCKNT STRL 6X48 IN

## (undated) DEVICE — TAPE MICROFM 2IN LF

## (undated) DEVICE — PK CATH CARD 10

## (undated) DEVICE — UNDERGLV SURG BIOGEL INDICATOR LF PF 7.5